# Patient Record
Sex: MALE | Race: WHITE | NOT HISPANIC OR LATINO | Employment: OTHER | URBAN - METROPOLITAN AREA
[De-identification: names, ages, dates, MRNs, and addresses within clinical notes are randomized per-mention and may not be internally consistent; named-entity substitution may affect disease eponyms.]

---

## 2019-07-10 ENCOUNTER — HOSPITAL ENCOUNTER (OUTPATIENT)
Dept: PREADMISSION TESTING | Facility: OTHER | Age: 69
Discharge: HOME | End: 2019-07-10
Attending: ORTHOPAEDIC SURGERY
Payer: MEDICARE

## 2019-07-10 ENCOUNTER — ANESTHESIA EVENT (OUTPATIENT)
Dept: SURGERY | Facility: OTHER | Age: 69
End: 2019-07-10
Payer: MEDICARE

## 2019-07-10 VITALS
BODY MASS INDEX: 28.63 KG/M2 | HEIGHT: 70 IN | TEMPERATURE: 98 F | WEIGHT: 200 LBS | HEART RATE: 54 BPM | DIASTOLIC BLOOD PRESSURE: 66 MMHG | OXYGEN SATURATION: 96 % | SYSTOLIC BLOOD PRESSURE: 142 MMHG

## 2019-07-10 DIAGNOSIS — Z01.818 PREOP TESTING: Primary | ICD-10-CM

## 2019-07-10 LAB
ABO + RH BLD: NORMAL
BILIRUB UR QL STRIP: NEGATIVE
BLD GP AB SCN CELLS X3 SERPL QL: NORMAL
CLARITY UR: CLEAR
COLOR UR: YELLOW
GLUCOSE UR QL STRIP: NEGATIVE
HGB UR QL STRIP: NEGATIVE
KETONES UR QL STRIP: NEGATIVE
LEUKOCYTE ESTERASE UR QL STRIP: NEGATIVE
NITRITE UR QL STRIP: NEGATIVE
PH UR STRIP: 6 [PH] (ref 5–8)
PROT UR QL STRIP: NEGATIVE
SP GR UR STRIP: 1.01 (ref 1–1.03)
URN SPEC COLLECT METH UR: NORMAL
UROBILINOGEN UR STRIP-ACNC: NEGATIVE EU/DL

## 2019-07-10 PROCEDURE — 36415 COLL VENOUS BLD VENIPUNCTURE: CPT

## 2019-07-10 PROCEDURE — 86901 BLOOD TYPING SEROLOGIC RH(D): CPT

## 2019-07-10 PROCEDURE — 81003 URINALYSIS AUTO W/O SCOPE: CPT

## 2019-07-10 RX ORDER — METOPROLOL TARTRATE 50 MG/1
25 TABLET ORAL DAILY
COMMUNITY
End: 2020-09-23

## 2019-07-10 RX ORDER — IRBESARTAN 150 MG/1
150 TABLET ORAL NIGHTLY
COMMUNITY
End: 2022-01-12 | Stop reason: SDUPTHER

## 2019-07-10 RX ORDER — SODIUM CHLORIDE, SODIUM LACTATE, POTASSIUM CHLORIDE, CALCIUM CHLORIDE 600; 310; 30; 20 MG/100ML; MG/100ML; MG/100ML; MG/100ML
INJECTION, SOLUTION INTRAVENOUS CONTINUOUS
Status: CANCELLED | OUTPATIENT
Start: 2019-07-10

## 2019-07-10 RX ORDER — LIDOCAINE HYDROCHLORIDE 10 MG/ML
0.5 INJECTION, SOLUTION EPIDURAL; INFILTRATION; INTRACAUDAL; PERINEURAL ONCE
Status: CANCELLED | OUTPATIENT
Start: 2019-07-10 | End: 2019-07-10

## 2019-07-10 RX ORDER — ASCORBIC ACID 500 MG
500 TABLET ORAL DAILY
COMMUNITY
End: 2020-08-19

## 2019-07-10 RX ORDER — ASPIRIN 81 MG/1
81 TABLET ORAL DAILY
Status: ON HOLD | COMMUNITY
End: 2019-07-23 | Stop reason: HOSPADM

## 2019-07-10 RX ORDER — ROSUVASTATIN CALCIUM 20 MG/1
20 TABLET, COATED ORAL NIGHTLY
COMMUNITY
End: 2022-01-12 | Stop reason: SDUPTHER

## 2019-07-10 RX ORDER — LATANOPROST 50 UG/ML
1 SOLUTION/ DROPS OPHTHALMIC NIGHTLY
COMMUNITY
End: 2021-09-22

## 2019-07-10 RX ORDER — ACETAMINOPHEN 500 MG
1000 TABLET ORAL
Status: CANCELLED | OUTPATIENT
Start: 2019-07-10 | End: 2019-07-10

## 2019-07-10 NOTE — ANESTHESIA PREPROCEDURE EVALUATION
07/10/2019  Bandar French is a 68 y.o., male.    Anesthesia Evaluation    I have reviewed the Patient Summary Reports.    I have reviewed the Nursing Notes.   I have reviewed the Medications.     Review of Systems  Anesthesia Hx:  Denies Family Hx of Anesthesia complications.   Denies Personal Hx of Anesthesia complications.   Social:  Former Smoker    Hematology/Oncology:  Hematology Normal   Oncology Normal     EENT/Dental:  EENT/Dental Normal glaucoma   Cardiovascular:   Hypertension CAD asymptomatic CABG/stent (stent 2014 & 2017)  hyperlipidemia    Pulmonary:  Pulmonary Normal    Renal/:  Renal/ Normal     Hepatic/GI:  Hepatic/GI Normal    Musculoskeletal:  Musculoskeletal Normal    Neurological:  Neurology Normal    Endocrine:  Endocrine Normal    Dermatological:  Skin Normal    Psych:  Psychiatric Normal           Physical Exam  General:  Well nourished      Dental:  Dental Findings: In tact, Upper Dentures        Mental Status:  Mental Status Findings:  Cooperative, Alert and Oriented         Anesthesia Plan  Type of Anesthesia, risks & benefits discussed:  Anesthesia Type:  general  Patient's Preference:   Intra-op Monitoring Plan: standard ASA monitors  Intra-op Monitoring Plan Comments:   Post Op Pain Control Plan: multimodal analgesia  Post Op Pain Control Plan Comments:   Induction:   IV  Beta Blocker:         Informed Consent: Patient understands risks and agrees with Anesthesia plan.  Questions answered. Anesthesia consent signed with patient.  ASA Score: 3     Day of Surgery Review of History & Physical:    H&P update referred to the surgeon.     Anesthesia Plan Notes: Booked as General, discussed both SAB and General, pt accepts either    Dr. King, [facility] cardiology, for clearance/EKG. Labs today in [facility]        Ready For Surgery From Anesthesia Perspective.

## 2019-07-10 NOTE — DISCHARGE INSTRUCTIONS
PRE-ADMIT TESTING -  [phone]    [address]  [place] [facility]          Your surgery has been scheduled at [facility]. We are pleased to have the opportunity to serve you. For Further Information please call [phone].    On the day of surgery please report to the Information Desk on the 1st floor.    · CONTACT YOUR PHYSICIAN'S OFFICE THE DAY PRIOR TO YOUR SURGERY TO OBTAIN YOUR ARRIVAL TIME.     · The evening before surgery do not eat anything after 9 p.m. ( this includes hard candy, chewing gum and mints).  You may only have GATORADE, POWERADE AND WATER  from 9 p.m. until you leave your home.   DO NOT DRINK ANY LIQUIDS ON THE WAY TO THE HOSPITAL.      SPECIAL MEDICATION INSTRUCTIONS: TAKE medications checked off by the Anesthesiologist on your Medication List.    Angiogram Patients: Take medications as instructed by your physician, including aspirin.     Surgery Patients:    If you take ASPIRIN - Your PHYSICIAN/SURGEON will need to inform you IF/OR when you need to stop taking aspirin prior to your surgery.     Do Not take any medications containing IBUPROFEN.  Do Not Wear any make-up or dark nail polish   (especially eye make-up) to surgery. If you come to surgery with makeup on you will be required to remove the makeup or nail polish.    Do not shave your surgical area at least 5 days prior to your surgery. The surgical prep will be performed at the hospital according to Infection Control regulations.    Leave all valuables at home.   Do Not wear any jewelry or watches, including any metal in body piercings. Jewelry must be removed prior to coming to the hospital.  There is a possibility that rings that are unable to be removed may be cut off if they are on the surgical extremity.    Contact Lens must be removed before surgery. Either do not wear the contact lens or bring a case and solution for storage.  Please bring a container for eyeglasses or dentures as required.  Bring  any paperwork your physician has provided, such as consent forms,  history and physicals, doctor's orders, etc.   Bring comfortable clothes that are loose fitting to wear upon discharge. Take into consideration the type of surgery being performed.  Maintain your diet as advised per your physician the day prior to surgery.      Adequate rest the night before surgery is advised.   Park in the Parking lot behind the hospital or in the [facility] across the street from the parking lot. Parking is complimentary.  If you will be discharged the same day as your procedure, please arrange for a responsible adult to drive you home or to accompany you if traveling by taxi.   YOU WILL NOT BE PERMITTED TO DRIVE OR TO LEAVE THE HOSPITAL ALONE AFTER SURGERY.   It is strongly recommended that you arrange for someone to remain with you for the first 24 hrs following your surgery.    The Surgeon will speak to your family/visitor after your surgery regarding the outcome of your surgery and post op care.  The Surgeon may speak to you after your surgery, but there is a possibility you may not remember the details.  Please check with your family members regarding the conversation with the Surgeon.      Thank you for your cooperation.  The Staff of [facility].                Bathing Instructions with Hibiclens     Shower the evening before and morning of your procedure with Hibiclens:   Wash your face with water and your regular face wash/soap   Apply Hibiclens directly on your skin or on a wet washcloth and wash gently. When showering: Move away from the shower stream when applying Hibiclens to avoid rinsing off too soon.   Rinse thoroughly with warm water   Do not dilute Hibiclens         Dry off as usual, do not use any deodorant, powder, body lotions, perfume, after shave or cologne.

## 2019-07-22 ENCOUNTER — HOSPITAL ENCOUNTER (OUTPATIENT)
Facility: OTHER | Age: 69
Discharge: HOME HEALTH | End: 2019-07-23
Attending: ORTHOPAEDIC SURGERY | Admitting: ORTHOPAEDIC SURGERY
Payer: MEDICARE

## 2019-07-22 ENCOUNTER — ANESTHESIA (OUTPATIENT)
Dept: SURGERY | Facility: OTHER | Age: 69
End: 2019-07-22
Payer: MEDICARE

## 2019-07-22 DIAGNOSIS — M17.11 OSTEOARTHRITIS OF RIGHT KNEE: ICD-10-CM

## 2019-07-22 DIAGNOSIS — M17.11 PRIMARY OSTEOARTHRITIS OF RIGHT KNEE: Primary | ICD-10-CM

## 2019-07-22 LAB
POCT GLUCOSE: 186 MG/DL (ref 70–110)
POCT GLUCOSE: 206 MG/DL (ref 70–110)

## 2019-07-22 PROCEDURE — 25000003 PHARM REV CODE 250: Performed by: ANESTHESIOLOGY

## 2019-07-22 PROCEDURE — 97116 GAIT TRAINING THERAPY: CPT

## 2019-07-22 PROCEDURE — 37000009 HC ANESTHESIA EA ADD 15 MINS: Performed by: ORTHOPAEDIC SURGERY

## 2019-07-22 PROCEDURE — 25000003 PHARM REV CODE 250: Performed by: ORTHOPAEDIC SURGERY

## 2019-07-22 PROCEDURE — 97110 THERAPEUTIC EXERCISES: CPT

## 2019-07-22 PROCEDURE — C1776 JOINT DEVICE (IMPLANTABLE): HCPCS | Performed by: ORTHOPAEDIC SURGERY

## 2019-07-22 PROCEDURE — 37000008 HC ANESTHESIA 1ST 15 MINUTES: Performed by: ORTHOPAEDIC SURGERY

## 2019-07-22 PROCEDURE — 25000003 PHARM REV CODE 250: Performed by: NURSE PRACTITIONER

## 2019-07-22 PROCEDURE — 63600175 PHARM REV CODE 636 W HCPCS: Performed by: ORTHOPAEDIC SURGERY

## 2019-07-22 PROCEDURE — 94761 N-INVAS EAR/PLS OXIMETRY MLT: CPT

## 2019-07-22 PROCEDURE — 36000711: Performed by: ORTHOPAEDIC SURGERY

## 2019-07-22 PROCEDURE — 36000710: Performed by: ORTHOPAEDIC SURGERY

## 2019-07-22 PROCEDURE — 25000003 PHARM REV CODE 250: Performed by: NURSE ANESTHETIST, CERTIFIED REGISTERED

## 2019-07-22 PROCEDURE — C9290 INJ, BUPIVACAINE LIPOSOME: HCPCS | Performed by: ORTHOPAEDIC SURGERY

## 2019-07-22 PROCEDURE — 63600175 PHARM REV CODE 636 W HCPCS: Performed by: NURSE ANESTHETIST, CERTIFIED REGISTERED

## 2019-07-22 PROCEDURE — 97161 PT EVAL LOW COMPLEX 20 MIN: CPT

## 2019-07-22 PROCEDURE — 71000039 HC RECOVERY, EACH ADD'L HOUR: Performed by: ORTHOPAEDIC SURGERY

## 2019-07-22 PROCEDURE — 27201423 OPTIME MED/SURG SUP & DEVICES STERILE SUPPLY: Performed by: ORTHOPAEDIC SURGERY

## 2019-07-22 PROCEDURE — 94799 UNLISTED PULMONARY SVC/PX: CPT

## 2019-07-22 PROCEDURE — C1713 ANCHOR/SCREW BN/BN,TIS/BN: HCPCS | Performed by: ORTHOPAEDIC SURGERY

## 2019-07-22 PROCEDURE — 63600175 PHARM REV CODE 636 W HCPCS: Performed by: SPECIALIST

## 2019-07-22 PROCEDURE — 71000033 HC RECOVERY, INTIAL HOUR: Performed by: ORTHOPAEDIC SURGERY

## 2019-07-22 DEVICE — IMPLANTABLE DEVICE: Type: IMPLANTABLE DEVICE | Site: KNEE | Status: FUNCTIONAL

## 2019-07-22 DEVICE — CEMENT REFOBACIN BCR 1X40: Type: IMPLANTABLE DEVICE | Site: KNEE | Status: FUNCTIONAL

## 2019-07-22 DEVICE — TIBIA STM PSN 5 DEG SZ F R: Type: IMPLANTABLE DEVICE | Site: KNEE | Status: FUNCTIONAL

## 2019-07-22 DEVICE — COMP FEM CR PSN CMT SZ 8 R: Type: IMPLANTABLE DEVICE | Site: KNEE | Status: FUNCTIONAL

## 2019-07-22 RX ORDER — ROSUVASTATIN CALCIUM 10 MG/1
20 TABLET, COATED ORAL NIGHTLY
Status: DISCONTINUED | OUTPATIENT
Start: 2019-07-22 | End: 2019-07-23 | Stop reason: HOSPADM

## 2019-07-22 RX ORDER — DIPHENHYDRAMINE HYDROCHLORIDE 50 MG/ML
25 INJECTION INTRAMUSCULAR; INTRAVENOUS EVERY 6 HOURS PRN
Status: DISCONTINUED | OUTPATIENT
Start: 2019-07-22 | End: 2019-07-22 | Stop reason: HOSPADM

## 2019-07-22 RX ORDER — GLUCAGON 1 MG
1 KIT INJECTION
Status: DISCONTINUED | OUTPATIENT
Start: 2019-07-22 | End: 2019-07-23 | Stop reason: HOSPADM

## 2019-07-22 RX ORDER — DIPHENHYDRAMINE HYDROCHLORIDE 50 MG/ML
25 INJECTION INTRAMUSCULAR; INTRAVENOUS EVERY 6 HOURS PRN
Status: DISCONTINUED | OUTPATIENT
Start: 2019-07-22 | End: 2019-07-23 | Stop reason: HOSPADM

## 2019-07-22 RX ORDER — IBUPROFEN 200 MG
24 TABLET ORAL
Status: DISCONTINUED | OUTPATIENT
Start: 2019-07-22 | End: 2019-07-23 | Stop reason: HOSPADM

## 2019-07-22 RX ORDER — ACETAMINOPHEN 500 MG
1000 TABLET ORAL
Status: DISCONTINUED | OUTPATIENT
Start: 2019-07-22 | End: 2019-07-22 | Stop reason: SDUPTHER

## 2019-07-22 RX ORDER — SODIUM CHLORIDE 0.9 % (FLUSH) 0.9 %
10 SYRINGE (ML) INJECTION
Status: DISCONTINUED | OUTPATIENT
Start: 2019-07-22 | End: 2019-07-23 | Stop reason: HOSPADM

## 2019-07-22 RX ORDER — KETOROLAC TROMETHAMINE 30 MG/ML
INJECTION, SOLUTION INTRAMUSCULAR; INTRAVENOUS
Status: DISCONTINUED | OUTPATIENT
Start: 2019-07-22 | End: 2019-07-22 | Stop reason: HOSPADM

## 2019-07-22 RX ORDER — OXYCODONE HYDROCHLORIDE 5 MG/1
10 TABLET ORAL EVERY 4 HOURS PRN
Status: DISCONTINUED | OUTPATIENT
Start: 2019-07-22 | End: 2019-07-23 | Stop reason: HOSPADM

## 2019-07-22 RX ORDER — ASPIRIN 325 MG
325 TABLET ORAL EVERY 12 HOURS
Status: DISCONTINUED | OUTPATIENT
Start: 2019-07-23 | End: 2019-07-23 | Stop reason: HOSPADM

## 2019-07-22 RX ORDER — HYDROMORPHONE HYDROCHLORIDE 1 MG/ML
0.5 INJECTION, SOLUTION INTRAMUSCULAR; INTRAVENOUS; SUBCUTANEOUS EVERY 4 HOURS PRN
Status: DISCONTINUED | OUTPATIENT
Start: 2019-07-22 | End: 2019-07-23 | Stop reason: HOSPADM

## 2019-07-22 RX ORDER — MEPERIDINE HYDROCHLORIDE 25 MG/ML
12.5 INJECTION INTRAMUSCULAR; INTRAVENOUS; SUBCUTANEOUS ONCE AS NEEDED
Status: DISCONTINUED | OUTPATIENT
Start: 2019-07-22 | End: 2019-07-22 | Stop reason: HOSPADM

## 2019-07-22 RX ORDER — CEFAZOLIN SODIUM 2 G/50ML
2 SOLUTION INTRAVENOUS
Status: COMPLETED | OUTPATIENT
Start: 2019-07-22 | End: 2019-07-23

## 2019-07-22 RX ORDER — MIDAZOLAM HYDROCHLORIDE 1 MG/ML
INJECTION INTRAMUSCULAR; INTRAVENOUS
Status: DISCONTINUED | OUTPATIENT
Start: 2019-07-22 | End: 2019-07-22

## 2019-07-22 RX ORDER — ONDANSETRON 2 MG/ML
8 INJECTION INTRAMUSCULAR; INTRAVENOUS EVERY 8 HOURS PRN
Status: DISCONTINUED | OUTPATIENT
Start: 2019-07-22 | End: 2019-07-23 | Stop reason: HOSPADM

## 2019-07-22 RX ORDER — IRBESARTAN 150 MG/1
150 TABLET ORAL NIGHTLY
Status: DISCONTINUED | OUTPATIENT
Start: 2019-07-22 | End: 2019-07-23 | Stop reason: HOSPADM

## 2019-07-22 RX ORDER — ACETAMINOPHEN 500 MG
1000 TABLET ORAL
Status: COMPLETED | OUTPATIENT
Start: 2019-07-22 | End: 2019-07-22

## 2019-07-22 RX ORDER — HYDROMORPHONE HYDROCHLORIDE 2 MG/ML
0.4 INJECTION, SOLUTION INTRAMUSCULAR; INTRAVENOUS; SUBCUTANEOUS EVERY 5 MIN PRN
Status: DISCONTINUED | OUTPATIENT
Start: 2019-07-22 | End: 2019-07-22 | Stop reason: HOSPADM

## 2019-07-22 RX ORDER — OXYCODONE HYDROCHLORIDE 5 MG/1
5 TABLET ORAL
Status: DISCONTINUED | OUTPATIENT
Start: 2019-07-22 | End: 2019-07-22 | Stop reason: HOSPADM

## 2019-07-22 RX ORDER — DOCUSATE SODIUM 100 MG/1
100 CAPSULE, LIQUID FILLED ORAL EVERY 12 HOURS
Status: DISCONTINUED | OUTPATIENT
Start: 2019-07-22 | End: 2019-07-23 | Stop reason: HOSPADM

## 2019-07-22 RX ORDER — LIDOCAINE HYDROCHLORIDE 10 MG/ML
0.5 INJECTION, SOLUTION EPIDURAL; INFILTRATION; INTRACAUDAL; PERINEURAL ONCE
Status: DISCONTINUED | OUTPATIENT
Start: 2019-07-22 | End: 2019-07-22 | Stop reason: HOSPADM

## 2019-07-22 RX ORDER — SODIUM CHLORIDE 0.9 % (FLUSH) 0.9 %
3 SYRINGE (ML) INJECTION
Status: DISCONTINUED | OUTPATIENT
Start: 2019-07-22 | End: 2019-07-23 | Stop reason: HOSPADM

## 2019-07-22 RX ORDER — OXYCODONE HYDROCHLORIDE 5 MG/1
5 TABLET ORAL EVERY 4 HOURS PRN
Status: DISCONTINUED | OUTPATIENT
Start: 2019-07-22 | End: 2019-07-23 | Stop reason: HOSPADM

## 2019-07-22 RX ORDER — BUPIVACAINE HCL/EPINEPHRINE 0.25-.0005
VIAL (ML) INJECTION
Status: DISCONTINUED | OUTPATIENT
Start: 2019-07-22 | End: 2019-07-22 | Stop reason: HOSPADM

## 2019-07-22 RX ORDER — SODIUM CHLORIDE, SODIUM LACTATE, POTASSIUM CHLORIDE, CALCIUM CHLORIDE 600; 310; 30; 20 MG/100ML; MG/100ML; MG/100ML; MG/100ML
INJECTION, SOLUTION INTRAVENOUS CONTINUOUS
Status: DISCONTINUED | OUTPATIENT
Start: 2019-07-22 | End: 2019-07-23 | Stop reason: HOSPADM

## 2019-07-22 RX ORDER — ONDANSETRON 2 MG/ML
4 INJECTION INTRAMUSCULAR; INTRAVENOUS DAILY PRN
Status: DISCONTINUED | OUTPATIENT
Start: 2019-07-22 | End: 2019-07-22 | Stop reason: HOSPADM

## 2019-07-22 RX ORDER — CEFAZOLIN SODIUM 1 G/3ML
2 INJECTION, POWDER, FOR SOLUTION INTRAMUSCULAR; INTRAVENOUS
Status: COMPLETED | OUTPATIENT
Start: 2019-07-22 | End: 2019-07-22

## 2019-07-22 RX ORDER — ACETAMINOPHEN 500 MG
1000 TABLET ORAL EVERY 8 HOURS
Status: COMPLETED | OUTPATIENT
Start: 2019-07-22 | End: 2019-07-23

## 2019-07-22 RX ORDER — IBUPROFEN 200 MG
16 TABLET ORAL
Status: DISCONTINUED | OUTPATIENT
Start: 2019-07-22 | End: 2019-07-23 | Stop reason: HOSPADM

## 2019-07-22 RX ORDER — INSULIN ASPART 100 [IU]/ML
0-5 INJECTION, SOLUTION INTRAVENOUS; SUBCUTANEOUS
Status: DISCONTINUED | OUTPATIENT
Start: 2019-07-22 | End: 2019-07-23 | Stop reason: HOSPADM

## 2019-07-22 RX ORDER — ACETAMINOPHEN 325 MG/1
650 TABLET ORAL EVERY 6 HOURS PRN
Status: DISCONTINUED | OUTPATIENT
Start: 2019-07-23 | End: 2019-07-23 | Stop reason: HOSPADM

## 2019-07-22 RX ORDER — METOPROLOL TARTRATE 50 MG/1
50 TABLET ORAL 2 TIMES DAILY
Status: DISCONTINUED | OUTPATIENT
Start: 2019-07-22 | End: 2019-07-23 | Stop reason: HOSPADM

## 2019-07-22 RX ORDER — MUPIROCIN 20 MG/G
1 OINTMENT TOPICAL 2 TIMES DAILY
Status: DISCONTINUED | OUTPATIENT
Start: 2019-07-22 | End: 2019-07-23 | Stop reason: HOSPADM

## 2019-07-22 RX ORDER — EPHEDRINE SULFATE 50 MG/ML
INJECTION, SOLUTION INTRAVENOUS
Status: DISCONTINUED | OUTPATIENT
Start: 2019-07-22 | End: 2019-07-22

## 2019-07-22 RX ORDER — PROPOFOL 10 MG/ML
VIAL (ML) INTRAVENOUS
Status: DISCONTINUED | OUTPATIENT
Start: 2019-07-22 | End: 2019-07-22

## 2019-07-22 RX ORDER — MIDAZOLAM HYDROCHLORIDE 5 MG/ML
INJECTION INTRAMUSCULAR; INTRAVENOUS
Status: DISCONTINUED | OUTPATIENT
Start: 2019-07-22 | End: 2019-07-22

## 2019-07-22 RX ORDER — PROPOFOL 10 MG/ML
VIAL (ML) INTRAVENOUS CONTINUOUS PRN
Status: DISCONTINUED | OUTPATIENT
Start: 2019-07-22 | End: 2019-07-22

## 2019-07-22 RX ORDER — ROPIVACAINE HYDROCHLORIDE 5 MG/ML
INJECTION, SOLUTION EPIDURAL; INFILTRATION; PERINEURAL
Status: COMPLETED | OUTPATIENT
Start: 2019-07-22 | End: 2019-07-22

## 2019-07-22 RX ORDER — DEXTROSE MONOHYDRATE AND SODIUM CHLORIDE 5; .9 G/100ML; G/100ML
INJECTION, SOLUTION INTRAVENOUS CONTINUOUS
Status: DISCONTINUED | OUTPATIENT
Start: 2019-07-22 | End: 2019-07-23

## 2019-07-22 RX ORDER — LIDOCAINE HYDROCHLORIDE 10 MG/ML
INJECTION, SOLUTION INTRAVENOUS
Status: DISCONTINUED | OUTPATIENT
Start: 2019-07-22 | End: 2019-07-22

## 2019-07-22 RX ORDER — FENTANYL CITRATE 50 UG/ML
INJECTION, SOLUTION INTRAMUSCULAR; INTRAVENOUS
Status: DISCONTINUED | OUTPATIENT
Start: 2019-07-22 | End: 2019-07-22

## 2019-07-22 RX ORDER — ONDANSETRON 2 MG/ML
INJECTION INTRAMUSCULAR; INTRAVENOUS
Status: DISCONTINUED | OUTPATIENT
Start: 2019-07-22 | End: 2019-07-22

## 2019-07-22 RX ORDER — CELECOXIB 200 MG/1
200 CAPSULE ORAL 2 TIMES DAILY
Status: DISCONTINUED | OUTPATIENT
Start: 2019-07-22 | End: 2019-07-23 | Stop reason: HOSPADM

## 2019-07-22 RX ORDER — TRANEXAMIC ACID 100 MG/ML
INJECTION, SOLUTION INTRAVENOUS
Status: DISCONTINUED | OUTPATIENT
Start: 2019-07-22 | End: 2019-07-22 | Stop reason: HOSPADM

## 2019-07-22 RX ORDER — FAMOTIDINE 20 MG/1
20 TABLET, FILM COATED ORAL 2 TIMES DAILY
Status: DISCONTINUED | OUTPATIENT
Start: 2019-07-22 | End: 2019-07-23 | Stop reason: HOSPADM

## 2019-07-22 RX ORDER — POLYETHYLENE GLYCOL 3350 17 G/17G
17 POWDER, FOR SOLUTION ORAL DAILY
Status: DISCONTINUED | OUTPATIENT
Start: 2019-07-23 | End: 2019-07-23 | Stop reason: HOSPADM

## 2019-07-22 RX ADMIN — ROSUVASTATIN CALCIUM 20 MG: 10 TABLET, COATED ORAL at 08:07

## 2019-07-22 RX ADMIN — ONDANSETRON 4 MG: 2 INJECTION INTRAMUSCULAR; INTRAVENOUS at 08:07

## 2019-07-22 RX ADMIN — EPHEDRINE SULFATE 10 MG: 50 INJECTION INTRAMUSCULAR; INTRAVENOUS; SUBCUTANEOUS at 09:07

## 2019-07-22 RX ADMIN — PROPOFOL 40 MG: 10 INJECTION, EMULSION INTRAVENOUS at 08:07

## 2019-07-22 RX ADMIN — CELECOXIB 200 MG: 200 CAPSULE ORAL at 08:07

## 2019-07-22 RX ADMIN — VANCOMYCIN HYDROCHLORIDE 1250 MG: 100 INJECTION, POWDER, LYOPHILIZED, FOR SOLUTION INTRAVENOUS at 08:07

## 2019-07-22 RX ADMIN — ROPIVACAINE HYDROCHLORIDE 3 ML: 5 INJECTION, SOLUTION EPIDURAL; INFILTRATION; PERINEURAL at 08:07

## 2019-07-22 RX ADMIN — SODIUM CHLORIDE, SODIUM LACTATE, POTASSIUM CHLORIDE, AND CALCIUM CHLORIDE: 600; 310; 30; 20 INJECTION, SOLUTION INTRAVENOUS at 09:07

## 2019-07-22 RX ADMIN — EPHEDRINE SULFATE 5 MG: 50 INJECTION INTRAMUSCULAR; INTRAVENOUS; SUBCUTANEOUS at 09:07

## 2019-07-22 RX ADMIN — CELECOXIB 200 MG: 200 CAPSULE ORAL at 01:07

## 2019-07-22 RX ADMIN — OXYCODONE HYDROCHLORIDE 10 MG: 5 TABLET ORAL at 05:07

## 2019-07-22 RX ADMIN — ACETAMINOPHEN 1000 MG: 500 TABLET, FILM COATED ORAL at 07:07

## 2019-07-22 RX ADMIN — DOCUSATE SODIUM 100 MG: 100 CAPSULE, LIQUID FILLED ORAL at 01:07

## 2019-07-22 RX ADMIN — EPHEDRINE SULFATE 5 MG: 50 INJECTION INTRAMUSCULAR; INTRAVENOUS; SUBCUTANEOUS at 08:07

## 2019-07-22 RX ADMIN — ACETAMINOPHEN 1000 MG: 500 TABLET ORAL at 03:07

## 2019-07-22 RX ADMIN — FAMOTIDINE 20 MG: 20 TABLET, FILM COATED ORAL at 01:07

## 2019-07-22 RX ADMIN — CEFAZOLIN 2 G: 330 INJECTION, POWDER, FOR SOLUTION INTRAMUSCULAR; INTRAVENOUS at 08:07

## 2019-07-22 RX ADMIN — SODIUM CHLORIDE, SODIUM LACTATE, POTASSIUM CHLORIDE, AND CALCIUM CHLORIDE: 600; 310; 30; 20 INJECTION, SOLUTION INTRAVENOUS at 08:07

## 2019-07-22 RX ADMIN — SODIUM CHLORIDE, SODIUM LACTATE, POTASSIUM CHLORIDE, AND CALCIUM CHLORIDE: 600; 310; 30; 20 INJECTION, SOLUTION INTRAVENOUS at 07:07

## 2019-07-22 RX ADMIN — MUPIROCIN 1 G: 20 OINTMENT TOPICAL at 08:07

## 2019-07-22 RX ADMIN — CEFAZOLIN SODIUM 2 G: 2 SOLUTION INTRAVENOUS at 05:07

## 2019-07-22 RX ADMIN — DEXTROSE AND SODIUM CHLORIDE: 5; .9 INJECTION, SOLUTION INTRAVENOUS at 12:07

## 2019-07-22 RX ADMIN — LIDOCAINE HYDROCHLORIDE 50 MG: 10 INJECTION, SOLUTION INTRAVENOUS at 08:07

## 2019-07-22 RX ADMIN — PROPOFOL 75 MCG/KG/MIN: 10 INJECTION, EMULSION INTRAVENOUS at 08:07

## 2019-07-22 RX ADMIN — OXYCODONE HYDROCHLORIDE 10 MG: 5 TABLET ORAL at 01:07

## 2019-07-22 RX ADMIN — MIDAZOLAM HYDROCHLORIDE 2 MG: 1 INJECTION, SOLUTION INTRAMUSCULAR; INTRAVENOUS at 08:07

## 2019-07-22 RX ADMIN — MUPIROCIN 1 G: 20 OINTMENT TOPICAL at 01:07

## 2019-07-22 RX ADMIN — DOCUSATE SODIUM 100 MG: 100 CAPSULE, LIQUID FILLED ORAL at 08:07

## 2019-07-22 RX ADMIN — VANCOMYCIN HYDROCHLORIDE 1250 MG: 100 INJECTION, POWDER, LYOPHILIZED, FOR SOLUTION INTRAVENOUS at 07:07

## 2019-07-22 RX ADMIN — ACETAMINOPHEN 1000 MG: 500 TABLET ORAL at 09:07

## 2019-07-22 RX ADMIN — FAMOTIDINE 20 MG: 20 TABLET, FILM COATED ORAL at 08:07

## 2019-07-22 NOTE — PT/OT/SLP PROGRESS
Physical Therapy Treatment    Patient Name:  Bandar French   MRN:  [id number]    Recommendations:     Discharge Recommendations:  home health PT, home   Discharge Equipment Recommendations: shower chair, commode   Barriers to discharge: None    Assessment:     Bandar French is a 68 y.o. male admitted with a medical diagnosis of Osteoarthritis of right knee.  He presents with the following impairments/functional limitations:  weakness, impaired functional mobilty, gait instability, orthopedic precautions, impaired balance, decreased lower extremity function. These impairments inhibit the patient from independently and safely participating in desired functional tasks such as transfers, ambulating within home, cooking, cleaning, and community ambulation.     Patient tolerated second session very well as evidenced by increased gait distance and improving independence with therapeutic exercises. Patient required moderate cues for gait deviations, see below. Patient would benefit from  PT upon discharge in order to optimize functional mobility outcomes and maximize patient safety.     Rehab Prognosis: Good; patient would benefit from acute skilled PT services to address these deficits and reach maximum level of function.    Recent Surgery: Procedure(s) (LRB):  ARTHROPLASTY, KNEE, TOTAL (Right) Day of Surgery    Plan:     During this hospitalization, patient to be seen BID to address the identified rehab impairments via gait training, therapeutic exercises, therapeutic activities, neuromuscular re-education and progress toward the following goals:    · Plan of Care Expires:  07/29/19    Subjective     Chief Complaint: Minimal pain  Patient/Family Comments/goals: Agreeable to ambulation and therapeutic exercises   Pain/Comfort:  · Pain Rating 1: 2/10  · Location - Side 1: Right  · Location - Orientation 1: generalized  · Location 1: knee  · Pain Addressed 1: Reposition, Distraction  · Pain Rating Post-Intervention 1:  2/10      Objective:     Communicated with RN prior to session.  Patient found up in chair with   upon PT entry to room.     General Precautions: Standard, fall   Orthopedic Precautions:RLE weight bearing as tolerated   Braces: N/A     Functional Mobility:  · Bed Mobility:     · Sit to Supine: stand by assistance  · No verbal cues necessary   · No use of HB features   · Transfers:     · Sit <> Stand:  stand by assistance with rolling walker  · Verbal cues for hand placement for optimal safety   · Gait:   · Ambulated 150' with RW, SBA/CGA  · PT demonstrated the following prior to patient trial: heel contact upon initial contact, toe off upon initial swing, and adequate knee flexion during swing phase of surgical limb. PT also demonstrated step-through pattern  · Pt required moderate verbal cues for heel contact upon initial contact  · Patient with moderately poor swing phase of surgical limb; required demonstration 2/2 poor knee flexion  · Balance:   Static sitting: SBA  Static standing: CGA/SBA with RW        AM-PAC 6 CLICK MOBILITY          Therapeutic Activities and Exercises:  · Pt performed supine therapeutic exercises for strengthening and to promote circulation, pressure relief, and functional ROM with verbal, visual and tactile cues for correct performance including:   · Bilateral ankle pumps x 10 reps  · Bilateral quad sets x 10 reps  · Bilateral glute sets x 10 reps   · RLE heel slides x 10 reps   · RLE hip ABD x 10 reps  · RLE SLR x 10 reps    Patient left supine with all lines intact, call button in reach and RN notified..    GOALS:   Multidisciplinary Problems     Physical Therapy Goals        Problem: Physical Therapy Goal    Goal Priority Disciplines Outcome Goal Variances Interventions   Physical Therapy Goal     PT, PT/OT      Description:  PT goals to be met by 7/29/19:  1. Pt will perform transfers (supine <-> sit, sit <-> stand, bed <-> chair) with mod I and LRAD.  2. Pt will ambulate 300 ft or  "greater with mod I and LRAD.  3. Pt will ascend/descend 1 6" curb style step with LRAD and supervision to simulate home environment.                      Time Tracking:     PT Received On: 07/22/19  PT Start Time: 1618     PT Stop Time: 1647  PT Total Time (min): 29 min     Billable Minutes: Gait Training 20 and Therapeutic Exercise 9    Treatment Type: Treatment  PT/PTA: PT     PTA Visit Number: 0     Renetta Brown, PT  07/22/2019  "

## 2019-07-22 NOTE — PT/OT/SLP EVAL
"Physical Therapy Evaluation    Patient Name:  Bandar French   MRN:  [id number]    Recommendations:     Discharge Recommendations:  home health PT, home   Discharge Equipment Recommendations: shower chair, commode   Barriers to discharge: None    Assessment:     Bandar French is a 68 y.o. male admitted with a medical diagnosis of Osteoarthritis of right knee.  He presents with the following impairments/functional limitations:  weakness, impaired functional mobilty, decreased safety awareness, impaired endurance, gait instability, pain, decreased ROM, edema, decreased lower extremity function, orthopedic precautions.    PT evaluation completed and goals established. Pt tolerated PT eval well with no adverse reactions.Pt able to perform transfers with CGA. Ambulated 200 ft with CGA and RW. Pt limited by pain and decreased ROM at R knee.    Rehab Prognosis: Good; patient would benefit from acute skilled PT services to address these deficits and reach maximum level of function.    Recent Surgery: Procedure(s) (LRB):  ARTHROPLASTY, KNEE, TOTAL (Right) Day of Surgery    Plan:     During this hospitalization, patient to be seen BID to address the identified rehab impairments via gait training, therapeutic activities, therapeutic exercises and progress toward the following goals:    · Plan of Care Expires:  07/29/19    Subjective     Chief Complaint: "I want to be get out bed."  Patient/Family Comments/goals: "to go home"  Pain/Comfort:  · Pain Rating 1: 1/10  · Location - Side 1: Right  · Location 1: knee  · Pain Rating Post-Intervention 1: 6/10  · Location - Side 2: Right  · Location 2: knee  · Pain Addressed 2: Reposition, Distraction, Other (see comments)(cryotherapy applied after session)    Patients cultural, spiritual, Muslim conflicts given the current situation: no    Living Environment:  Pt lives with his wife in a single-story home. Pt has 6 curb-style steps up to enter through the front door. Pt has 20 ft " incline to enter through back door. Pt has walk-in shower in bathroom.  Prior to admission, patients level of function was independent for mobility and ADLs.  Equipment used at home: none.  DME owned (not currently used): rolling walker.  Upon discharge, patient will have assistance from his wife.    Objective:     Communicated with ortho nurse navigator, Maricruz, prior to session.  Patient found supine with bashir catheter, peripheral IV  upon PT entry to room.    General Precautions: Standard, fall   Orthopedic Precautions:RLE weight bearing as tolerated   Braces: N/A     Exams:  · Cognitive Exam:  Patient is oriented to Person, Place, Time and Situation  · Gross Motor Coordination:  WFL  · Sensation: Intact  · RLE ROM: WFL except at R knee. R knee flexion 0-75 deg. R knee extension lacking 5 deg.  · RLE Strength: WFL except at R knee. 5/5 MMT at hip and ankle. 4-/5 MMT at knee.  · LLE ROM: WNL  · LLE Strength: WNL    Functional Mobility:  · Bed Mobility:     · Supine to Sit: contact guard assistance  · Transfers:     · Sit to Stand:  contact guard assistance with rolling walker  · Bed to Chair: contact guard assistance with  rolling walker  using  Step Transfer  · Gait: 200 ft with RW and CGA.      Therapeutic Activities and Exercises:  · Pt supine in bed upon entering room and agreeable to PT eval/treatment.  · Performed supine -> sit with CGA. Sat EOB x 2 min with SBA. Demonstrated normal static sitting balance.  · Performed sit <-> stand with CGA and RW. Required verbal cues for hand placement.  · Amb 200 ft with RW and CGA. Demonstrated step-to gait pattern with decreased L step length. Improved with verbal cues. No LOB noted. Demonstrated good balance during ambulation. Required VC for management of RW during turns.  · Perf therex: glut sets, quad sets, AP, and SLR x 10 reps on BLE.    Pt tolerated PT eval and treatment well with no adverse reactions. Pt limited by pain and decreased ROM at R knee.    AM-PAC 6  "CLICK MOBILITY  Total Score:17     Patient left up in chair with all lines intact and call button in reach.    GOALS:   Multidisciplinary Problems     Physical Therapy Goals        Problem: Physical Therapy Goal    Goal Priority Disciplines Outcome Goal Variances Interventions   Physical Therapy Goal     PT, PT/OT      Description:  PT goals to be met by 7/29/19:  1. Pt will perform transfers (supine <-> sit, sit <-> stand, bed <-> chair) with mod I and LRAD.  2. Pt will ambulate 300 ft or greater with mod I and LRAD.  3. Pt will ascend/descend 1 6" curb style step with LRAD and supervision to simulate home environment.                      History:     Past Medical History:   Diagnosis Date    Arthritis     Coronary artery disease 2014    stent    Glaucoma     Hyperlipidemia     Hypertension     Impaired fasting glucose        Past Surgical History:   Procedure Laterality Date    CORONARY STENT PLACEMENT  2014    EYE SURGERY      tendon repair on right; bleph bilateral    HAND SURGERY Right     KNEE ARTHROSCOPY Left 1967    KNEE ARTHROSCOPY Right 2012    TONSILLECTOMY         Time Tracking:     PT Received On: 07/22/19  PT Start Time: 1408     PT Stop Time: 1440  PT Total Time (min): 32 min     Billable Minutes: Evaluation 15 and Therapeutic Exercise 17      Ashley Wooten, PT  07/22/2019  "

## 2019-07-22 NOTE — DISCHARGE INSTRUCTIONS
Knee Athroscopy Discharge Instructions    1) Pain: After surgery your knee will be sore. The knee will likely have been injected with a numbing medicine (Exparel) prior to completion of surgery for pain control. This is indicated on a green bracelet that you will continue to wear for 4 days after surgery. You will   also get a prescription for pain control before you leave the hospital. Ice and elevation will assist with pain control.    a) Apply ice as much as possible for the first 72 hours. After 72 hours, apply ice for 20minutes 3-4 times a day, after therapy,after exercising or whenever experiencing pain. Avoid direct skin contact with ice to prevent frostbit.          b) Elevate the affected leg with the pillow the length of the leg  to assist with swelling and pain.  2) Incision Care:  a) Some drainage from the incision in the first 72 hours is normal. If drainage is excessive,remove bandage,  pat dry, cover with sterile gauze and secure with tape. Notify physician about excessive drainage. Staples will be removed 14 days after surgery   3) Activity:  a) Perform exercises 2-3 x day.  b) You may shower 48 hours after surgery providing the dressing is waterproof. No tub or hot tub usage.DR SIMON PATIENTS CANNOT SHOWER UNTIL STAPLES ARE REMOVED. Support help is mandatory during showering. If the dressing becomes wet, replace with a new dressing.   c) Wear thigh high dg hose stockings for 3 weeks after surgery .You may remove stockings for 1- 2 hours during the day only. Send patient home with an extra pair dg hose.If your physician orders the CPM machine you are to use it for 2 hours in the am and 2 hours in the pm.Increase the flexion 5 degrees each session if tolerated. This is not to replace your exercise program.  4) For lifetime after your replacement surgery, you may need antibiotic coverage before dental or minor surgical procedures.  5) Possible Complications: Call Surgeon  a) Infection: Report these  signs and symptoms to your surgeon.  i) Unexpected redness around incision   ii) Persistent drainage from wound after 72 hours.  iii) Temperature ,can be treated with Tylenol. Do not go to the emergency room or urgent care center, call your surgeon.   iv) Additional swelling  v) Pain not controlled with current pain medication  b) Blood Clot: Report theses signs and symptoms to your surgeon  i) Unusual pain  ii) Red or discolored skin  iii) Swelling in the leg  iv) Unusual warm skin

## 2019-07-22 NOTE — PLAN OF CARE
ARLETTE met with pt at bedside to complete discharge assessment, verified PCP and uses [facility] on [address].  Pt has RW, needs BSC and will purchase SC later.  ARLETTE gave pt a list of  agencies and pt would like to be placed with Dr. Westbrook's preferred provider and wife signed choice form.  Wife will provide transportation home.     07/22/19 5722   Discharge Assessment   Assessment Type Discharge Planning Assessment   Confirmed/corrected address and phone number on facesheet? Yes   Assessment information obtained from? Patient   Communicated expected length of stay with patient/caregiver no   Prior to hospitilization cognitive status: Alert/Oriented   Prior to hospitalization functional status: Independent   Current cognitive status: Alert/Oriented   Current Functional Status: Assistive Equipment;Needs Assistance   Lives With spouse   Able to Return to Prior Arrangements yes   Is patient able to care for self after discharge? Unable to determine at this time (comments)   Readmission Within the Last 30 Days no previous admission in last 30 days   Patient currently being followed by outpatient case management? No   Patient currently receives any other outside agency services? No   Equipment Currently Used at Home walker, rolling   Do you have any problems affording any of your prescribed medications? No   Is the patient taking medications as prescribed? yes   Does the patient have transportation home? Yes   Transportation Anticipated family or friend will provide   Does the patient receive services at the [facility]? No   Discharge Plan A Home Health   DME Needed Upon Discharge  bedside commode   Patient/Family in Agreement with Plan yes

## 2019-07-22 NOTE — CONSULTS
Consult Note  IM    Consult Requested By: Quinton Westbrook MD  Reason for Consult: CAD/HTN/Lipids/IFG    SUBJECTIVE:     History of Present Illness:  Patient is a 68 y.o. male presents with scheduled right knee repair.  Seen post op in PACU.  Still under effects of spinal anesthesia.  VSS.  Pre-op/EPIC reviewed.  No CP/SOB/N/V/D/F/C.      Past Medical History:   Diagnosis Date    Arthritis     Coronary artery disease 2014    stent    Glaucoma     Hyperlipidemia     Hypertension     Impaired fasting glucose      Past Surgical History:   Procedure Laterality Date    CORONARY STENT PLACEMENT      EYE SURGERY      tendon repair on right; bleph bilateral    HAND SURGERY Right     KNEE ARTHROSCOPY Left 1967    KNEE ARTHROSCOPY Right     TONSILLECTOMY       History reviewed. No pertinent family history.  Social History     Tobacco Use    Smoking status: Former Smoker     Last attempt to quit:      Years since quittin.5    Smokeless tobacco: Never Used   Substance Use Topics    Alcohol use: Not Currently     Frequency: Never     Comment:     Drug use: Not on file       Review of patient's allergies indicates:  No Known Allergies     Review of Systems:  Constitutional: No fever or chills  Respiratory: No cough or shortness of breath  Cardiovascular: No chest pain or palpitations  Gastrointestinal: No nausea or vomiting  Neurological: No confusion or weakness    OBJECTIVE:     Vital Signs (Most Recent)  Temp: (unableto get po temp. placed under mechelle hugger) (19 1012)  Pulse: (!) 50 (19 1045)  Resp: 20 (19 1100)  BP: 133/73 (19 1045)  SpO2: 98 % (19 1045)    Vital Signs Range (Last 24H):  Temp:  [97.7 °F (36.5 °C)]   Pulse:  [50-65]   Resp:  [16-20]   BP: (131-135)/(66-74)   SpO2:  [95 %-98 %]       Intake/Output Summary (Last 24 hours) at 2019 1117  Last data filed at 2019 1100  Gross per 24 hour   Intake 1400 ml   Output 1000 ml   Net 400 ml        Physical Exam:  General appearance: Well developed, well nourished  Eyes:  Conjunctivae/corneas clear. PERRL.  Lungs: Normal respiratory effort,   clear to auscultation bilaterally   Heart: Nabeel/Regular rate and rhythm, S1, S2 normal, no murmur, rub or akila.  Abdomen: Soft, non-tender non-distended; bowel sounds normal; no masses,  no organomegaly  Extremities: No cyanosis or clubbing. No edema.    Skin: Skin color, texture, turgor normal. No rashes or lesions  Neurologic: Normal strength and tone. No focal numbness or weakness  Right knee:  CDI  [place]      Laboratory:      Diagnostic Results:  X-Ray Knee 1 or 2 View Right   Final Result      Postsurgical change.         Electronically signed by: Deacon Borrero MD   Date:    07/22/2019   Time:    10:45          ASSESSMENT/PLAN:     1. S/P Right Knee (M17.11):  Per ortho/therapy teams.  2. CAD (I25.10):  Followed by Dr. King.  BB/ARB/Statin/ASA.  Monitor on tele.   3. HTN (I10):  meds with hold parameters.  4. IFG (R73.01):  ADA diet.  SSI.   5. Lipids (E78.5):  Statin.  6. DVT prophy:  ASA BID    Thanks for consult  See above  Will follow along.

## 2019-07-22 NOTE — INTERVAL H&P NOTE
The patient has been examined and the H&P has been reviewed:    I concur with the findings and no changes have occurred since H&P was written.    Anesthesia/Surgery risks, benefits and alternative options discussed and understood by patient/family.          Active Hospital Problems    Diagnosis  POA    Osteoarthritis of right knee [M17.11]  Yes      Resolved Hospital Problems   No resolved problems to display.

## 2019-07-22 NOTE — PLAN OF CARE
Problem: Adult Inpatient Plan of Care  Goal: Plan of Care Review  Outcome: Ongoing (interventions implemented as appropriate)  Pt on RA. Sats 100%. No distress noted. IS done. Will continue to monitor.

## 2019-07-22 NOTE — PLAN OF CARE
[facility]  [address]  [place] [address]  [phone]               [facility] Orthopedic Discharge Orders    Home [place]án           Expected Discharge Date: 07/23/2019    Diagnoses:  Post-op  knee replacement    Patient is homebound due to:   Pt requires home health services due to taxing effort to leave the home as a result of immobility from Post-op knee replacement    Weight Bearing Status:   full weight bearing: right leg  FWB unless otherwise indicated.  Progress to cane as able.  Set up for outpatient PT as soon as able after staple removal once patient is MOD I with cane.    Physical Therapy:   3 times a week for 2 weeks (Call for further orders beyond 2 weeks)  - Ambulate with a rolling walker  - Progress to cane  - Instruct on ROM and strengthening of knee    Aide to provide assistance with personal care and  ADLs  2 times a week for 2 weeks    Wound Care:   Surgical dressing change:Starting on  post op day 2 then 3 times per week and prn saturation.Change dressing while knee in flexed position utilizing island dressing or apply gauze and cover with tegaderm.  Teach patient to change daily if draining.  Pt may shower if surgical dressing is waterproof. Protect surgical dressing from getting wet by using press and seal saranwrap or garbage bag. Removal and replacement of dressing after shower only needed if incision is suspected  to have gotten wet during shower.  Otherwise change as previously described depending on dressing/drainage. No soaking in the tub or hot tub use for 6 weeks.    PT/SN to remove staples 14 days Post-op and apply skin prep and steri-strips.    · Cold therapy/Ice: Encouraged at least 20 minutes 2-3 times daily or more if desired.  Incision must be kept waterproof while icing.  · Wear TEDS Bilateral Thigh High Stockings for 3 weeks. Dg hose x 3 weeks. Ok to remove dg hose 1-2 hours/day max if desired.       Contact:  Please contact the nurse  practitioner, Ashley at [phone] at Ext [phone]. with concerns.  She is in surgery M,W,F so if urgent and needs to be addressed prior to the end of the day call the  and they with contact her in the OR or Clinic.         BLOOD THINNER:    If sent home on Xarelto         -14 days post-op for TKR       -30 days post-op for THR     If sent home home on ASA    325mg   BID x 4 weeks     Once finished with prescribed blood thinner, patients can return to pre-surgical ASA dosage if they took ASA before surgery.       Outpatient Therapy: [facility]    [address] [address]   or  [address]  [place] [address]    Call [phone] to schedule appointment  Fax [phone]    If need orders: Call Shadia at Ext [phone]    DME:  - rolling Walker  - 3 in 1 commode  - tub bench / shower chair  - Per PT/OT recommendation          Quinton Westbrook

## 2019-07-22 NOTE — ANESTHESIA PROCEDURE NOTES
Spinal    Diagnosis: OA R KNEE  Patient location during procedure: holding area  Start time: 7/22/2019 8:41 AM  Timeout: 7/22/2019 8:41 AM  End time: 7/22/2019 8:44 AM    Staffing  Authorizing Provider: Giovanni Lin MD  Performing Provider: Giovanni Lin MD    Preanesthetic Checklist  Completed: patient identified, site marked, surgical consent, pre-op evaluation, timeout performed, IV checked, risks and benefits discussed and monitors and equipment checked  Spinal Block  Patient position: sitting  Prep: ChloraPrep  Patient monitoring: heart rate, cardiac monitor, continuous pulse ox and frequent blood pressure checks  Approach: right paramedian  Location: L3-4  Injection technique: single shot  CSF Fluid: clear free-flowing CSF  Needle  Needle type: pencil-tip   Needle gauge: 25 G  Needle length: 3.5 in  Additional Documentation: incremental injection, negative aspiration for heme and no paresthesia on injection  Needle localization: anatomical landmarks  Assessment  Sensory level: T6   Dermatomal levels determined by alcohol wipe and pinch or prick  Ease of block: easy  Patient's tolerance of the procedure: comfortable throughout block and no complaints

## 2019-07-22 NOTE — TRANSFER OF CARE
"Anesthesia Transfer of Care Note    Patient: Bandar French    Procedure(s) Performed: Procedure(s) (LRB):  ARTHROPLASTY, KNEE, TOTAL (Right)    Patient location: PACU    Anesthesia Type: spinal    Transport from OR: Transported from OR on 2-3 L/min O2 by NC with adequate spontaneous ventilation    Post pain: adequate analgesia    Post assessment: no apparent anesthetic complications    Post vital signs: stable    Level of consciousness: awake    Nausea/Vomiting: no nausea/vomiting    Complications: none    Transfer of care protocol was followed      Last vitals:   Visit Vitals  /74 (BP Location: Left arm, Patient Position: Sitting)   Pulse (!) 53   Temp 36.5 °C (97.7 °F)   Resp 16   Ht 5' 10" (1.778 m)   Wt 90.3 kg (199 lb)   SpO2 98%   BMI 28.55 kg/m²     "

## 2019-07-22 NOTE — PLAN OF CARE
Jacob  and received BSC.     07/22/19 5266   Final Note   Assessment Type Final Discharge Note   Anticipated Discharge Disposition Home-Health   What phone number can be called within the next 1-3 days to see how you are doing after discharge?   ([phone])   Hospital Follow Up  Appt(s) scheduled? No   Discharge plans and expectations educations in teach back method with documentation complete? No

## 2019-07-22 NOTE — OP NOTE
[facility]  Operative Report    SUMMARY     Surgery Date: 7/22/2019     Surgeon(s) and Role:     * Quinton Westbrook MD - Primary    Assistant: LUIS Paul FA    Pre-op Diagnosis:  Primary osteoarthritis of right knee [M17.11]    Post-op Diagnosis:  Post-Op Diagnosis Codes:     * Primary osteoarthritis of right knee [M17.11]    Procedure(s) (LRB):  ARTHROPLASTY, KNEE, TOTAL (Right) (Loida Persona)    Anesthesia: Spinal    Description of Procedure: The appropriate consent was signed. The patient understood and except all risks and complications. The patient was brought to the Operating Room after undergoing spinal anesthetic. Tourniquet was applied to the proximal operative leg. The lower extremity was then prepped and draped in a sterile manner. After exsanguination of Esmarch bandage, tourniquet was inflated to 300 mmHg. An anterior incision with a medial parapatellar arthrotomy was performed. The menisci, anterior cruciate ligament and patellar fat pad were excised. The patella was resurfaced and sized to a 32 mm patella.   Three holes were then drilled for the lugs and this was trialed. A hole was then  drilled in the distal femur, marquita was placed down the femoral canal and the   distal femur cut in 6 degrees of valgus. The tibia was then cut utilizing the   Loida navigation system in 0 degree varus valgus with 5 degrees in posterior   slope. Extension block was placed and full extension was noted. The femur was   then sized to a #8 and #8 cutting block was placed and the anterior and   posterior cuts as well as chamfer cuts were performed. The tibia was sized to a  size F and a trial was performed.Trials were performed and a 14 mm spacer was felt to be appropriate.The tibia was then prepared with the drill and the punch, and trials were   removed and the knee washed out. Aquamantys was used to paint the posterior   capsule and corners. Palacos cement with gentamicin was then mixed and    pressurized sequentially in tibia, femur and patella. Components were impacted   and excess cement was removed. A trial 14 mm spacer was placed and knee   brought out to full extension. Once the cement was allowed to harden, the 14 mm  spacer was felt to be appropriate and this was locked into the tibial   baseplate. Tourniquet was deflated and hemostasis was obtained. Good patellar   tracking was noted. Exparel cocktail was injected into the fascia and   subcutaneous tissue. The fascial closure was obtained with a running #2 Quill suture. Subcutaneous closure was obtained with #1 and 2-0 Vicryl. Skin was then closed with the staples and a sterile compressive dressing was applied. The patient was then brought to the Recovery Room in a good condition.        Estimated Blood Loss: 100cc         Specimens:   Specimen (12h ago, onward)    None

## 2019-07-22 NOTE — PLAN OF CARE
"Problem: Physical Therapy Goal  Goal: Physical Therapy Goal  PT goals to be met by 7/29/19:  1. Pt will perform transfers (supine <-> sit, sit <-> stand, bed <-> chair) with mod I and LRAD.  2. Pt will ambulate 300 ft or greater with mod I and LRAD.  3. Pt will ascend/descend 1 6" curb style step with LRAD and supervision to simulate home environment.     PT evaluation completed and goals established. Pt tolerated PT eval well with no adverse reactions.Pt able to perform transfers with CGA. Ambulated 200 ft with CGA and RW. Pt will benefit from continued skilled PT services to address impairments and functional limitations.      "

## 2019-07-23 VITALS
OXYGEN SATURATION: 98 % | TEMPERATURE: 98 F | BODY MASS INDEX: 28.47 KG/M2 | WEIGHT: 198.88 LBS | DIASTOLIC BLOOD PRESSURE: 76 MMHG | HEIGHT: 70 IN | RESPIRATION RATE: 18 BRPM | SYSTOLIC BLOOD PRESSURE: 121 MMHG | HEART RATE: 76 BPM

## 2019-07-23 LAB
ERYTHROCYTE [DISTWIDTH] IN BLOOD BY AUTOMATED COUNT: 12.9 % (ref 11.5–14.5)
HCT VFR BLD AUTO: 37.6 % (ref 40–54)
HGB BLD-MCNC: 12.6 G/DL (ref 14–18)
MCH RBC QN AUTO: 29.2 PG (ref 27–31)
MCHC RBC AUTO-ENTMCNC: 33.5 G/DL (ref 32–36)
MCV RBC AUTO: 87 FL (ref 82–98)
PLATELET # BLD AUTO: 161 K/UL (ref 150–350)
PMV BLD AUTO: 10.3 FL (ref 9.2–12.9)
POCT GLUCOSE: 128 MG/DL (ref 70–110)
RBC # BLD AUTO: 4.31 M/UL (ref 4.6–6.2)
WBC # BLD AUTO: 8.73 K/UL (ref 3.9–12.7)

## 2019-07-23 PROCEDURE — 97116 GAIT TRAINING THERAPY: CPT

## 2019-07-23 PROCEDURE — 36415 COLL VENOUS BLD VENIPUNCTURE: CPT

## 2019-07-23 PROCEDURE — 25000003 PHARM REV CODE 250: Performed by: NURSE PRACTITIONER

## 2019-07-23 PROCEDURE — 63600175 PHARM REV CODE 636 W HCPCS: Performed by: ORTHOPAEDIC SURGERY

## 2019-07-23 PROCEDURE — 25000003 PHARM REV CODE 250: Performed by: ORTHOPAEDIC SURGERY

## 2019-07-23 PROCEDURE — 97110 THERAPEUTIC EXERCISES: CPT

## 2019-07-23 PROCEDURE — 85027 COMPLETE CBC AUTOMATED: CPT

## 2019-07-23 PROCEDURE — 97165 OT EVAL LOW COMPLEX 30 MIN: CPT

## 2019-07-23 RX ORDER — ONDANSETRON 8 MG/1
8 TABLET, ORALLY DISINTEGRATING ORAL EVERY 8 HOURS PRN
Qty: 20 TABLET | Refills: 1 | Status: SHIPPED | OUTPATIENT
Start: 2019-07-23 | End: 2020-08-19

## 2019-07-23 RX ORDER — ASPIRIN 325 MG
325 TABLET ORAL 2 TIMES DAILY
Qty: 60 TABLET | Refills: 0 | Status: SHIPPED | OUTPATIENT
Start: 2019-07-23 | End: 2020-08-19

## 2019-07-23 RX ORDER — OXYCODONE AND ACETAMINOPHEN 5; 325 MG/1; MG/1
TABLET ORAL
Qty: 50 TABLET | Refills: 0 | Status: SHIPPED | OUTPATIENT
Start: 2019-07-23 | End: 2020-08-19

## 2019-07-23 RX ADMIN — DOCUSATE SODIUM 100 MG: 100 CAPSULE, LIQUID FILLED ORAL at 09:07

## 2019-07-23 RX ADMIN — FAMOTIDINE 20 MG: 20 TABLET, FILM COATED ORAL at 09:07

## 2019-07-23 RX ADMIN — MUPIROCIN 1 G: 20 OINTMENT TOPICAL at 09:07

## 2019-07-23 RX ADMIN — CELECOXIB 200 MG: 200 CAPSULE ORAL at 09:07

## 2019-07-23 RX ADMIN — OXYCODONE HYDROCHLORIDE 5 MG: 5 TABLET ORAL at 07:07

## 2019-07-23 RX ADMIN — ACETAMINOPHEN 1000 MG: 500 TABLET ORAL at 05:07

## 2019-07-23 RX ADMIN — POLYETHYLENE GLYCOL 3350 17 G: 17 POWDER, FOR SOLUTION ORAL at 09:07

## 2019-07-23 RX ADMIN — ASPIRIN 325 MG ORAL TABLET 325 MG: 325 PILL ORAL at 09:07

## 2019-07-23 RX ADMIN — CEFAZOLIN SODIUM 2 G: 2 SOLUTION INTRAVENOUS at 01:07

## 2019-07-23 RX ADMIN — METOPROLOL TARTRATE 50 MG: 50 TABLET ORAL at 09:07

## 2019-07-23 NOTE — NURSING
Discharge instructions reviewed with pt at length and verbalized 100% understanding.Saline lock left hand dc with cath intact and site without redness or swelling

## 2019-07-23 NOTE — PT/OT/SLP EVAL
"Occupational Therapy   Evaluation and Discharge Note    Name: Bandar French  MRN: [id number]  Admitting Diagnosis:  Osteoarthritis of right knee 1 Day Post-Op    Recommendations:     Discharge Recommendations: home health PT, home health OT, home with home health  Discharge Equipment Recommendations:  shower chair, commode  Barriers to discharge:  None    Assessment:     Bandar French is a 68 y.o. male with a medical diagnosis of Osteoarthritis of right knee. Pt already discharged at time of documentation, therefore no acute OT goals established. Pt tolerated OT evaluation well. Pain was well controlled. Pt required SBA for functional transfers and mobility and just incidental assist with LB dressing. He has strong support at home, therefore feel that he will be safe to D/C home today with his wife's support. Recommending HH OT/PT upon D/C. DME needs include shower chair and BSC.     Plan:     During this hospitalization, patient does not require further acute OT services.  Please re-consult if situation changes.    · Plan of Care Reviewed with: patient, spouse    Subjective     Chief Complaint: "I didn't sleep much."  Patient/Family Comments/goals: return home at [facility]     Occupational Profile:  Living Environment: Pt lives with his wife in 1-story house with x6 curb-like steps to enter. 20 ft incline to enter through the back of the house. Walk-in shower.   Previous level of function: independent - mod (I) with mobility and ADLs  Equipment Used at home:  walker, rolling  Assistance upon Discharge: wife available to assist     Pain/Comfort:  · Pain Rating 1: 2/10  · Location - Side 1: Right  · Location - Orientation 1: generalized  · Location 1: knee  · Pain Addressed 1: Reposition, Distraction, Pre-medicate for activity  · Pain Rating Post-Intervention 1: 2/10    Patients cultural, spiritual, Adventist conflicts given the current situation: no    Objective:     Communicated with: RN prior to session.  Patient " found HOB elevated with peripheral IV, telemetry, cryotherapy, SCD, FCD upon OT entry to room.    General Precautions: Standard, fall   Orthopedic Precautions:RLE weight bearing as tolerated   Braces: N/A     Occupational Performance:    Bed Mobility:    · Patient completed Scooting/Bridging with modified independence  · Patient completed Supine to Sit with modified independence    Functional Mobility/Transfers:  · Patient completed Sit <> Stand Transfer with stand by assistance  with  rolling walker   · Patient completed Bed <> Chair Transfer using Step Transfer technique with stand by assistance with rolling walker  · Functional Mobility: short, house-hold level using RW with SBA    Activities of Daily Living:  · Upper Body Dressing: independence to don overhead shirt   · Lower Body Dressing: minimum assistance incidental (A) to lace (R) LE, otherwise completed remainder of components with SBA    Cognitive/Visual Perceptual:  Cognitive/Psychosocial Skills:     -       Oriented to: Person, Place, Time and Situation   -       Follows Commands/attention:Follows multistep  commands  -       Communication: clear/fluent  -       Safety awareness/insight to disability: intact   -       Mood/Affect/Coping skills/emotional control: Appropriate to situation  Visual/Perceptual:      -Intact visual skills    Physical Exam:  Balance:    -       independent sitting balance; SBA for standing balance   Postural examination/scapula alignment:    -       No postural abnormalities identified  Skin integrity: Visible skin intact and incisional site covered- clean margins  Edema:  Moderate (R) LE  Sensation:    -       Intact  Upper Extremity Range of Motion:     -       Right Upper Extremity: WNL  -       Left Upper Extremity: WNL  Upper Extremity Strength:    -       Right Upper Extremity: WNL  -       Left Upper Extremity: WNL   Strength:    -       Right Upper Extremity: WNL  -       Left Upper Extremity: WNL  Fine Motor  Coordination:    -       Intact  Gross motor coordination:   WFL    AMPAC 6 Click ADL:  AMPAC Total Score: 21    Treatment & Education:  OT role and POC, LB dressing techniques, orthopedic precautions, transfer set up and technique, shower stall transfer, DME set up at home, DC planning   Education:    Patient left up in chair with all lines intact, call button in reach, RN notified and wife present    GOALS:   Multidisciplinary Problems     Occupational Therapy Goals        Problem: Occupational Therapy Goal    Goal Priority Disciplines Outcome Interventions   Occupational Therapy Goal     OT, PT/OT                     History:     Past Medical History:   Diagnosis Date    Arthritis     Coronary artery disease 2014    stent    Glaucoma     Hyperlipidemia     Hypertension     Impaired fasting glucose        Past Surgical History:   Procedure Laterality Date    ARTHROPLASTY, KNEE, TOTAL Right 7/22/2019    [facility] OR    CORONARY STENT PLACEMENT  2014    EYE SURGERY      tendon repair on right; bleph bilateral    HAND SURGERY Right     KNEE ARTHROSCOPY Left 1967    KNEE ARTHROSCOPY Right 2012    TONSILLECTOMY         Time Tracking:     OT Date of Treatment: 07/23/19  OT Start Time: 0843  OT Stop Time: 0904  OT Total Time (min): 21 min    Billable Minutes:Evaluation 21    AMANDA Caraballo/ROLF  7/23/2019

## 2019-07-23 NOTE — PLAN OF CARE
Problem: Adult Inpatient Plan of Care  Goal: Plan of Care Review  Outcome: Ongoing (interventions implemented as appropriate)  Patient on RA. Sats 99%. IS done. Pt. in no distress, will continue to monitor.

## 2019-07-23 NOTE — PLAN OF CARE
"Problem: Physical Therapy Goal  Goal: Physical Therapy Goal  PT goals to be met by 7/29/19:  1. Pt will perform transfers (supine <-> sit, sit <-> stand, bed <-> chair) with mod I and LRAD.  2. Pt will ambulate 300 ft or greater with mod I and LRAD.  3. Pt will ascend/descend 1 6" curb style step with LRAD and supervision to simulate home environment.     Outcome: Ongoing (interventions implemented as appropriate)  Pt sit<> stand w/ RW and SBA. PT amb'd 300 ft w/ RW and SBA .  Pt ascended/ descended 6 inch steep and ramp w/ RW SBA      "

## 2019-07-23 NOTE — PT/OT/SLP PROGRESS
Physical Therapy Treatment    Patient Name:  Bandar French   MRN:  [id number]    Recommendations:     Discharge Recommendations:  home, home health PT   Discharge Equipment Recommendations: shower chair, commode   Barriers to discharge: None    Assessment:     Bandar French is a 68 y.o. male admitted with a medical diagnosis of Osteoarthritis of right knee.  He presents with the following impairments/functional limitations:  weakness, impaired functional mobilty, gait instability, orthopedic precautions, impaired balance, decreased lower extremity function , pt tolerated PT. PT amb'd 300 ft<, pt practice 6 inch curb, and also able to walk the ramp. .    Rehab Prognosis: Good; patient would benefit from acute skilled PT services to address these deficits and reach maximum level of function.    Recent Surgery: Procedure(s) (LRB):  ARTHROPLASTY, KNEE, TOTAL (Right) 1 Day Post-Op    Plan:     During this hospitalization, patient to be seen BID to address the identified rehab impairments via gait training, therapeutic activities, therapeutic exercises, neuromuscular re-education and progress toward the following goals:    · Plan of Care Expires:  07/29/19    Subjective     Chief Complaint: none stated  Patient/Family Comments/goals:  Go home  Pain/Comfort:  · Pain Rating 1: 2/10  · Location - Side 1: Right  · Location - Orientation 1: generalized  · Location 1: knee  · Pain Addressed 2: Reposition, Distraction      Objective:     Communicated with ns prior to session.  Patient found up in chair with peripheral IV, telemetry upon PT entry to room.     General Precautions: Standard, fall   Orthopedic Precautions:RLE weight bearing as tolerated   Braces: N/A     Functional Mobility:  · Transfers:     · Sit to Stand:  stand by assistance with rolling walker  · Gait: 300ft < w/ RW SBA      AM-PAC 6 CLICK MOBILITY  Turning over in bed (including adjusting bedclothes, sheets and blankets)?: 3  Sitting down on and standing up  "from a chair with arms (e.g., wheelchair, bedside commode, etc.): 3  Moving from lying on back to sitting on the side of the bed?: 3  Moving to and from a bed to a chair (including a wheelchair)?: 3  Need to walk in hospital room?: 3  Climbing 3-5 steps with a railing?: 3  Basic Mobility Total Score: 18       Therapeutic Activities and Exercises:   Pt performed supine therapeutic exercises including ankle pumps, quad sets, glute sets,the patient performed seated LAQ, HS, abd/add x 10 reps with verbal and tactile cues.       Patient left up in chair with all lines intact, call button in reach, ns notified and wife present..    GOALS:   Multidisciplinary Problems     Physical Therapy Goals        Problem: Physical Therapy Goal    Goal Priority Disciplines Outcome Goal Variances Interventions   Physical Therapy Goal     PT, PT/OT Ongoing (interventions implemented as appropriate)     Description:  PT goals to be met by 7/29/19:  1. Pt will perform transfers (supine <-> sit, sit <-> stand, bed <-> chair) with mod I and LRAD.  2. Pt will ambulate 300 ft or greater with mod I and LRAD.  3. Pt will ascend/descend 1 6" curb style step with LRAD and supervision to simulate home environment.                      Time Tracking:     PT Received On: 07/23/19  PT Start Time: 0910     PT Stop Time: 0945  PT Total Time (min): 35 min     Billable Minutes: Gait Training 20 and Therapeutic Exercise 15    Treatment Type: Treatment  PT/PTA: PTA     PTA Visit Number: 1     Jimmy Diaz, PTA  07/23/2019  "

## 2019-07-23 NOTE — PROGRESS NOTES
"Progress Note  Orthopedics    Admit Date: 7/22/2019   Patient ID: Bandar French is a 68 y.o. male.  Postop day 1.  Right total knee replacement. Patient doing very well.  Ambulated 200 ft yesterday.  Dressing dry, neurovascularly intact.  Plan for discharge today with home health.            Quinton Westbrook      Vital Sign (recent):  /76 (BP Location: Right arm, Patient Position: Lying)   Pulse (!) 56   Temp 98.3 °F (36.8 °C) (Oral)   Resp 18   Ht 5' 10" (1.778 m)   Wt 90.2 kg (198 lb 13.7 oz)   SpO2 97%   BMI 28.53 kg/m²       Laboratory:    CBC:   Recent Labs   Lab 07/23/19  0602   WBC 8.73   RBC 4.31*   HGB 12.6*   HCT 37.6*      MCV 87   MCH 29.2   MCHC 33.5       CMP: No results for input(s): GLU, CALCIUM, ALBUMIN, PROT, NA, K, CO2, CL, BUN, CREATININE, ALKPHOS, ALT, AST, BILITOT in the last 168 hours.        Intake/Output Summary (Last 24 hours) at 7/23/2019 0821  Last data filed at 7/23/2019 0600  Gross per 24 hour   Intake 2788.75 ml   Output 4250 ml   Net -1461.25 ml         Current Medications:   aspirin  325 mg Oral Q12H    celecoxib  200 mg Oral BID    docusate sodium  100 mg Oral Q12H    famotidine  20 mg Oral BID    irbesartan  150 mg Oral QHS    metoprolol tartrate  50 mg Oral BID    mupirocin  1 g Nasal BID    polyethylene glycol  17 g Oral Daily    rosuvastatin  20 mg Oral QHS       Continuous Infusions:   lactated ringers 10 mL/hr at 07/22/19 0724     PRN Meds:.acetaminophen, dextrose 50%, dextrose 50%, diphenhydrAMINE, glucagon (human recombinant), glucose, glucose, HYDROmorphone, insulin aspart U-100, ondansetron, oxyCODONE, oxyCODONE, promethazine (PHENERGAN) IVPB, sodium chloride 0.9%, sodium chloride 0.9%   Progress Note  Orthopedics    Admit Date: 7/22/2019   Patient ID: Bandar French is a 68 y.o. male.            Quinton Westbrook      Vital Sign (recent):  /76 (BP Location: Right arm, Patient Position: Lying)   Pulse (!) 56   Temp 98.3 °F (36.8 °C) (Oral) " "  Resp 18   Ht 5' 10" (1.778 m)   Wt 90.2 kg (198 lb 13.7 oz)   SpO2 97%   BMI 28.53 kg/m²       Laboratory:    CBC:   Recent Labs   Lab 07/23/19  0602   WBC 8.73   RBC 4.31*   HGB 12.6*   HCT 37.6*      MCV 87   MCH 29.2   MCHC 33.5       CMP: No results for input(s): GLU, CALCIUM, ALBUMIN, PROT, NA, K, CO2, CL, BUN, CREATININE, ALKPHOS, ALT, AST, BILITOT in the last 168 hours.        Intake/Output Summary (Last 24 hours) at 7/23/2019 0821  Last data filed at 7/23/2019 0600  Gross per 24 hour   Intake 2788.75 ml   Output 4250 ml   Net -1461.25 ml         Current Medications:   aspirin  325 mg Oral Q12H    celecoxib  200 mg Oral BID    docusate sodium  100 mg Oral Q12H    famotidine  20 mg Oral BID    irbesartan  150 mg Oral QHS    metoprolol tartrate  50 mg Oral BID    mupirocin  1 g Nasal BID    polyethylene glycol  17 g Oral Daily    rosuvastatin  20 mg Oral QHS       Continuous Infusions:   lactated ringers 10 mL/hr at 07/22/19 0724     PRN Meds:.acetaminophen, dextrose 50%, dextrose 50%, diphenhydrAMINE, glucagon (human recombinant), glucose, glucose, HYDROmorphone, insulin aspart U-100, ondansetron, oxyCODONE, oxyCODONE, promethazine (PHENERGAN) IVPB, sodium chloride 0.9%, sodium chloride 0.9%  "

## 2019-07-23 NOTE — PLAN OF CARE
Problem: Adult Inpatient Plan of Care  Goal: Plan of Care Review  Outcome: Ongoing (interventions implemented as appropriate)     07/23/19 9593   Plan of Care Review   Plan of Care Reviewed With patient   Progress improving       Comments: Patient alert and oriented x 4. Vital signs WNL. Purposeful rounding done. Pain well tolerated with scheduled meds. Tolerating PO, no nausea or vomiting. Fall precautions maintained, call light within reach. Right knee dressing dry and intact. Polar care with ace wrap. SCDs on. Will continue to monitor and care.     0558: Mcmanus discontinued per order. Urinal at bedside.

## 2019-07-23 NOTE — PROGRESS NOTES
"IM  Progress Note    Admit Date: 7/22/2019   LOS: 0 days     SUBJECTIVE:     Follow-up For:  Osteoarthritis of right knee    Interval History:     Doing well.  Discussed with orthopedic team and wife at bedside.  Denies chest pain, shortness of breath, calf tenderness. Eager to go home.    Review of Systems:  Constitutional: No fever or chills  Respiratory: No cough or shortness of breath  Cardiovascular: No chest pain or palpitations  Gastrointestinal: No nausea or vomiting  Neurological: No confusion or weakness    OBJECTIVE:     Vital Signs Range (Last 24H):  /76 (BP Location: Right arm, Patient Position: Lying)   Pulse (!) 58   Temp 98.3 °F (36.8 °C) (Oral)   Resp 18   Ht 5' 10" (1.778 m)   Wt 90.2 kg (198 lb 13.7 oz)   SpO2 97%   BMI 28.53 kg/m²     Temp:  [97.5 °F (36.4 °C)-98.3 °F (36.8 °C)]   Pulse:  [50-65]   Resp:  [16-20]   BP: (121-155)/(64-78)   SpO2:  [93 %-100 %]     I & O (Last 24H):    Intake/Output Summary (Last 24 hours) at 7/23/2019 0911  Last data filed at 7/23/2019 0600  Gross per 24 hour   Intake 1988.75 ml   Output 4250 ml   Net -2261.25 ml       Physical Exam:  General appearance: Well developed, well nourished  Eyes:  Conjunctivae/corneas clear. PERRL.  Lungs: Normal respiratory effort,   clear to auscultation bilaterally   Heart:  Nabeel/Regular rate and rhythm, S1, S2 normal, no murmur, rub or akila.  Abdomen: Soft, non-tender non-distended; bowel sounds normal; no masses,  no organomegaly  Extremities: No cyanosis or clubbing. No edema.    Skin: Skin color, texture, turgor normal. No rashes or lesions  Neurologic: Normal strength and tone. No focal numbness or weakness   Right knee clear dry and intact    Laboratory Data:  Recent Labs   Lab 07/23/19  0602   WBC 8.73   RBC 4.31*   HGB 12.6*   HCT 37.6*      MCV 87   MCH 29.2   MCHC 33.5     POCT Glucose   Date Value Ref Range Status   07/23/2019 128 (H) 70 - 110 mg/dL Final   07/22/2019 206 (H) 70 - 110 mg/dL Final "   07/22/2019 186 (H) 70 - 110 mg/dL Final       Medications:  Medication list was reviewed and changes noted under Assessment/Plan.    Diagnostic Results:        ASSESSMENT/PLAN:          1. S/P Right Knee (M17.11):  Per ortho/therapy teams.  2. CAD (I25.10):  Followed by Dr. King.  BB/ARB/Statin/ASA.  Monitored on tele.   3. HTN (I10):  meds with hold parameters.  4. IFG (R73.01):  ADA diet.  SSI.  Will likely need some type of oral agent soon.  Defer to primary care.  5. Lipids (E78.5):  Statin.  6. DVT prophy:  ASA BID    Okay to discharge

## 2019-07-23 NOTE — PLAN OF CARE
Problem: Occupational Therapy Goal  Goal: Occupational Therapy Goal  Pt already discharged at time of evaluation, therefore no acute OT goals established.     Desirae Moody, OTR/L  7/23/2019

## 2019-07-24 NOTE — DISCHARGE SUMMARY
[facility]  Short Stay  Discharge Summary  TOTAL KNEE REPLACEMENT    Admit Date: 7/22/2019    Discharge Date and Time: 7/23/2019 11:05 AM      Discharge Attending Physician: Quinton Westbrook MD    Hospital Course:  Bandar French,is a 68 y.o. male with severe osteoarthritis R knee, unrelieved with the conservative measures. The patient was admitted on  7/22/2019 and underwent R total knee replacement with computer-assisted navigation. Postoperatively, weightbearing as tolerated with a walker and CPM machine was initiated on postop day #1. Bandar French did quite well and was discharged on 7/23/2019  with home health physical therapy and nursing. The patient will be on Percocet for pain and aspirin 325 mg p.o. b.i.d. with meals x4 weeks.  A CPM machine will will be sent home with the patient. Postoperative follow up will be in the office in 4 weeks.       Final Diagnoses:    Principal Problem: Osteoarthritis of right knee   Secondary Diagnoses:   Active Hospital Problems    Diagnosis  POA    *Osteoarthritis of right knee [M17.11]  Yes      Resolved Hospital Problems   No resolved problems to display.       Discharged Condition: good    Disposition: Home-Health Care [facility]    Follow up/Patient Instructions:    Medications:  Reconciled Home Medications:      Medication List      START taking these medications    aspirin 325 MG tablet  Take 1 tablet (325 mg total) by mouth 2 (two) times daily. Take for four weeks post op  Replaces:  aspirin 81 MG EC tablet     ondansetron 8 MG Tbdl  Commonly known as:  ZOFRAN-ODT  Take 1 tablet (8 mg total) by mouth every 8 (eight) hours as needed.     oxyCODONE-acetaminophen 5-325 mg per tablet  Commonly known as:  PERCOCET  Take 1 tablet by mouth every 4 hours as needed OR 2 tablets every 6 hours as needed        CONTINUE taking these medications    CENTRUM SILVER ORAL  Take by mouth once daily.     irbesartan 150 MG tablet  Commonly known as:  AVAPRO  Take 150 mg by mouth  "every evening.     latanoprost 0.005 % ophthalmic solution  Place 1 drop into both eyes every evening.     metoprolol tartrate 50 MG tablet  Commonly known as:  LOPRESSOR  Take 50 mg by mouth 2 (two) times daily.     rosuvastatin 20 MG tablet  Commonly known as:  CRESTOR  Take 20 mg by mouth every evening.     VITAMIN C 500 MG tablet  Generic drug:  ascorbic acid (vitamin C)  Take 500 mg by mouth once daily.        STOP taking these medications    aspirin 81 MG EC tablet  Commonly known as:  ECOTRIN  Replaced by:  aspirin 325 MG tablet          Discharge Procedure Orders   COMMODE FOR HOME USE     Order Specific Question Answer Comments   Type: Standard    Height: 5' 10" (1.778 m)    Weight: 90.3 kg (199 lb)    Length of need (1-99 months): 99      Other restrictions (specify):   Order Comments: Follow post op instructions given in office     Notify your health care provider if you experience any of the following:  temperature >100.4     Notify your health care provider if you experience any of the following:  severe uncontrolled pain     Notify your health care provider if you experience any of the following:  redness, tenderness, or signs of infection (pain, swelling, redness, odor or green/yellow discharge around incision site)     Change dressing (specify)   Order Comments: Dressing change to be performed by Home Health nurse.     Follow-up Information     [facility].    Specialties:  DME Provider, Home Health Services  Why:  Home Health  Contact information:  [address] [place] [id number]  [phone]             Quinton Westbrook MD In 4 weeks.    Specialty:  Orthopedic Surgery  Contact information:  [address]  [facility]  [facility] [address]  [phone]             [facility]    Specialty:  DME Provider  Why:  With questions regarding medical equipment - [facility]  Contact information:  [address] [facility] [address]  [facility] [id number] [phone]             "

## 2019-09-10 ENCOUNTER — TELEPHONE (OUTPATIENT)
Dept: FAMILY MEDICINE | Facility: CLINIC | Age: 69
End: 2019-09-10

## 2019-09-10 NOTE — TELEPHONE ENCOUNTER
----- Message from Florence Greer sent at 9/10/2019 10:03 AM CDT -----  Contact: Self  Pt is calling to speak with Staff regarding the Shingles vaccine.  He wants to know if the office has it?    He can be reached at [phone].    Thank you.

## 2019-09-25 ENCOUNTER — OFFICE VISIT (OUTPATIENT)
Dept: FAMILY MEDICINE | Facility: CLINIC | Age: 69
End: 2019-09-25
Payer: MEDICARE

## 2019-09-25 VITALS
SYSTOLIC BLOOD PRESSURE: 124 MMHG | HEIGHT: 70 IN | OXYGEN SATURATION: 96 % | TEMPERATURE: 98 F | HEART RATE: 61 BPM | BODY MASS INDEX: 27.92 KG/M2 | WEIGHT: 195 LBS | DIASTOLIC BLOOD PRESSURE: 68 MMHG

## 2019-09-25 DIAGNOSIS — E78.2 HYPERLIPIDEMIA, MIXED: ICD-10-CM

## 2019-09-25 DIAGNOSIS — Z12.5 SCREENING FOR MALIGNANT NEOPLASM OF PROSTATE: ICD-10-CM

## 2019-09-25 DIAGNOSIS — I11.9 HYPERTENSIVE HEART DISEASE WITHOUT HEART FAILURE: ICD-10-CM

## 2019-09-25 DIAGNOSIS — M19.90 OSTEOARTHRITIS, UNSPECIFIED OSTEOARTHRITIS TYPE, UNSPECIFIED SITE: ICD-10-CM

## 2019-09-25 DIAGNOSIS — R73.01 IMPAIRED FASTING GLUCOSE: ICD-10-CM

## 2019-09-25 DIAGNOSIS — Z00.00 ANNUAL PHYSICAL EXAM: ICD-10-CM

## 2019-09-25 DIAGNOSIS — Z23 NEED FOR PROPHYLACTIC VACCINATION AND INOCULATION AGAINST INFLUENZA: ICD-10-CM

## 2019-09-25 DIAGNOSIS — I25.10 CORONARY ARTERY DISEASE INVOLVING NATIVE CORONARY ARTERY OF NATIVE HEART WITHOUT ANGINA PECTORIS: Primary | ICD-10-CM

## 2019-09-25 PROCEDURE — G0009 ADMIN PNEUMOCOCCAL VACCINE: HCPCS | Mod: HCNC,S$GLB,, | Performed by: INTERNAL MEDICINE

## 2019-09-25 PROCEDURE — G0008 FLU VACCINE - HIGH DOSE (65+) PRESERVATIVE FREE IM: ICD-10-PCS | Mod: HCNC,S$GLB,, | Performed by: INTERNAL MEDICINE

## 2019-09-25 PROCEDURE — 90732 PNEUMOCOCCAL POLYSACCHARIDE VACCINE 23-VALENT =>2YO SQ IM: ICD-10-PCS | Mod: HCNC,S$GLB,, | Performed by: INTERNAL MEDICINE

## 2019-09-25 PROCEDURE — G0009 PNEUMOCOCCAL POLYSACCHARIDE VACCINE 23-VALENT =>2YO SQ IM: ICD-10-PCS | Mod: HCNC,S$GLB,, | Performed by: INTERNAL MEDICINE

## 2019-09-25 PROCEDURE — 90732 PPSV23 VACC 2 YRS+ SUBQ/IM: CPT | Mod: HCNC,S$GLB,, | Performed by: INTERNAL MEDICINE

## 2019-09-25 PROCEDURE — 90662 IIV NO PRSV INCREASED AG IM: CPT | Mod: HCNC,S$GLB,, | Performed by: INTERNAL MEDICINE

## 2019-09-25 PROCEDURE — 99397 PER PM REEVAL EST PAT 65+ YR: CPT | Mod: HCNC,25,S$GLB, | Performed by: INTERNAL MEDICINE

## 2019-09-25 PROCEDURE — 99999 PR PBB SHADOW E&M-EST. PATIENT-LVL III: ICD-10-PCS | Mod: PBBFAC,HCNC,, | Performed by: INTERNAL MEDICINE

## 2019-09-25 PROCEDURE — 99499 UNLISTED E&M SERVICE: CPT | Mod: S$GLB,,, | Performed by: INTERNAL MEDICINE

## 2019-09-25 PROCEDURE — 99999 PR PBB SHADOW E&M-EST. PATIENT-LVL III: CPT | Mod: PBBFAC,HCNC,, | Performed by: INTERNAL MEDICINE

## 2019-09-25 PROCEDURE — 90662 FLU VACCINE - HIGH DOSE (65+) PRESERVATIVE FREE IM: ICD-10-PCS | Mod: HCNC,S$GLB,, | Performed by: INTERNAL MEDICINE

## 2019-09-25 PROCEDURE — G0008 ADMIN INFLUENZA VIRUS VAC: HCPCS | Mod: HCNC,S$GLB,, | Performed by: INTERNAL MEDICINE

## 2019-09-25 PROCEDURE — 99499 RISK ADDL DX/OHS AUDIT: ICD-10-PCS | Mod: S$GLB,,, | Performed by: INTERNAL MEDICINE

## 2019-09-25 PROCEDURE — 99397 PR PREVENTIVE VISIT,EST,65 & OVER: ICD-10-PCS | Mod: HCNC,25,S$GLB, | Performed by: INTERNAL MEDICINE

## 2019-09-25 RX ORDER — MELOXICAM 15 MG/1
TABLET ORAL
Refills: 4 | COMMUNITY
Start: 2019-08-27 | End: 2020-08-19

## 2019-09-25 RX ORDER — HYDROCODONE BITARTRATE AND ACETAMINOPHEN 5; 325 MG/1; MG/1
TABLET ORAL
Refills: 0 | COMMUNITY
Start: 2019-08-02 | End: 2020-08-19

## 2019-09-25 NOTE — PROGRESS NOTES
[facility] Note    Chief Complaint      Chief Complaint   Patient presents with    Annual Exam       History of Present Illness      Bandar French is a 69 y.o. male with chronic conditions of CAD, HTN, HLD, IFG, osteoarthritis who presents today for: re-establish care from  and annual preventative visit.  HTN: BP at [facility] on irbesartan, metoprolol  CAD: Sees Dr. King.  On ASA, irbesartan, metoprolol, crestor.  Denies CP, SOB.  HLD: Controlled on crestor.  LDL per DR. King, 72.    Osteoarthritis: S/P Right TKA with Dr. Westbrook.  Leilani rubio post op and with PT.    Diet: Prepares own food mostly.  LImiting fatty foods and carbs.  Drinks plenty water  Exercise: Undergoing PT.    Denies drinking and driving, drinking more than 4 drinks on occasion, drug use.       Past Medical History:  Past Medical History:   Diagnosis Date    Arthritis     Coronary artery disease 2014    stent    Glaucoma     Hyperlipidemia     Hypertension     Impaired fasting glucose        Past Surgical History:   has a past surgical history that includes Knee arthroscopy (Left, ); Knee arthroscopy (Right, ); Hand surgery (Right); Coronary stent placement (); Eye surgery; Tonsillectomy; and Total knee arthroplasty (Right, 2019).    Family History:  family history is not on file.     Social History:  Social History     Tobacco Use    Smoking status: Former Smoker     Last attempt to quit: 1986     Years since quittin.7    Smokeless tobacco: Never Used   Substance Use Topics    Alcohol use: Not Currently     Frequency: Never     Drinks per session: Patient refused     Binge frequency: Never     Comment:     Drug use: Not on file       Review of Systems   Constitutional: Negative for chills, fever and malaise/fatigue.   HENT: Negative for hearing loss.    Eyes: Negative for discharge.   Respiratory: Negative for shortness of breath and wheezing.    Cardiovascular: Negative for chest pain and  palpitations.   Gastrointestinal: Negative for blood in stool, constipation, diarrhea, nausea and vomiting.   Genitourinary: Negative for hematuria and urgency.   Musculoskeletal: Negative for neck pain.   Skin: Negative for rash.   Neurological: Negative for weakness and headaches.   Endo/Heme/Allergies: Negative for polydipsia.        Medications:  Outpatient Encounter Medications as of 9/25/2019   Medication Sig Dispense Refill    aspirin-calcium carbonate 81 mg-300 mg calcium(777 mg) Tab Take 81 mg by mouth.      folic acid/multivit-min/lutein (CENTRUM SILVER ORAL) Take by mouth once daily.      HYDROcodone-acetaminophen (NORCO) 5-325 mg per tablet TK 1 TO 2 TS PO QID PRN P  0    irbesartan (AVAPRO) 150 MG tablet Take 150 mg by mouth every evening.      latanoprost 0.005 % ophthalmic solution Place 1 drop into both eyes every evening.      meloxicam (MOBIC) 15 MG tablet TK 1 T PO D PC  4    metoprolol tartrate (LOPRESSOR) 50 MG tablet Take 50 mg by mouth 2 (two) times daily.      rosuvastatin (CRESTOR) 20 MG tablet Take 20 mg by mouth every evening.      ascorbic acid, vitamin C, (VITAMIN C) 500 MG tablet Take 500 mg by mouth once daily.      aspirin 325 MG tablet Take 1 tablet (325 mg total) by mouth 2 (two) times daily. Take for four weeks post op 60 tablet 0    ondansetron (ZOFRAN-ODT) 8 MG TbDL Take 1 tablet (8 mg total) by mouth every 8 (eight) hours as needed. 20 tablet 1    oxyCODONE-acetaminophen (PERCOCET) 5-325 mg per tablet Take 1 tablet by mouth every 4 hours as needed OR 2 tablets every 6 hours as needed 50 tablet 0     No facility-administered encounter medications on file as of 9/25/2019.        Allergies:  Review of patient's allergies indicates:  No Known Allergies    Health Maintenance:    There is no immunization history on file for this patient.   Health Maintenance   Topic Date Due    Hepatitis C Screening  1950    Lipid Panel  1950    TETANUS VACCINE  08/26/1968  "   Colonoscopy  08/26/2000    Abdominal Aortic Aneurysm Screening  08/26/2015    Pneumococcal Vaccine (65+ Low/Medium Risk) (2 of 2 - PPSV23) 11/23/2016      Flu shot due.  Prevnar 2015.  Pneumovax due today.  Zostavax 2013.  Shingrix discussed.    Cscope Dr. Copeland, 2016 approx, no polyps, 5 yr interval for previous hx of polyps.    Physical Exam      Vital Signs  Temp: 98 °F (36.7 °C)  Temp src: Oral  Pulse: 61  SpO2: 96 %  BP: 124/68  BP Location: Right arm  Patient Position: Sitting  Height and Weight  Height: 5' 10" (177.8 cm)  Weight: 88.5 kg (195 lb)  BSA (Calculated - sq m): 2.09 sq meters  BMI (Calculated): 28  Weight in (lb) to have BMI = 25: 173.9]    Physical Exam   Constitutional: He appears well-developed and well-nourished.   HENT:   Head: Normocephalic and atraumatic.   Right Ear: External ear normal.   Left Ear: External ear normal.   Mouth/Throat: Oropharynx is clear and moist.   Eyes: Pupils are equal, round, and reactive to light. Conjunctivae and EOM are normal.   Cardiovascular: Normal rate, regular rhythm, normal heart sounds and intact distal pulses.   No murmur heard.  Pulmonary/Chest: Effort normal and breath sounds normal. He has no wheezes. He has no rales.   Abdominal: Soft. Bowel sounds are normal. He exhibits no distension and no abdominal bruit. There is no hepatosplenomegaly. There is no tenderness.   Vitals reviewed.       Laboratory:  CBC:  Recent Labs   Lab 07/23/19  0602   WBC 8.73   RBC 4.31 L   Hemoglobin 12.6 L   Hematocrit 37.6 L   Platelets 161   Mean Corpuscular Volume 87   Mean Corpuscular Hemoglobin 29.2   Mean Corpuscular Hemoglobin Conc 33.5     CMP:        Invalid input(s): CREATININ  URINALYSIS:  Recent Labs   Lab 07/10/19  0844   Color, UA Yellow   Specific Gravity, UA 1.010   pH, UA 6.0   Protein, UA Negative   Nitrite, UA Negative   Leukocytes, UA Negative   Urobilinogen, UA Negative      LIPIDS:      TSH:      A1C:        Assessment/Plan     Bandar French is " a 69 y.o.male with:    1. Annual physical exam  Discussed diet and exercise, vaccines and cancer screening, risk factors.  Screening labs ordered.     2. Coronary artery disease involving native coronary artery of native heart without angina pectoris  Continue current meds.    3. Hypertensive heart disease without heart failure  Continue current meds.    4. Hyperlipidemia, mixed  Continue current meds.    5. Impaired fasting glucose  - Hemoglobin A1c; Future  - Hemoglobin A1c  Continue current meds.    6. Osteoarthritis, unspecified osteoarthritis type, unspecified site    7. Screening for malignant neoplasm of prostate  - PSA, Screening; Future  - PSA, Screening       Chronic conditions status updated as per HPI.  Other than changes above, cont current medications and maintain follow up with specialists.  Return to clinic in 6 months.    Gunnar Rangel MD  [facility]                Answers for HPI/ROS submitted by the patient on 9/24/2019   activity change: No  unexpected weight change: No  rhinorrhea: No  trouble swallowing: No  visual disturbance: No  chest tightness: No  polyuria: No  difficulty urinating: No  joint swelling: No  arthralgias: No  confusion: No  dysphoric mood: No    Flu consent signed by patient. Flu vaccine administered.

## 2019-11-25 ENCOUNTER — TELEPHONE (OUTPATIENT)
Dept: FAMILY MEDICINE | Facility: CLINIC | Age: 69
End: 2019-11-25

## 2019-11-25 DIAGNOSIS — Z12.5 SCREENING FOR MALIGNANT NEOPLASM OF PROSTATE: ICD-10-CM

## 2019-11-25 DIAGNOSIS — R73.01 IMPAIRED FASTING GLUCOSE: Primary | ICD-10-CM

## 2019-11-25 NOTE — TELEPHONE ENCOUNTER
----- Message from Enriqueta Raphael sent at 11/25/2019  1:26 PM CST -----  Contact: self  Patient is requesting a call back concerning question about his lab orders. Please call

## 2019-11-25 NOTE — TELEPHONE ENCOUNTER
office would not use the orders you gave, pt is going to [facility] tomorrow to have labs done

## 2019-11-26 ENCOUNTER — LAB VISIT (OUTPATIENT)
Dept: LAB | Facility: HOSPITAL | Age: 69
End: 2019-11-26
Attending: INTERNAL MEDICINE
Payer: MEDICARE

## 2019-11-26 DIAGNOSIS — Z12.5 SCREENING FOR MALIGNANT NEOPLASM OF PROSTATE: ICD-10-CM

## 2019-11-26 DIAGNOSIS — R73.01 IMPAIRED FASTING GLUCOSE: ICD-10-CM

## 2019-11-26 LAB
COMPLEXED PSA SERPL-MCNC: 0.53 NG/ML (ref 0–4)
ESTIMATED AVG GLUCOSE: 148 MG/DL (ref 68–131)
HBA1C MFR BLD HPLC: 6.8 % (ref 4–5.6)

## 2019-11-26 PROCEDURE — 84153 ASSAY OF PSA TOTAL: CPT | Mod: HCNC

## 2019-11-26 PROCEDURE — 36415 COLL VENOUS BLD VENIPUNCTURE: CPT | Mod: HCNC,PO

## 2019-11-26 PROCEDURE — 83036 HEMOGLOBIN GLYCOSYLATED A1C: CPT | Mod: HCNC

## 2019-11-28 PROBLEM — E11.9 TYPE 2 DIABETES MELLITUS WITHOUT COMPLICATION, WITHOUT LONG-TERM CURRENT USE OF INSULIN: Status: ACTIVE | Noted: 2019-11-28

## 2019-11-28 NOTE — PROGRESS NOTES
Labs show diagnosis of diabetes with an A1C of 6.8 (diabetes diagnosis A1C >6.5).  Medications to control diabetes are not needed at this time.  Needs to focus harder on low carb, low sugar diet.  Please offer diabetic education if desired.  Will recheck in 6 months.  Otherwise labs show normal PSA

## 2019-11-29 ENCOUNTER — TELEPHONE (OUTPATIENT)
Dept: FAMILY MEDICINE | Facility: CLINIC | Age: 69
End: 2019-11-29

## 2019-11-29 DIAGNOSIS — E11.9 TYPE 2 DIABETES MELLITUS WITHOUT COMPLICATION, WITHOUT LONG-TERM CURRENT USE OF INSULIN: Primary | ICD-10-CM

## 2019-11-29 NOTE — TELEPHONE ENCOUNTER
----- Message from Gunnar Rangel MD sent at 11/28/2019  9:18 AM CST -----  Labs show diagnosis of diabetes with an A1C of 6.8 (diabetes diagnosis A1C >6.5).  Medications to control diabetes are not needed at this time.  Needs to focus harder on low carb, low sugar diet.  Please offer diabetic education if desired.  Will recheck in 6 months.  Otherwise labs show normal PSA

## 2019-12-05 ENCOUNTER — CLINICAL SUPPORT (OUTPATIENT)
Dept: DIABETES | Facility: CLINIC | Age: 69
End: 2019-12-05
Payer: MEDICARE

## 2019-12-05 DIAGNOSIS — E11.9 TYPE 2 DIABETES MELLITUS WITHOUT COMPLICATION, WITHOUT LONG-TERM CURRENT USE OF INSULIN: ICD-10-CM

## 2019-12-05 DIAGNOSIS — E11.65 UNCONTROLLED TYPE 2 DIABETES MELLITUS WITH HYPERGLYCEMIA: Primary | ICD-10-CM

## 2019-12-05 PROCEDURE — 99999 PR PBB SHADOW E&M-EST. PATIENT-LVL I: CPT | Mod: PBBFAC,HCNC,, | Performed by: DIETITIAN, REGISTERED

## 2019-12-05 PROCEDURE — 99999 PR PBB SHADOW E&M-EST. PATIENT-LVL I: ICD-10-PCS | Mod: PBBFAC,HCNC,, | Performed by: DIETITIAN, REGISTERED

## 2019-12-05 PROCEDURE — G0108 DIAB MANAGE TRN  PER INDIV: HCPCS | Mod: HCNC,S$GLB,, | Performed by: DIETITIAN, REGISTERED

## 2019-12-05 PROCEDURE — G0108 PR DIAB MANAGE TRN  PER INDIV: ICD-10-PCS | Mod: HCNC,S$GLB,, | Performed by: DIETITIAN, REGISTERED

## 2019-12-05 RX ORDER — INSULIN PUMP SYRINGE, 3 ML
EACH MISCELLANEOUS
Qty: 1 EACH | Refills: 0 | Status: SHIPPED | OUTPATIENT
Start: 2019-12-05 | End: 2021-11-17

## 2019-12-05 RX ORDER — LANCETS
1 EACH MISCELLANEOUS DAILY
Qty: 30 EACH | Refills: 11 | Status: SHIPPED | OUTPATIENT
Start: 2019-12-05 | End: 2020-09-16 | Stop reason: SDUPTHER

## 2019-12-05 NOTE — PROGRESS NOTES
Diabetes Education  Author: Johnie Juarez RD  Date: 12/5/2019    Diabetes Care Management Summary  Diabetes Education Record Assessment/Progress: Initial  Current Diabetes Risk Level: Low     Patient is a new diagnosis with overall good diet and exercise. Retired, lives at home with wife who prepares 3 meals per day for patient.     Diabetes Type  Diabetes Type : Type II(New Dx)    Diabetes History  Diabetes Diagnosis: 0-1 year  Current Treatment: Diet, Exercise    Health Maintenance was reviewed today with patient. Discussed with patient importance of routine eye exams, foot exams/foot care, blood work (i.e.: A1c, microalbumin, and lipid), dental visits, yearly flu vaccine, and pneumonia vaccine as indicated by PCP. Patient verbalized understanding.     Health Maintenance Topics with due status: Not Due       Topic Last Completion Date    High Dose Statin 09/25/2019    Aspirin/Antiplatelet Therapy 09/25/2019     Health Maintenance Due   Topic Date Due    Hepatitis C Screening  1950    Lipid Panel  1950    TETANUS VACCINE  08/26/1968    Colonoscopy  08/26/2000    Abdominal Aortic Aneurysm Screening  08/26/2015       Nutrition  Meal Planning: 3 meals per day, snacks between meal  What type of beverages do you drink?: water, juice, milk(Coffee)  Meal Plan 24 Hour Recall - Breakfast: Toast w/ PB, honey & Cinnamon, Oatmeal w/ Coconut, Omelette w/ Potato - oj, broderick  Meal Plan 24 Hour Recall - Lunch: 1/2 [place] w/ Ham & Cheese w/ Carrots, Cauliflower  Meal Plan 24 Hour Recall - Dinner: Meat, Vegetables, Salad w/ Lite Dressing   Meal Plan 24 Hour Recall - Snack: carrots, celery, ritz crackers w/ pb and a glass milk     Monitoring   Self Monitoring : No Monitor    Exercise   Exercise Type: use exercise equipment(stationary bike)  Intensity: Low  Frequency: 3-5 Times per week  Duration: 30 min    Current Diabetes Treatment   Current Treatment: Diet, Exercise    Social History  Preferred Learning Method:  Face to Face  Primary Support: Self  Occupation: Retired x1yr                                Barriers to Change  Barriers to Change: None  Learning Challenges : None    Readiness to Learn   Readiness to Learn : Acceptance    Cultural Influences  Cultural Influences: None    Diabetes Education Assessment/Progress  Diabetes Disease Process (diabetes disease process and treatment options): Demonstrates Understanding/Competency(verbalizes/demonstrates), Discussion, Individual Session, Instructed, Written Materials Provided, Comprehends Key Points  Nutrition (Incorporating nutritional management into one's lifestyle): Demonstrates Understanding/Competency (verbalizes/demonstrates), Discussion, Individual Session, Instructed, Written Materials Provided, Comprehends Key Points  Physical Activity (incorporating physical activity into one's lifestyle): Demonstrates Understanding/Competency (verbalizes/demonstrates), Discussion, Individual Session, Instructed, Written Materials Provided, Comprehends Key Points  Medications (states correct name, dose, onset, peak, duration, side effects & timing of meds): Demonstrates Understanding/Competency(verbalizes/demonstrates), Discussion, Individual Session, Instructed, Written Materials Provided, Comprehends Key Points  Monitoring (monitoring blood glucose/other parameters & using results): Demonstrates Understanding/Competency (verbalizes/demonstrates), Discussion, Individual Session, Instructed, Written Materials Provided, Comprehends Key Points  Acute Complications (preventing, detecting, and treating acute complications): Demonstrates Understanding/Competency (verbalizes/demonstrates), Discussion, Individual Session, Instructed, Written Materials Provided, Comprehends Key Points  Chronic Complications (preventing, detecting, and treating chronic complications): Demonstrates Understanding/Competency (verbalizes/demonstrates), Discussion, Individual Session, Instructed, Written  Materials Provided, Comprehends Key Points  Clinical (diabetes, other pertinent medical history, and relevant comorbidities reviewed during visit): Discussion, Comprehends Key Points  Cognitive (knowledge of self-management skills, functional health literacy): Discussion, Comprehends Key Points  Psychosocial (emotional response to diabetes): Discussion, Comprehends Key Points  Diabetes Distress and Support Systems: Not Covered/Deferred(Time)  Behavioral (readiness for change, lifestyle practices, self-care behaviors): Discussion, Comprehends Key Points    Goals  Patient has selected/evaluated goals during today's session: Yes, selected  Monitoring: Set(Patient will monitor Blood Sugars at least once daily to have bg wnl )  Start Date: 12/05/19         Diabetes Care Plan/Intervention  Education Plan/Intervention: Individual Follow-Up DSMT    Diabetes Meal Plan  Carbohydrate Per Meal: 45-60g  Carbohydrate Per Snack : 15-20g    Today's Self-Management Care Plan was developed with the patient's input and is based on barriers identified during today's assessment.    The long and short-term goals in the care plan were written with the patient/caregiver's input. The patient has agreed to work toward these goals to improve his overall diabetes control.      The patient received a copy of today's self-management plan and verbalized understanding of the care plan, goals, and all of today's instructions.      The patient was encouraged to communicate with his physician and care team regarding his condition(s) and treatment.  I provided the patient with my contact information today and encouraged him to contact me via phone or patient portal as needed.     Education Units of Time   Time Spent: 60 min

## 2020-05-08 DIAGNOSIS — Z12.11 COLON CANCER SCREENING: ICD-10-CM

## 2020-08-14 LAB
LEFT EYE DM RETINOPATHY: NEGATIVE
RIGHT EYE DM RETINOPATHY: NEGATIVE

## 2020-08-18 ENCOUNTER — PATIENT OUTREACH (OUTPATIENT)
Dept: OTHER | Facility: OTHER | Age: 70
End: 2020-08-18

## 2020-08-18 DIAGNOSIS — E11.9 TYPE 2 DIABETES MELLITUS: ICD-10-CM

## 2020-08-18 NOTE — LETTER
August 18, 2020     Bandar French  [address]  [place] [address]       Dear Bandar,    Welcome to [facility] Our goal is to make care effective, proactive and convenient by using data you send us from home to better treat your chronic conditions.              My name is Chuck Covington, and I am your dedicated Digital Medicine clinician. As an expert in medication management, I will help ensure that the medications you are taking continue to provide the intended benefits and help you reach your goals. You can reach me directly at [phone] or by sending me a message directly through your [facility] account.      I am Magdalena Wesley and I will be your health . My job is to help you identify lifestyle changes to improve your disease control. We will talk about nutrition, exercise, and other ways you may be able to adjust your current habits to better your health. Additionally, we will help ensure you are completing the tests and screenings that are necessary to help manage your conditions. You can reach me directly at [phone] or by sending me a message directly through your [facility] account.    Most importantly, YOU are at the center of this team. Together, we will work to improve your overall health and encourage you to meet your goals for a healthier lifestyle.     What we expect from YOU:  · Please take frequent home blood pressure measurements. We ask that you take at least 1 blood pressure reading per week, but more information will better help us get you know you. Be sure you rest for a few minutes before taking the reading in a quiet, comfortable place.     Be available to receive phone calls or [employer] messages, when appropriate, from your care team. Please let us know if there are any specific days or times that work best for us to reach you via phone.     Complete routine tests and screenings. Dont worry, we will help keep you on track!           What you should  expect from your Digital Medicine Care Team:   We will work with you to create a personalized plan of care and provide you with encouragement and education, including regarding lifestyle changes, that could help you manage your disease states.     We will adjust your current medications, if needed, and continue to monitor your long-term progress.     We will provide you and your physician with monthly progress reports after you have been in the program for more than 30 days.     We will send you reminders through [employer] and text messages to help ensure you do not miss any testing deadlines to help manage your disease states.    You will be able to reach us by phone or through your "[employer] account by clicking our names under Care Team on the right side of the home screen.    I look forward to working with you to achieve your blood pressure goals!    We look forward to working with you to help manage your health,    Sincerely,    Your Digital Medicine Team    Please visit our websites to learn more:   · Hypertension: [email or web address]      Remember, we are not available for emergencies. If you have an emergency, please contact your doctors office directly or call [facility] ([phone] or [phone]) or [phone].

## 2020-08-18 NOTE — PROGRESS NOTES
Digital Medicine: Health  Introduction    Introduced Bandar French to Digital Medicine. Discussed health  role and recommended lifestyle modifications.    The history is provided by the patient.           Additional Enrollment Details: While going through demographic, patient prefers to use [facility]. I updated his preferred pharmacies.     Currently, he it taking metoprolol dose in the morning (around 9am).     He takes second metorprolol dose within 30 minutes of his irbesartan dose before bed.     HYPERTENSION  Explained hypertension digital medicine goals including BP goal less than or equal to 130/80mmHg, improved convenience of BP management and reduced risk of heart attack, kidney failure, stroke, eye disease, dementia, and death.      Explained non-pharmacologic therapies like low salt diet and physical activity can reduce blood pressure. .      Explained that we expect patient to submit several blood pressure readings per week at random times of the day, but at least 30 minutes after taking blood pressure medications. Instructed patient not to allow anyone else to use their blood pressure monitor and phone as data submitted is directly entered into medical record. Reviewed and confirmed appropriate blood pressure monitoring technique.             Lifestyle  Plan      Topic    Lipid (Cholesterol) Test        Last 5 Patient Entered Readings                                      Current 30 Day Average: 108/67     Recent Readings 8/18/2020    SBP (mmHg) 108    DBP (mmHg) 67    Pulse 51

## 2020-08-19 ENCOUNTER — PATIENT OUTREACH (OUTPATIENT)
Dept: OTHER | Facility: OTHER | Age: 70
End: 2020-08-19

## 2020-08-19 LAB
CHOL/HDLC RATIO: 4
CHOLEST SERPL-MSCNC: 113 MG/DL (ref 0–200)
HDLC SERPL-MCNC: 31 MG/DL
LDLC SERPL CALC-MCNC: 61 MG/DL
NON-HDL CHOLESTEROL: 82
TRIGLYCERIDE (LIPID PAN): 129

## 2020-08-19 PROCEDURE — 99453 REM MNTR PHYSIOL PARAM SETUP: CPT | Mod: S$GLB,,, | Performed by: INTERNAL MEDICINE

## 2020-08-19 PROCEDURE — 99453 PR REMOTE MONITR, PHYSIOL PARAM, INITIAL: ICD-10-PCS | Mod: S$GLB,,, | Performed by: INTERNAL MEDICINE

## 2020-08-19 NOTE — PROGRESS NOTES
Digital Medicine: Clinician Introduction    Bandar French is a 69 y.o. male who is newly enrolled in the Digital Medicine Clinic.    Called patient regarding HDMP (Hypertension Digital Medicine Program)    HPI  Patients BP average is currently 137/74 mmHg.    Patient denies s/s of hypotension (lightheadedness, dizziness, nausea, fatigue) associated with low readings. Instructed patient to inform me if this occurs, patient confirms understanding.    Patient denies s/s of hypertension (SOB, CP, severe headaches, changes in vision) associated with high readings. Instructed patient to go to the ED if BP >180/110 and accompanied by hypertensive s/s, patient confirms understanding.    Patient takes his Irbesartan 150 mg every evening. He went to his cardiologist today (at ) and he decreased his Lopressor to 25 mg BID because his pulse was low.    Patient checked his BP this morning as soon as he woke up, likely why it was elevated. It was before his morning dose of Lopressor.    The history is provided by the patient.      Review of patient's allergies indicates:  No Known Allergies  Completed Medication Reconciliation  Verified pharmacy information.    HYPERTENSION  Explained hypertension digital medicine goals including BP goal less than or equal to 130/80mmHg, improved convenience of BP management and reduced risk of heart attack, kidney failure, stroke, eye disease, dementia, and death.     Explained non-pharmacologic therapies like low salt diet and physical activity can reduce blood pressure.       Explained that we expect patient to submit several blood pressure readings per week at random times of the day, but at least 30 minutes after taking blood pressure medications. Instructed patient not to allow anyone else to use their blood pressure monitor and phone as data submitted is directly entered into medical record. Reviewed and confirmed appropriate blood pressure monitoring technique.         Reviewed  signs/symptoms of hypertension (headache, changes in vision, chest pain, shortness of breath)   Reviewed signs/symptoms of hypotension (lightheaded, dizziness, weakness)     Patient's BP goal is less than or equal to 130/80. Patients BP average is 137/74 mmHg, which is above goal, per 2017 ACC/AHA Hypertension Guidelines. He walked me through how he's checking his blood pressure. He sits at his home office and checks on his left arm. His technique appears to be accurate.       Last 5 Patient Entered Readings                                      Current 30 Day Average: 137/74     Recent Readings 8/19/2020 8/18/2020 8/18/2020 8/18/2020    SBP (mmHg) 140 134 145 108    DBP (mmHg) 78 75 75 67    Pulse 50 49 50 51              Depression Screening  Bandar French screened negative on the depression screening.     Sleep Apnea Screening  Patient not previously diagnosed with ENEDINA       Medication Affordability Screening  Patient did not answer the medication affordability questionnaires. Patient is currently not having problems affording medications    Medication Adherence-Medication adherence was assessed.  Patient continue taking medication as prescribed.          He reports it is a routine for him      ASSESSMENT(S)  Patients BP average is 137/74 mmHg, which is above goal. Patient's BP goal is less than or equal to 130/80 per 2017 ACC/AHA Hypertension Guidelines.       Hypertension Plan  Additional monitoring needed.  Continue current therapy.  Continue current diet/physical activity routine.  Will call patient in a few weeks, sooner if needed. If patient's BP is elevated, will consider increasing irbesartan to 300 mg. Patient knows to reach out at any time for any questions or concerns.        Addressed any questions or concerns and patient has my contact information if needed prior to next outreach. Patient verbalizes understanding.      Explained the importance of self-monitoring and medication adherence.  Encouraged the patient to communicate with their health  for lifestyle modifications to help improve or maintain a healthy lifestyle.        Sent link to [facility] webpages and my contact information via [employer] for future questions.        Explained to the patient that the Digital Medicine team is not available for emergencies. Advised patient call [facility] ([phone] or [phone]) or [phone] if needed.             Topic    Lipid (Cholesterol) Test        Current Medication Regimen:  Hypertension Medications             irbesartan (AVAPRO) 150 MG tablet Take 150 mg by mouth every evening.    metoprolol tartrate (LOPRESSOR) 50 MG tablet Take 25 mg by mouth 2 (two) times daily.        Chuck Covington, PharmD  Digital Medicine Clinical Pharmacist  [phone]

## 2020-08-24 ENCOUNTER — PATIENT OUTREACH (OUTPATIENT)
Dept: OTHER | Facility: OTHER | Age: 70
End: 2020-08-24

## 2020-08-24 NOTE — PROGRESS NOTES
Digital Medicine: Health  Introduction    Introduced Bandar French to Digital Medicine. Discussed health  role and recommended lifestyle modifications.    The history is provided by the patient.         Health  received low BG alert.        Additional Enrollment Details:   I called Mr. French to complete HC enrollment and to follow up on low BG alert received on . Patient denies hypoglycemic symptoms and believes it was an error. I will delete from chart.     Patient is noticing at least a 30 point difference in glucometer readings between his own. His personal glucometer is less than a year old and his test strips do not  for another year. He did try the test solution with the digital glucometer and it was in normal range indicated by test strip vial.      I encouraged patient to switch up the order of his testing to see if that makes a difference.     Mr. French had a question about whether sun exposure could increase his blood sugar. I will let his PharmD know so she can answer if applicable. 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 He is not sure if he told her or not.                 DIABETES  Explained the goal of the diabetes digital medicine program is to decrease A1c within patient-specific target levels. Reviewed benefits of A1c reduction including reducing risk for kidney, eye, and nerve disease.        Patient's A1C goal is less than or equal to 8.  Patient's most recent A1C result is at goal.  @RESUFAST(LABA1C,HGBA).          HYPERTENSION    Patient's BP goal is less than or equal to 130/80.Patient's BP average is 134/75 mmHg, which is above goal, per 2017 ACC/AHA Hypertension Guidelines.            Diet-Assessed    Patient reports eating or drinking the following: Mr. French changed his diet last November. Since then, he has lost about 25 pounds. His wife has been more attentive to carb count, and he  tries to stay in the 45g of carbs per meal and 15-20g of carb per snack. He is focusing on more foods that have zero carbs, limiting sweets, eating more vegetables, and drinking primarily water.     He also drinks coffee, tea, and low sugar cherry limeade (Min[employer]id). He tries to limit to 4oz.     They eat primarily at home.           Physical Activity-Assessed      He exercises for 60 minutes per day 5 day(s) a week.     Additional physical activity details: He started walking in February, and two months ago, he and his wife joined [employer]. They walk 30 minutes on the treadmill, and then do 30 minutes of weight lifting.         Medication Adherence-Medication adherence was assessed.  Patient continue taking medication as prescribed.          Substance, Sleep, Stress-Assessed  stress-not assessed  Details:  Intervention(s):    Sleep-assessed  Details:  Intervention(s):    Alcohol -assessed  Details:None  Intervention(s):    Tobacco-Assessed  Details:None  Intervention(s):          Provided patient education.       Addressed any questions or concerns and patient has my contact information if needed prior to next outreach. Patient verbalizes understanding.      Explained the importance of self-monitoring and medication adherence. Encouraged the patient to communicate with their health  for lifestyle modifications to help improve or maintain a healthy lifestyle.                Topic    Lipid (Cholesterol) Test        Last 5 Patient Entered Readings                                      Current 30 Day Average: 134/75     Recent Readings 8/24/2020 8/23/2020 8/23/2020 8/22/2020 8/21/2020    SBP (mmHg) 130 138 139 118 146    DBP (mmHg) 72 72 81 71 76    Pulse 53 55 51 51 57        Last 6 Patient Entered Readings                                          Most Recent A1c: 6.8% on 11/26/2019  (Goal: 8%)     Recent Readings 8/21/2020 8/21/2020 8/21/2020 8/21/2020 8/21/2020    Blood Glucose (mg/dL) 184 30 118 168  000

## 2020-08-25 ENCOUNTER — PATIENT OUTREACH (OUTPATIENT)
Dept: OTHER | Facility: OTHER | Age: 70
End: 2020-08-25

## 2020-08-25 DIAGNOSIS — I11.9 HYPERTENSIVE HEART DISEASE WITHOUT HEART FAILURE: Primary | ICD-10-CM

## 2020-08-25 DIAGNOSIS — E11.9 TYPE 2 DIABETES MELLITUS WITHOUT COMPLICATION, WITHOUT LONG-TERM CURRENT USE OF INSULIN: ICD-10-CM

## 2020-08-25 NOTE — PROGRESS NOTES
Digital Medicine: Clinician Introduction    Bandar French is a 69 y.o. male who is newly enrolled in the Digital Medicine Clinic.    HPI:  Called patient regarding the DDMP (Diabetes Digital Medicine Program).    Patient denies hypoglycemic s/sx (dizziness, extreme hunger, headaches, confusion, trouble concentrating, sweating, shaking, blurred vision, personality changes); patient denies hyperglycemic s/sx (increased thirst, increased urination, headaches, trouble concentrating, blurred vision, fatigue).    Patient had 4-5 corn chips for lunch and the rest of the meal was carb-free. He states he believes the digital glucometer is higher than his True Metrix. This morning, his numbers were 204 vs 147.     Patient inquired about the sun's effect on blood glucose because he likes to tan in his backyard.    The history is provided by the patient.      Review of patient's allergies indicates:  No Known Allergies  Completed Medication Reconciliation  Verified pharmacy information.  Patient is on ACEI/ARB.   Patient is on statin     DIABETES  Explained the goal of the diabetes digital medicine program is to decrease A1c within patient-specific target levels. Reviewed benefits of A1c reduction including reducing risk for kidney, eye, and nerve disease.      Explained that we expect patient to submit blood sugar readings as prescribed. Instructed patient not to allow anyone else to use their glucometer and phone as data submitted is directly entered into their medical record. Reviewed and confirmed appropriate blood sugar testing technique.      Patient reported SMBG schedule: Daily.   Reviewed general Self-Monitoring of Blood Glucose (SMBG) goals:  · FP-130 mg/dL  · 2h PPG: <180 mg/dL  · Bedtime: <150 mg/dL    Reviewed signs or symptoms of hyperglycemia (headache, increased thirst, increased urination, fatigue, blurred vision, etc.).      Reviewed signs and symptoms of hypoglycemia (weakness, dizziness, hunger,  shakiness, nausea, headache, heart palpitations, sweating, fatigue, anxiety, etc.).  Reviewed treatment of hypoglycemia (15/15 rule).      Patient does not have history of hypoglycemia.    Patient's A1C goal is less than or equal to 8 per 2020 ADA guidelines. Patient's most recent A1C result is at goal. Lab Results     Component                Value               Date                     HGBA1C                   6.8 (H)             11/26/2019          .         Last 6 Patient Entered Readings                                          Most Recent A1c: 6.8% on 11/26/2019  (Goal: 8%)     Recent Readings 8/23/2020 8/23/2020 8/22/2020 8/21/2020 8/21/2020    Blood Glucose (mg/dL) 122 150 152 184 (No Data)             Depression Screening  Did not address depression screening.    Sleep Apnea Screening    Did not address sleep apnea screening.     Medication Affordability Screening  Did not address medication affordability screening.     Medication Adherence-Medication adherence was assessed.  Patient continue taking medication as prescribed.            ASSESSMENT(S)  Patient's A1C goal is less than or equal to 8 per 2020 ADA guidelines. Patient's most recent A1C result is at goal. Lab Results    Component                Value               Date                     HGBA1C                   6.8 (H)             11/26/2019          .         Diabetes Plan  Labs ordered. Scheduled BMP, A1C, microalbumin/creatinine ratio Expected Lab Date: 8/27/2020  Additional monitoring needed.  Continue current diet/physical activity routine.  Provided patient education. Counseled patient that the sun can decrease blood glucose so to avoid excessive exposure to it and stay well hydrated  Will call patient in a few weeks, sooner if needed. Patient knows to reach out at any time for any questions or concerns.        Addressed any questions or concerns and patient has my contact information if needed prior to next outreach. Patient verbalizes  understanding.      Explained the importance of self-monitoring and medication adherence. Encouraged the patient to communicate with their health  for lifestyle modifications to help improve or maintain a healthy lifestyle.        Sent link to [facility] webpages and my contact information via [employer] [facility] for future questions.        Explained to the patient that the Digital Medicine team is not available for emergencies. Advised patient call [facility] ([phone] or [phone]) or [phone] if needed.             Topic    Lipid (Cholesterol) Test        Current Medication Regimen:  Hypertension Medications             irbesartan (AVAPRO) 150 MG tablet Take 150 mg by mouth every evening.    metoprolol tartrate (LOPRESSOR) 50 MG tablet Take 25 mg by mouth 2 (two) times daily.          Chuck Covington, PharmD  Digital Medicine Clinical Pharmacist  [phone]

## 2020-08-26 ENCOUNTER — LAB VISIT (OUTPATIENT)
Dept: LAB | Facility: HOSPITAL | Age: 70
End: 2020-08-26
Attending: INTERNAL MEDICINE
Payer: MEDICARE

## 2020-08-26 ENCOUNTER — TELEPHONE (OUTPATIENT)
Dept: FAMILY MEDICINE | Facility: CLINIC | Age: 70
End: 2020-08-26

## 2020-08-26 DIAGNOSIS — E11.9 TYPE 2 DIABETES MELLITUS WITHOUT COMPLICATION, WITHOUT LONG-TERM CURRENT USE OF INSULIN: ICD-10-CM

## 2020-08-26 DIAGNOSIS — I10 ESSENTIAL HYPERTENSION: Primary | ICD-10-CM

## 2020-08-26 DIAGNOSIS — I11.9 HYPERTENSIVE HEART DISEASE WITHOUT HEART FAILURE: ICD-10-CM

## 2020-08-26 LAB
ANION GAP SERPL CALC-SCNC: 6 MMOL/L (ref 8–16)
BUN SERPL-MCNC: 19 MG/DL (ref 8–23)
CALCIUM SERPL-MCNC: 9.1 MG/DL (ref 8.7–10.5)
CHLORIDE SERPL-SCNC: 106 MMOL/L (ref 95–110)
CO2 SERPL-SCNC: 29 MMOL/L (ref 23–29)
CREAT SERPL-MCNC: 1 MG/DL (ref 0.5–1.4)
EST. GFR  (AFRICAN AMERICAN): >60 ML/MIN/1.73 M^2
EST. GFR  (NON AFRICAN AMERICAN): >60 ML/MIN/1.73 M^2
ESTIMATED AVG GLUCOSE: 128 MG/DL (ref 68–131)
GLUCOSE SERPL-MCNC: 111 MG/DL (ref 70–110)
HBA1C MFR BLD HPLC: 6.1 % (ref 4–5.6)
POTASSIUM SERPL-SCNC: 4.6 MMOL/L (ref 3.5–5.1)
SODIUM SERPL-SCNC: 141 MMOL/L (ref 136–145)

## 2020-08-26 PROCEDURE — 36415 COLL VENOUS BLD VENIPUNCTURE: CPT | Mod: HCNC,PO

## 2020-08-26 PROCEDURE — 80048 BASIC METABOLIC PNL TOTAL CA: CPT | Mod: HCNC

## 2020-08-26 PROCEDURE — 83036 HEMOGLOBIN GLYCOSYLATED A1C: CPT | Mod: HCNC

## 2020-08-26 NOTE — TELEPHONE ENCOUNTER
"----- Message from Chuck Covington PharmD sent at 8/26/2020  9:35 AM CDT -----  Regarding: Hypertension Digital Medicine  Good morning Dr. Rangel,    Can you please add a diagnoses of "hypertension" to this patient's problem list in order for me to link the orders to it for the hypertension digital medicine program?    Please let me know if you have any questions.    Thanks so much,  Chuck Covington, PharmD  Digital Medicine Clinical Pharmacist  [phone]      "

## 2020-08-26 NOTE — TELEPHONE ENCOUNTER
Pt has a diagnosis of hypertensive heart disease without heart failure on his problem list already.     ICD-10-CM: I11.9   ICD-9-CM: 402.90

## 2020-08-27 ENCOUNTER — NUTRITION (OUTPATIENT)
Dept: DIABETES | Facility: CLINIC | Age: 70
End: 2020-08-27
Payer: MEDICARE

## 2020-08-27 DIAGNOSIS — E11.9 TYPE 2 DIABETES MELLITUS WITHOUT COMPLICATION, WITHOUT LONG-TERM CURRENT USE OF INSULIN: ICD-10-CM

## 2020-08-27 PROCEDURE — G0108 DIAB MANAGE TRN  PER INDIV: HCPCS | Mod: HCNC,S$GLB,, | Performed by: DIETITIAN, REGISTERED

## 2020-08-27 PROCEDURE — G0108 PR DIAB MANAGE TRN  PER INDIV: ICD-10-PCS | Mod: HCNC,S$GLB,, | Performed by: DIETITIAN, REGISTERED

## 2020-08-27 NOTE — PROGRESS NOTES
Diabetes Education  Author: Johnie Juarez RD  Date: 9/4/2020    Diabetes Care Management Summary  Diabetes Education Record Assessment/Progress: Post Program/Follow-up  Current Diabetes Risk Level: Low         Diabetes Type  Diabetes Type : Type II    Patient in office for follow up:  24hr Recall:  B - 2 eggs, 1/2c grits, 1/2c fruit  L - lettuce w/ tomatoe and tuna  D - spinach and artichoke dip w/ fried bowtie pasta, shrimp and tasso pasta, cheesecake (Birthday Dinner)   Drinking water and unsweet tea throughout the day, gatorade zero, coffee black    Testing 1-2x/day  113 7 day average   Exercising daily x1hr     Reviewed DM diet. Patient encouraged to continue current plan as bg wnl and A1c improved.    Health Maintenance was reviewed today with patient. Discussed with patient importance of routine eye exams, foot exams/foot care, blood work (i.e.: A1c, microalbumin, and lipid), dental visits, yearly flu vaccine, and pneumonia vaccine as indicated by PCP. Patient verbalized understanding.     Health Maintenance Topics with due status: Not Due       Topic Last Completion Date    High Dose Statin 08/19/2020     Health Maintenance Due   Topic Date Due    Hepatitis C Screening  1950    Lipid Panel  1950    Foot Exam  08/26/1960    TETANUS VACCINE  08/26/1968    Aspirin/Antiplatelet Therapy  08/26/1968    Colorectal Cancer Screening  08/26/2000    Shingles Vaccine (2 of 3) 02/27/2013    Abdominal Aortic Aneurysm Screening  08/26/2015    Influenza Vaccine (1) 08/01/2020         Today's Self-Management Care Plan was developed with the patient's input and is based on barriers identified during today's assessment.    The long and short-term goals in the care plan were written with the patient/caregiver's input. The patient has agreed to work toward these goals to improve his overall diabetes control.      The patient received a copy of today's self-management plan and verbalized understanding of the  care plan, goals, and all of today's instructions.      The patient was encouraged to communicate with his physician and care team regarding his condition(s) and treatment.  I provided the patient with my contact information today and encouraged him to contact me via phone or patient portal as needed.

## 2020-08-28 ENCOUNTER — TELEPHONE (OUTPATIENT)
Dept: FAMILY MEDICINE | Facility: CLINIC | Age: 70
End: 2020-08-28

## 2020-08-28 NOTE — TELEPHONE ENCOUNTER
Called pt and spoke with pt in regards of his lab results. Pt verbalized understanding of message.

## 2020-08-28 NOTE — TELEPHONE ENCOUNTER
----- Message from Gunnar Rangel MD sent at 8/28/2020  7:59 AM CDT -----  Diabetes control doing very well.  Kidneys appear very healthy

## 2020-08-31 PROCEDURE — 99458 PR REMOTE PHYSIOL MONIT, EA ADDTL 20 MINS: ICD-10-PCS | Mod: S$GLB,,, | Performed by: INTERNAL MEDICINE

## 2020-08-31 PROCEDURE — 99457 RPM TX MGMT 1ST 20 MIN: CPT | Mod: S$GLB,,, | Performed by: INTERNAL MEDICINE

## 2020-08-31 PROCEDURE — 99457 PR MONITORING, PHYSIOL PARAM, REMOTE, 1ST 20 MINS, PER MONTH: ICD-10-PCS | Mod: S$GLB,,, | Performed by: INTERNAL MEDICINE

## 2020-08-31 PROCEDURE — 99458 RPM TX MGMT EA ADDL 20 MIN: CPT | Mod: S$GLB,,, | Performed by: INTERNAL MEDICINE

## 2020-09-16 ENCOUNTER — OFFICE VISIT (OUTPATIENT)
Dept: FAMILY MEDICINE | Facility: CLINIC | Age: 70
End: 2020-09-16
Payer: MEDICARE

## 2020-09-16 VITALS
WEIGHT: 187.63 LBS | HEART RATE: 50 BPM | SYSTOLIC BLOOD PRESSURE: 120 MMHG | DIASTOLIC BLOOD PRESSURE: 64 MMHG | HEIGHT: 70 IN | OXYGEN SATURATION: 96 % | TEMPERATURE: 98 F | BODY MASS INDEX: 26.86 KG/M2

## 2020-09-16 DIAGNOSIS — E11.9 TYPE 2 DIABETES MELLITUS WITHOUT COMPLICATION, WITHOUT LONG-TERM CURRENT USE OF INSULIN: ICD-10-CM

## 2020-09-16 DIAGNOSIS — M17.11 PRIMARY OSTEOARTHRITIS OF RIGHT KNEE: ICD-10-CM

## 2020-09-16 DIAGNOSIS — Z23 NEED FOR PROPHYLACTIC VACCINATION AND INOCULATION AGAINST INFLUENZA: Primary | ICD-10-CM

## 2020-09-16 DIAGNOSIS — I25.10 CORONARY ARTERY DISEASE INVOLVING NATIVE CORONARY ARTERY OF NATIVE HEART WITHOUT ANGINA PECTORIS: ICD-10-CM

## 2020-09-16 DIAGNOSIS — I11.9 HYPERTENSIVE HEART DISEASE WITHOUT HEART FAILURE: ICD-10-CM

## 2020-09-16 PROCEDURE — 99397 PER PM REEVAL EST PAT 65+ YR: CPT | Mod: 25,HCNC,S$GLB, | Performed by: INTERNAL MEDICINE

## 2020-09-16 PROCEDURE — 99499 UNLISTED E&M SERVICE: CPT | Mod: S$GLB,,, | Performed by: INTERNAL MEDICINE

## 2020-09-16 PROCEDURE — 3044F PR MOST RECENT HEMOGLOBIN A1C LEVEL <7.0%: ICD-10-PCS | Mod: HCNC,CPTII,S$GLB, | Performed by: INTERNAL MEDICINE

## 2020-09-16 PROCEDURE — G0008 ADMIN INFLUENZA VIRUS VAC: HCPCS | Mod: HCNC,S$GLB,, | Performed by: INTERNAL MEDICINE

## 2020-09-16 PROCEDURE — 99999 PR PBB SHADOW E&M-EST. PATIENT-LVL IV: ICD-10-PCS | Mod: PBBFAC,HCNC,, | Performed by: INTERNAL MEDICINE

## 2020-09-16 PROCEDURE — 99397 PR PREVENTIVE VISIT,EST,65 & OVER: ICD-10-PCS | Mod: 25,HCNC,S$GLB, | Performed by: INTERNAL MEDICINE

## 2020-09-16 PROCEDURE — G0008 PR ADMIN INFLUENZA VIRUS VAC: ICD-10-PCS | Mod: HCNC,S$GLB,, | Performed by: INTERNAL MEDICINE

## 2020-09-16 PROCEDURE — 90694 FLU VACCINE - QUADRIVALENT - ADJUVANTED: ICD-10-PCS | Mod: HCNC,S$GLB,, | Performed by: INTERNAL MEDICINE

## 2020-09-16 PROCEDURE — 90694 VACC AIIV4 NO PRSRV 0.5ML IM: CPT | Mod: HCNC,S$GLB,, | Performed by: INTERNAL MEDICINE

## 2020-09-16 PROCEDURE — 99499 RISK ADDL DX/OHS AUDIT: ICD-10-PCS | Mod: S$GLB,,, | Performed by: INTERNAL MEDICINE

## 2020-09-16 PROCEDURE — 3044F HG A1C LEVEL LT 7.0%: CPT | Mod: HCNC,CPTII,S$GLB, | Performed by: INTERNAL MEDICINE

## 2020-09-16 PROCEDURE — 99999 PR PBB SHADOW E&M-EST. PATIENT-LVL IV: CPT | Mod: PBBFAC,HCNC,, | Performed by: INTERNAL MEDICINE

## 2020-09-16 RX ORDER — METOPROLOL SUCCINATE 25 MG/1
TABLET, EXTENDED RELEASE ORAL
COMMUNITY
Start: 2020-09-15 | End: 2020-11-03

## 2020-09-16 RX ORDER — CALCIUM CARB/VITAMIN D3/VIT K1 500-500-40
TABLET,CHEWABLE ORAL
COMMUNITY
Start: 2020-07-13

## 2020-09-16 RX ORDER — ASPIRIN 81 MG/1
81 TABLET ORAL DAILY
Status: ON HOLD | COMMUNITY
Start: 2020-02-20 | End: 2022-01-21 | Stop reason: HOSPADM

## 2020-09-16 NOTE — PROGRESS NOTES
[facility] Note    Chief Complaint      Chief Complaint   Patient presents with    Annual Exam       History of Present Illness      Bandar French is a 70 y.o. male with chronic conditions of CAD, HTN, HLD, DM2, osteoarthritis who presents today for: annual preventative visit.\  DM2: A1C 6.1.  Diet controlled.  Eye exam UTD with .  HTN: BP at goal on irbesartan, metoprolol.  HR has b een low but not having persistent lightheadedness or dyspnea.    CAD: Sees Dr. Cueva.  On ASA, irbesartan, metoprolol, crestor.  Denies CP, SOB.  HLD: Controlled on crestor.  LDL per DR. King, 72.    Osteoarthritis: S/P Right TKA with Dr. Westbrook.  Completed PT.    Diet: Prepares own food mostly.  LImiting fatty foods and carbs.  Drinks plenty water  Exercise: [employer] 6-7 x/week.   Denies drinking and driving, drinking more than 4 drinks on occasion, drug use.   Flu shot due today. Prevnar .  Pneumovax UTD.  Zostavax 2013.  Shingrix discussed.    Cscope Dr. Copeland, 2016 approx, no polyps, 5 yr interval for previous hx of polyps.    Past Medical History:  Past Medical History:   Diagnosis Date    Arthritis     Coronary artery disease 2014    stent    Glaucoma     Hyperlipidemia     Hypertension     Impaired fasting glucose        Past Surgical History:   has a past surgical history that includes Knee arthroscopy (Left, ); Knee arthroscopy (Right, ); Hand surgery (Right); Coronary stent placement (); Eye surgery; Tonsillectomy; Total knee arthroplasty (Right, 2019); and Joint replacement (19).    Family History:  family history is not on file.     Social History:  Social History     Tobacco Use    Smoking status: Former Smoker     Packs/day: 2.00     Years: 20.00     Pack years: 40.00     Types: Cigarettes, Cigars     Start date: 1968     Quit date:      Years since quittin.7    Smokeless tobacco: Never Used   Substance Use Topics    Alcohol use: Not Currently      Frequency: Never     Drinks per session: Patient refused     Binge frequency: Never     Comment: 1984    Drug use: Never       I personally reviewed all past medical, surgical, social and family history.    Review of Systems   Constitutional: Negative for chills, fever and malaise/fatigue.   Respiratory: Negative for shortness of breath.    Cardiovascular: Negative for chest pain.   Gastrointestinal: Negative for constipation, diarrhea, nausea and vomiting.   Skin: Negative for rash.   Neurological: Negative for weakness.   All other systems reviewed and are negative.       Medications:  Outpatient Encounter Medications as of 9/16/2020   Medication Sig Dispense Refill    aspirin (ADULT ASPIRIN REGIMEN) 81 MG EC tablet       folic acid/multivit-min/lutein (CENTRUM SILVER ORAL) Take by mouth once daily.      irbesartan (AVAPRO) 150 MG tablet Take 150 mg by mouth every evening.      latanoprost 0.005 % ophthalmic solution Place 1 drop into both eyes every evening.      metoprolol succinate (TOPROL-XL) 25 MG 24 hr tablet       MICRO THIN LANCETS 33 gauge Misc       rosuvastatin (CRESTOR) 20 MG tablet Take 20 mg by mouth every evening.      [DISCONTINUED] lancets Misc 1 each by Misc.(Non-Drug; Combo Route) route once daily. 30 each 11    aspirin 325 MG tablet Take 1 tablet (325 mg total) by mouth 2 (two) times daily. Take for four weeks post op 60 tablet 0    blood sugar diagnostic (BLOOD GLUCOSE TEST) Strp 1 each by Misc.(Non-Drug; Combo Route) route once daily. 30 each 11    blood-glucose meter kit Use as instructed 1 each 0    metoprolol tartrate (LOPRESSOR) 50 MG tablet Take 25 mg by mouth 2 (two) times daily.       No facility-administered encounter medications on file as of 9/16/2020.        Allergies:  Review of patient's allergies indicates:  No Known Allergies    Health Maintenance:  Immunization History   Administered Date(s) Administered    Influenza - High Dose - PF (65 years and older)  "09/25/2019    Pneumococcal Polysaccharide - 23 Valent 09/25/2019      Health Maintenance   Topic Date Due    Hepatitis C Screening  1950    Lipid Panel  1950    Foot Exam  08/26/1960    TETANUS VACCINE  08/26/1968    Abdominal Aortic Aneurysm Screening  08/26/2015    Eye Exam  12/13/2020    Hemoglobin A1c  02/26/2021    High Dose Statin  09/16/2021    Aspirin/Antiplatelet Therapy  09/16/2021    Pneumococcal Vaccine (65+ Low/Medium Risk)  Completed        Physical Exam      Vital Signs  Temp: 98 °F (36.7 °C)  Temp src: Oral  Pulse: (!) 50  SpO2: 96 %  BP: 120/64  BP Location: Right arm  Patient Position: Sitting  Pain Score: 0-No pain  Height and Weight  Height: 5' 10" (177.8 cm)  Weight: 85.1 kg (187 lb 9.8 oz)  BSA (Calculated - sq m): 2.05 sq meters  BMI (Calculated): 26.9  Weight in (lb) to have BMI = 25: 173.9]    Physical Exam  Vitals signs reviewed.   Constitutional:       Appearance: He is well-developed.   HENT:      Head: Normocephalic and atraumatic.      Right Ear: External ear normal.      Left Ear: External ear normal.   Eyes:      Conjunctiva/sclera: Conjunctivae normal.      Pupils: Pupils are equal, round, and reactive to light.   Cardiovascular:      Rate and Rhythm: Normal rate and regular rhythm.      Pulses:           Posterior tibial pulses are 2+ on the right side and 2+ on the left side.      Heart sounds: Normal heart sounds. No murmur.   Pulmonary:      Effort: Pulmonary effort is normal.      Breath sounds: Normal breath sounds. No wheezing or rales.   Abdominal:      General: Bowel sounds are normal. There is no distension or abdominal bruit.      Palpations: Abdomen is soft.      Tenderness: There is no abdominal tenderness.   Feet:      Right foot:      Protective Sensation: 5 sites tested. 5 sites sensed.      Skin integrity: Skin integrity normal.      Left foot:      Protective Sensation: 5 sites tested. 5 sites sensed.      Skin integrity: Skin integrity " normal.          Laboratory:  CBC:  Recent Labs   Lab 07/23/19  0602   WBC 8.73   RBC 4.31 L   Hemoglobin 12.6 L   Hematocrit 37.6 L   Platelets 161   Mean Corpuscular Volume 87   Mean Corpuscular Hemoglobin 29.2   Mean Corpuscular Hemoglobin Conc 33.5     CMP:  Recent Labs   Lab 08/26/20  0921   Glucose 111 H   Calcium 9.1   Sodium 141   Potassium 4.6   CO2 29   Chloride 106   BUN, Bld 19     URINALYSIS:  Recent Labs   Lab 07/10/19  0844   Color, UA Yellow   Specific Gravity, UA 1.010   pH, UA 6.0   Protein, UA Negative   Nitrite, UA Negative   Leukocytes, UA Negative   Urobilinogen, UA Negative      LIPIDS:      TSH:      A1C:  Recent Labs   Lab 11/26/19  1057 08/26/20  0921   Hemoglobin A1C 6.8 H 6.1 H       Assessment/Plan     Bandar French is a 70 y.o.male with:    Annual preventative visit: Discussed diet and exercise, vaccines and cancer screening, risk factors.  Screening labs reviewed and lipid panel requested.    1. Type 2 diabetes mellitus without complication, without long-term current use of insulin  Cont current diet.  A1C reviewed.  Eye exam UTD.    2. Coronary artery disease involving native coronary artery of native heart without angina pectoris  Continue current meds.  F?U with Dr. Cueva.  3. Hypertensive heart disease without heart failure  Continue current meds.    4. Primary osteoarthritis of right knee  F/U with Dr. Westbrook as needed.  5. Need for prophylactic vaccination and inoculation against influenza  - Flu Vaccine - Quadrivalent (Adjuvanted) *Preferred* 65+       Chronic conditions status updated as per HPI.  Other than changes above, cont current medications and maintain follow up with specialists.  Return to clinic in 6 months.    Gunnar Rangel MD  [facility]                  Flu consent signed by patient. Flu vaccine administered.

## 2020-09-17 ENCOUNTER — TELEPHONE (OUTPATIENT)
Dept: ADMINISTRATIVE | Facility: HOSPITAL | Age: 70
End: 2020-09-17

## 2020-09-17 ENCOUNTER — PATIENT OUTREACH (OUTPATIENT)
Dept: ADMINISTRATIVE | Facility: HOSPITAL | Age: 70
End: 2020-09-17

## 2020-09-18 ENCOUNTER — PATIENT OUTREACH (OUTPATIENT)
Dept: ADMINISTRATIVE | Facility: HOSPITAL | Age: 70
End: 2020-09-18

## 2020-09-18 ENCOUNTER — TELEPHONE (OUTPATIENT)
Dept: ADMINISTRATIVE | Facility: HOSPITAL | Age: 70
End: 2020-09-18

## 2020-09-21 ENCOUNTER — PATIENT OUTREACH (OUTPATIENT)
Dept: OTHER | Facility: OTHER | Age: 70
End: 2020-09-21

## 2020-09-21 NOTE — PROGRESS NOTES
Digital Medicine: Health  Follow-Up    The history is provided by the patient.             Reason for review: Blood glucose not at goal and Blood pressure not at goal  Care Team received low BG alert.          Topics Covered on Call: physical activity, Diet and device use    Additional Follow-up details: I called Mr. French to follow up on low BG alert of 25. Patient reports this is an error and denies any hypoglycemic symptoms. He rechecked BG a few minutes later and it was 176.     Patient continues to report a 30 point difference on the glucometers. He is checking BG first with digital glucometer and then manually inputting readings from his personal glucometer. We talked about switching the order so that he will first check with his own and the digital glucometer second to see if readings are still 30 points different. I will check back in a week. .    Patient saw his cardiologist who decreased his metoprolol to 25 mg once per day. He changed it about a week or two ago. Tolerating well. Pulse is still low 50s. He plans on making another appointment to discuss.     Patient recently took his flu shot and reports feeling unwell for a few days post shot. He feels fine now.               Diet-no change to diet    No change to diet.  Patient reports eating or drinking the following: Patient continues to follow low carb and low sodium. He is reading nutrition labels. He is eating more fresh foods than processed. He drinks about 4-6 20oz cups of water per day.       Physical Activity-no change to routine  No change to exercise routine.       Additional physical activity details: He continues to go to [employer] 5-6 days/week.       Medication Adherence-Medication adherence was assessed.      Substance, Sleep, Stress-Not assessed      Continue current diet/physical activity routine.  Instructed to charge device.  Provided patient education.  Reviewed Device Techniques.     Addressed patient questions and patient  has my contact information if needed prior to next outreach. Patient verbalizes understanding.      Explained the importance of self-monitoring and medication adherence. Encouraged the patient to communicate with their health  for lifestyle modifications to help improve or maintain a healthy lifestyle.            There are no preventive care reminders to display for this patient.    Last 5 Patient Entered Readings                                      Current 30 Day Average: 140/77     Recent Readings 9/19/2020 9/18/2020 9/17/2020 9/15/2020 9/14/2020    SBP (mmHg) 145 152 136 139 129    DBP (mmHg) 82 81 75 81 70    Pulse 52 50 51 51 54        Last 6 Patient Entered Readings                                          Most Recent A1c: 6.1% on 8/26/2020  (Goal: 8%)     Recent Readings 9/19/2020 9/19/2020 9/19/2020 9/19/2020 9/19/2020    Blood Glucose (mg/dL) 105 157 113 176 25

## 2020-09-23 ENCOUNTER — PATIENT OUTREACH (OUTPATIENT)
Dept: OTHER | Facility: OTHER | Age: 70
End: 2020-09-23

## 2020-09-23 NOTE — PROGRESS NOTES
Digital Medicine: Clinician Follow-Up    Called patient for routine follow up regarding HDMP (Hypertension Digital Medicine Program) and DDMP (Diabetes Digital Medicine Program).    HPI:  Patient's BP average is currently 140/77 mmHg and patient's last A1C was 6.1%.     Patient's cardiologist decreased his metoprolol to 25 mg once daily on 9/16 due to bradycardia.    The history is provided by the patient.   Follow-up reason(s): routine follow up.     Hypertension    Readings are trending down due to medication adherence.       Diabetes    Readings are trending down due to medication adherence.       Patient is not experiencing signs/symptoms of hypotension.  Patient is not experiencing signs/symptoms of hypertension.  Patient is not experiencing signs/symptoms of hypoglycemia.  Patient is not experiencing signs/symptoms of hyperglycemia.        Last 5 Patient Entered Readings                                      Current 30 Day Average: 140/77     Recent Readings 9/23/2020 9/22/2020 9/21/2020 9/21/2020 9/19/2020    SBP (mmHg) 128 127 155 149 145    DBP (mmHg) 75 67 79 88 82    Pulse 49 56 51 52 52        Last 6 Patient Entered Readings                                          Most Recent A1c: 6.1% on 8/26/2020  (Goal: 8%)     Recent Readings 9/23/2020 9/23/2020 9/22/2020 9/22/2020 9/21/2020    Blood Glucose (mg/dL) 152 94 253 169 120               Depression Screening  Did not address depression screening.    Sleep Apnea Screening    Did not address sleep apnea screening.     Medication Affordability Screening  Did not address medication affordability screening.     Medication Adherence-Medication adherence was assessed.          ASSESSMENT(S)  Patient's A1C goal is less than or equal to 8 per 2020 ADA guidelines. Patient's most recent A1C result is at goal. Lab Results    Component                Value               Date                     HGBA1C                   6.1 (H)             08/26/2020          .      Patients BP average is 140/77 mmHg, which is above goal. Patient's BP goal is less than or equal to 130/80 per 2017 ACC/AHA Hypertension Guidelines.     Hypertension Plan  Continue current therapy.  Continue current diet/physical activity routine.  If patient's BP is elevated in 2 months, will consider increasing irbesartan to 300 mg.  Diabetes Plan  Continue current therapy.  Continue current diet/physical activity routine.  Will call patient in a few weeks, sooner if needed. Patient knows to reach out at any time for any questions or concerns.        Addressed patient questions and patient has my contact information if needed prior to next outreach. Patient verbalizes understanding.            There are no preventive care reminders to display for this patient.  There are no preventive care reminders to display for this patient.    Hypertension Medications             irbesartan (AVAPRO) 150 MG tablet Take 150 mg by mouth every evening.    metoprolol succinate (TOPROL-XL) 25 MG 24 hr tablet     metoprolol tartrate (LOPRESSOR) 50 MG tablet Take 25 mg by mouth once daily.        Chuck Covington, PharmD  [facility] Clinical Pharmacist  [phone]

## 2020-09-28 ENCOUNTER — PATIENT OUTREACH (OUTPATIENT)
Dept: OTHER | Facility: OTHER | Age: 70
End: 2020-09-28

## 2020-09-28 NOTE — PROGRESS NOTES
Digital Medicine: Health  Follow-Up    The history is provided by the patient.               Care Team received low BG alert.          Topics Covered on Call: physical activity    Additional Follow-up details: I called Mr. French to follow up on low BG alert of 33. He believes this was an error. He denies any hypoglycemic symptoms. I will delete from chart.     He plans on going to the [facility] today to get his glucometer checked. It is registering 30+ points higher than his personal glucometer.     He is going to [place] for a few days this week.     He feels much better since being off his metoprolol. He noticed he isn't having to take naps throughout the day. He has more energy/strength.               Diet-Not assessed          Physical Activity-no change to routine  No change to exercise routine.   6 day(s) a week.     Additional physical activity details: He continues to go to the gym almost everyday. He has started a beginner's yoga class. He has started to see definition in his muscles.       Medication Adherence-Medication adherence was assessed.      Substance, Sleep, Stress-Not assessed      Continue current diet/physical activity routine.  Tech support needed. [facility]  Provided patient education.       Addressed patient questions and patient has my contact information if needed prior to next outreach. Patient verbalizes understanding.      Explained the importance of self-monitoring and medication adherence. Encouraged the patient to communicate with their health  for lifestyle modifications to help improve or maintain a healthy lifestyle.            There are no preventive care reminders to display for this patient.    Last 5 Patient Entered Readings                                      Current 30 Day Average: 142/78     Recent Readings 9/27/2020 9/26/2020 9/25/2020 9/24/2020 9/24/2020    SBP (mmHg) 146 129 136 147 138    DBP (mmHg) 83 78 77 76 80    Pulse 59 86 59 59 64        Last 6 Patient Entered  Readings                                          Most Recent A1c: 6.1% on 8/26/2020  (Goal: 8%)     Recent Readings 9/27/2020 9/27/2020 9/26/2020 9/26/2020 9/25/2020    Blood Glucose (mg/dL) 184 93 178 143 192

## 2020-09-30 PROCEDURE — 99457 PR MONITORING, PHYSIOL PARAM, REMOTE, 1ST 20 MINS, PER MONTH: ICD-10-PCS | Mod: S$GLB,,, | Performed by: INTERNAL MEDICINE

## 2020-09-30 PROCEDURE — 99457 RPM TX MGMT 1ST 20 MIN: CPT | Mod: S$GLB,,, | Performed by: INTERNAL MEDICINE

## 2020-10-19 ENCOUNTER — PATIENT MESSAGE (OUTPATIENT)
Dept: OTHER | Facility: OTHER | Age: 70
End: 2020-10-19

## 2020-10-19 ENCOUNTER — PATIENT MESSAGE (OUTPATIENT)
Dept: FAMILY MEDICINE | Facility: CLINIC | Age: 70
End: 2020-10-19

## 2020-10-19 ENCOUNTER — PATIENT OUTREACH (OUTPATIENT)
Dept: OTHER | Facility: OTHER | Age: 70
End: 2020-10-19

## 2020-10-19 NOTE — PROGRESS NOTES
Digital Medicine: Health  Follow-Up    The history is provided by the patient.               Care Team received low BG alert.        Additional Follow-up details: Mr. French returned my phone call. Patient reports not enough blood on the test strip. He denies any hypoglycemic symptoms.     He is feeling well overall. He notes his pulse seems normal since getting off metoprolol. I encouraged him to charge his BP cuff.             Diet-Not assessed          Physical Activity-Not assessed    Medication Adherence-Medication adherence was assessed.      Substance, Sleep, Stress-Not assessed      Instructed to charge device.  Provided patient education.       Addressed patient questions and patient has my contact information if needed prior to next outreach. Patient verbalizes understanding.             There are no preventive care reminders to display for this patient.      Last 5 Patient Entered Readings                                      Current 30 Day Average: 147/80     Recent Readings 10/19/2020 10/19/2020 10/18/2020 10/17/2020 10/17/2020    SBP (mmHg) 158 143 152 134 137    DBP (mmHg) 86 81 82 67 79    Pulse 64 74 80 69 72        Last 6 Patient Entered Readings                                          Most Recent A1c: 6.1% on 8/26/2020  (Goal: 8%)     Recent Readings 10/19/2020 10/19/2020 10/18/2020 10/18/2020 10/17/2020    Blood Glucose (mg/dL) 147 202 140 40 189

## 2020-10-28 ENCOUNTER — PATIENT OUTREACH (OUTPATIENT)
Dept: OTHER | Facility: OTHER | Age: 70
End: 2020-10-28

## 2020-10-28 NOTE — PROGRESS NOTES
Digital Medicine: Health  Follow-Up    The history is provided by the patient.             Reason for review: Blood glucose not at goal and Blood pressure not at goal  Care Team received low BG alert.          Topics Covered on Call: physical activity and Diet    Additional Follow-up details: I called Mr. French to follow up on a low BG alert of 50 today. Patient reports going to the gym as usual. He came home and did a little bit of housework when he felt dizzy and got tunnel vision. He sat down and ate a banana. He eventually ate breakfast.     We talked about better fast acting carbs to eat after getting a low.     Patient notes he has a doctor's appointment to check his eye pressure since getting off the metoprolol.                 Diet-no change to diet    No change to diet.  Patient reports eating or drinking the following: Dinner last night was around 5pm, which is a usual dinner time. Patient reports eating thin slices of roast beef, a salad, and some apples with a sweet potato. Patient continues to drink 5-6 12oz tumblers of water. We talked about late night snacking. He likes pistachios and cashews, but we talked about lower carb nuts (pecans, macademia). Patient understands.     Intervention(s): carb reduction      Physical Activity-no change to routine  No change to exercise routine.     Medication Adherence-Medication adherence was assessed.      Substance, Sleep, Stress-Not assessed      Provided patient education.       Addressed patient questions and patient has my contact information if needed prior to next outreach. Patient verbalizes understanding.      Explained the importance of self-monitoring and medication adherence. Encouraged the patient to communicate with their health  for lifestyle modifications to help improve or maintain a healthy lifestyle.               There are no preventive care reminders to display for this patient.      Last 5 Patient Entered Readings                                       Current 30 Day Average: 149/79     Recent Readings 10/28/2020 10/28/2020 10/28/2020 10/28/2020 10/28/2020    SBP (mmHg) 126 109 108 98 136    DBP (mmHg) 77 65 65 59 81    Pulse 65 78 84 98 71        Last 6 Patient Entered Readings                                          Most Recent A1c: 6.1% on 8/26/2020  (Goal: 8%)     Recent Readings 10/28/2020 10/28/2020 10/28/2020 10/28/2020 10/27/2020    Blood Glucose (mg/dL) 151 356 50 184 166

## 2020-10-31 ENCOUNTER — NURSE TRIAGE (OUTPATIENT)
Dept: ADMINISTRATIVE | Facility: CLINIC | Age: 70
End: 2020-10-31

## 2020-11-01 NOTE — TELEPHONE ENCOUNTER
BS 50 about 15 minutes ago.     Reason for Disposition   [1] Evening (after bedtime snack) blood glucose < 100 mg/dL (5.6 mmol/L) AND [2] more than once in past week    Additional Information   Negative: Unconscious or difficult to awaken   Negative: Seizure occurs   Negative: Acting confused (e.g., disoriented, slurred speech)   Negative: Very weak (e.g., can't stand)   Negative: Sounds like a life-threatening emergency to the triager   Negative: [1] Vomiting AND [2] signs of dehydration (e.g., very dry mouth, lightheaded, dark urine)   Negative: [1] Low blood sugar symptoms persist > 30 minutes AND [2] using low blood sugar Care Advice   Negative: [1] Low blood glucose (< 70 mg/dL  or 3.9 mmol/L) persists > 30 minutes AND [2] using low blood sugar Care Advice   Negative: Patient sounds very sick or weak to the triager   Negative: [1] Low blood sugar symptoms with no other adult present AND [2] hasn't tried Care Advice   Negative: [1] Low blood glucose (< 70 mg/dL  or 3.9 mmol/L) with no other adult present AND [2] hasn't tried Care Advice   Negative: Diabetes drug error or overdose (e.g., insulin error or extra dose)   Negative: [1] Caller has URGENT medication or insulin pump question AND [2] triager unable to answer question   Negative: [1] Blood glucose < 70  mg/dL (3.9 mmol/L) or symptomatic, now improved with Care Advice AND [2] cause unknown   Negative: [1] Caller has NON-URGENT medication or insulin pump question AND [2] triager unable to answer question   Negative: [1] Morning (before breakfast) blood glucose < 80 mg/dL (4.4 mmol/L) AND [2] more than once in past week    Protocols used: DIABETES - LOW BLOOD SUGAR-A-    Called concerning low blood sugar reading of 41 but now 195. He is monitored by digital medicine and they call him when the readings are way out of range. He is controlling his blood sugar with diet and exercise and has a health . Asked the patient to journal  activities and food during the times his blood sugar changes rapidly and to call pcp when office is open--Monday.

## 2020-11-02 ENCOUNTER — PATIENT MESSAGE (OUTPATIENT)
Dept: OTHER | Facility: OTHER | Age: 70
End: 2020-11-02

## 2020-11-02 ENCOUNTER — PATIENT OUTREACH (OUTPATIENT)
Dept: OTHER | Facility: OTHER | Age: 70
End: 2020-11-02

## 2020-11-02 ENCOUNTER — PATIENT MESSAGE (OUTPATIENT)
Dept: FAMILY MEDICINE | Facility: CLINIC | Age: 70
End: 2020-11-02

## 2020-11-02 ENCOUNTER — TELEPHONE (OUTPATIENT)
Dept: PRIMARY CARE CLINIC | Facility: CLINIC | Age: 70
End: 2020-11-02

## 2020-11-02 DIAGNOSIS — I25.10 CORONARY ARTERY DISEASE INVOLVING NATIVE CORONARY ARTERY OF NATIVE HEART WITHOUT ANGINA PECTORIS: ICD-10-CM

## 2020-11-02 DIAGNOSIS — E11.649 TYPE 2 DIABETES MELLITUS WITH HYPOGLYCEMIA WITHOUT COMA, WITHOUT LONG-TERM CURRENT USE OF INSULIN: ICD-10-CM

## 2020-11-02 DIAGNOSIS — E11.9 TYPE 2 DIABETES MELLITUS WITHOUT COMPLICATION, WITHOUT LONG-TERM CURRENT USE OF INSULIN: Primary | ICD-10-CM

## 2020-11-02 DIAGNOSIS — E16.2 HYPOGLYCEMIA: ICD-10-CM

## 2020-11-02 RX ORDER — BLOOD-GLUCOSE,RECEIVER,CONT
EACH MISCELLANEOUS
Qty: 1 EACH | Refills: 11 | Status: SHIPPED | OUTPATIENT
Start: 2020-11-02 | End: 2023-10-20

## 2020-11-02 RX ORDER — BLOOD-GLUCOSE TRANSMITTER
EACH MISCELLANEOUS
Qty: 1 EACH | Refills: 3 | Status: SHIPPED | OUTPATIENT
Start: 2020-11-02 | End: 2023-10-20

## 2020-11-02 RX ORDER — BLOOD-GLUCOSE SENSOR
EACH MISCELLANEOUS
Qty: 10 EACH | Refills: 11 | Status: SHIPPED | OUTPATIENT
Start: 2020-11-02 | End: 2023-10-20

## 2020-11-02 NOTE — PROGRESS NOTES
Digital Medicine: Clinician Follow-Up    Called patient for routine follow up regarding HDMP (Hypertension Digital Medicine Program) and DDMP (Diabetes Digital Medicine Program). Patient has had multiple recent low BG alerts.    HPI:  Patient's BP average is currently 147/78 mmHg and patient's last A1C was 6.1%.     Patient states he has flu-like symptoms this morning.     The history is provided by the patient.   Follow-up reason(s): routine follow up and Alert received.   Care Team received low BG alert.  Patient states he felt fine when his BG was 61 on 10/31 in the evening at 10 pm. He showered and rechecked his BG and it was 41. He received a message to call [facility] on-call. He called [facility] on-call. Nurse Katie told him to eat a tablespoon of honey and his BG was back to the normal range.     Patient eats dinner at 5 pm and usually does not eat again until the following day.       Patient is not experiencing signs/symptoms of hypotension.  Patient is not experiencing signs/symptoms of hypertension.  Patient is experiencing signs/symptoms of hypoglycemia. He feels lightheaded and dizzy.   Patient is not experiencing signs/symptoms of hyperglycemia.          Last 5 Patient Entered Readings                                      Current 30 Day Average: 147/78     Recent Readings 11/2/2020 11/2/2020 11/1/2020 11/1/2020 11/1/2020    SBP (mmHg) 105 112 147 122 146    DBP (mmHg) 72 71 75 75 76    Pulse 93 87 70 82 69        Last 6 Patient Entered Readings                                          Most Recent A1c: 6.1% on 8/26/2020  (Goal: 8%)     Recent Readings 11/2/2020 11/2/2020 11/1/2020 11/1/2020 11/1/2020    Blood Glucose (mg/dL) 159 164 100 144 150               Depression Screening  Did not address depression screening.    Sleep Apnea Screening    Did not address sleep apnea screening.     Medication Affordability Screening  Did not address medication affordability screening.     Medication Adherence-Medication  adherence was assessed.          ASSESSMENT(S)  Patients BP average is 147/78 mmHg, which is at goal. Patient's BP goal is less than or equal to 130/80.  Patient's A1C goal is less than or equal to 8. Patient's most recent A1C result is at goal. Lab Results    Component                Value               Date                     HGBA1C                   6.1 (H)             08/26/2020          .       Hypertension Plan  Continue current therapy.  Continue current diet/physical activity routine.    Diabetes Plan  Additional monitoring needed.  Await md intervention. Patient requests a referral for cardiology since he sees an outside provider for cardiology. He would also like a referral for endocrinology. Messaged PCP.  Provided patient education. Informed patient to eat a snack in between dinner and bed.  Will call patient in a few weeks, sooner if needed. Patient knows to reach out at any time for any questions or concerns.        Addressed patient questions and patient has my contact information if needed prior to next outreach. Patient verbalizes understanding.             There are no preventive care reminders to display for this patient.  There are no preventive care reminders to display for this patient.      Hypertension Medications             irbesartan (AVAPRO) 150 MG tablet Take 150 mg by mouth every evening.    metoprolol succinate (TOPROL-XL) 25 MG 24 hr tablet           Chuck Covington, PharmD  Digital Medicine Clinical Pharmacist  [phone]

## 2020-11-02 NOTE — TELEPHONE ENCOUNTER
----- Message from Chuck Covington PharmD sent at 11/2/2020 11:28 AM CST -----  Good morning Dr. Rangel,    I hope all is well. I wanted to bring your attention to Mr. French's BG readings. He is not on any medications for his diabetes. He has been having frequent hypoglycemic episodes and his BG quickly goes from one extreme to the next.   A few examples:   10/28- BG was 50 at 9:27 am and 356 at 10:11 am.     10/31- BG was 41 at 10:40 pm and 195 at 10:53 pm.    11/1- BG was 67 at 12:12 am and 235 at 12:35 am.    I believe a continuous glucose monitor would be beneficial to get more information because he is often unable to recall when the readings are in relation to meals and what he was doing at the time. He inquired about a referral to endocrinology.     He also would like a referral to cardiology because he sees one at  and would like all his providers to be within the [facility] system.    Please advise regarding BG readings.    Best regards,  Chuck Covington, PharmD  Digital Medicine Clinical Pharmacist  [phone]

## 2020-11-02 NOTE — PROGRESS NOTES
"Messaged PCP:    Good morning Dr. Rangel,    I hope all is well. I wanted to bring your attention to Mr. French's BG readings. He is not on any medications for his diabetes. He has been having frequent hypoglycemic episodes and his BG quickly goes from one extreme to the next.   A few examples:   10/28- BG was 50 at 9:27 am and 356 at 10:11 am.     10/31- BG was 41 at 10:40 pm and 195 at 10:53 pm.    11/1- BG was 67 at 12:12 am and 235 at 12:35 am.    I believe a continuous glucose monitor would be beneficial to get more information because he is often unable to recall when the readings are in relation to meals and what he was doing at the time. He inquired about a referral to endocrinology.     He also would like a referral to cardiology because he sees one at  and would like all his providers to be within the [facility] system.    Please advise regarding BG readings.    Best regards,  Chuck Covington, PharmD  [facility] Medicine Clinical Pharmacist  [phone]      PCP noted: "Ordered cardiology and endocrinology referrals.  Sending in Dexcom prescription to [facility] to help with authorization".  "

## 2020-11-02 NOTE — PROGRESS NOTES
Digital Medicine: Health  Follow-Up    The history is provided by the patient.               Care Team received low BG alert.        Additional Follow-up details: I called Mr. Portilol to discuss string of low BG alerts. Patient has had multiple low BG alerts within the past few weeks. He charges glucometer weekly. Patient will likely reach out to his PCP to discuss referral to an endocrinologist.     When he had lows this Saturday, he called the [facility] number and talked to a nurse. She instructed him to drink juice or candy. He ate honey and rechecked. It came back up. Patient notes he doesn't necessarily feel bad, but he isn't feeling great either.     Today he woke up with a tender back and possible flu-like symptoms.     Transferred call to [facility].                 Diet-no change to diet    No change to diet.  Patient reports eating or drinking the following: Patient notes he isn't actively trying to cut out all carbs. No real change in his diet to justify string of low readings.       Physical Activity-no change to routine  No change to exercise routine.     Medication Adherence-Medication adherence was assessed.        Substance, Sleep, Stress-No change  stress-  Details:  Intervention(s):    Sleep-  Details:  Intervention(s):    Alcohol -  Details:  Intervention(s):    Tobacco-  Details:  Intervention(s):          Provided patient education.       Patient verbalizes understanding.             There are no preventive care reminders to display for this patient.      Last 5 Patient Entered Readings                                      Current 30 Day Average: 147/78     Recent Readings 11/2/2020 11/2/2020 11/1/2020 11/1/2020 11/1/2020    SBP (mmHg) 105 112 147 122 146    DBP (mmHg) 72 71 75 75 76    Pulse 93 87 70 82 69        Last 6 Patient Entered Readings                                          Most Recent A1c: 6.1% on 8/26/2020  (Goal: 8%)     Recent Readings 11/2/2020 11/2/2020 11/1/2020  11/1/2020 11/1/2020    Blood Glucose (mg/dL) 159 164 100 144 150

## 2020-11-02 NOTE — TELEPHONE ENCOUNTER
Ordered cardiology and endocrinology referrals.  Sending in Dexcom prescription to [facility] to help with authorization

## 2020-11-03 ENCOUNTER — OFFICE VISIT (OUTPATIENT)
Dept: ENDOCRINOLOGY | Facility: CLINIC | Age: 70
End: 2020-11-03
Payer: MEDICARE

## 2020-11-03 VITALS
SYSTOLIC BLOOD PRESSURE: 128 MMHG | DIASTOLIC BLOOD PRESSURE: 70 MMHG | BODY MASS INDEX: 26.26 KG/M2 | HEART RATE: 80 BPM | WEIGHT: 183.44 LBS | HEIGHT: 70 IN

## 2020-11-03 DIAGNOSIS — E11.9 TYPE 2 DIABETES MELLITUS WITHOUT COMPLICATION, WITHOUT LONG-TERM CURRENT USE OF INSULIN: ICD-10-CM

## 2020-11-03 DIAGNOSIS — E16.2 HYPOGLYCEMIA: Primary | ICD-10-CM

## 2020-11-03 PROCEDURE — 1101F PR PT FALLS ASSESS DOC 0-1 FALLS W/OUT INJ PAST YR: ICD-10-PCS | Mod: HCNC,CPTII,GC,S$GLB | Performed by: GENERAL ACUTE CARE HOSPITAL

## 2020-11-03 PROCEDURE — 1125F PR PAIN SEVERITY QUANTIFIED, PAIN PRESENT: ICD-10-PCS | Mod: HCNC,GC,S$GLB, | Performed by: GENERAL ACUTE CARE HOSPITAL

## 2020-11-03 PROCEDURE — 3008F BODY MASS INDEX DOCD: CPT | Mod: HCNC,CPTII,GC,S$GLB | Performed by: GENERAL ACUTE CARE HOSPITAL

## 2020-11-03 PROCEDURE — 99203 OFFICE O/P NEW LOW 30 MIN: CPT | Mod: HCNC,GC,S$GLB, | Performed by: GENERAL ACUTE CARE HOSPITAL

## 2020-11-03 PROCEDURE — 99203 PR OFFICE/OUTPT VISIT, NEW, LEVL III, 30-44 MIN: ICD-10-PCS | Mod: HCNC,GC,S$GLB, | Performed by: GENERAL ACUTE CARE HOSPITAL

## 2020-11-03 PROCEDURE — 3044F PR MOST RECENT HEMOGLOBIN A1C LEVEL <7.0%: ICD-10-PCS | Mod: HCNC,CPTII,GC,S$GLB | Performed by: GENERAL ACUTE CARE HOSPITAL

## 2020-11-03 PROCEDURE — 3044F HG A1C LEVEL LT 7.0%: CPT | Mod: HCNC,CPTII,GC,S$GLB | Performed by: GENERAL ACUTE CARE HOSPITAL

## 2020-11-03 PROCEDURE — 99999 PR PBB SHADOW E&M-EST. PATIENT-LVL V: CPT | Mod: PBBFAC,HCNC,GC, | Performed by: GENERAL ACUTE CARE HOSPITAL

## 2020-11-03 PROCEDURE — 99499 UNLISTED E&M SERVICE: CPT | Mod: S$GLB,,, | Performed by: INTERNAL MEDICINE

## 2020-11-03 PROCEDURE — 1125F AMNT PAIN NOTED PAIN PRSNT: CPT | Mod: HCNC,GC,S$GLB, | Performed by: GENERAL ACUTE CARE HOSPITAL

## 2020-11-03 PROCEDURE — 99499 RISK ADDL DX/OHS AUDIT: ICD-10-PCS | Mod: S$GLB,,, | Performed by: INTERNAL MEDICINE

## 2020-11-03 PROCEDURE — 1101F PT FALLS ASSESS-DOCD LE1/YR: CPT | Mod: HCNC,CPTII,GC,S$GLB | Performed by: GENERAL ACUTE CARE HOSPITAL

## 2020-11-03 PROCEDURE — 99999 PR PBB SHADOW E&M-EST. PATIENT-LVL V: ICD-10-PCS | Mod: PBBFAC,HCNC,GC, | Performed by: GENERAL ACUTE CARE HOSPITAL

## 2020-11-03 PROCEDURE — 1159F MED LIST DOCD IN RCRD: CPT | Mod: HCNC,GC,S$GLB, | Performed by: GENERAL ACUTE CARE HOSPITAL

## 2020-11-03 PROCEDURE — 1159F PR MEDICATION LIST DOCUMENTED IN MEDICAL RECORD: ICD-10-PCS | Mod: HCNC,GC,S$GLB, | Performed by: GENERAL ACUTE CARE HOSPITAL

## 2020-11-03 PROCEDURE — 3008F PR BODY MASS INDEX (BMI) DOCUMENTED: ICD-10-PCS | Mod: HCNC,CPTII,GC,S$GLB | Performed by: GENERAL ACUTE CARE HOSPITAL

## 2020-11-03 RX ORDER — NITROGLYCERIN 0.3 MG/1
TABLET SUBLINGUAL
COMMUNITY
Start: 2014-10-18 | End: 2021-03-16

## 2020-11-03 RX ORDER — PANTOPRAZOLE SODIUM 40 MG/1
TABLET, DELAYED RELEASE ORAL
COMMUNITY
Start: 2017-05-10 | End: 2020-11-03

## 2020-11-03 RX ORDER — ASCORBIC ACID 500 MG
500 TABLET,CHEWABLE ORAL
COMMUNITY
Start: 2019-05-21 | End: 2020-11-03

## 2020-11-03 RX ORDER — CHLORPHENIRAMINE MALEATE 4 MG
4 TABLET ORAL
COMMUNITY
Start: 2018-01-01

## 2020-11-03 RX ORDER — ISOSORBIDE MONONITRATE 30 MG/1
30 TABLET, EXTENDED RELEASE ORAL DAILY
COMMUNITY
Start: 2017-05-10 | End: 2020-11-03

## 2020-11-03 NOTE — ASSESSMENT & PLAN NOTE
-A1c  6.1 (AT GOAL, DIET CONTROLLED)   -FS log  (Mainly at goal)   -Diet, exercise, lifestyle modifications and A1c Goals discussed   -Hypoglycemia symptoms and plan of action discussed   -Ophthalmology seen within 1 year  (No laser Sx)   -KING 19.1  (WNL)   -ASCVD (LDL 61 At goal)  ON Statin     Plan:   -Due to A1C at goal off medications will recommend the following.  -Cw/ Diet and lifestyle modifications   -NO need for Oral hypoglycemics at this time   -Follow up with PCP

## 2020-11-03 NOTE — ASSESSMENT & PLAN NOTE
-Pt having ASYMPTOMATIC low glucose reading on glucometer   -Rapid correction from 40s to 200s after 15 minutes (Concerning for false readings on glucometer)     -Denies relation to food intake or fasting   -No prior episodes   -NO signs of AI   -No gastric bypass surgery     Plan:   -Likely Pseudo hypoglycemias from faulty glucometer or insufficient blood sample.     -Continue to check FS qAC/HS and if symptoms of hypoglycemia   -If patient has hypoglycemic symptoms with documented hypoglycemia will consider CGM for further evaluation at that time.      -Informed patient to call if having documented, symptomatic hypoglycemias.     -Follow up w/ PCP   -Follow up prn

## 2020-11-03 NOTE — PATIENT INSTRUCTIONS
Keep checking your sugar at least once a day and if you are having symptoms of low blood sugar.       Symptoms of Low blood sugar (Dizzyness, shaking, nausea, blurry vision, or palpitations)     If you have symptoms of low blood sugar and you are able to catch it on the meter, please send us a message and we will schedule a continuous glucose monitor for further evaluation.      I will message your doctor about your back pain and possible imaging to be done.     Thank you for your visit.     Feel free to contact me if any concerns.

## 2020-11-03 NOTE — PROGRESS NOTES
"Subjective:      Patient ID: Bandar French is a 70 y.o. male.    Chief Complaint:  Hypoglycemia Initial visit       History of Present Illness  71 YO Male w/ dx of DM2 diet controlled, HTN, CAD s/p stent 2014 and HLD that presents to the endocrine clinic referred by PCP to establish care due to episodes of hypoglycemias.  Pt today reports that in the past month he has been having "Low blood glucose" readings that are asymptomatic and happening at random times during the day.  NO relation to food intake or fasting.  Pt reports he does not feel hypoglycemic symptoms when episodes occur.  He does endorse one day he felt a little dizzy  When the glucose was 50 but symptoms did not really improved after taking glucose for correction.        With regards to DM and new episodes of Hypoglycemia:    -Duration:   Dx > 5 yrs ago   -Complications:  CAD   -DM Medications:   DIET CONTROLLED   -Previously tried medications:  NONE   -Hyperglycemia symptoms: DENIES   -Hypoglycemia symptoms:  Sporadic hypoglycemic readings,  not related to food or fasting.  Asymptomatic   -Know how to correct hypoglycemias:  YES   -Glucose monitoring:  AM ( 90 - 120s), L (100 - 180s)  D( 130 - 200)   -Activity:  Very active, works out 3-4 times a week.   -Denies prior Bariatric surgery   -Denies use of steroids   -No signs or symptoms of AI   -Denies prior episodes of hypoglycemias   -Pt reports low glucose readings are asymptomatic       Diabetes Management Status    Statin: Taking (Rosuvastatin)   ACE/ARB: Taking  (Ibersartan 150mg)     Screening or Prevention Patient's value Goal Complete/Controlled?   HgA1C Testing and Control   Lab Results   Component Value Date    HGBA1C 6.1 (H) 08/26/2020      Annually/Less than 8% Yes   Lipid profile : 08/19/2020 Annually Yes   LDL control Lab Results   Component Value Date    LDLCALC 61 08/19/2020    Annually/Less than 100 mg/dl  Yes   Nephropathy screening Lab Results   Component Value Date    LABMICR " "25.0 08/26/2020     Lab Results   Component Value Date    PROTEINUA Negative 07/10/2019    Annually Yes   Blood pressure BP Readings from Last 1 Encounters:   11/03/20 128/70    Less than 140/90 Yes   Dilated retinal exam : 08/14/2020 Annually Yes   Foot exam   : 09/16/2020 Annually Yes         Review of Systems   Constitutional: Negative for chills and fatigue.   HENT: Negative for rhinorrhea and sore throat.    Eyes: Negative for discharge and visual disturbance.   Respiratory: Negative for shortness of breath and wheezing.    Cardiovascular: Negative for chest pain and leg swelling.   Gastrointestinal: Negative for abdominal distention and abdominal pain.   Endocrine: Negative for cold intolerance and polydipsia.   Musculoskeletal: Negative for arthralgias and myalgias.   Skin: Negative for rash and wound.   Neurological: Negative for dizziness, weakness and light-headedness.   Psychiatric/Behavioral: Negative for agitation and sleep disturbance.       Objective:     /70 (BP Location: Left arm, Patient Position: Sitting, BP Method: Medium (Manual))   Pulse 80   Ht 5' 10" (1.778 m)   Wt 83.2 kg (183 lb 6.8 oz)   BMI 26.32 kg/m²   BP Readings from Last 3 Encounters:   11/03/20 128/70   09/16/20 120/64   09/25/19 124/68     Wt Readings from Last 1 Encounters:   11/03/20 1056 83.2 kg (183 lb 6.8 oz)     Body mass index is 26.32 kg/m².      Physical Exam  Vitals signs reviewed.   Constitutional:       Appearance: He is well-developed.   HENT:      Right Ear: External ear normal.      Left Ear: External ear normal.      Nose: Nose normal.   Neck:      Thyroid: No thyromegaly.      Trachea: No tracheal deviation.   Cardiovascular:      Rate and Rhythm: Normal rate.      Heart sounds: No murmur.   Pulmonary:      Effort: Pulmonary effort is normal.      Breath sounds: Normal breath sounds.   Abdominal:      Palpations: Abdomen is soft. There is no mass.      Hernia: No hernia is present.   Skin:     Findings: " No rash.   Neurological:      Mental Status: He is alert.      Cranial Nerves: No cranial nerve deficit.      Sensory: No sensory deficit.      Coordination: Coordination normal.   Psychiatric:         Judgment: Judgment normal.                Lab Review:   Lab Results   Component Value Date    HGBA1C 6.1 (H) 08/26/2020     Lab Results   Component Value Date    CHOL 113 08/19/2020    HDL 31 08/19/2020    LDLCALC 61 08/19/2020     Lab Results   Component Value Date     08/26/2020    K 4.6 08/26/2020     08/26/2020    CO2 29 08/26/2020     (H) 08/26/2020    BUN 19 08/26/2020    CREATININE 1.0 08/26/2020    CALCIUM 9.1 08/26/2020    ANIONGAP 6 (L) 08/26/2020    ESTGFRAFRICA >60.0 08/26/2020    EGFRNONAA >60.0 08/26/2020     No results found for: [id number]    Assessment and Plan     Type 2 diabetes mellitus without complication, without long-term current use of insulin  -A1c  6.1 (AT GOAL, DIET CONTROLLED)   -FS log  (Mainly at goal)   -Diet, exercise, lifestyle modifications and A1c Goals discussed   -Hypoglycemia symptoms and plan of action discussed   -Ophthalmology seen within 1 year  (No laser Sx)   -KING 19.1  (WNL)   -ASCVD (LDL 61 At goal)  ON Statin     Plan:   -Due to A1C at goal off medications will recommend the following.  -Cw/ Diet and lifestyle modifications   -NO need for Oral hypoglycemics at this time   -Follow up with PCP     Hypoglycemia  -Pt having ASYMPTOMATIC low glucose reading on glucometer   -Rapid correction from 40s to 200s after 15 minutes (Concerning for false readings on glucometer)     -Denies relation to food intake or fasting   -No prior episodes   -NO signs of AI   -No gastric bypass surgery     Plan:   -Likely Pseudo hypoglycemias from faulty glucometer or insufficient blood sample.     -Continue to check FS qAC/HS and if symptoms of hypoglycemia   -If patient has hypoglycemic symptoms with documented hypoglycemia will consider CGM for further evaluation at that  time.      -Informed patient to call if having documented, symptomatic hypoglycemias.     -Follow up w/ PCP   -Follow up prn

## 2020-11-04 NOTE — PROGRESS NOTES
I have reviewed and concur with Dr. Rodriguez's history, physical, assessment, and plan.  I have personally interviewed and examined the patient.    Diabetes controlled with diet alone. Recently has had a few episodes of documented hypoglycemia although without symptoms thus not fulfilling Whipple's triad and suspect at least some of these are false. Will continue to monitor and if has documented episodes with symptoms can consider further evaluation with either fast or mixed meal test depending on timing of occurences    Isabella Doe MD

## 2020-11-12 ENCOUNTER — TELEPHONE (OUTPATIENT)
Dept: FAMILY MEDICINE | Facility: CLINIC | Age: 70
End: 2020-11-12

## 2020-11-12 NOTE — TELEPHONE ENCOUNTER
----- Message from Enriqueta Raphael sent at 11/12/2020  9:14 AM CST -----  Contact: [phone]  Type:  Needs Medical Advice    Who Called: PT  Reason for Call: pt would like to speak with the nurse regarding having a Ear Infection, pt states he is having ear pain and feels like the ear is blocked and would like to know what he can Take over the Counter to help. Pt is also having some Dizziness as well  Would the patient rather a call back or a response via [facility]? Call back  Best Call Back Number: [phone]

## 2020-11-13 ENCOUNTER — OFFICE VISIT (OUTPATIENT)
Dept: PRIMARY CARE CLINIC | Facility: CLINIC | Age: 70
End: 2020-11-13
Payer: MEDICARE

## 2020-11-13 ENCOUNTER — OFFICE VISIT (OUTPATIENT)
Dept: URGENT CARE | Facility: CLINIC | Age: 70
End: 2020-11-13
Payer: MEDICARE

## 2020-11-13 VITALS
DIASTOLIC BLOOD PRESSURE: 72 MMHG | WEIGHT: 176 LBS | HEIGHT: 70 IN | SYSTOLIC BLOOD PRESSURE: 113 MMHG | HEART RATE: 76 BPM | TEMPERATURE: 100 F | BODY MASS INDEX: 25.2 KG/M2 | RESPIRATION RATE: 20 BRPM | OXYGEN SATURATION: 95 %

## 2020-11-13 VITALS
HEIGHT: 70 IN | WEIGHT: 176.81 LBS | HEART RATE: 73 BPM | OXYGEN SATURATION: 96 % | BODY MASS INDEX: 25.31 KG/M2 | SYSTOLIC BLOOD PRESSURE: 130 MMHG | DIASTOLIC BLOOD PRESSURE: 82 MMHG

## 2020-11-13 DIAGNOSIS — H92.01 RIGHT EAR PAIN: Primary | ICD-10-CM

## 2020-11-13 DIAGNOSIS — J02.9 SORE THROAT: ICD-10-CM

## 2020-11-13 DIAGNOSIS — R53.83 FATIGUE, UNSPECIFIED TYPE: ICD-10-CM

## 2020-11-13 DIAGNOSIS — U07.1 COVID-19 VIRUS INFECTION: Primary | ICD-10-CM

## 2020-11-13 DIAGNOSIS — H93.11 TINNITUS OF RIGHT EAR: ICD-10-CM

## 2020-11-13 DIAGNOSIS — U07.1 COVID-19 VIRUS DETECTED: ICD-10-CM

## 2020-11-13 DIAGNOSIS — R50.9 FEVER, UNSPECIFIED FEVER CAUSE: ICD-10-CM

## 2020-11-13 LAB
CTP QC/QA: YES
CTP QC/QA: YES
FLUAV AG NPH QL: NEGATIVE
FLUBV AG NPH QL: NEGATIVE
SARS-COV-2 RDRP RESP QL NAA+PROBE: POSITIVE

## 2020-11-13 PROCEDURE — 3288F PR FALLS RISK ASSESSMENT DOCUMENTED: ICD-10-PCS | Mod: HCNC,CPTII,S$GLB, | Performed by: INTERNAL MEDICINE

## 2020-11-13 PROCEDURE — 1101F PT FALLS ASSESS-DOCD LE1/YR: CPT | Mod: HCNC,CPTII,S$GLB, | Performed by: INTERNAL MEDICINE

## 2020-11-13 PROCEDURE — U0002: ICD-10-PCS | Mod: QW,S$GLB,, | Performed by: NURSE PRACTITIONER

## 2020-11-13 PROCEDURE — 87804 POCT INFLUENZA A/B: ICD-10-PCS | Mod: QW,S$GLB,, | Performed by: NURSE PRACTITIONER

## 2020-11-13 PROCEDURE — 3008F BODY MASS INDEX DOCD: CPT | Mod: CPTII,S$GLB,, | Performed by: NURSE PRACTITIONER

## 2020-11-13 PROCEDURE — 3008F BODY MASS INDEX DOCD: CPT | Mod: HCNC,CPTII,S$GLB, | Performed by: INTERNAL MEDICINE

## 2020-11-13 PROCEDURE — 1125F AMNT PAIN NOTED PAIN PRSNT: CPT | Mod: HCNC,S$GLB,, | Performed by: INTERNAL MEDICINE

## 2020-11-13 PROCEDURE — 1157F PR ADVANCE CARE PLAN OR EQUIV PRESENT IN MEDICAL RECORD: ICD-10-PCS | Mod: HCNC,S$GLB,, | Performed by: INTERNAL MEDICINE

## 2020-11-13 PROCEDURE — 1125F PR PAIN SEVERITY QUANTIFIED, PAIN PRESENT: ICD-10-PCS | Mod: HCNC,S$GLB,, | Performed by: INTERNAL MEDICINE

## 2020-11-13 PROCEDURE — 99214 PR OFFICE/OUTPT VISIT, EST, LEVL IV, 30-39 MIN: ICD-10-PCS | Mod: HCNC,S$GLB,, | Performed by: INTERNAL MEDICINE

## 2020-11-13 PROCEDURE — 3288F FALL RISK ASSESSMENT DOCD: CPT | Mod: HCNC,CPTII,S$GLB, | Performed by: INTERNAL MEDICINE

## 2020-11-13 PROCEDURE — 1159F MED LIST DOCD IN RCRD: CPT | Mod: HCNC,S$GLB,, | Performed by: INTERNAL MEDICINE

## 2020-11-13 PROCEDURE — 1157F ADVNC CARE PLAN IN RCRD: CPT | Mod: HCNC,S$GLB,, | Performed by: INTERNAL MEDICINE

## 2020-11-13 PROCEDURE — 99214 PR OFFICE/OUTPT VISIT, EST, LEVL IV, 30-39 MIN: ICD-10-PCS | Mod: S$GLB,,, | Performed by: NURSE PRACTITIONER

## 2020-11-13 PROCEDURE — U0002 COVID-19 LAB TEST NON-CDC: HCPCS | Mod: QW,S$GLB,, | Performed by: NURSE PRACTITIONER

## 2020-11-13 PROCEDURE — 1157F ADVNC CARE PLAN IN RCRD: CPT | Mod: S$GLB,,, | Performed by: NURSE PRACTITIONER

## 2020-11-13 PROCEDURE — 87804 INFLUENZA ASSAY W/OPTIC: CPT | Mod: QW,S$GLB,, | Performed by: NURSE PRACTITIONER

## 2020-11-13 PROCEDURE — 1159F PR MEDICATION LIST DOCUMENTED IN MEDICAL RECORD: ICD-10-PCS | Mod: HCNC,S$GLB,, | Performed by: INTERNAL MEDICINE

## 2020-11-13 PROCEDURE — 3008F PR BODY MASS INDEX (BMI) DOCUMENTED: ICD-10-PCS | Mod: CPTII,S$GLB,, | Performed by: NURSE PRACTITIONER

## 2020-11-13 PROCEDURE — 3008F PR BODY MASS INDEX (BMI) DOCUMENTED: ICD-10-PCS | Mod: HCNC,CPTII,S$GLB, | Performed by: INTERNAL MEDICINE

## 2020-11-13 PROCEDURE — 99999 PR PBB SHADOW E&M-EST. PATIENT-LVL IV: CPT | Mod: PBBFAC,HCNC,, | Performed by: INTERNAL MEDICINE

## 2020-11-13 PROCEDURE — 1157F PR ADVANCE CARE PLAN OR EQUIV PRESENT IN MEDICAL RECORD: ICD-10-PCS | Mod: S$GLB,,, | Performed by: NURSE PRACTITIONER

## 2020-11-13 PROCEDURE — 1101F PR PT FALLS ASSESS DOC 0-1 FALLS W/OUT INJ PAST YR: ICD-10-PCS | Mod: HCNC,CPTII,S$GLB, | Performed by: INTERNAL MEDICINE

## 2020-11-13 PROCEDURE — 99999 PR PBB SHADOW E&M-EST. PATIENT-LVL IV: ICD-10-PCS | Mod: PBBFAC,HCNC,, | Performed by: INTERNAL MEDICINE

## 2020-11-13 PROCEDURE — 99214 OFFICE O/P EST MOD 30 MIN: CPT | Mod: S$GLB,,, | Performed by: NURSE PRACTITIONER

## 2020-11-13 PROCEDURE — 99214 OFFICE O/P EST MOD 30 MIN: CPT | Mod: HCNC,S$GLB,, | Performed by: INTERNAL MEDICINE

## 2020-11-13 RX ORDER — FLUTICASONE PROPIONATE 50 MCG
1 SPRAY, SUSPENSION (ML) NASAL DAILY
Qty: 16 G | Refills: 2 | Status: SHIPPED | OUTPATIENT
Start: 2020-11-13 | End: 2021-03-16

## 2020-11-13 RX ORDER — AZITHROMYCIN 250 MG/1
TABLET, FILM COATED ORAL
Qty: 6 TABLET | Refills: 0 | Status: SHIPPED | OUTPATIENT
Start: 2020-11-13 | End: 2020-11-18

## 2020-11-13 NOTE — PROGRESS NOTES
[facility] Note    Chief Complaint      Chief Complaint   Patient presents with    Otalgia     right, congestion    Tinnitus     bilateral       History of Present Illness      Bandar French is a 70 y.o. male with chronic conditions of CAD, HTN, HLD, DM2, osteoarthritis who presents today for: Complaints of 4-5 days of low grade fever, fatigue, decreased appetites, chills, right ear pain and ringing in the ears.  Has tried tylenol for fever and chlortab for congestion.  No COVID contacts.  Flu shot UTD. Prevnar .  Pneumovax UTD.  Zostavax 2013.  Shingrix discussed.    Cscope Dr. Copeland, 2016 approx, no polyps, 5 yr interval for previous hx of polyps.    Past Medical History:  Past Medical History:   Diagnosis Date    Arthritis     Coronary artery disease     stent    Glaucoma     Hyperlipidemia     Hypertension     Impaired fasting glucose        Past Surgical History:   has a past surgical history that includes Knee arthroscopy (Left, ); Knee arthroscopy (Right, ); Hand surgery (Right); Coronary stent placement (); Eye surgery; Tonsillectomy; Total knee arthroplasty (Right, 2019); and Joint replacement (19).    Family History:  family history is not on file.     Social History:  Social History     Tobacco Use    Smoking status: Former Smoker     Packs/day: 2.00     Years: 20.00     Pack years: 40.00     Types: Cigarettes, Cigars     Start date: 1968     Quit date:      Years since quittin.8    Smokeless tobacco: Never Used   Substance Use Topics    Alcohol use: Not Currently     Frequency: Never     Drinks per session: Patient refused     Binge frequency: Never     Comment:     Drug use: Never       I personally reviewed all past medical, surgical, social and family history.    Review of Systems   Constitutional: Negative for chills, fever and malaise/fatigue.   HENT: Positive for ear pain. Negative for congestion and sore throat.     Respiratory: Negative for cough, shortness of breath and wheezing.    Cardiovascular: Negative for chest pain.   Gastrointestinal: Positive for nausea. Negative for abdominal pain, constipation, diarrhea and vomiting.   Genitourinary: Negative for dysuria.   Skin: Negative for rash.   Neurological: Positive for headaches. Negative for weakness.   All other systems reviewed and are negative.       Medications:  Outpatient Encounter Medications as of 11/13/2020   Medication Sig Dispense Refill    amino acids/mv,iron,min (OCUVITE EXTRA ORAL) Take 1 capsule by mouth once daily.       aspirin (ADULT ASPIRIN REGIMEN) 81 MG EC tablet Take 81 mg by mouth once daily.       blood sugar diagnostic (BLOOD GLUCOSE TEST) Strp 1 each by Misc.(Non-Drug; Combo Route) route once daily. 30 each 11    blood-glucose meter kit Use as instructed 1 each 0    blood-glucose meter,continuous (DEXCOM G6 ) Misc Use as directed 1 each 11    blood-glucose sensor (DEXCOM G6 SENSOR) Joanne Use as directed 10 each 11    blood-glucose transmitter (DEXCOM G6 TRANSMITTER) Joanne Use as directed 1 each 3    chlorpheniramine (CHLORTABS) 4 mg tablet Take 4 mg by mouth as needed.       folic acid/multivit-min/lutein (CENTRUM SILVER ORAL) Take by mouth once daily.      irbesartan (AVAPRO) 150 MG tablet Take 150 mg by mouth every evening.      latanoprost 0.005 % ophthalmic solution Place 1 drop into both eyes every evening.      MICRO THIN LANCETS 33 gauge Misc       nitroGLYCERIN (NITROSTAT) 0.3 MG SL tablet       rosuvastatin (CRESTOR) 20 MG tablet Take 20 mg by mouth every evening.       No facility-administered encounter medications on file as of 11/13/2020.        Allergies:  Review of patient's allergies indicates:  No Known Allergies    Health Maintenance:  Immunization History   Administered Date(s) Administered    Influenza (FLUAD) - Quadrivalent - Adjuvanted - PF *Preferred* (65+) 09/16/2020    Influenza - High Dose - PF (65  "years and older) 09/25/2019    Pneumococcal Polysaccharide - 23 Valent 09/25/2019    Zoster Recombinant 10/14/2020      Health Maintenance   Topic Date Due    TETANUS VACCINE  08/26/1968    Abdominal Aortic Aneurysm Screening  08/26/2015    Pneumococcal Vaccine (65+ Low/Medium Risk) (2 of 2 - PCV13) 09/25/2020    Hemoglobin A1c  02/26/2021    Lipid Panel  08/19/2021    Eye Exam  08/31/2021    Foot Exam  09/16/2021    High Dose Statin  11/13/2021    Aspirin/Antiplatelet Therapy  11/13/2021    Hepatitis C Screening  Completed        Physical Exam      Vital Signs  Pulse: 73  SpO2: 96 %  BP: 130/82  BP Location: Left arm  Pain Score:   2  Height and Weight  Height: 5' 10" (177.8 cm)  Weight: 80.2 kg (176 lb 12.9 oz)  BSA (Calculated - sq m): 1.99 sq meters  BMI (Calculated): 25.4  Weight in (lb) to have BMI = 25: 173.9]    Physical Exam  Vitals signs reviewed.   Constitutional:       Appearance: He is well-developed.   HENT:      Head: Normocephalic and atraumatic.      Right Ear: External ear normal.      Left Ear: External ear normal.   Eyes:      Conjunctiva/sclera: Conjunctivae normal.      Pupils: Pupils are equal, round, and reactive to light.   Cardiovascular:      Rate and Rhythm: Normal rate and regular rhythm.      Heart sounds: Normal heart sounds. No murmur.   Pulmonary:      Effort: Pulmonary effort is normal.      Breath sounds: Normal breath sounds. No wheezing or rales.   Abdominal:      General: Bowel sounds are normal. There is no distension or abdominal bruit.      Palpations: Abdomen is soft.      Tenderness: There is no abdominal tenderness.          Laboratory:  CBC:  Recent Labs   Lab 07/23/19  0602   WBC 8.73   RBC 4.31 L   Hemoglobin 12.6 L   Hematocrit 37.6 L   Platelets 161   MCV 87   MCH 29.2   MCHC 33.5     CMP:  Recent Labs   Lab 08/26/20  0921   Glucose 111 H   Calcium 9.1   Sodium 141   Potassium 4.6   CO2 29   Chloride 106   BUN 19     URINALYSIS:  Recent Labs   Lab " 07/10/19  0844   Color, UA Yellow   Specific Gravity, UA 1.010   pH, UA 6.0   Protein, UA Negative   Nitrite, UA Negative   Leukocytes, UA Negative   Urobilinogen, UA Negative      LIPIDS:  Recent Labs   Lab 08/19/20   HDL 31   Cholesterol 113   LDL Cholesterol 61     TSH:      A1C:  Recent Labs   Lab 11/26/19  1057 08/26/20  0921   Hemoglobin A1C 6.8 H 6.1 H       Assessment/Plan     Bandar French is a 70 y.o.male with:    1. Right ear pain    2. Tinnitus of right ear    3. Fatigue, unspecified type    4. Fever, unspecified fever cause    Pt electing to get rapid test at urgent care for COVID and flu.  Sending in zpak and nasal steroid.      Gunnar Rangel MD  [facility]      Answers for HPI/ROS submitted by the patient on 11/12/2020   Fever  Chronicity: new  Onset: yesterday  Frequency: 2 to 4 times per day  Progression since onset: gradually improving  Max temp prior to arrival: 101 to 101.9 F  Temperature source: a tympanic thermometer  muscle aches: No  sleepiness: Yes  Treatments tried: acetaminophen  Improvement on treatment: mild

## 2020-11-13 NOTE — PATIENT INSTRUCTIONS
Your test was POSITIVE for COVID-19 (coronavirus).       Please isolate yourself at home.  You may leave home and/or return to work once the following conditions are met:    If you were not hospitalized and are not severely immunocompromised*:   More than 10 days since symptoms first appeared AND   More than 24 hours fever free without medications AND   Symptoms have improved     If you were hospitalized OR are severely immunocompromised*:   More than 20 days since symptoms first appeared   More than 24 hours fever free without medications   Symptoms have improved    If you had no symptoms but tested positive:   More than 10 days since the date of the first positive test (20 days if severely immunocompromised).   If you develop symptoms, then use the guidelines above.     *Definition of severely immunocompromised:  - Current chemotherapy for cancer  - Untreated HIV with CD4 count less than 200  - Combined primary immunodeficiency disorder  - Prednisone more than 20 mg per day for more than 14 days  - Post-transplant patients    Additional instructions:   Separate yourself from other people and animals in your home.   Call ahead before visiting your doctor.   Wear a facemask when around others.   Cover your coughs and sneezes.   Wash your hands often with soap and water; hand  can be used, too.   Avoid sharing personal household items.   Wipe down surfaces used daily.   Monitor your symptoms. Seek prompt medical attention if your illness is worsening (e.g., difficulty breathing).    Before seeking care, call your healthcare provider.   If you have a medical emergency and need to call 911, notify the dispatch personnel that you have, or are being evaluated for COVID-19. If possible, put on a facemask before emergency medical services arrive.        Contact Tracing    As one of the next steps, you will receive a call or text from the [place] Department of Health ([facility]) COVID-19 Tracing Team.  See the contact information below so you know not to ignore the health departments call. It is important that you contact them back immediately so they can help.      Contact Tracer Number:  [phone]  Caller ID for most carriers: [place] Dept Health     What is contact tracing?  · Contact tracing is a process that helps identify everyone who has been in close contact with an infected person. Contact tracers let those people know they may have been exposed and guide them on next steps. Confidentiality is important for everyone; no one will be told who may have exposed them to the virus.  · Your involvement is important. The more we know about where and how this virus is spreading, the better chance we have at stopping it from spreading further.  What does exposure mean?  · Exposure means you have been within 6 feet for more than 15 minutes with a person who has or had COVID-19.  What kind of questions do the contact tracers ask?  · A contact tracer will confirm your basic contact information including name, address, phone number, and next of kin, as well as asking about any symptoms you may have had. Theyll also ask you how you think you may have gotten sick, such as places where you may have been exposed to the virus, and people you were with. Those names will never be shared with anyone outside of that call, and will only be used to help trace and stop the spread of the virus.   I have privacy concerns. How will the state use my information?  · Your privacy about your health is important. All calls are completed using call centers that use the appropriate health privacy protection measures (HIPAA compliance), meaning that your patient information is safe. No one will ever ask you any questions related to immigration status. Your health comes first.   Do I have to participate?  · You do not have to participate, but we strongly encourage you to. Contact tracing can help us catch and control new outbreaks as  theyre developing to keep your friends and family safe.   What if I dont hear from anyone?  · If you dont receive a call within 24 hours, you can call the number above right away to inquire about your status. That line is open from 8:00 am - 8:00 p.m., 7 days a week.  Contact tracing saves lives! Together, we have the power to beat this virus and keep our loved ones and neighbors safe.    For more information see CDC link below.      https://www.cdc.gov/coronavirus/2019-ncov/hcp/guidance-prevent-spread.html#precautions        Sources:  CDC, [facility] and [facility]           Sincerely,     Mega Biswas III, NP

## 2020-11-13 NOTE — PROGRESS NOTES
"Subjective:       Patient ID: Bandar French is a 70 y.o. male.    Vitals:  height is 5' 10" (1.778 m) and weight is 79.8 kg (176 lb). His temperature is 99.5 °F (37.5 °C). His blood pressure is 113/72 and his pulse is 76. His respiration is 20 and oxygen saturation is 95%.     Chief Complaint: COVID-19 Concerns    Pt has chills, sweating, fatigue, dizziness, fever of 101 yesterday. Pt took cloratab for symptoms with some relief for post nasal drip.    Provider note begins below:    Patient seen by his primary provider Dr. Rangel earlier today.  Placed on macrolide antibiotic and oral steroid for presumed sinus infection.  Sent to this facility today for rapid COVID 19 test and flu test.    Per Dr Rangel, no known sick contacts.      URI   This is a new problem. Episode onset: Last sunday. The problem has been unchanged. The maximum temperature recorded prior to his arrival was 101 - 101.9 F. The fever has been present for 1 to 2 days. Associated symptoms include headaches. Pertinent negatives include no congestion, coughing, ear pain, nausea, rash, sinus pain, sore throat, vomiting or wheezing. Treatments tried: Clorotab. The treatment provided mild relief.       Constitution: Positive for chills, sweating, fatigue and fever.   HENT: Negative for ear pain, congestion, sinus pain, sinus pressure, sore throat and voice change.    Neck: Negative for painful lymph nodes.   Eyes: Negative for eye redness.   Respiratory: Negative for chest tightness, cough, sputum production, bloody sputum, COPD, shortness of breath, stridor, wheezing and asthma.    Gastrointestinal: Negative for nausea and vomiting.   Musculoskeletal: Negative for muscle ache.   Skin: Negative for rash.   Allergic/Immunologic: Negative for seasonal allergies and asthma.   Neurological: Positive for headaches.   Hematologic/Lymphatic: Negative for swollen lymph nodes.       Objective:      Physical Exam   Constitutional: He is oriented to person, " place, and time. He is cooperative.  Non-toxic appearance. No distress.   HENT:   Head: Normocephalic.   Ears:   Right Ear: Hearing and external ear normal.   Left Ear: Hearing and external ear normal.   Neck: Trachea normal and phonation normal.   Cardiovascular: Normal rate.   Pulmonary/Chest: Effort normal and breath sounds normal. No respiratory distress. He has no decreased breath sounds. He has no wheezes.   Abdominal: Normal appearance.   Neurological: He is alert and oriented to person, place, and time. Gait and coordination normal. Psychiatric: His speech is normal and behavior is normal. Mood normal.   Nursing note and vitals reviewed.    Results for orders placed or performed in visit on 11/13/20   POCT COVID-19 Rapid Screening   Result Value Ref Range    POC Rapid COVID Positive (A) Negative     Acceptable Yes    POCT Influenza A/B   Result Value Ref Range    Rapid Influenza A Ag Negative Negative    Rapid Influenza B Ag Negative Negative     Acceptable Yes        Physical exam not performed due to potential for viral transmission during pandemic.  Patient seen predominantly telephonically.        Assessment:       1. COVID-19 virus infection    2. Sore throat        Plan:       Labs ordered at this visit reviewed.     Discussed quarantine procedure at length with patient and he acknowledged complete understanding and agrees to plan.  Invited patient to return call to his primary care provider, this clinic or the [facility] if he has any further questions regarding his diagnosis or new onset of symptoms.    COVID-19 virus infection    Sore throat  -     POCT COVID-19 Rapid Screening  -     POCT Influenza A/B      Patient Instructions   Your test was POSITIVE for COVID-19 (coronavirus).       Please isolate yourself at home.  You may leave home and/or return to work once the following conditions are met:    If you were not hospitalized and are not severely  immunocompromised*:   More than 10 days since symptoms first appeared AND   More than 24 hours fever free without medications AND   Symptoms have improved     If you were hospitalized OR are severely immunocompromised*:   More than 20 days since symptoms first appeared   More than 24 hours fever free without medications   Symptoms have improved    If you had no symptoms but tested positive:   More than 10 days since the date of the first positive test (20 days if severely immunocompromised).   If you develop symptoms, then use the guidelines above.     *Definition of severely immunocompromised:  - Current chemotherapy for cancer  - Untreated HIV with CD4 count less than 200  - Combined primary immunodeficiency disorder  - Prednisone more than 20 mg per day for more than 14 days  - Post-transplant patients    Additional instructions:   Separate yourself from other people and animals in your home.   Call ahead before visiting your doctor.   Wear a facemask when around others.   Cover your coughs and sneezes.   Wash your hands often with soap and water; hand  can be used, too.   Avoid sharing personal household items.   Wipe down surfaces used daily.   Monitor your symptoms. Seek prompt medical attention if your illness is worsening (e.g., difficulty breathing).    Before seeking care, call your healthcare provider.   If you have a medical emergency and need to call 911, notify the dispatch personnel that you have, or are being evaluated for COVID-19. If possible, put on a facemask before emergency medical services arrive.        Contact Tracing    As one of the next steps, you will receive a call or text from the [facility] ([facility]) COVID-19 Tracing Team. See the contact information below so you know not to ignore the health departments call. It is important that you contact them back immediately so they can help.      Contact Tracer Number:  [phone]  Caller ID for most  carriers: [facility]     What is contact tracing?  · Contact tracing is a process that helps identify everyone who has been in close contact with an infected person. Contact tracers let those people know they may have been exposed and guide them on next steps. Confidentiality is important for everyone; no one will be told who may have exposed them to the virus.  · Your involvement is important. The more we know about where and how this virus is spreading, the better chance we have at stopping it from spreading further.  What does exposure mean?  · Exposure means you have been within 6 feet for more than 15 minutes with a person who has or had COVID-19.  What kind of questions do the contact tracers ask?  · A contact tracer will confirm your basic contact information including name, address, phone number, and next of kin, as well as asking about any symptoms you may have had. Theyll also ask you how you think you may have gotten sick, such as places where you may have been exposed to the virus, and people you were with. Those names will never be shared with anyone outside of that call, and will only be used to help trace and stop the spread of the virus.   I have privacy concerns. How will the state use my information?  · Your privacy about your health is important. All calls are completed using call centers that use the appropriate health privacy protection measures (HIPAA compliance), meaning that your patient information is safe. No one will ever ask you any questions related to immigration status. Your health comes first.   Do I have to participate?  · You do not have to participate, but we strongly encourage you to. Contact tracing can help us catch and control new outbreaks as theyre developing to keep your friends and family safe.   What if I dont hear from anyone?  · If you dont receive a call within 24 hours, you can call the number above right away to inquire about your status. That line is open  from 8:00 am - 8:00 p.m., 7 days a week.  Contact tracing saves lives! Together, we have the power to beat this virus and keep our loved ones and neighbors safe.    For more information see CDC link below.      https://www.cdc.gov/coronavirus/2019-ncov/hcp/guidance-prevent-spread.html#precautions        Sources:  [facility], [place] [facility] and [facility]           Sincerely,     Mega Biswas III, NP

## 2020-11-17 ENCOUNTER — TELEPHONE (OUTPATIENT)
Dept: FAMILY MEDICINE | Facility: CLINIC | Age: 70
End: 2020-11-17

## 2020-11-17 NOTE — TELEPHONE ENCOUNTER
Patient test positive on 13th but patient sated that you told him his quarantine started on the 8th? So ten day from the 8th would be tomorrow and he wants to know if he can participate in his activites starting Thursday-Sunday this week???

## 2020-11-17 NOTE — TELEPHONE ENCOUNTER
The current guidance from the CDC states a patient is to quarantine for 10 days from the onset of symptoms.  So if his symptoms started on the 8th, then he can be released from quarantine as of the 18th.

## 2020-11-17 NOTE — TELEPHONE ENCOUNTER
----- Message from Emy Ambriz sent at 11/17/2020  2:02 PM CST -----  Regarding: call back  Type:  Patient Returning Call    Who Called:patient   Who Left Message for Patient:office  Does the patient know what this is regarding?:questions  Would the patient rather a call back or a response via [facility]? Call back   Best Call Back Number:[phone]  Additional Information: patient tested positive for covid last Saturday. Patient would like to be retested for Covid.

## 2020-11-20 PROCEDURE — 99454 PR REMOTE MNTR, PHYS PARAM, INITIAL, EA 30 DAYS: ICD-10-PCS | Mod: S$GLB,,, | Performed by: INTERNAL MEDICINE

## 2020-11-20 PROCEDURE — 99454 REM MNTR PHYSIOL PARAM 16-30: CPT | Mod: S$GLB,,, | Performed by: INTERNAL MEDICINE

## 2020-11-25 ENCOUNTER — PATIENT MESSAGE (OUTPATIENT)
Dept: PRIMARY CARE CLINIC | Facility: CLINIC | Age: 70
End: 2020-11-25

## 2020-11-25 NOTE — TELEPHONE ENCOUNTER
Ms. Alvarenga,  Can you help me figure out what he's talking about?  It looks like he is looking at his [employer] portal and it is not reconciling with what we have.    Thanks

## 2020-11-27 NOTE — TELEPHONE ENCOUNTER
We might need to forward this to [facility] personnel to help answer his question?  I don't know how to answer.  On my end in Health Maintenance tab, foot exam is marked as done on 9/16/2020

## 2020-11-30 ENCOUNTER — PATIENT OUTREACH (OUTPATIENT)
Dept: OTHER | Facility: OTHER | Age: 70
End: 2020-11-30

## 2020-11-30 ENCOUNTER — PATIENT MESSAGE (OUTPATIENT)
Dept: PRIMARY CARE CLINIC | Facility: CLINIC | Age: 70
End: 2020-11-30

## 2020-11-30 PROCEDURE — 99457 PR MONITORING, PHYSIOL PARAM, REMOTE, 1ST 20 MINS, PER MONTH: ICD-10-PCS | Mod: S$GLB,,, | Performed by: INTERNAL MEDICINE

## 2020-11-30 PROCEDURE — 99457 RPM TX MGMT 1ST 20 MIN: CPT | Mod: S$GLB,,, | Performed by: INTERNAL MEDICINE

## 2020-11-30 NOTE — PROGRESS NOTES
Digital Medicine: Health  Follow-Up    The history is provided by the patient.               Care Team received low BG alert.        Additional Follow-up details: I spoke to patient quickly as he was out shopping. Care team received a low BG alert of 51. Patient rechecked it and it was 160. He believes the 51 was a false reading. He denies any hypoglycemic symptoms. I'll follow up in more detail at the next encounter.             Diet-Not assessed          Physical Activity-Not assessed    Medication Adherence-Medication Adherence not addressed.      Substance, Sleep, Stress-Not assessed         Addressed patient questions and patient has my contact information if needed prior to next outreach.        There are no preventive care reminders to display for this patient.      Last 5 Patient Entered Readings                                      Current 30 Day Average: 119/69     Recent Readings 11/30/2020 11/29/2020 11/28/2020 11/27/2020 11/25/2020    SBP (mmHg) 125 131 143 120 131    DBP (mmHg) 75 76 80 72 75    Pulse 66 58 60 57 65        Last 6 Patient Entered Readings                                          Most Recent A1c: 6.1% on 8/26/2020  (Goal: 8%)     Recent Readings 11/30/2020 11/29/2020 11/29/2020 11/28/2020 11/27/2020    Blood Glucose (mg/dL) 150 160 51 169 171

## 2020-12-02 ENCOUNTER — PATIENT MESSAGE (OUTPATIENT)
Dept: PRIMARY CARE CLINIC | Facility: CLINIC | Age: 70
End: 2020-12-02

## 2020-12-02 ENCOUNTER — PATIENT OUTREACH (OUTPATIENT)
Dept: OTHER | Facility: OTHER | Age: 70
End: 2020-12-02

## 2020-12-02 NOTE — PROGRESS NOTES
Digital Medicine: Clinician Follow-Up    Called patient for routine follow up regarding HDMP (Hypertension Digital Medicine Program) and DDMP (Diabetes Digital Medicine Program).    HPI:  Patients BP average is currently 116/68 mmHg and patient's last A1C was 6.1%.       The history is provided by the patient.   Follow-up reason(s): routine follow up.     Hypertension    Patient's blood pressure is stable.   Diabetes    Patient's blood glucose is stable.   Patient is not experiencing signs/symptoms of hypotension.  Patient is not experiencing signs/symptoms of hypertension.  Patient is not experiencing signs/symptoms of hypoglycemia.  Patient is not experiencing signs/symptoms of hyperglycemia.          Last 5 Patient Entered Readings                                      Current 30 Day Average: 116/68     Recent Readings 12/2/2020 11/30/2020 11/29/2020 11/28/2020 11/27/2020    SBP (mmHg) 112 125 131 143 120    DBP (mmHg) 67 75 76 80 72    Pulse 62 66 58 60 57        Last 6 Patient Entered Readings                                          Most Recent A1c: 6.1% on 8/26/2020  (Goal: 8%)     Recent Readings 12/2/2020 12/1/2020 11/30/2020 11/29/2020 11/28/2020    Blood Glucose (mg/dL) 164 161 150 160 169               Depression Screening  Did not address depression screening.    Sleep Apnea Screening    Did not address sleep apnea screening.     Medication Affordability Screening  Did not address medication affordability screening.     Medication Adherence-Medication adherence was assessed.          ASSESSMENT(S)  Patients BP average is 116/68 mmHg, which is at goal. Patient's BP goal is less than or equal to 130/80.  Patient's A1C goal is less than or equal to 8. Patient's most recent A1C result is at goal. Lab Results    Component                Value               Date                     HGBA1C                   6.1 (H)             08/26/2020          .       Hypertension Plan  Continue current therapy.  Continue  current diet/physical activity routine.    Diabetes Plan  Continue current therapy.  Continue current diet/physical activity routine.  Provided patient education. Informed patient how to order more lancets.   Will call patient in a few weeks, sooner if needed. Patient knows to reach out at any time for any questions or concerns.        Addressed patient questions and patient has my contact information if needed prior to next outreach. Patient verbalizes understanding.             There are no preventive care reminders to display for this patient.  There are no preventive care reminders to display for this patient.      Hypertension Medications             irbesartan (AVAPRO) 150 MG tablet Take 150 mg by mouth every evening.    nitroGLYCERIN (NITROSTAT) 0.3 MG SL tablet         Chuck Covington, PharmD  Digital Medicine Clinician  [phone]

## 2020-12-08 ENCOUNTER — PATIENT OUTREACH (OUTPATIENT)
Dept: ADMINISTRATIVE | Facility: OTHER | Age: 70
End: 2020-12-08

## 2020-12-08 ENCOUNTER — PATIENT OUTREACH (OUTPATIENT)
Dept: ADMINISTRATIVE | Facility: HOSPITAL | Age: 70
End: 2020-12-08

## 2020-12-08 NOTE — PROGRESS NOTES
Patient came on the fobt workbook needing an order. Patient completed colonoscopy no order needed

## 2020-12-23 PROCEDURE — 99454 REM MNTR PHYSIOL PARAM 16-30: CPT | Mod: S$GLB,,, | Performed by: INTERNAL MEDICINE

## 2020-12-23 PROCEDURE — 99454 PR REMOTE MNTR, PHYS PARAM, INITIAL, EA 30 DAYS: ICD-10-PCS | Mod: S$GLB,,, | Performed by: INTERNAL MEDICINE

## 2020-12-28 ENCOUNTER — PATIENT OUTREACH (OUTPATIENT)
Dept: OTHER | Facility: OTHER | Age: 70
End: 2020-12-28

## 2021-01-27 PROCEDURE — 99454 PR REMOTE MNTR, PHYS PARAM, INITIAL, EA 30 DAYS: ICD-10-PCS | Mod: S$GLB,,, | Performed by: INTERNAL MEDICINE

## 2021-01-27 PROCEDURE — 99454 REM MNTR PHYSIOL PARAM 16-30: CPT | Mod: S$GLB,,, | Performed by: INTERNAL MEDICINE

## 2021-02-22 ENCOUNTER — PATIENT MESSAGE (OUTPATIENT)
Dept: ADMINISTRATIVE | Facility: OTHER | Age: 71
End: 2021-02-22

## 2021-02-26 ENCOUNTER — LAB VISIT (OUTPATIENT)
Dept: LAB | Facility: HOSPITAL | Age: 71
End: 2021-02-26
Attending: INTERNAL MEDICINE
Payer: MEDICARE

## 2021-02-26 DIAGNOSIS — E11.9 TYPE 2 DIABETES MELLITUS WITHOUT COMPLICATION, WITHOUT LONG-TERM CURRENT USE OF INSULIN: ICD-10-CM

## 2021-02-26 PROCEDURE — 83036 HEMOGLOBIN GLYCOSYLATED A1C: CPT

## 2021-02-26 PROCEDURE — 36415 COLL VENOUS BLD VENIPUNCTURE: CPT | Mod: PO

## 2021-02-27 LAB
ESTIMATED AVG GLUCOSE: 120 MG/DL (ref 68–131)
HBA1C MFR BLD: 5.8 % (ref 4–5.6)

## 2021-02-27 PROCEDURE — 99454 REM MNTR PHYSIOL PARAM 16-30: CPT | Mod: S$GLB,,, | Performed by: INTERNAL MEDICINE

## 2021-02-27 PROCEDURE — 99454 PR REMOTE MNTR, PHYS PARAM, INITIAL, EA 30 DAYS: ICD-10-PCS | Mod: S$GLB,,, | Performed by: INTERNAL MEDICINE

## 2021-03-15 LAB
LEFT EYE DM RETINOPATHY: NORMAL
RIGHT EYE DM RETINOPATHY: NORMAL

## 2021-03-16 ENCOUNTER — OFFICE VISIT (OUTPATIENT)
Dept: FAMILY MEDICINE | Facility: CLINIC | Age: 71
End: 2021-03-16
Payer: MEDICARE

## 2021-03-16 ENCOUNTER — TELEPHONE (OUTPATIENT)
Dept: ADMINISTRATIVE | Facility: HOSPITAL | Age: 71
End: 2021-03-16

## 2021-03-16 VITALS
BODY MASS INDEX: 25.54 KG/M2 | HEART RATE: 69 BPM | WEIGHT: 178.38 LBS | TEMPERATURE: 98 F | DIASTOLIC BLOOD PRESSURE: 58 MMHG | SYSTOLIC BLOOD PRESSURE: 100 MMHG | HEIGHT: 70 IN | OXYGEN SATURATION: 98 %

## 2021-03-16 DIAGNOSIS — E11.9 TYPE 2 DIABETES MELLITUS WITHOUT COMPLICATION, WITHOUT LONG-TERM CURRENT USE OF INSULIN: Primary | ICD-10-CM

## 2021-03-16 DIAGNOSIS — I11.9 HYPERTENSIVE HEART DISEASE WITHOUT HEART FAILURE: ICD-10-CM

## 2021-03-16 DIAGNOSIS — E78.2 HYPERLIPIDEMIA, MIXED: ICD-10-CM

## 2021-03-16 DIAGNOSIS — I25.10 CORONARY ARTERY DISEASE INVOLVING NATIVE CORONARY ARTERY OF NATIVE HEART WITHOUT ANGINA PECTORIS: ICD-10-CM

## 2021-03-16 PROCEDURE — 99214 OFFICE O/P EST MOD 30 MIN: CPT | Mod: S$GLB,,, | Performed by: INTERNAL MEDICINE

## 2021-03-16 PROCEDURE — 3008F BODY MASS INDEX DOCD: CPT | Mod: CPTII,S$GLB,, | Performed by: INTERNAL MEDICINE

## 2021-03-16 PROCEDURE — 3044F PR MOST RECENT HEMOGLOBIN A1C LEVEL <7.0%: ICD-10-PCS | Mod: CPTII,S$GLB,, | Performed by: INTERNAL MEDICINE

## 2021-03-16 PROCEDURE — 1157F ADVNC CARE PLAN IN RCRD: CPT | Mod: S$GLB,,, | Performed by: INTERNAL MEDICINE

## 2021-03-16 PROCEDURE — 3044F HG A1C LEVEL LT 7.0%: CPT | Mod: CPTII,S$GLB,, | Performed by: INTERNAL MEDICINE

## 2021-03-16 PROCEDURE — 1159F PR MEDICATION LIST DOCUMENTED IN MEDICAL RECORD: ICD-10-PCS | Mod: S$GLB,,, | Performed by: INTERNAL MEDICINE

## 2021-03-16 PROCEDURE — 1157F PR ADVANCE CARE PLAN OR EQUIV PRESENT IN MEDICAL RECORD: ICD-10-PCS | Mod: S$GLB,,, | Performed by: INTERNAL MEDICINE

## 2021-03-16 PROCEDURE — 3074F PR MOST RECENT SYSTOLIC BLOOD PRESSURE < 130 MM HG: ICD-10-PCS | Mod: CPTII,S$GLB,, | Performed by: INTERNAL MEDICINE

## 2021-03-16 PROCEDURE — 99214 PR OFFICE/OUTPT VISIT, EST, LEVL IV, 30-39 MIN: ICD-10-PCS | Mod: S$GLB,,, | Performed by: INTERNAL MEDICINE

## 2021-03-16 PROCEDURE — 3008F PR BODY MASS INDEX (BMI) DOCUMENTED: ICD-10-PCS | Mod: CPTII,S$GLB,, | Performed by: INTERNAL MEDICINE

## 2021-03-16 PROCEDURE — 3078F DIAST BP <80 MM HG: CPT | Mod: CPTII,S$GLB,, | Performed by: INTERNAL MEDICINE

## 2021-03-16 PROCEDURE — 1126F AMNT PAIN NOTED NONE PRSNT: CPT | Mod: S$GLB,,, | Performed by: INTERNAL MEDICINE

## 2021-03-16 PROCEDURE — 3074F SYST BP LT 130 MM HG: CPT | Mod: CPTII,S$GLB,, | Performed by: INTERNAL MEDICINE

## 2021-03-16 PROCEDURE — 99999 PR PBB SHADOW E&M-EST. PATIENT-LVL IV: CPT | Mod: PBBFAC,,, | Performed by: INTERNAL MEDICINE

## 2021-03-16 PROCEDURE — 99999 PR PBB SHADOW E&M-EST. PATIENT-LVL IV: ICD-10-PCS | Mod: PBBFAC,,, | Performed by: INTERNAL MEDICINE

## 2021-03-16 PROCEDURE — 1159F MED LIST DOCD IN RCRD: CPT | Mod: S$GLB,,, | Performed by: INTERNAL MEDICINE

## 2021-03-16 PROCEDURE — 1126F PR PAIN SEVERITY QUANTIFIED, NO PAIN PRESENT: ICD-10-PCS | Mod: S$GLB,,, | Performed by: INTERNAL MEDICINE

## 2021-03-16 PROCEDURE — 3078F PR MOST RECENT DIASTOLIC BLOOD PRESSURE < 80 MM HG: ICD-10-PCS | Mod: CPTII,S$GLB,, | Performed by: INTERNAL MEDICINE

## 2021-03-19 PROCEDURE — 99454 PR REMOTE MNTR, PHYS PARAM, INITIAL, EA 30 DAYS: ICD-10-PCS | Mod: S$GLB,,, | Performed by: INTERNAL MEDICINE

## 2021-03-19 PROCEDURE — 99454 REM MNTR PHYSIOL PARAM 16-30: CPT | Mod: S$GLB,,, | Performed by: INTERNAL MEDICINE

## 2021-03-30 ENCOUNTER — TELEPHONE (OUTPATIENT)
Dept: ADMINISTRATIVE | Facility: HOSPITAL | Age: 71
End: 2021-03-30

## 2021-04-19 PROCEDURE — 99454 REM MNTR PHYSIOL PARAM 16-30: CPT | Mod: S$GLB,,, | Performed by: INTERNAL MEDICINE

## 2021-04-19 PROCEDURE — 99454 PR REMOTE MNTR, PHYS PARAM, INITIAL, EA 30 DAYS: ICD-10-PCS | Mod: S$GLB,,, | Performed by: INTERNAL MEDICINE

## 2021-05-18 PROCEDURE — 99454 PR REMOTE MNTR, PHYS PARAM, INITIAL, EA 30 DAYS: ICD-10-PCS | Mod: S$GLB,,, | Performed by: INTERNAL MEDICINE

## 2021-05-18 PROCEDURE — 99454 REM MNTR PHYSIOL PARAM 16-30: CPT | Mod: S$GLB,,, | Performed by: INTERNAL MEDICINE

## 2021-06-22 PROCEDURE — 99454 REM MNTR PHYSIOL PARAM 16-30: CPT | Mod: S$GLB,,, | Performed by: INTERNAL MEDICINE

## 2021-06-22 PROCEDURE — 99454 PR REMOTE MNTR, PHYS PARAM, INITIAL, EA 30 DAYS: ICD-10-PCS | Mod: S$GLB,,, | Performed by: INTERNAL MEDICINE

## 2021-07-20 PROCEDURE — 99454 PR REMOTE MNTR, PHYS PARAM, INITIAL, EA 30 DAYS: ICD-10-PCS | Mod: S$GLB,,, | Performed by: INTERNAL MEDICINE

## 2021-07-20 PROCEDURE — 99454 REM MNTR PHYSIOL PARAM 16-30: CPT | Mod: S$GLB,,, | Performed by: INTERNAL MEDICINE

## 2021-08-19 PROCEDURE — 99454 REM MNTR PHYSIOL PARAM 16-30: CPT | Mod: S$GLB,,, | Performed by: INTERNAL MEDICINE

## 2021-08-19 PROCEDURE — 99454 PR REMOTE MNTR, PHYS PARAM, INITIAL, EA 30 DAYS: ICD-10-PCS | Mod: S$GLB,,, | Performed by: INTERNAL MEDICINE

## 2021-08-20 ENCOUNTER — PES CALL (OUTPATIENT)
Dept: ADMINISTRATIVE | Facility: CLINIC | Age: 71
End: 2021-08-20

## 2021-08-23 ENCOUNTER — TELEPHONE (OUTPATIENT)
Dept: FAMILY MEDICINE | Facility: CLINIC | Age: 71
End: 2021-08-23

## 2021-08-23 DIAGNOSIS — E11.9 TYPE 2 DIABETES MELLITUS WITHOUT COMPLICATION, WITHOUT LONG-TERM CURRENT USE OF INSULIN: Primary | ICD-10-CM

## 2021-08-23 DIAGNOSIS — Z12.5 SCREENING FOR MALIGNANT NEOPLASM OF PROSTATE: ICD-10-CM

## 2021-08-23 DIAGNOSIS — E78.2 HYPERLIPIDEMIA, MIXED: ICD-10-CM

## 2021-09-08 ENCOUNTER — PES CALL (OUTPATIENT)
Dept: ADMINISTRATIVE | Facility: CLINIC | Age: 71
End: 2021-09-08

## 2021-09-08 ENCOUNTER — PATIENT MESSAGE (OUTPATIENT)
Dept: OTHER | Facility: OTHER | Age: 71
End: 2021-09-08

## 2021-09-14 ENCOUNTER — LAB VISIT (OUTPATIENT)
Dept: LAB | Facility: HOSPITAL | Age: 71
End: 2021-09-14
Attending: INTERNAL MEDICINE
Payer: MEDICARE

## 2021-09-14 DIAGNOSIS — E78.2 HYPERLIPIDEMIA, MIXED: ICD-10-CM

## 2021-09-14 DIAGNOSIS — Z12.5 SCREENING FOR MALIGNANT NEOPLASM OF PROSTATE: ICD-10-CM

## 2021-09-14 DIAGNOSIS — E11.9 TYPE 2 DIABETES MELLITUS WITHOUT COMPLICATION, WITHOUT LONG-TERM CURRENT USE OF INSULIN: ICD-10-CM

## 2021-09-14 LAB
ALBUMIN SERPL BCP-MCNC: 4 G/DL (ref 3.5–5.2)
ALP SERPL-CCNC: 62 U/L (ref 55–135)
ALT SERPL W/O P-5'-P-CCNC: 34 U/L (ref 10–44)
ANION GAP SERPL CALC-SCNC: 9 MMOL/L (ref 8–16)
AST SERPL-CCNC: 19 U/L (ref 10–40)
BILIRUB SERPL-MCNC: 0.9 MG/DL (ref 0.1–1)
BUN SERPL-MCNC: 20 MG/DL (ref 8–23)
CALCIUM SERPL-MCNC: 9 MG/DL (ref 8.7–10.5)
CHLORIDE SERPL-SCNC: 108 MMOL/L (ref 95–110)
CHOLEST SERPL-MCNC: 99 MG/DL (ref 120–199)
CHOLEST/HDLC SERPL: 3.5 {RATIO} (ref 2–5)
CO2 SERPL-SCNC: 25 MMOL/L (ref 23–29)
COMPLEXED PSA SERPL-MCNC: 0.72 NG/ML (ref 0–4)
CREAT SERPL-MCNC: 0.8 MG/DL (ref 0.5–1.4)
EST. GFR  (AFRICAN AMERICAN): >60 ML/MIN/1.73 M^2
EST. GFR  (NON AFRICAN AMERICAN): >60 ML/MIN/1.73 M^2
ESTIMATED AVG GLUCOSE: 131 MG/DL (ref 68–131)
GLUCOSE SERPL-MCNC: 118 MG/DL (ref 70–110)
HBA1C MFR BLD: 6.2 % (ref 4–5.6)
HDLC SERPL-MCNC: 28 MG/DL (ref 40–75)
HDLC SERPL: 28.3 % (ref 20–50)
LDLC SERPL CALC-MCNC: 50.4 MG/DL (ref 63–159)
NONHDLC SERPL-MCNC: 71 MG/DL
POTASSIUM SERPL-SCNC: 4.3 MMOL/L (ref 3.5–5.1)
PROT SERPL-MCNC: 6.8 G/DL (ref 6–8.4)
SODIUM SERPL-SCNC: 142 MMOL/L (ref 136–145)
TRIGL SERPL-MCNC: 103 MG/DL (ref 30–150)

## 2021-09-14 PROCEDURE — 36415 COLL VENOUS BLD VENIPUNCTURE: CPT | Mod: HCNC,PO | Performed by: INTERNAL MEDICINE

## 2021-09-14 PROCEDURE — 80061 LIPID PANEL: CPT | Mod: HCNC | Performed by: INTERNAL MEDICINE

## 2021-09-14 PROCEDURE — 84153 ASSAY OF PSA TOTAL: CPT | Mod: HCNC | Performed by: INTERNAL MEDICINE

## 2021-09-14 PROCEDURE — 80053 COMPREHEN METABOLIC PANEL: CPT | Mod: HCNC | Performed by: INTERNAL MEDICINE

## 2021-09-14 PROCEDURE — 83036 HEMOGLOBIN GLYCOSYLATED A1C: CPT | Mod: HCNC | Performed by: INTERNAL MEDICINE

## 2021-09-21 PROCEDURE — 99454 REM MNTR PHYSIOL PARAM 16-30: CPT | Mod: S$GLB,,, | Performed by: INTERNAL MEDICINE

## 2021-09-21 PROCEDURE — 99454 PR REMOTE MNTR, PHYS PARAM, INITIAL, EA 30 DAYS: ICD-10-PCS | Mod: S$GLB,,, | Performed by: INTERNAL MEDICINE

## 2021-09-22 ENCOUNTER — OFFICE VISIT (OUTPATIENT)
Dept: FAMILY MEDICINE | Facility: CLINIC | Age: 71
End: 2021-09-22
Payer: MEDICARE

## 2021-09-22 VITALS — SYSTOLIC BLOOD PRESSURE: 118 MMHG | HEART RATE: 68 BPM | DIASTOLIC BLOOD PRESSURE: 69 MMHG | OXYGEN SATURATION: 98 %

## 2021-09-22 DIAGNOSIS — M17.11 PRIMARY OSTEOARTHRITIS OF RIGHT KNEE: ICD-10-CM

## 2021-09-22 DIAGNOSIS — I25.10 CORONARY ARTERY DISEASE INVOLVING NATIVE CORONARY ARTERY OF NATIVE HEART WITHOUT ANGINA PECTORIS: ICD-10-CM

## 2021-09-22 DIAGNOSIS — I11.9 HYPERTENSIVE HEART DISEASE WITHOUT HEART FAILURE: ICD-10-CM

## 2021-09-22 DIAGNOSIS — E78.2 HYPERLIPIDEMIA, MIXED: ICD-10-CM

## 2021-09-22 DIAGNOSIS — E11.9 TYPE 2 DIABETES MELLITUS WITHOUT COMPLICATION, WITHOUT LONG-TERM CURRENT USE OF INSULIN: Primary | ICD-10-CM

## 2021-09-22 DIAGNOSIS — H81.10 BENIGN PAROXYSMAL POSITIONAL VERTIGO, UNSPECIFIED LATERALITY: ICD-10-CM

## 2021-09-22 PROCEDURE — 3288F PR FALLS RISK ASSESSMENT DOCUMENTED: ICD-10-PCS | Mod: HCNC,CPTII,S$GLB, | Performed by: INTERNAL MEDICINE

## 2021-09-22 PROCEDURE — 99214 OFFICE O/P EST MOD 30 MIN: CPT | Mod: HCNC,S$GLB,, | Performed by: INTERNAL MEDICINE

## 2021-09-22 PROCEDURE — 99999 PR PBB SHADOW E&M-EST. PATIENT-LVL IV: CPT | Mod: PBBFAC,HCNC,, | Performed by: INTERNAL MEDICINE

## 2021-09-22 PROCEDURE — 1101F PT FALLS ASSESS-DOCD LE1/YR: CPT | Mod: HCNC,CPTII,S$GLB, | Performed by: INTERNAL MEDICINE

## 2021-09-22 PROCEDURE — 99999 PR PBB SHADOW E&M-EST. PATIENT-LVL IV: ICD-10-PCS | Mod: PBBFAC,HCNC,, | Performed by: INTERNAL MEDICINE

## 2021-09-22 PROCEDURE — 3288F FALL RISK ASSESSMENT DOCD: CPT | Mod: HCNC,CPTII,S$GLB, | Performed by: INTERNAL MEDICINE

## 2021-09-22 PROCEDURE — 99499 UNLISTED E&M SERVICE: CPT | Mod: HCNC,S$GLB,, | Performed by: INTERNAL MEDICINE

## 2021-09-22 PROCEDURE — 1157F ADVNC CARE PLAN IN RCRD: CPT | Mod: HCNC,CPTII,S$GLB, | Performed by: INTERNAL MEDICINE

## 2021-09-22 PROCEDURE — 99214 PR OFFICE/OUTPT VISIT, EST, LEVL IV, 30-39 MIN: ICD-10-PCS | Mod: HCNC,S$GLB,, | Performed by: INTERNAL MEDICINE

## 2021-09-22 PROCEDURE — 99499 RISK ADDL DX/OHS AUDIT: ICD-10-PCS | Mod: HCNC,S$GLB,, | Performed by: INTERNAL MEDICINE

## 2021-09-22 PROCEDURE — 1157F PR ADVANCE CARE PLAN OR EQUIV PRESENT IN MEDICAL RECORD: ICD-10-PCS | Mod: HCNC,CPTII,S$GLB, | Performed by: INTERNAL MEDICINE

## 2021-09-22 PROCEDURE — 1101F PR PT FALLS ASSESS DOC 0-1 FALLS W/OUT INJ PAST YR: ICD-10-PCS | Mod: HCNC,CPTII,S$GLB, | Performed by: INTERNAL MEDICINE

## 2021-09-22 PROCEDURE — 4010F PR ACE/ARB THEARPY RXD/TAKEN: ICD-10-PCS | Mod: HCNC,CPTII,S$GLB, | Performed by: INTERNAL MEDICINE

## 2021-09-22 PROCEDURE — 3078F DIAST BP <80 MM HG: CPT | Mod: HCNC,CPTII,S$GLB, | Performed by: INTERNAL MEDICINE

## 2021-09-22 PROCEDURE — 1160F PR REVIEW ALL MEDS BY PRESCRIBER/CLIN PHARMACIST DOCUMENTED: ICD-10-PCS | Mod: HCNC,CPTII,S$GLB, | Performed by: INTERNAL MEDICINE

## 2021-09-22 PROCEDURE — 3074F SYST BP LT 130 MM HG: CPT | Mod: HCNC,CPTII,S$GLB, | Performed by: INTERNAL MEDICINE

## 2021-09-22 PROCEDURE — 4010F ACE/ARB THERAPY RXD/TAKEN: CPT | Mod: HCNC,CPTII,S$GLB, | Performed by: INTERNAL MEDICINE

## 2021-09-22 PROCEDURE — 3078F PR MOST RECENT DIASTOLIC BLOOD PRESSURE < 80 MM HG: ICD-10-PCS | Mod: HCNC,CPTII,S$GLB, | Performed by: INTERNAL MEDICINE

## 2021-09-22 PROCEDURE — 1126F AMNT PAIN NOTED NONE PRSNT: CPT | Mod: HCNC,CPTII,S$GLB, | Performed by: INTERNAL MEDICINE

## 2021-09-22 PROCEDURE — 1159F MED LIST DOCD IN RCRD: CPT | Mod: HCNC,CPTII,S$GLB, | Performed by: INTERNAL MEDICINE

## 2021-09-22 PROCEDURE — 1160F RVW MEDS BY RX/DR IN RCRD: CPT | Mod: HCNC,CPTII,S$GLB, | Performed by: INTERNAL MEDICINE

## 2021-09-22 PROCEDURE — 3044F HG A1C LEVEL LT 7.0%: CPT | Mod: HCNC,CPTII,S$GLB, | Performed by: INTERNAL MEDICINE

## 2021-09-22 PROCEDURE — 3074F PR MOST RECENT SYSTOLIC BLOOD PRESSURE < 130 MM HG: ICD-10-PCS | Mod: HCNC,CPTII,S$GLB, | Performed by: INTERNAL MEDICINE

## 2021-09-22 PROCEDURE — 1126F PR PAIN SEVERITY QUANTIFIED, NO PAIN PRESENT: ICD-10-PCS | Mod: HCNC,CPTII,S$GLB, | Performed by: INTERNAL MEDICINE

## 2021-09-22 PROCEDURE — 3044F PR MOST RECENT HEMOGLOBIN A1C LEVEL <7.0%: ICD-10-PCS | Mod: HCNC,CPTII,S$GLB, | Performed by: INTERNAL MEDICINE

## 2021-09-22 PROCEDURE — 1159F PR MEDICATION LIST DOCUMENTED IN MEDICAL RECORD: ICD-10-PCS | Mod: HCNC,CPTII,S$GLB, | Performed by: INTERNAL MEDICINE

## 2021-09-22 RX ORDER — FLUTICASONE PROPIONATE 50 MCG
2 SPRAY, SUSPENSION (ML) NASAL DAILY
Qty: 16 G | Refills: 11 | Status: SHIPPED | OUTPATIENT
Start: 2021-09-22 | End: 2021-12-17 | Stop reason: SDUPTHER

## 2021-09-29 ENCOUNTER — PATIENT MESSAGE (OUTPATIENT)
Dept: FAMILY MEDICINE | Facility: CLINIC | Age: 71
End: 2021-09-29

## 2021-10-18 PROCEDURE — 99454 PR REMOTE MNTR, PHYS PARAM, INITIAL, EA 30 DAYS: ICD-10-PCS | Mod: S$GLB,,, | Performed by: INTERNAL MEDICINE

## 2021-10-18 PROCEDURE — 99454 REM MNTR PHYSIOL PARAM 16-30: CPT | Mod: S$GLB,,, | Performed by: INTERNAL MEDICINE

## 2021-10-20 ENCOUNTER — PES CALL (OUTPATIENT)
Dept: ADMINISTRATIVE | Facility: CLINIC | Age: 71
End: 2021-10-20

## 2021-10-21 ENCOUNTER — OFFICE VISIT (OUTPATIENT)
Dept: FAMILY MEDICINE | Facility: CLINIC | Age: 71
End: 2021-10-21
Payer: MEDICARE

## 2021-10-21 VITALS
WEIGHT: 185 LBS | HEART RATE: 60 BPM | DIASTOLIC BLOOD PRESSURE: 68 MMHG | OXYGEN SATURATION: 97 % | HEIGHT: 70 IN | BODY MASS INDEX: 26.48 KG/M2 | SYSTOLIC BLOOD PRESSURE: 124 MMHG

## 2021-10-21 DIAGNOSIS — E78.5 HYPERLIPIDEMIA ASSOCIATED WITH TYPE 2 DIABETES MELLITUS: ICD-10-CM

## 2021-10-21 DIAGNOSIS — E11.69 HYPERLIPIDEMIA ASSOCIATED WITH TYPE 2 DIABETES MELLITUS: ICD-10-CM

## 2021-10-21 DIAGNOSIS — Z13.6 ENCOUNTER FOR ABDOMINAL AORTIC ANEURYSM (AAA) SCREENING: ICD-10-CM

## 2021-10-21 DIAGNOSIS — I11.9 HYPERTENSIVE HEART DISEASE WITHOUT HEART FAILURE: ICD-10-CM

## 2021-10-21 DIAGNOSIS — Z00.00 ENCOUNTER FOR PREVENTIVE HEALTH EXAMINATION: Primary | ICD-10-CM

## 2021-10-21 DIAGNOSIS — I15.2 HYPERTENSION ASSOCIATED WITH DIABETES: ICD-10-CM

## 2021-10-21 DIAGNOSIS — S50.311A ABRASION OF RIGHT ELBOW, INITIAL ENCOUNTER: ICD-10-CM

## 2021-10-21 DIAGNOSIS — M19.90 OSTEOARTHRITIS, UNSPECIFIED OSTEOARTHRITIS TYPE, UNSPECIFIED SITE: ICD-10-CM

## 2021-10-21 DIAGNOSIS — H81.10 BENIGN PAROXYSMAL POSITIONAL VERTIGO, UNSPECIFIED LATERALITY: ICD-10-CM

## 2021-10-21 DIAGNOSIS — E11.9 TYPE 2 DIABETES MELLITUS WITHOUT COMPLICATION, WITHOUT LONG-TERM CURRENT USE OF INSULIN: ICD-10-CM

## 2021-10-21 DIAGNOSIS — T63.304A SPIDER BITE WOUND, UNDETERMINED INTENT, INITIAL ENCOUNTER: ICD-10-CM

## 2021-10-21 DIAGNOSIS — E11.59 HYPERTENSION ASSOCIATED WITH DIABETES: ICD-10-CM

## 2021-10-21 DIAGNOSIS — I25.10 CORONARY ARTERY DISEASE INVOLVING NATIVE CORONARY ARTERY OF NATIVE HEART WITHOUT ANGINA PECTORIS: ICD-10-CM

## 2021-10-21 PROCEDURE — 3078F PR MOST RECENT DIASTOLIC BLOOD PRESSURE < 80 MM HG: ICD-10-PCS | Mod: HCNC,CPTII,S$GLB, | Performed by: NURSE PRACTITIONER

## 2021-10-21 PROCEDURE — 99499 UNLISTED E&M SERVICE: CPT | Mod: HCNC,S$GLB,, | Performed by: NURSE PRACTITIONER

## 2021-10-21 PROCEDURE — 3288F FALL RISK ASSESSMENT DOCD: CPT | Mod: HCNC,CPTII,S$GLB, | Performed by: NURSE PRACTITIONER

## 2021-10-21 PROCEDURE — 3044F PR MOST RECENT HEMOGLOBIN A1C LEVEL <7.0%: ICD-10-PCS | Mod: HCNC,CPTII,S$GLB, | Performed by: NURSE PRACTITIONER

## 2021-10-21 PROCEDURE — 4010F PR ACE/ARB THEARPY RXD/TAKEN: ICD-10-PCS | Mod: HCNC,CPTII,S$GLB, | Performed by: NURSE PRACTITIONER

## 2021-10-21 PROCEDURE — 1160F RVW MEDS BY RX/DR IN RCRD: CPT | Mod: HCNC,CPTII,S$GLB, | Performed by: NURSE PRACTITIONER

## 2021-10-21 PROCEDURE — 1126F PR PAIN SEVERITY QUANTIFIED, NO PAIN PRESENT: ICD-10-PCS | Mod: HCNC,CPTII,S$GLB, | Performed by: NURSE PRACTITIONER

## 2021-10-21 PROCEDURE — 1170F PR FUNCTIONAL STATUS ASSESSED: ICD-10-PCS | Mod: HCNC,CPTII,S$GLB, | Performed by: NURSE PRACTITIONER

## 2021-10-21 PROCEDURE — 90471 IMMUNIZATION ADMIN: CPT | Mod: HCNC,S$GLB,, | Performed by: NURSE PRACTITIONER

## 2021-10-21 PROCEDURE — 3078F DIAST BP <80 MM HG: CPT | Mod: HCNC,CPTII,S$GLB, | Performed by: NURSE PRACTITIONER

## 2021-10-21 PROCEDURE — G0439 PR MEDICARE ANNUAL WELLNESS SUBSEQUENT VISIT: ICD-10-PCS | Mod: HCNC,S$GLB,, | Performed by: NURSE PRACTITIONER

## 2021-10-21 PROCEDURE — 99499 RISK ADDL DX/OHS AUDIT: ICD-10-PCS | Mod: HCNC,S$GLB,, | Performed by: NURSE PRACTITIONER

## 2021-10-21 PROCEDURE — 3044F HG A1C LEVEL LT 7.0%: CPT | Mod: HCNC,CPTII,S$GLB, | Performed by: NURSE PRACTITIONER

## 2021-10-21 PROCEDURE — 99999 PR PBB SHADOW E&M-EST. PATIENT-LVL V: ICD-10-PCS | Mod: PBBFAC,HCNC,, | Performed by: NURSE PRACTITIONER

## 2021-10-21 PROCEDURE — 1170F FXNL STATUS ASSESSED: CPT | Mod: HCNC,CPTII,S$GLB, | Performed by: NURSE PRACTITIONER

## 2021-10-21 PROCEDURE — 1157F PR ADVANCE CARE PLAN OR EQUIV PRESENT IN MEDICAL RECORD: ICD-10-PCS | Mod: HCNC,CPTII,S$GLB, | Performed by: NURSE PRACTITIONER

## 2021-10-21 PROCEDURE — 1159F MED LIST DOCD IN RCRD: CPT | Mod: HCNC,CPTII,S$GLB, | Performed by: NURSE PRACTITIONER

## 2021-10-21 PROCEDURE — 1101F PT FALLS ASSESS-DOCD LE1/YR: CPT | Mod: HCNC,CPTII,S$GLB, | Performed by: NURSE PRACTITIONER

## 2021-10-21 PROCEDURE — 4010F ACE/ARB THERAPY RXD/TAKEN: CPT | Mod: HCNC,CPTII,S$GLB, | Performed by: NURSE PRACTITIONER

## 2021-10-21 PROCEDURE — 3008F BODY MASS INDEX DOCD: CPT | Mod: HCNC,CPTII,S$GLB, | Performed by: NURSE PRACTITIONER

## 2021-10-21 PROCEDURE — 1159F PR MEDICATION LIST DOCUMENTED IN MEDICAL RECORD: ICD-10-PCS | Mod: HCNC,CPTII,S$GLB, | Performed by: NURSE PRACTITIONER

## 2021-10-21 PROCEDURE — 3288F PR FALLS RISK ASSESSMENT DOCUMENTED: ICD-10-PCS | Mod: HCNC,CPTII,S$GLB, | Performed by: NURSE PRACTITIONER

## 2021-10-21 PROCEDURE — 99999 PR PBB SHADOW E&M-EST. PATIENT-LVL V: CPT | Mod: PBBFAC,HCNC,, | Performed by: NURSE PRACTITIONER

## 2021-10-21 PROCEDURE — 90471 TDAP VACCINE GREATER THAN OR EQUAL TO 7YO IM: ICD-10-PCS | Mod: HCNC,S$GLB,, | Performed by: NURSE PRACTITIONER

## 2021-10-21 PROCEDURE — 90715 TDAP VACCINE GREATER THAN OR EQUAL TO 7YO IM: ICD-10-PCS | Mod: HCNC,S$GLB,, | Performed by: NURSE PRACTITIONER

## 2021-10-21 PROCEDURE — 1157F ADVNC CARE PLAN IN RCRD: CPT | Mod: HCNC,CPTII,S$GLB, | Performed by: NURSE PRACTITIONER

## 2021-10-21 PROCEDURE — 1101F PR PT FALLS ASSESS DOC 0-1 FALLS W/OUT INJ PAST YR: ICD-10-PCS | Mod: HCNC,CPTII,S$GLB, | Performed by: NURSE PRACTITIONER

## 2021-10-21 PROCEDURE — 1126F AMNT PAIN NOTED NONE PRSNT: CPT | Mod: HCNC,CPTII,S$GLB, | Performed by: NURSE PRACTITIONER

## 2021-10-21 PROCEDURE — 1160F PR REVIEW ALL MEDS BY PRESCRIBER/CLIN PHARMACIST DOCUMENTED: ICD-10-PCS | Mod: HCNC,CPTII,S$GLB, | Performed by: NURSE PRACTITIONER

## 2021-10-21 PROCEDURE — 90715 TDAP VACCINE 7 YRS/> IM: CPT | Mod: HCNC,S$GLB,, | Performed by: NURSE PRACTITIONER

## 2021-10-21 PROCEDURE — 3074F PR MOST RECENT SYSTOLIC BLOOD PRESSURE < 130 MM HG: ICD-10-PCS | Mod: HCNC,CPTII,S$GLB, | Performed by: NURSE PRACTITIONER

## 2021-10-21 PROCEDURE — 3074F SYST BP LT 130 MM HG: CPT | Mod: HCNC,CPTII,S$GLB, | Performed by: NURSE PRACTITIONER

## 2021-10-21 PROCEDURE — G0439 PPPS, SUBSEQ VISIT: HCPCS | Mod: HCNC,S$GLB,, | Performed by: NURSE PRACTITIONER

## 2021-10-21 PROCEDURE — 3008F PR BODY MASS INDEX (BMI) DOCUMENTED: ICD-10-PCS | Mod: HCNC,CPTII,S$GLB, | Performed by: NURSE PRACTITIONER

## 2021-10-21 RX ORDER — DORZOLAMIDE HYDROCHLORIDE AND TIMOLOL MALEATE 20; 5 MG/ML; MG/ML
SOLUTION/ DROPS OPHTHALMIC
COMMUNITY
Start: 2021-08-11 | End: 2021-11-17 | Stop reason: SDUPTHER

## 2021-10-21 RX ORDER — ACETAMINOPHEN 500 MG
5000 TABLET ORAL DAILY
COMMUNITY

## 2021-10-21 RX ORDER — LATANOPROST 50 UG/ML
1 SOLUTION/ DROPS OPHTHALMIC NIGHTLY
COMMUNITY
Start: 2021-10-01 | End: 2022-06-11 | Stop reason: SDUPTHER

## 2021-10-21 RX ORDER — IBUPROFEN 100 MG/5ML
1000 SUSPENSION, ORAL (FINAL DOSE FORM) ORAL DAILY
COMMUNITY
End: 2022-01-20 | Stop reason: DRUGHIGH

## 2021-10-21 RX ORDER — DORZOLAMIDE HYDROCHLORIDE AND TIMOLOL MALEATE 20; 5 MG/ML; MG/ML
1 SOLUTION/ DROPS OPHTHALMIC 2 TIMES DAILY
COMMUNITY
End: 2022-07-01 | Stop reason: SDUPTHER

## 2021-11-02 ENCOUNTER — HOSPITAL ENCOUNTER (OUTPATIENT)
Dept: RADIOLOGY | Facility: HOSPITAL | Age: 71
Discharge: HOME | End: 2021-11-02
Attending: NURSE PRACTITIONER
Payer: MEDICARE

## 2021-11-02 DIAGNOSIS — Z13.6 ENCOUNTER FOR ABDOMINAL AORTIC ANEURYSM (AAA) SCREENING: ICD-10-CM

## 2021-11-02 PROCEDURE — 76775 US EXAM ABDO BACK WALL LIM: CPT | Mod: 26,HCNC,, | Performed by: RADIOLOGY

## 2021-11-02 PROCEDURE — 76775 US EXAM ABDO BACK WALL LIM: CPT | Mod: TC,HCNC

## 2021-11-02 PROCEDURE — 76775 US ABDOMINAL AORTA: ICD-10-PCS | Mod: 26,HCNC,, | Performed by: RADIOLOGY

## 2021-11-04 ENCOUNTER — PATIENT MESSAGE (OUTPATIENT)
Dept: FAMILY MEDICINE | Facility: CLINIC | Age: 71
End: 2021-11-04
Payer: MEDICARE

## 2021-11-04 ENCOUNTER — PATIENT MESSAGE (OUTPATIENT)
Dept: UROLOGY | Facility: CLINIC | Age: 71
End: 2021-11-04
Payer: MEDICARE

## 2021-11-05 ENCOUNTER — PATIENT MESSAGE (OUTPATIENT)
Dept: FAMILY MEDICINE | Facility: CLINIC | Age: 71
End: 2021-11-05
Payer: MEDICARE

## 2021-11-17 ENCOUNTER — OFFICE VISIT (OUTPATIENT)
Dept: CARDIOLOGY | Facility: CLINIC | Age: 71
End: 2021-11-17
Payer: MEDICARE

## 2021-11-17 VITALS
SYSTOLIC BLOOD PRESSURE: 135 MMHG | HEART RATE: 56 BPM | BODY MASS INDEX: 26.33 KG/M2 | WEIGHT: 183.88 LBS | DIASTOLIC BLOOD PRESSURE: 63 MMHG | HEIGHT: 70 IN

## 2021-11-17 DIAGNOSIS — I25.10 CORONARY ARTERY DISEASE INVOLVING NATIVE CORONARY ARTERY OF NATIVE HEART WITHOUT ANGINA PECTORIS: Primary | ICD-10-CM

## 2021-11-17 DIAGNOSIS — E78.2 HYPERLIPIDEMIA, MIXED: ICD-10-CM

## 2021-11-17 DIAGNOSIS — I10 ESSENTIAL HYPERTENSION: ICD-10-CM

## 2021-11-17 DIAGNOSIS — Z95.5 PRESENCE OF STENT IN CORONARY ARTERY: ICD-10-CM

## 2021-11-17 DIAGNOSIS — E11.9 TYPE 2 DIABETES MELLITUS WITHOUT COMPLICATION, WITHOUT LONG-TERM CURRENT USE OF INSULIN: ICD-10-CM

## 2021-11-17 PROBLEM — E78.5 HYPERLIPIDEMIA ASSOCIATED WITH TYPE 2 DIABETES MELLITUS: Chronic | Status: ACTIVE | Noted: 2021-10-21

## 2021-11-17 PROBLEM — K76.0 STEATOSIS OF LIVER: Status: ACTIVE | Noted: 2021-11-17

## 2021-11-17 PROBLEM — Z82.49 FAMILY HISTORY OF CARDIOVASCULAR DISEASE: Status: ACTIVE | Noted: 2021-11-17

## 2021-11-17 PROBLEM — E11.69 HYPERLIPIDEMIA ASSOCIATED WITH TYPE 2 DIABETES MELLITUS: Chronic | Status: ACTIVE | Noted: 2021-10-21

## 2021-11-17 PROCEDURE — 1159F MED LIST DOCD IN RCRD: CPT | Mod: HCNC,CPTII,S$GLB, | Performed by: INTERNAL MEDICINE

## 2021-11-17 PROCEDURE — 3288F FALL RISK ASSESSMENT DOCD: CPT | Mod: HCNC,CPTII,S$GLB, | Performed by: INTERNAL MEDICINE

## 2021-11-17 PROCEDURE — 3044F HG A1C LEVEL LT 7.0%: CPT | Mod: HCNC,CPTII,S$GLB, | Performed by: INTERNAL MEDICINE

## 2021-11-17 PROCEDURE — 1157F ADVNC CARE PLAN IN RCRD: CPT | Mod: HCNC,CPTII,S$GLB, | Performed by: INTERNAL MEDICINE

## 2021-11-17 PROCEDURE — 1101F PT FALLS ASSESS-DOCD LE1/YR: CPT | Mod: HCNC,CPTII,S$GLB, | Performed by: INTERNAL MEDICINE

## 2021-11-17 PROCEDURE — 99999 PR PBB SHADOW E&M-EST. PATIENT-LVL IV: CPT | Mod: PBBFAC,HCNC,, | Performed by: INTERNAL MEDICINE

## 2021-11-17 PROCEDURE — 3078F PR MOST RECENT DIASTOLIC BLOOD PRESSURE < 80 MM HG: ICD-10-PCS | Mod: HCNC,CPTII,S$GLB, | Performed by: INTERNAL MEDICINE

## 2021-11-17 PROCEDURE — 4010F PR ACE/ARB THEARPY RXD/TAKEN: ICD-10-PCS | Mod: HCNC,CPTII,S$GLB, | Performed by: INTERNAL MEDICINE

## 2021-11-17 PROCEDURE — 1101F PR PT FALLS ASSESS DOC 0-1 FALLS W/OUT INJ PAST YR: ICD-10-PCS | Mod: HCNC,CPTII,S$GLB, | Performed by: INTERNAL MEDICINE

## 2021-11-17 PROCEDURE — 93000 ELECTROCARDIOGRAM COMPLETE: CPT | Mod: HCNC,S$GLB,, | Performed by: INTERNAL MEDICINE

## 2021-11-17 PROCEDURE — 1160F PR REVIEW ALL MEDS BY PRESCRIBER/CLIN PHARMACIST DOCUMENTED: ICD-10-PCS | Mod: HCNC,CPTII,S$GLB, | Performed by: INTERNAL MEDICINE

## 2021-11-17 PROCEDURE — 3288F PR FALLS RISK ASSESSMENT DOCUMENTED: ICD-10-PCS | Mod: HCNC,CPTII,S$GLB, | Performed by: INTERNAL MEDICINE

## 2021-11-17 PROCEDURE — 99999 PR PBB SHADOW E&M-EST. PATIENT-LVL IV: ICD-10-PCS | Mod: PBBFAC,HCNC,, | Performed by: INTERNAL MEDICINE

## 2021-11-17 PROCEDURE — 1126F PR PAIN SEVERITY QUANTIFIED, NO PAIN PRESENT: ICD-10-PCS | Mod: HCNC,CPTII,S$GLB, | Performed by: INTERNAL MEDICINE

## 2021-11-17 PROCEDURE — 3075F SYST BP GE 130 - 139MM HG: CPT | Mod: HCNC,CPTII,S$GLB, | Performed by: INTERNAL MEDICINE

## 2021-11-17 PROCEDURE — 3008F PR BODY MASS INDEX (BMI) DOCUMENTED: ICD-10-PCS | Mod: HCNC,CPTII,S$GLB, | Performed by: INTERNAL MEDICINE

## 2021-11-17 PROCEDURE — 93000 EKG 12-LEAD: ICD-10-PCS | Mod: HCNC,S$GLB,, | Performed by: INTERNAL MEDICINE

## 2021-11-17 PROCEDURE — 3078F DIAST BP <80 MM HG: CPT | Mod: HCNC,CPTII,S$GLB, | Performed by: INTERNAL MEDICINE

## 2021-11-17 PROCEDURE — 3044F PR MOST RECENT HEMOGLOBIN A1C LEVEL <7.0%: ICD-10-PCS | Mod: HCNC,CPTII,S$GLB, | Performed by: INTERNAL MEDICINE

## 2021-11-17 PROCEDURE — 99204 OFFICE O/P NEW MOD 45 MIN: CPT | Mod: HCNC,25,S$GLB, | Performed by: INTERNAL MEDICINE

## 2021-11-17 PROCEDURE — 1159F PR MEDICATION LIST DOCUMENTED IN MEDICAL RECORD: ICD-10-PCS | Mod: HCNC,CPTII,S$GLB, | Performed by: INTERNAL MEDICINE

## 2021-11-17 PROCEDURE — 4010F ACE/ARB THERAPY RXD/TAKEN: CPT | Mod: HCNC,CPTII,S$GLB, | Performed by: INTERNAL MEDICINE

## 2021-11-17 PROCEDURE — 1160F RVW MEDS BY RX/DR IN RCRD: CPT | Mod: HCNC,CPTII,S$GLB, | Performed by: INTERNAL MEDICINE

## 2021-11-17 PROCEDURE — 3008F BODY MASS INDEX DOCD: CPT | Mod: HCNC,CPTII,S$GLB, | Performed by: INTERNAL MEDICINE

## 2021-11-17 PROCEDURE — 1126F AMNT PAIN NOTED NONE PRSNT: CPT | Mod: HCNC,CPTII,S$GLB, | Performed by: INTERNAL MEDICINE

## 2021-11-17 PROCEDURE — 1157F PR ADVANCE CARE PLAN OR EQUIV PRESENT IN MEDICAL RECORD: ICD-10-PCS | Mod: HCNC,CPTII,S$GLB, | Performed by: INTERNAL MEDICINE

## 2021-11-17 PROCEDURE — 3075F PR MOST RECENT SYSTOLIC BLOOD PRESS GE 130-139MM HG: ICD-10-PCS | Mod: HCNC,CPTII,S$GLB, | Performed by: INTERNAL MEDICINE

## 2021-11-17 PROCEDURE — 99204 PR OFFICE/OUTPT VISIT, NEW, LEVL IV, 45-59 MIN: ICD-10-PCS | Mod: HCNC,25,S$GLB, | Performed by: INTERNAL MEDICINE

## 2021-11-18 PROCEDURE — 99454 REM MNTR PHYSIOL PARAM 16-30: CPT | Mod: S$GLB,,, | Performed by: INTERNAL MEDICINE

## 2021-11-18 PROCEDURE — 99454 PR REMOTE MNTR, PHYS PARAM, INITIAL, EA 30 DAYS: ICD-10-PCS | Mod: S$GLB,,, | Performed by: INTERNAL MEDICINE

## 2021-11-30 ENCOUNTER — PATIENT MESSAGE (OUTPATIENT)
Dept: OTHER | Facility: OTHER | Age: 71
End: 2021-11-30
Payer: MEDICARE

## 2021-11-30 ENCOUNTER — PATIENT MESSAGE (OUTPATIENT)
Dept: FAMILY MEDICINE | Facility: CLINIC | Age: 71
End: 2021-11-30
Payer: MEDICARE

## 2021-12-01 ENCOUNTER — OFFICE VISIT (OUTPATIENT)
Dept: OPHTHALMOLOGY | Facility: CLINIC | Age: 71
End: 2021-12-01
Payer: MEDICARE

## 2021-12-01 ENCOUNTER — CLINICAL SUPPORT (OUTPATIENT)
Dept: OPHTHALMOLOGY | Facility: CLINIC | Age: 71
End: 2021-12-01
Payer: MEDICARE

## 2021-12-01 DIAGNOSIS — E11.9 DIABETES MELLITUS TYPE 2 WITHOUT RETINOPATHY: ICD-10-CM

## 2021-12-01 DIAGNOSIS — H40.1132 PRIMARY OPEN ANGLE GLAUCOMA (POAG) OF BOTH EYES, MODERATE STAGE: Primary | ICD-10-CM

## 2021-12-01 DIAGNOSIS — H25.813 COMBINED FORMS OF AGE-RELATED CATARACT OF BOTH EYES: ICD-10-CM

## 2021-12-01 DIAGNOSIS — H40.1430 PSEUDOEXFOLIATION (PXF) GLAUCOMA OF BOTH EYES: ICD-10-CM

## 2021-12-01 DIAGNOSIS — H04.129 DRY EYE: ICD-10-CM

## 2021-12-01 DIAGNOSIS — Z97.3 WEARS CONTACT LENSES: ICD-10-CM

## 2021-12-01 PROCEDURE — 99204 OFFICE O/P NEW MOD 45 MIN: CPT | Mod: HCNC,S$GLB,, | Performed by: STUDENT IN AN ORGANIZED HEALTH CARE EDUCATION/TRAINING PROGRAM

## 2021-12-01 PROCEDURE — 92083 HUMPHREY VISUAL FIELD - OU - BOTH EYES: ICD-10-PCS | Mod: HCNC,S$GLB,, | Performed by: STUDENT IN AN ORGANIZED HEALTH CARE EDUCATION/TRAINING PROGRAM

## 2021-12-01 PROCEDURE — 92250 COLOR FUNDUS PHOTOGRAPHY - OU - BOTH EYES: ICD-10-PCS | Mod: HCNC,S$GLB,, | Performed by: STUDENT IN AN ORGANIZED HEALTH CARE EDUCATION/TRAINING PROGRAM

## 2021-12-01 PROCEDURE — 92020 GONIOSCOPY: CPT | Mod: HCNC,S$GLB,, | Performed by: STUDENT IN AN ORGANIZED HEALTH CARE EDUCATION/TRAINING PROGRAM

## 2021-12-01 PROCEDURE — 92020 PR SPECIAL EYE EVAL,GONIOSCOPY: ICD-10-PCS | Mod: HCNC,S$GLB,, | Performed by: STUDENT IN AN ORGANIZED HEALTH CARE EDUCATION/TRAINING PROGRAM

## 2021-12-01 PROCEDURE — 4010F ACE/ARB THERAPY RXD/TAKEN: CPT | Mod: HCNC,CPTII,S$GLB, | Performed by: STUDENT IN AN ORGANIZED HEALTH CARE EDUCATION/TRAINING PROGRAM

## 2021-12-01 PROCEDURE — 1157F ADVNC CARE PLAN IN RCRD: CPT | Mod: HCNC,CPTII,S$GLB, | Performed by: STUDENT IN AN ORGANIZED HEALTH CARE EDUCATION/TRAINING PROGRAM

## 2021-12-01 PROCEDURE — 92250 FUNDUS PHOTOGRAPHY W/I&R: CPT | Mod: HCNC,S$GLB,, | Performed by: STUDENT IN AN ORGANIZED HEALTH CARE EDUCATION/TRAINING PROGRAM

## 2021-12-01 PROCEDURE — 99204 PR OFFICE/OUTPT VISIT, NEW, LEVL IV, 45-59 MIN: ICD-10-PCS | Mod: HCNC,S$GLB,, | Performed by: STUDENT IN AN ORGANIZED HEALTH CARE EDUCATION/TRAINING PROGRAM

## 2021-12-01 PROCEDURE — 99999 PR PBB SHADOW E&M-EST. PATIENT-LVL III: ICD-10-PCS | Mod: PBBFAC,HCNC,, | Performed by: STUDENT IN AN ORGANIZED HEALTH CARE EDUCATION/TRAINING PROGRAM

## 2021-12-01 PROCEDURE — 99999 PR PBB SHADOW E&M-EST. PATIENT-LVL III: CPT | Mod: PBBFAC,HCNC,, | Performed by: STUDENT IN AN ORGANIZED HEALTH CARE EDUCATION/TRAINING PROGRAM

## 2021-12-01 PROCEDURE — 1157F PR ADVANCE CARE PLAN OR EQUIV PRESENT IN MEDICAL RECORD: ICD-10-PCS | Mod: HCNC,CPTII,S$GLB, | Performed by: STUDENT IN AN ORGANIZED HEALTH CARE EDUCATION/TRAINING PROGRAM

## 2021-12-01 PROCEDURE — 92083 EXTENDED VISUAL FIELD XM: CPT | Mod: HCNC,S$GLB,, | Performed by: STUDENT IN AN ORGANIZED HEALTH CARE EDUCATION/TRAINING PROGRAM

## 2021-12-01 PROCEDURE — 4010F PR ACE/ARB THEARPY RXD/TAKEN: ICD-10-PCS | Mod: HCNC,CPTII,S$GLB, | Performed by: STUDENT IN AN ORGANIZED HEALTH CARE EDUCATION/TRAINING PROGRAM

## 2021-12-06 ENCOUNTER — PATIENT MESSAGE (OUTPATIENT)
Dept: ADMINISTRATIVE | Facility: OTHER | Age: 71
End: 2021-12-06
Payer: MEDICARE

## 2021-12-17 DIAGNOSIS — H81.10 BENIGN PAROXYSMAL POSITIONAL VERTIGO, UNSPECIFIED LATERALITY: ICD-10-CM

## 2021-12-19 PROCEDURE — 99454 REM MNTR PHYSIOL PARAM 16-30: CPT | Mod: S$GLB,,, | Performed by: INTERNAL MEDICINE

## 2021-12-19 PROCEDURE — 99454 PR REMOTE MNTR, PHYS PARAM, INITIAL, EA 30 DAYS: ICD-10-PCS | Mod: S$GLB,,, | Performed by: INTERNAL MEDICINE

## 2021-12-20 RX ORDER — FLUTICASONE PROPIONATE 50 MCG
2 SPRAY, SUSPENSION (ML) NASAL DAILY
Qty: 16 G | Refills: 11 | Status: SHIPPED | OUTPATIENT
Start: 2021-12-20 | End: 2022-04-13

## 2021-12-30 ENCOUNTER — TELEPHONE (OUTPATIENT)
Dept: CARDIOLOGY | Facility: CLINIC | Age: 71
End: 2021-12-30

## 2022-01-18 PROCEDURE — 99454 REM MNTR PHYSIOL PARAM 16-30: CPT | Mod: S$GLB,,, | Performed by: INTERNAL MEDICINE

## 2022-01-18 PROCEDURE — 99454 PR REMOTE MNTR, PHYS PARAM, INITIAL, EA 30 DAYS: ICD-10-PCS | Mod: S$GLB,,, | Performed by: INTERNAL MEDICINE

## 2022-01-20 ENCOUNTER — HOSPITAL ENCOUNTER (INPATIENT)
Facility: HOSPITAL | Age: 72
LOS: 1 days | Discharge: HOME | DRG: 247 | End: 2022-01-21
Attending: EMERGENCY MEDICINE | Admitting: INTERNAL MEDICINE
Payer: MEDICARE

## 2022-01-20 ENCOUNTER — TELEPHONE (OUTPATIENT)
Dept: INTERNAL MEDICINE | Facility: CLINIC | Age: 72
End: 2022-01-20
Payer: MEDICARE

## 2022-01-20 DIAGNOSIS — Z95.820 STATUS POST ANGIOPLASTY WITH STENT: ICD-10-CM

## 2022-01-20 DIAGNOSIS — I25.10 CORONARY ARTERY DISEASE INVOLVING NATIVE CORONARY ARTERY OF NATIVE HEART WITHOUT ANGINA PECTORIS: ICD-10-CM

## 2022-01-20 DIAGNOSIS — I20.0 UNSTABLE ANGINA: ICD-10-CM

## 2022-01-20 DIAGNOSIS — R07.9 CHEST PAIN: ICD-10-CM

## 2022-01-20 DIAGNOSIS — I20.0 ACCELERATING ANGINA: Primary | ICD-10-CM

## 2022-01-20 LAB
ALBUMIN SERPL BCP-MCNC: 4.2 G/DL (ref 3.5–5.2)
ALP SERPL-CCNC: 61 U/L (ref 55–135)
ALT SERPL W/O P-5'-P-CCNC: 22 U/L (ref 10–44)
ANION GAP SERPL CALC-SCNC: 10 MMOL/L (ref 8–16)
AORTIC ROOT ANNULUS: 3.36 CM
APTT BLDCRRT: 25 SEC (ref 21–32)
ASCENDING AORTA: 2.67 CM
AST SERPL-CCNC: 15 U/L (ref 10–40)
AV INDEX (PROSTH): 0.9
AV MEAN GRADIENT: 5 MMHG
AV PEAK GRADIENT: 10 MMHG
AV VALVE AREA: 4.1 CM2
AV VELOCITY RATIO: 0.66
BASOPHILS # BLD AUTO: 0.07 K/UL (ref 0–0.2)
BASOPHILS NFR BLD: 0.8 % (ref 0–1.9)
BILIRUB SERPL-MCNC: 0.8 MG/DL (ref 0.1–1)
BNP SERPL-MCNC: 12 PG/ML (ref 0–99)
BSA FOR ECHO PROCEDURE: 2.02 M2
BUN SERPL-MCNC: 20 MG/DL (ref 8–23)
CALCIUM SERPL-MCNC: 9 MG/DL (ref 8.7–10.5)
CHLORIDE SERPL-SCNC: 108 MMOL/L (ref 95–110)
CO2 SERPL-SCNC: 21 MMOL/L (ref 23–29)
CREAT SERPL-MCNC: 0.8 MG/DL (ref 0.5–1.4)
CV ECHO LV RWT: 0.44 CM
DIFFERENTIAL METHOD: ABNORMAL
DOP CALC AO PEAK VEL: 1.61 M/S
DOP CALC AO VTI: 26.39 CM
DOP CALC LVOT AREA: 4.6 CM2
DOP CALC LVOT DIAMETER: 2.41 CM
DOP CALC LVOT PEAK VEL: 1.07 M/S
DOP CALC LVOT STROKE VOLUME: 108.33 CM3
DOP CALC MV VTI: 30.58 CM
DOP CALCLVOT PEAK VEL VTI: 23.76 CM
E WAVE DECELERATION TIME: 249.37 MSEC
E/A RATIO: 0.92
E/E' RATIO: 11.6 M/S
ECHO LV POSTERIOR WALL: 1.04 CM (ref 0.6–1.1)
EJECTION FRACTION: 60 %
EOSINOPHIL # BLD AUTO: 0.2 K/UL (ref 0–0.5)
EOSINOPHIL NFR BLD: 2.5 % (ref 0–8)
ERYTHROCYTE [DISTWIDTH] IN BLOOD BY AUTOMATED COUNT: 12.8 % (ref 11.5–14.5)
EST. GFR  (AFRICAN AMERICAN): >60 ML/MIN/1.73 M^2
EST. GFR  (NON AFRICAN AMERICAN): >60 ML/MIN/1.73 M^2
FRACTIONAL SHORTENING: 68 % (ref 28–44)
GLUCOSE SERPL-MCNC: 132 MG/DL (ref 70–110)
HCT VFR BLD AUTO: 40.7 % (ref 40–54)
HGB BLD-MCNC: 14.1 G/DL (ref 14–18)
IMM GRANULOCYTES # BLD AUTO: 0.07 K/UL (ref 0–0.04)
IMM GRANULOCYTES NFR BLD AUTO: 0.8 % (ref 0–0.5)
INR PPP: 1.3 (ref 0.8–1.2)
INTERVENTRICULAR SEPTUM: 1 CM (ref 0.6–1.1)
IVRT: 85.63 MSEC
LA MAJOR: 4.8 CM
LA MINOR: 4 CM
LA WIDTH: 3.8 CM
LEFT ATRIUM SIZE: 3.56 CM
LEFT ATRIUM VOLUME INDEX MOD: 26 ML/M2
LEFT ATRIUM VOLUME INDEX: 25 ML/M2
LEFT ATRIUM VOLUME MOD: 52.26 CM3
LEFT ATRIUM VOLUME: 50.18 CM3
LEFT INTERNAL DIMENSION IN SYSTOLE: 1.5 CM (ref 2.1–4)
LEFT VENTRICLE DIASTOLIC VOLUME INDEX: 51.24 ML/M2
LEFT VENTRICLE DIASTOLIC VOLUME: 102.99 ML
LEFT VENTRICLE MASS INDEX: 84 G/M2
LEFT VENTRICLE SYSTOLIC VOLUME INDEX: 17 ML/M2
LEFT VENTRICLE SYSTOLIC VOLUME: 34.2 ML
LEFT VENTRICULAR INTERNAL DIMENSION IN DIASTOLE: 4.71 CM (ref 3.5–6)
LEFT VENTRICULAR MASS: 169.55 G
LV LATERAL E/E' RATIO: 12.43 M/S
LV SEPTAL E/E' RATIO: 10.88 M/S
LYMPHOCYTES # BLD AUTO: 1.6 K/UL (ref 1–4.8)
LYMPHOCYTES NFR BLD: 18.7 % (ref 18–48)
MCH RBC QN AUTO: 29.6 PG (ref 27–31)
MCHC RBC AUTO-ENTMCNC: 34.6 G/DL (ref 32–36)
MCV RBC AUTO: 86 FL (ref 82–98)
MONOCYTES # BLD AUTO: 1 K/UL (ref 0.3–1)
MONOCYTES NFR BLD: 11.7 % (ref 4–15)
MV PEAK A VEL: 0.95 M/S
MV PEAK E VEL: 0.87 M/S
MV PEAK GRADIENT: 4 MMHG
MV STENOSIS PRESSURE HALF TIME: 72.32 MS
MV VALVE AREA BY CONTINUITY EQUATION: 3.54 CM2
MV VALVE AREA P 1/2 METHOD: 3.04 CM2
NEUTROPHILS # BLD AUTO: 5.4 K/UL (ref 1.8–7.7)
NEUTROPHILS NFR BLD: 65.5 % (ref 38–73)
NRBC BLD-RTO: 0 /100 WBC
PISA TR MAX VEL: 1.9 M/S
PLATELET # BLD AUTO: 190 K/UL (ref 150–450)
PMV BLD AUTO: 10.3 FL (ref 9.2–12.9)
POTASSIUM SERPL-SCNC: 4.1 MMOL/L (ref 3.5–5.1)
PROT SERPL-MCNC: 6.8 G/DL (ref 6–8.4)
PROTHROMBIN TIME: 12.8 SEC (ref 9–12.5)
PULM VEIN S/D RATIO: 1.11
PV PEAK D VEL: 0.28 M/S
PV PEAK S VEL: 0.31 M/S
PV PEAK VELOCITY: 0.87 CM/S
RA MAJOR: 4 CM
RA PRESSURE: 3 MMHG
RA WIDTH: 3.7 CM
RBC # BLD AUTO: 4.76 M/UL (ref 4.6–6.2)
RIGHT VENTRICULAR END-DIASTOLIC DIMENSION: 2.4 CM
RIGHT VENTRICULAR LENGTH IN DIASTOLE (APICAL 4-CHAMBER VIEW): 5.5 CM
RV TISSUE DOPPLER FREE WALL SYSTOLIC VELOCITY 1 (APICAL 4 CHAMBER VIEW): 16.78 CM/S
SARS-COV-2 RDRP RESP QL NAA+PROBE: NEGATIVE
SODIUM SERPL-SCNC: 139 MMOL/L (ref 136–145)
STJ: 2.65 CM
TDI LATERAL: 0.07 M/S
TDI SEPTAL: 0.08 M/S
TDI: 0.08 M/S
TR MAX PG: 14 MMHG
TRICUSPID ANNULAR PLANE SYSTOLIC EXCURSION: 1.9 CM
TROPONIN I SERPL DL<=0.01 NG/ML-MCNC: 0.02 NG/ML (ref 0–0.03)
TROPONIN I SERPL DL<=0.01 NG/ML-MCNC: 0.02 NG/ML (ref 0–0.03)
TV REST PULMONARY ARTERY PRESSURE: 17 MMHG
WBC # BLD AUTO: 8.27 K/UL (ref 3.9–12.7)

## 2022-01-20 PROCEDURE — 93005 ELECTROCARDIOGRAM TRACING: CPT | Mod: HCNC

## 2022-01-20 PROCEDURE — 11000001 HC ACUTE MED/SURG PRIVATE ROOM: Mod: HCNC

## 2022-01-20 PROCEDURE — 99152 MOD SED SAME PHYS/QHP 5/>YRS: CPT | Mod: HCNC,,, | Performed by: INTERNAL MEDICINE

## 2022-01-20 PROCEDURE — 92979 PR INTRAVASC CORONARY SO2,ADDN VESSEL: ICD-10-PCS | Mod: 26,RC,HCNC, | Performed by: INTERNAL MEDICINE

## 2022-01-20 PROCEDURE — 85730 THROMBOPLASTIN TIME PARTIAL: CPT | Mod: HCNC | Performed by: EMERGENCY MEDICINE

## 2022-01-20 PROCEDURE — 25000003 PHARM REV CODE 250: Mod: HCNC | Performed by: EMERGENCY MEDICINE

## 2022-01-20 PROCEDURE — 99153 MOD SED SAME PHYS/QHP EA: CPT | Mod: HCNC | Performed by: INTERNAL MEDICINE

## 2022-01-20 PROCEDURE — C1725 CATH, TRANSLUMIN NON-LASER: HCPCS | Mod: HCNC | Performed by: INTERNAL MEDICINE

## 2022-01-20 PROCEDURE — 99152 PR MOD CONSCIOUS SEDATION, SAME PHYS, 5+ YRS, FIRST 15 MIN: ICD-10-PCS | Mod: HCNC,,, | Performed by: INTERNAL MEDICINE

## 2022-01-20 PROCEDURE — U0002 COVID-19 LAB TEST NON-CDC: HCPCS | Mod: HCNC | Performed by: EMERGENCY MEDICINE

## 2022-01-20 PROCEDURE — 92978 PR IVUS, CORONARY, 1ST VESSEL: ICD-10-PCS | Mod: 26,LC,HCNC, | Performed by: INTERNAL MEDICINE

## 2022-01-20 PROCEDURE — 93010 ELECTROCARDIOGRAM REPORT: CPT | Mod: 76,HCNC,, | Performed by: INTERNAL MEDICINE

## 2022-01-20 PROCEDURE — 99223 1ST HOSP IP/OBS HIGH 75: CPT | Mod: HCNC,,, | Performed by: INTERNAL MEDICINE

## 2022-01-20 PROCEDURE — 92928 PRQ TCAT PLMT NTRAC ST 1 LES: CPT | Mod: LC,HCNC,, | Performed by: INTERNAL MEDICINE

## 2022-01-20 PROCEDURE — 85610 PROTHROMBIN TIME: CPT | Mod: HCNC | Performed by: EMERGENCY MEDICINE

## 2022-01-20 PROCEDURE — 25000242 PHARM REV CODE 250 ALT 637 W/ HCPCS: Mod: HCNC | Performed by: EMERGENCY MEDICINE

## 2022-01-20 PROCEDURE — 25000003 PHARM REV CODE 250: Mod: HCNC | Performed by: NURSE PRACTITIONER

## 2022-01-20 PROCEDURE — C1894 INTRO/SHEATH, NON-LASER: HCPCS | Mod: HCNC | Performed by: INTERNAL MEDICINE

## 2022-01-20 PROCEDURE — C1874 STENT, COATED/COV W/DEL SYS: HCPCS | Mod: HCNC | Performed by: INTERNAL MEDICINE

## 2022-01-20 PROCEDURE — 93458 PR CATH PLACE/CORON ANGIO, IMG SUPER/INTERP,W LEFT HEART VENTRICULOGRAPHY: ICD-10-PCS | Mod: 26,51,59,HCNC | Performed by: INTERNAL MEDICINE

## 2022-01-20 PROCEDURE — 99152 MOD SED SAME PHYS/QHP 5/>YRS: CPT | Mod: HCNC | Performed by: INTERNAL MEDICINE

## 2022-01-20 PROCEDURE — 63600175 PHARM REV CODE 636 W HCPCS: Mod: HCNC | Performed by: EMERGENCY MEDICINE

## 2022-01-20 PROCEDURE — 83880 ASSAY OF NATRIURETIC PEPTIDE: CPT | Mod: HCNC | Performed by: EMERGENCY MEDICINE

## 2022-01-20 PROCEDURE — C1753 CATH, INTRAVAS ULTRASOUND: HCPCS | Mod: HCNC | Performed by: INTERNAL MEDICINE

## 2022-01-20 PROCEDURE — 25000003 PHARM REV CODE 250: Mod: HCNC | Performed by: INTERNAL MEDICINE

## 2022-01-20 PROCEDURE — 92928 PR STENT: ICD-10-PCS | Mod: LC,HCNC,, | Performed by: INTERNAL MEDICINE

## 2022-01-20 PROCEDURE — 93458 L HRT ARTERY/VENTRICLE ANGIO: CPT | Mod: 26,51,59,HCNC | Performed by: INTERNAL MEDICINE

## 2022-01-20 PROCEDURE — 99285 EMERGENCY DEPT VISIT HI MDM: CPT | Mod: 25,HCNC

## 2022-01-20 PROCEDURE — 85347 COAGULATION TIME ACTIVATED: CPT | Mod: HCNC | Performed by: INTERNAL MEDICINE

## 2022-01-20 PROCEDURE — 92978 ENDOLUMINL IVUS OCT C 1ST: CPT | Mod: LC,HCNC | Performed by: INTERNAL MEDICINE

## 2022-01-20 PROCEDURE — 92978 ENDOLUMINL IVUS OCT C 1ST: CPT | Mod: 26,LC,HCNC, | Performed by: INTERNAL MEDICINE

## 2022-01-20 PROCEDURE — 93010 ELECTROCARDIOGRAM REPORT: CPT | Mod: HCNC,,, | Performed by: INTERNAL MEDICINE

## 2022-01-20 PROCEDURE — 92979 ENDOLUMINL IVUS OCT C EA: CPT | Mod: RC,HCNC | Performed by: INTERNAL MEDICINE

## 2022-01-20 PROCEDURE — 63600175 PHARM REV CODE 636 W HCPCS: Mod: HCNC | Performed by: INTERNAL MEDICINE

## 2022-01-20 PROCEDURE — 25500020 PHARM REV CODE 255: Mod: HCNC | Performed by: INTERNAL MEDICINE

## 2022-01-20 PROCEDURE — 93010 EKG 12-LEAD: ICD-10-PCS | Mod: 76,HCNC,, | Performed by: INTERNAL MEDICINE

## 2022-01-20 PROCEDURE — 99223 PR INITIAL HOSPITAL CARE,LEVL III: ICD-10-PCS | Mod: HCNC,,, | Performed by: INTERNAL MEDICINE

## 2022-01-20 PROCEDURE — 94761 N-INVAS EAR/PLS OXIMETRY MLT: CPT | Mod: HCNC

## 2022-01-20 PROCEDURE — 80053 COMPREHEN METABOLIC PANEL: CPT | Mod: HCNC | Performed by: EMERGENCY MEDICINE

## 2022-01-20 PROCEDURE — 92979 ENDOLUMINL IVUS OCT C EA: CPT | Mod: 26,RC,HCNC, | Performed by: INTERNAL MEDICINE

## 2022-01-20 PROCEDURE — 93458 L HRT ARTERY/VENTRICLE ANGIO: CPT | Mod: 59,HCNC | Performed by: INTERNAL MEDICINE

## 2022-01-20 PROCEDURE — 85025 COMPLETE CBC W/AUTO DIFF WBC: CPT | Mod: HCNC | Performed by: EMERGENCY MEDICINE

## 2022-01-20 PROCEDURE — C9600 PERC DRUG-EL COR STENT SING: HCPCS | Mod: RC,HCNC | Performed by: INTERNAL MEDICINE

## 2022-01-20 PROCEDURE — C1887 CATHETER, GUIDING: HCPCS | Mod: HCNC | Performed by: INTERNAL MEDICINE

## 2022-01-20 PROCEDURE — C1769 GUIDE WIRE: HCPCS | Mod: HCNC | Performed by: INTERNAL MEDICINE

## 2022-01-20 PROCEDURE — 84484 ASSAY OF TROPONIN QUANT: CPT | Mod: 91,HCNC | Performed by: EMERGENCY MEDICINE

## 2022-01-20 PROCEDURE — 27201423 OPTIME MED/SURG SUP & DEVICES STERILE SUPPLY: Mod: HCNC | Performed by: INTERNAL MEDICINE

## 2022-01-20 PROCEDURE — 96374 THER/PROPH/DIAG INJ IV PUSH: CPT | Mod: 59,HCNC

## 2022-01-20 DEVICE — STENT RONYX35022UX RESOLUTE ONYX 3.50X22
Type: IMPLANTABLE DEVICE | Site: HEART | Status: FUNCTIONAL
Brand: RESOLUTE ONYX™

## 2022-01-20 DEVICE — STENT RONYX30012UX RESOLUTE ONYX 3.00X12
Type: IMPLANTABLE DEVICE | Site: HEART | Status: FUNCTIONAL
Brand: RESOLUTE ONYX™

## 2022-01-20 RX ORDER — VITC/E/ZINC/COPPER/LUTEIN/ZEAX 250 MG-200
2 TABLET,CHEWABLE ORAL DAILY
COMMUNITY

## 2022-01-20 RX ORDER — HEPARIN SODIUM 1000 [USP'U]/ML
INJECTION, SOLUTION INTRAVENOUS; SUBCUTANEOUS
Status: DISCONTINUED | OUTPATIENT
Start: 2022-01-20 | End: 2022-01-20 | Stop reason: HOSPADM

## 2022-01-20 RX ORDER — DIPHENHYDRAMINE HYDROCHLORIDE 50 MG/ML
INJECTION INTRAMUSCULAR; INTRAVENOUS
Status: DISCONTINUED | OUTPATIENT
Start: 2022-01-20 | End: 2022-01-20 | Stop reason: HOSPADM

## 2022-01-20 RX ORDER — VERAPAMIL HYDROCHLORIDE 2.5 MG/ML
INJECTION, SOLUTION INTRAVENOUS
Status: DISCONTINUED | OUTPATIENT
Start: 2022-01-20 | End: 2022-01-20 | Stop reason: HOSPADM

## 2022-01-20 RX ORDER — ASPIRIN 81 MG/1
81 TABLET ORAL DAILY
Status: DISCONTINUED | OUTPATIENT
Start: 2022-01-21 | End: 2022-01-21 | Stop reason: HOSPADM

## 2022-01-20 RX ORDER — ACETAMINOPHEN 500 MG
500 TABLET ORAL EVERY 6 HOURS PRN
Status: ON HOLD | COMMUNITY
End: 2022-01-21 | Stop reason: HOSPADM

## 2022-01-20 RX ORDER — IBUPROFEN 200 MG
200 TABLET ORAL EVERY 6 HOURS PRN
Status: ON HOLD | COMMUNITY
End: 2022-01-21 | Stop reason: HOSPADM

## 2022-01-20 RX ORDER — ASPIRIN 325 MG
325 TABLET ORAL
Status: COMPLETED | OUTPATIENT
Start: 2022-01-20 | End: 2022-01-20

## 2022-01-20 RX ORDER — NITROGLYCERIN 0.4 MG/1
0.4 TABLET SUBLINGUAL
Status: COMPLETED | OUTPATIENT
Start: 2022-01-20 | End: 2022-01-20

## 2022-01-20 RX ORDER — VALSARTAN 40 MG/1
80 TABLET ORAL DAILY
Refills: 1 | Status: DISCONTINUED | OUTPATIENT
Start: 2022-01-21 | End: 2022-01-21 | Stop reason: HOSPADM

## 2022-01-20 RX ORDER — HEPARIN SODIUM 200 [USP'U]/100ML
INJECTION, SOLUTION INTRAVENOUS
Status: DISCONTINUED | OUTPATIENT
Start: 2022-01-20 | End: 2022-01-20

## 2022-01-20 RX ORDER — SODIUM CHLORIDE 9 MG/ML
INJECTION, SOLUTION INTRAVENOUS CONTINUOUS
Status: ACTIVE | OUTPATIENT
Start: 2022-01-20 | End: 2022-01-20

## 2022-01-20 RX ORDER — CLOPIDOGREL BISULFATE 75 MG/1
600 TABLET ORAL
Status: COMPLETED | OUTPATIENT
Start: 2022-01-20 | End: 2022-01-20

## 2022-01-20 RX ORDER — ATORVASTATIN CALCIUM 40 MG/1
80 TABLET, FILM COATED ORAL NIGHTLY
Status: DISCONTINUED | OUTPATIENT
Start: 2022-01-20 | End: 2022-01-21 | Stop reason: HOSPADM

## 2022-01-20 RX ORDER — NITROGLYCERIN 20 MG/100ML
0-400 INJECTION INTRAVENOUS CONTINUOUS
Status: DISCONTINUED | OUTPATIENT
Start: 2022-01-20 | End: 2022-01-20

## 2022-01-20 RX ORDER — IODIXANOL 320 MG/ML
INJECTION, SOLUTION INTRAVASCULAR
Status: DISCONTINUED | OUTPATIENT
Start: 2022-01-20 | End: 2022-01-20 | Stop reason: HOSPADM

## 2022-01-20 RX ORDER — CLOPIDOGREL BISULFATE 75 MG/1
75 TABLET ORAL DAILY
Status: DISCONTINUED | OUTPATIENT
Start: 2022-01-21 | End: 2022-01-21 | Stop reason: HOSPADM

## 2022-01-20 RX ORDER — FENTANYL CITRATE 50 UG/ML
INJECTION, SOLUTION INTRAMUSCULAR; INTRAVENOUS
Status: DISCONTINUED | OUTPATIENT
Start: 2022-01-20 | End: 2022-01-20 | Stop reason: HOSPADM

## 2022-01-20 RX ORDER — MIDAZOLAM HYDROCHLORIDE 1 MG/ML
INJECTION, SOLUTION INTRAMUSCULAR; INTRAVENOUS
Status: DISCONTINUED | OUTPATIENT
Start: 2022-01-20 | End: 2022-01-20 | Stop reason: HOSPADM

## 2022-01-20 RX ORDER — ASCORBIC ACID 500 MG
500 TABLET ORAL DAILY
COMMUNITY
End: 2022-03-12 | Stop reason: ALTCHOICE

## 2022-01-20 RX ORDER — DIPHENHYDRAMINE HCL 25 MG
50 CAPSULE ORAL ONCE
Status: DISCONTINUED | OUTPATIENT
Start: 2022-01-20 | End: 2022-01-20 | Stop reason: HOSPADM

## 2022-01-20 RX ORDER — NITROGLYCERIN 0.4 MG/1
0.4 TABLET SUBLINGUAL EVERY 5 MIN PRN
Status: DISCONTINUED | OUTPATIENT
Start: 2022-01-20 | End: 2022-01-21 | Stop reason: HOSPADM

## 2022-01-20 RX ORDER — HEPARIN SODIUM,PORCINE/D5W 25000/250
0-40 INTRAVENOUS SOLUTION INTRAVENOUS CONTINUOUS
Status: DISCONTINUED | OUTPATIENT
Start: 2022-01-20 | End: 2022-01-20

## 2022-01-20 RX ORDER — LIDOCAINE HYDROCHLORIDE 10 MG/ML
INJECTION, SOLUTION EPIDURAL; INFILTRATION; INTRACAUDAL; PERINEURAL
Status: DISCONTINUED | OUTPATIENT
Start: 2022-01-20 | End: 2022-01-20 | Stop reason: HOSPADM

## 2022-01-20 RX ORDER — ACETAMINOPHEN 325 MG/1
650 TABLET ORAL EVERY 6 HOURS PRN
Status: DISCONTINUED | OUTPATIENT
Start: 2022-01-20 | End: 2022-01-21 | Stop reason: HOSPADM

## 2022-01-20 RX ADMIN — CLOPIDOGREL 600 MG: 75 TABLET, FILM COATED ORAL at 10:01

## 2022-01-20 RX ADMIN — HEPARIN SODIUM 12 UNITS/KG/HR: 1000 INJECTION, SOLUTION INTRAVENOUS; SUBCUTANEOUS at 11:01

## 2022-01-20 RX ADMIN — ACETAMINOPHEN 650 MG: 325 TABLET ORAL at 08:01

## 2022-01-20 RX ADMIN — SODIUM CHLORIDE: 0.9 INJECTION, SOLUTION INTRAVENOUS at 02:01

## 2022-01-20 RX ADMIN — ATORVASTATIN CALCIUM 80 MG: 40 TABLET, FILM COATED ORAL at 08:01

## 2022-01-20 RX ADMIN — NITROGLYCERIN 0.4 MG: 0.4 TABLET, ORALLY DISINTEGRATING SUBLINGUAL at 09:01

## 2022-01-20 RX ADMIN — NITROGLYCERIN 5 MCG/MIN: 20 INJECTION INTRAVENOUS at 11:01

## 2022-01-20 RX ADMIN — ASPIRIN 325 MG ORAL TABLET 325 MG: 325 PILL ORAL at 09:01

## 2022-01-20 RX ADMIN — NITROGLYCERIN 0.4 MG: 0.4 TABLET, ORALLY DISINTEGRATING SUBLINGUAL at 11:01
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
## 2022-01-20 NOTE — Clinical Note
180 ml of contrast were injected throughout the case. 120 mL of contrast was the total wasted during the case. 300 mL was the total amount used during the case.

## 2022-01-20 NOTE — INTERVAL H&P NOTE
The patient has been examined and the H&P has been reviewed:    I concur with the findings and no changes have occurred since H&P was written.    Anesthesia/Surgery risks, benefits and alternative options discussed and understood by patient/family.          Active Hospital Problems    Diagnosis  POA    *Unstable angina [I20.0]  Yes    Essential hypertension [I10]  Yes     Chronic    Coronary artery disease involving native coronary artery of native heart without angina pectoris [I25.10]  Yes     Chronic     Review of old records showed the presence of coronary artery disease with a stent of the circumflex in October of 2014. Cardiac catheterization in 2017 showed a 20% lesion in the right coronary artery, but the left coronary artery was free of disease.  Echocardiography in January 2019 showed left ventricular systolic function be normal, ejection fraction 60-65%          Hyperlipidemia, mixed [E78.2]  Yes     Chronic      Resolved Hospital Problems   No resolved problems to display.

## 2022-01-20 NOTE — PLAN OF CARE
2 mL of air removed from radial vasc band.  No hematoma or bleeding noted.  +2 paty radial pulses palpated. Skin normal in color, warm to touch, < 3 sec cap refill.   Will continue to monitor pt.

## 2022-01-20 NOTE — Clinical Note
The catheter was inserted into the ostium   left main. An angiography was performed of the left coronary arteries. Multiple views were taken. The angiography was performed via hand injection with 30 mL of contrast.

## 2022-01-20 NOTE — HOSPITAL COURSE
01/20/2022   Cath Results:  Access: Rt Radial   LM:  TY III flow  LAD: TY- flow 50% prox   LCx:  TY- flow  99% ISR OM1 stent   RCA: TY- flow 70% Prox   LVgram: LVEDP 17 , LVEF %  Intervention:   S/p Successful PTCA and stent to OM1 99% ISR with 3.0 x 12 KAMI post dilated with 3.0 NC balloons  S/P Successful PTCA prox RCA with 3.5 x 22 KAMI post dilated with 3.5 NC balloon      Closure device:  Vasc band      Patient tolerated procedure well, no complications    01/21/2022  Patient recovered overnight without incident. Complaint of mild chest discomfort this AM. EKG SR without acute ischemic changes. Patient later ambulated around nursing station without chest pain and was found to be tolerating medications/ diet without difficulty. Medications were sent to pharmacy for bedside delivery. Compliance with medication was discussed with particular attention paid to importance of  DAPT for one year without interruption. The risk of abrupt stent occlusion and MI with early discontinuation was specifically stressed. Verbalized understanding and will follow up in the cardiology clinic in 2-3 weeks

## 2022-01-20 NOTE — ASSESSMENT & PLAN NOTE
History of LCx stent in 2014   Repeat LHC in 2017 noting mild disease to RCA 20% with patent LCx and LAD  Presents with UA today   NPO for LHC  Not on BB 2/2 bradycardia  ARB to be resumed post LHC  Asa, Plavix, statin

## 2022-01-20 NOTE — PHARMACY MED REC
"  Admission Medication History     The home medication history was taken by Nancy Forbes CPhT.    Medication history obtained from, Patient Verified    You may go to "Admission" then "Reconcile Home Medications" tabs to review and/or act upon these items.      The home medication list has been updated by the Pharmacy department.    Please read ALL comments highlighted in yellow.    Please address this information as you see fit.     Feel free to contact us if you have any questions or require assistance.      The medications listed below were removed from the home medication list.  Please reorder if appropriate:  Patient reports no longer taking the following medication(s):   Ocuvite Extra Oral        Nancy Forbes CPhT.  Ext [phone]              .          "
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
## 2022-01-20 NOTE — HPI
"Bandar French is a 71 y.o. male with Arthritis, Depression, Glaucoma, CAD, and Impaired fasting glucose. Patient reports that he was able to play a round of golf yesterday without any complaints. Patient was at home at rest yesterday evening and developed an episode of chest pain that radiated to his neck and bilateral jaws. Patient took an asa and his symptoms resolved within 30 minutes. Patient was able to sleep without recurrence of pain. This morning he went to the gym and started working out on the elliptical when his symptoms returned with increased intensity. Pain was similar to his MI in 2014 except worse with radiation down his left arm and "excruciating" jaw and chest pain described as a squeezing sensation. He rested with some relief but not complete relief. Patient is having jaw pain at the time of evaluation which improved with NTG x 2 but not completely. He reports generalized weakness but denies dizziness, syncope, diaphoresis, NV.  BP and HR are controlled. EKG SR without any GEORGE. Patient has been loaded with asa and Plavix. Heparin gtt initiated. He is NPO- last ate at 9pm.   Tridil gtt ordered.   NPO for [facility] today.   "
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
## 2022-01-20 NOTE — ED NOTES
Pt in room 23 with his wife. Pt aaox4 with no pain at present. Pt had an episode of jaw pain and chest pain last night that resolved and it returned this morning while at the gym on treadmill. Head to toe assessment completed, placed on cardiac monitor and plan of care reviewed. Call bell within reach.

## 2022-01-20 NOTE — PROGRESS NOTES
VN cued into room to complete admit assessment. VIP model introduced; VN working alongside bedside treatment team.  Plan of care reviewed with patient. Patient informed of fall risk, fall precautions, call light within reach, side rails x2 elevated. Patient notified to ask staff for assistance. Patient verbalized complete understanding. Time allowed for questions. Will continue to monitor and intervene as needed.          01/20/22 1630   Admission   Communication Issues? None   Shift   Virtual Nurse - Rounding Complete   Virtual Nurse - Patient Verbalized Approval Of Camera Use   Safety/Activity   Patient Rounds bed in low position;call light in patient/parent reach;visualized patient   Safety Promotion/Fall Prevention side rails raised x 2   Positioning   Body Position supine   Head of Bed (HOB) Positioning HOB at 20-30 degrees

## 2022-01-20 NOTE — ED NOTES
Faye from cath lab called for report, updated her on pt's current status #20 to R ac with heparin infusing and #18 to L ac with tidril infusing. Both sites free from pain, redness or swelling. Pt currently has pain to jaw but no chest pain or shortness of breath. ER tech at bedside shaving pt's bilateral groin and wrist. Wife remains at bedside, call bell within reach.

## 2022-01-20 NOTE — Clinical Note
An angiography was performed of the right coronary arteries. The angiography was performed via hand injection with 20 mL of contrast.

## 2022-01-20 NOTE — BRIEF OP NOTE
"POST CATH NOTE    HPI:   Patient referrred by :   s/p catheterization secondary to:     Cath Results:  Access: Rt Radial   LM:  TY III flow  LAD: TY- flow 50% prox   LCx:  TY- flow  99% ISR OM1 stent   RCA: TY- flow 70% Prox   LVgram: LVEDP 17 , LVEF %  Intervention:   S/p Successful PTCA and stent to OM1 99% ISR with 3.0 x 12 KAMI post dilated with 3.0 NC balloons  S/P Successful PTCA prox RCA with 3.5 x 22 KAMI post dilated with 3.5 NC balloon     Closure device:  Vasc band     Patient tolerated procedure well, no complications    Post Cath Exam:  /81   Pulse (!) 56   Temp 98.2 °F (36.8 °C)   Resp 17   Ht 5' 10" (1.778 m)   Wt 83 kg (183 lb)   SpO2 97%   BMI 26.26 kg/m²     Assessment:   - ASA/Plavix  - High intense statin   - Echo   - Likely will D/C home in AM     "

## 2022-01-20 NOTE — ASSESSMENT & PLAN NOTE
Symptoms concerning for UA  Initial Troponin negative  EKG without GEORGE  Tridil- pain is persistent despite SL NTG  Plavix and asa load  Heparin gtt  TTE  NPO for LHC today

## 2022-01-20 NOTE — Clinical Note
The catheter was repositioned into the left ventricle LV. Hemodynamics were performed.  and Pullback was recorded.

## 2022-01-20 NOTE — PLAN OF CARE
Patient transferred to recovery cath lab slot 3 via stretcher with side rails up x2 .  Pt AAO X3 and able to follow commands. Pt is stable when connecting to cardiac monitors.  VSS. Left radial vasc band in place with 9 ml of air in band c.d.i. no bleeding or hematoma noted. +2 paty radial pulses palpated. +2 paty pedal and post tib pulses.  Skin normal in color and warm to touch, <3 sec cap refill.  Fall risk precautions given and patient acknowledges. AIDET completed to pt.  Will continue to monitor patient.  Updated pt's spouse in waiting room.

## 2022-01-20 NOTE — TELEPHONE ENCOUNTER
----- Message from Suma Sharma sent at 1/20/2022  8:24 AM CST -----  Contact: Pt  Type:  Needs Medical Advice    Who Called: pt   Symptoms (please be specific): chest tighten , jaw locking up  How long has patient had these symptoms:  last night  Pharmacy name and phone #:    Would the patient rather a call back or a response via [facility]? Call  Best Call Back Number: [phone]  Additional Information:     Pt stated he wanted to let us office know he is heading to the EMD

## 2022-01-20 NOTE — Clinical Note
The catheter was inserted into the ostium   right coronary artery. An angiography was performed of the right coronary arteries. The angiography was performed via hand injection with 10 mL of contrast.

## 2022-01-20 NOTE — PLAN OF CARE
Remaining air removed from right radial vasc band. Gauze and tegaderm dressing applied c.d.i.   No drainage or shadowing noted. No bleeding or hematoma noted around site. +2 paty radial pulses palpated. Skin normal in color, warm to touch, < 3 sec cap refill.   Pt tolerated well.  Will continue to monitor pt.

## 2022-01-20 NOTE — PLAN OF CARE
Patient transferred to floor on CR monitor.  VSS. No c/o pain. Patient attached to tele monitor upon arrival in room 431.  Right radial with gauze/tegaderm CDI. No bleeding or hematoma noted.  2+ paty pedal, post tib and radial pulses.  Site reviewed with PORFIRIO Lees at bedside.  All questions answered. Updated pt's wife at bedside.

## 2022-01-20 NOTE — Clinical Note
The right groin and right radial was prepped. The site was prepped with ChloraPrep. The patient was draped. The patient was positioned supine.

## 2022-01-21 ENCOUNTER — TELEPHONE (OUTPATIENT)
Dept: INTERNAL MEDICINE | Facility: CLINIC | Age: 72
End: 2022-01-21
Payer: MEDICARE

## 2022-01-21 VITALS
OXYGEN SATURATION: 96 % | HEART RATE: 72 BPM | SYSTOLIC BLOOD PRESSURE: 145 MMHG | TEMPERATURE: 97 F | WEIGHT: 183 LBS | RESPIRATION RATE: 18 BRPM | BODY MASS INDEX: 26.2 KG/M2 | DIASTOLIC BLOOD PRESSURE: 73 MMHG | HEIGHT: 70 IN

## 2022-01-21 PROCEDURE — 99239 PR HOSPITAL DISCHARGE DAY,>30 MIN: ICD-10-PCS | Mod: HCNC,,, | Performed by: NURSE PRACTITIONER

## 2022-01-21 PROCEDURE — 93010 ELECTROCARDIOGRAM REPORT: CPT | Mod: HCNC,,, | Performed by: INTERNAL MEDICINE

## 2022-01-21 PROCEDURE — 25000003 PHARM REV CODE 250: Mod: HCNC | Performed by: INTERNAL MEDICINE

## 2022-01-21 PROCEDURE — 94761 N-INVAS EAR/PLS OXIMETRY MLT: CPT | Mod: HCNC

## 2022-01-21 PROCEDURE — 99239 HOSP IP/OBS DSCHRG MGMT >30: CPT | Mod: HCNC,,, | Performed by: NURSE PRACTITIONER

## 2022-01-21 PROCEDURE — 93010 EKG 12-LEAD: ICD-10-PCS | Mod: HCNC,,, | Performed by: INTERNAL MEDICINE

## 2022-01-21 PROCEDURE — 93005 ELECTROCARDIOGRAM TRACING: CPT | Mod: HCNC

## 2022-01-21 PROCEDURE — 93010 ELECTROCARDIOGRAM REPORT: CPT | Mod: 76,HCNC,, | Performed by: INTERNAL MEDICINE

## 2022-01-21 PROCEDURE — 25000003 PHARM REV CODE 250: Mod: HCNC | Performed by: NURSE PRACTITIONER

## 2022-01-21 RX ORDER — CLOPIDOGREL BISULFATE 75 MG/1
75 TABLET ORAL DAILY
Qty: 30 TABLET | Refills: 11 | Status: SHIPPED | OUTPATIENT
Start: 2022-01-22 | End: 2022-02-19

## 2022-01-21 RX ORDER — NITROGLYCERIN 0.4 MG/1
0.4 TABLET SUBLINGUAL EVERY 5 MIN PRN
Qty: 25 TABLET | Refills: 11 | Status: SHIPPED | OUTPATIENT
Start: 2022-01-21 | End: 2023-02-06 | Stop reason: SDUPTHER

## 2022-01-21 RX ORDER — KETOROLAC TROMETHAMINE 30 MG/ML
30 INJECTION, SOLUTION INTRAMUSCULAR; INTRAVENOUS ONCE
Status: DISCONTINUED | OUTPATIENT
Start: 2022-01-21 | End: 2022-01-21 | Stop reason: HOSPADM

## 2022-01-21 RX ORDER — NAPROXEN SODIUM 220 MG/1
81 TABLET, FILM COATED ORAL DAILY
Qty: 30 TABLET | Refills: 11 | Status: SHIPPED | OUTPATIENT
Start: 2022-01-21 | End: 2023-01-25

## 2022-01-21 RX ADMIN — ACETAMINOPHEN 650 MG: 325 TABLET ORAL at 10:01

## 2022-01-21 RX ADMIN — ASPIRIN 81 MG: 81 TABLET, COATED ORAL at 10:01

## 2022-01-21 RX ADMIN — CLOPIDOGREL 75 MG: 75 TABLET, FILM COATED ORAL at 10:01

## 2022-01-21 RX ADMIN — VALSARTAN 80 MG: 40 TABLET, FILM COATED ORAL at 11:01

## 2022-01-21 NOTE — DISCHARGE INSTRUCTIONS
Other instructions    Lifting restrictions   No lifting over 10 pounds with right arm for 1 week              Understanding Transradial Cardiac Catheterization    Cardiac catheterization (cardiac cath) is a common, non-surgical procedure. During the procedure, your doctor will insert a long, thin tube (catheter) into an artery and move it up into your heart. Transradial means the catheter is inserted into an artery in the wrist (the radial artery). This procedure can be used to diagnose and treat certain heart problems.  Why do I need a transradial cardiac cath?  You may need a cardiac cath if signs indicate a problem with your heart. These may include:  · Symptoms of chest pain, tightness, or heaviness (known as angina). This is a common symptom of blocked heart arteries, known as coronary artery disease.  · Symptoms of weakness, dizziness, trouble breathing, or swollen legs or feet. These may be symptoms of a problem with a heart valve or the heart muscle.  · Other test results show heart problems. Tests may include stress tests, heart scans, and echocardiography.  During a cardiac cath, your doctor can see the condition of the coronary arteries and heart valves. He or she can also check how well the heart pumps and the flow of blood through the heart. Your doctor can also measure pressures and take blood samples. And, if needed, he or she can open blocked arteries. This can help reduce symptoms of angina.  Cardiac cath is often done using a catheter inserted into an artery in the groin. During transradial cardiac cath, the catheter is inserted into an artery in the wrist. This can mean less bleeding and a faster recovery. Some people may have blockages in the groin arteries as well as in the heart arteries, making it difficult to reach the heart. The transradial approach can be used to get around this problem.   What happens during a transradial cardiac cath?  The procedure is done in the hospital or a surgery  [facility]. First, an IV line is put in your arm or hand to deliver fluids and medicines. You will likely be given medicine to relax you and make you drowsy. When the procedure begins:  · You lie on an X-ray table.  · The skin over the insertion site in your wrist is numbed.  · The doctor makes a tiny puncture or incision into the artery in the wrist. He or she then inserts a catheter and threads it through the blood vessel into your heart.    · The doctor may inject a contrast fluid through the catheter into the arteries. This fluid makes the arteries show up better on X-rays.  · Tests may be done to check the condition of your heart and arteries. If needed, the doctor can clear blockages in the arteries or do other repairs.  · When the doctor is finished, he or she will remove the catheter and put direct pressure on the site to prevent bleeding.  · You will stay for a time to recover, and then go home.  What are the risks of transradial cardiac cath?  These include:  · Bleeding, bruising, infection, or blood clots  · Damage to the radial artery that may cause injury to the hand  · Allergic reaction to the contrast fluid  · Abnormal heartbeat (arrhythmia)  · Damage to blood vessels or tissues  · Kidney damage or failure  · The need for emergency heart surgery  · Heart attack, stroke, or death  Date Last Reviewed: 5/1/2016  © 5779-9541 [employer]. [address] [place] [address]. All rights reserved. This information is not intended as a substitute for professional medical care. Always follow your healthcare professional's instructions.    Cardiac Catheterization (English) View Edit Remove  Aspirin, ADULT (English) View Edit Remove  Clopidogrel, ADULT (English) View Edit Remove  Nitroglycerin, ADULT (English) View Edit Remove  Angina Discharge Instructions (English) View Edit Remove

## 2022-01-21 NOTE — TELEPHONE ENCOUNTER
----- Message from Luiza Felder sent at 1/21/2022  1:13 PM CST -----  Regarding: Hosp f/u appt  Patient is preparing for discharge from [facility] and is requiring a hospital follow up appointment with  within 7 days.  If possible, can you please assist with scheduling this patient and message me back?    Dx Accelerating angina     Thank you,    Luiza Felder  High End Access Navigator/[facility] discharge project   [facility]

## 2022-01-21 NOTE — PLAN OF CARE
"TN working remotely. DCA done virtually with patient with [employer] on unit. Pt granted permission to virtually enter the room. Demographics/Ins/NextofKin/PCP verified with patient/family. Denies any DME needs at present from pt/family. Patient/family plans to return home with family. Educated on bedside RX. Patient consents for TN to discuss discharge plan with next of kin. Preferences appointment times and location obtained. Patient reports they will have transportation home upon discharge.    Pt lives with spouse in [place] and is independent with ADLS.     Future Appointments   Date Time Provider Department Center   1/31/2022 10:45 AM Gunnar Rangel MD [facility] PRICARE [place]   2/3/2022  8:00 AM Mega Tompkins MD [facility] CARDIO [place] [facility]   4/6/2022 10:00 AM Heather You MD [facility]     BP (!) 145/73 (Patient Position: Sitting)   Pulse 72   Temp 97.2 °F (36.2 °C) (Oral)   Resp 18   Ht 5' 10" (1.778 m)   Wt 83 kg (183 lb)   SpO2 96%   BMI 26.26 kg/m²      aspirin  81 mg Oral Daily    atorvastatin  80 mg Oral QHS    clopidogreL  75 mg Oral Daily    ketorolac  30 mg Intravenous Once    valsartan  80 mg Oral Daily        01/21/22 1413   Discharge Planning   Assessment Type Discharge Planning Brief Assessment   Resource/Environmental Concerns none   Support Systems Spouse/significant other   Equipment Currently Used at Home none   Current Living Arrangements home/apartment/condo   Patient/Family Anticipates Transition to home   Patient/Family Anticipated Services at Transition none   DME Needed Upon Discharge  none   Discharge Plan A Home;Home with family     "

## 2022-01-21 NOTE — PLAN OF CARE
"   01/21/22 1253   Post-Acute Status   Post-Acute Authorization Other   Other Status Awaiting f/u Appts     Gunnar Rangel MD  Go in 1 week  FOLLOW UP WITH PCP  [id number] [place] [address] RD  [place] [address] [phone]   Also requested 2wk F/U with [facility].    Future Appointments   Date Time Provider Department Center   4/6/2022 10:00 AM Heather You MD [facility]     BP (!) 145/73 (Patient Position: Sitting)   Pulse 72   Temp 97.2 °F (36.2 °C) (Oral)   Resp 18   Ht 5' 10" (1.778 m)   Wt 83 kg (183 lb)   SpO2 96%   BMI 26.26 kg/m²      aspirin  81 mg Oral Daily    atorvastatin  80 mg Oral QHS    clopidogreL  75 mg Oral Daily    ketorolac  30 mg Intravenous Once    valsartan  80 mg Oral Daily       "

## 2022-01-21 NOTE — DISCHARGE SUMMARY
"[facility]  Cardiology  Discharge Summary      Patient Name: Bandar French  MRN: [id number]  Admission Date: 1/20/2022  Hospital Length of Stay: 1 days  Discharge Date and Time:  01/21/2022 12:20 PM  Attending Physician: Pito Ha MD    Discharging Provider: Carlos Enrique Giang NP  Primary Care Physician: Gunnar Rangel MD    HPI:   Bandar French is a 71 y.o. male with Arthritis, Depression, Glaucoma, CAD, and Impaired fasting glucose. Patient reports that he was able to play a round of golf yesterday without any complaints. Patient was at home at rest yesterday evening and developed an episode of chest pain that radiated to his neck and bilateral jaws. Patient took an asa and his symptoms resolved within 30 minutes. Patient was able to sleep without recurrence of pain. This morning he went to the gym and started working out on the elliptical when his symptoms returned with increased intensity. Pain was similar to his MI in 2014 except worse with radiation down his left arm and "excruciating" jaw and chest pain described as a squeezing sensation. He rested with some relief but not complete relief. Patient is having jaw pain at the time of evaluation which improved with NTG x 2 but not completely. He reports generalized weakness but denies dizziness, syncope, diaphoresis, NV.  BP and HR are controlled. EKG SR without any GEORGE. Patient has been loaded with asa and Plavix. Heparin gtt initiated. He is NPO- last ate at 9pm.   Tridil gtt ordered.   NPO for [facility] today.       Procedure(s) (LRB):  Left heart cath (Left)  ANGIOGRAM, CORONARY ARTERY (N/A)  IVUS, Coronary  PTCA, Single Vessel  Stent, Drug Eluting, Multi Vessel, Coronary     Indwelling Lines/Drains at time of discharge:  Lines/Drains/Airways     None                 Hospital Course:  01/20/2022   Cath Results:  Access: Rt Radial   LM:  TY III flow  LAD: TY- flow 50% prox   LCx:  TY- flow  99% ISR OM1 stent   RCA: TY- flow 70% Prox "   LVgram: LVEDP 17 , LVEF %  Intervention:   S/p Successful PTCA and stent to OM1 99% ISR with 3.0 x 12 KAMI post dilated with 3.0 NC balloons  S/P Successful PTCA prox RCA with 3.5 x 22 KAMI post dilated with 3.5 NC balloon      Closure device:  Vasc band      Patient tolerated procedure well, no complications    01/21/2022  Patient recovered overnight without incident. Complaint of mild chest discomfort this AM. EKG SR without acute ischemic changes. Patient later ambulated around nursing station without chest pain and was found to be tolerating medications/ diet without difficulty. Medications were sent to pharmacy for bedside delivery. Compliance with medication was discussed with particular attention paid to importance of  DAPT for one year without interruption. The risk of abrupt stent occlusion and MI with early discontinuation was specifically stressed. Verbalized understanding and will follow up in the cardiology clinic in 2-3 weeks        Goals of Care Treatment Preferences:       Living Will: Yes              Consults:   Consults (From admission, onward)        Status Ordering Provider     Inpatient consult to [facility]  Once        Provider:  Mega Tompkins MD    Completed TAYLER STOCKTON          Significant Diagnostic Studies: Labs:   BMP:   Recent Labs   Lab 01/20/22  0904   *      K 4.1      CO2 21*   BUN 20   CREATININE 0.8   CALCIUM 9.0   , CMP   Recent Labs   Lab 01/20/22  0904      K 4.1      CO2 21*   *   BUN 20   CREATININE 0.8   CALCIUM 9.0   PROT 6.8   ALBUMIN 4.2   BILITOT 0.8   ALKPHOS 61   AST 15   ALT 22   ANIONGAP 10   ESTGFRAFRICA >60   EGFRNONAA >60   , CBC   Recent Labs   Lab 01/20/22  0904   WBC 8.27   HGB 14.1   HCT 40.7      , INR   Lab Results   Component Value Date    INR 1.3 (H) 01/20/2022   , Lipid Panel   Lab Results   Component Value Date    CHOL 99 (L) 09/14/2021    HDL 28 (L) 09/14/2021    LDLCALC 50.4 (L) 09/14/2021     TRIG 103 09/14/2021    CHOLHDL 28.3 09/14/2021   , Troponin   Recent Labs   Lab 01/20/22  0904 01/20/22  1103   TROPONINI 0.022 0.020   , A1C:   Recent Labs   Lab 09/14/21  0752   HGBA1C 6.2*    and All labs within the past 24 hours have been reviewed  Cardiac Graphics: Echocardiogram:   Transthoracic echo (TTE) complete (Cupid Only):   Results for orders placed or performed during the hospital encounter of 01/20/22   Echo   Result Value Ref Range    BSA 2.02 m2    TDI SEPTAL 0.08 m/s    LV LATERAL E/E' RATIO 12.43 m/s    LV SEPTAL E/E' RATIO 10.88 m/s    LA WIDTH 3.80 cm    TDI LATERAL 0.07 m/s    PV PEAK VELOCITY 0.87 cm/s    LVIDd 4.71 3.5 - 6.0 cm    IVS 1.00 (A) 0.6 - 1.1 cm    Posterior Wall 1.04 0.6 - 1.1 cm    Ao root annulus 3.36 cm    LVIDs 1.50 2.1 - 4.0 cm    FS 68 28 - 44 %    LA volume 50.18 cm3    STJ 2.65 cm    Ascending aorta 2.67 cm    LV mass 169.55 g    LA size 3.56 cm    RV S' 16.78 cm/s    Left Ventricle Relative Wall Thickness 0.44 cm    AV mean gradient 5 mmHg    AV valve area 4.10 cm2    AV Velocity Ratio 0.66     AV index (prosthetic) 0.90     MV valve area p 1/2 method 3.04 cm2    MV valve area by continuity eq 3.54 cm2    E/A ratio 0.92     Mean e' 0.08 m/s    E wave deceleration time 249.37 msec    IVRT 85.63 msec    Pulm vein S/D ratio 1.11     LVOT diameter 2.41 cm    LVOT area 4.6 cm2    LVOT peak darrel 1.07 m/s    LVOT peak VTI 23.76 cm    Ao peak darrel 1.61 m/s    Ao VTI 26.39 cm    LVOT stroke volume 108.33 cm3    AV peak gradient 10 mmHg    MV peak gradient 4 mmHg    E/E' ratio 11.60 m/s    MV Peak E Darrel 0.87 m/s    TR Max Darrel 1.90 m/s    MV VTI 30.58 cm    MV stenosis pressure 1/2 time 72.32 ms    MV Peak A Darrel 0.95 m/s    PV Peak S Darrel 0.31 m/s    PV Peak D Darrel 0.28 m/s    LV Systolic Volume 34.20 mL    LV Systolic Volume Index 17.0 mL/m2    LV Diastolic Volume 102.99 mL    LV Diastolic Volume Index 51.24 mL/m2    LA Volume Index 25.0 mL/m2    LV Mass Index 84 g/m2    RA Major Axis  4.00 cm    Left Atrium Minor Axis 4.00 cm    Left Atrium Major Axis 4.80 cm    Triscuspid Valve Regurgitation Peak Gradient 14 mmHg    LA Volume Index (Mod) 26.0 mL/m2    LA volume (mod) 52.26 cm3    RA Width 3.70 cm    Right Atrial Pressure (from IVC) 3 mmHg    EF 60 %    RVDD 2.40 cm    TAPSE 1.90 cm    Right ventricular length in diastole (apical 4-chamber view) 5.50 cm    TV rest pulmonary artery pressure 17 mmHg    Narrative    · The left ventricle is normal in size with concentric remodeling and   normal systolic function.  · The estimated ejection fraction is 60%.  · Grade I left ventricular diastolic dysfunction.  · Normal right ventricular size with normal right ventricular systolic   function.  · The estimated PA systolic pressure is 17 mmHg.  · Normal central venous pressure (3 mmHg).          Pending Diagnostic Studies:     Procedure Component Value Units Date/Time    EKG 12-lead [[id number]] Collected: 01/21/22 1037    Order Status: Sent Lab Status: In process Updated: 01/21/22 1121    Narrative:      Test Reason : R07.9,    Vent. Rate : 061 BPM     Atrial Rate : 061 BPM     P-R Int : 224 ms          QRS Dur : 090 ms      QT Int : 390 ms       P-R-T Axes : 054 041 037 degrees     QTc Int : 392 ms    Sinus rhythm with 1st degree A-V block  Otherwise normal ECG  When compared with ECG of 21-JAN-2022 00:02,  No significant change was found    Referred By: [email or web address]   SELF           Confirmed By:           Final Active Diagnoses:    Diagnosis Date Noted POA    PRINCIPAL PROBLEM:  Unstable angina [I20.0] 01/20/2022 Yes    Essential hypertension [I10] 08/26/2020 Yes     Chronic    Coronary artery disease involving native coronary artery of native heart without angina pectoris [I25.10] 09/25/2019 Yes     Chronic    Hyperlipidemia, mixed [E78.2] 09/25/2019 Yes     Chronic      Problems Resolved During this Admission:     No new Assessment & Plan notes have been filed under this hospital service since the  last note was generated.  Service: Cardiology      Discharged Condition: good    Disposition: Home or Self Care    Follow Up:    Patient Instructions:      Diet diabetic     Diet Cardiac     Lifting restrictions   Order Comments: No lifting over 10 pounds with right arm for 1 week     Notify your health care provider if you experience any of the following:  temperature >100.4     Notify your health care provider if you experience any of the following:  severe uncontrolled pain     Notify your health care provider if you experience any of the following:  redness, tenderness, or signs of infection (pain, swelling, redness, odor or green/yellow discharge around incision site)     Notify your health care provider if you experience any of the following:  difficulty breathing or increased cough     Notify your health care provider if you experience any of the following:  persistent dizziness, light-headedness, or visual disturbances     Medications:  Reconciled Home Medications:      Medication List      START taking these medications    aspirin 81 MG Chew  Take 1 tablet (81 mg total) by mouth once daily.  Replaces: aspirin 81 MG EC tablet     clopidogreL 75 mg tablet  Commonly known as: PLAVIX  Take 1 tablet (75 mg total) by mouth once daily.  Start taking on: January 22, 2022     nitroGLYCERIN 0.4 MG SL tablet  Commonly known as: NITROSTAT  Place 1 tablet (0.4 mg total) under the tongue every 5 (five) minutes as needed for Chest pain.        CONTINUE taking these medications    blood sugar diagnostic Strp  Commonly known as: BLOOD GLUCOSE TEST  1 each by Misc.(Non-Drug; Combo Route) route once daily.     CENTRUM SILVER ORAL  Take by mouth once daily.     chlorpheniramine 4 mg tablet  Commonly known as: CHLOR-TRIMETON  Take 4 mg by mouth as needed.     cholecalciferol (vitamin D3) 125 mcg (5,000 unit) Tab  Take 1,000 Units by mouth once daily.     DEXCOM G6  Misc  Generic drug: blood-glucose meter,continuous  Use  as directed     DEXCOM G6 SENSOR Joanne  Generic drug: blood-glucose sensor  Use as directed     DEXCOM G6 TRANSMITTER Joanne  Generic drug: blood-glucose transmitter  Use as directed     dorzolamide-timolol 2-0.5% 22.3-6.8 mg/mL ophthalmic solution  Commonly known as: COSOPT  Place 1 drop into both eyes 2 (two) times daily.     fluticasone propionate 50 mcg/actuation nasal spray  Commonly known as: FLONASE  2 sprays (100 mcg total) by Each Nostril route once daily.     ICAPS AREDS2 250 mg-200 unit -12.5 mg-1 mg Cap  Generic drug: vit C-E-zinc ox-dayron-lut-zeax  Take 1 capsule by mouth once daily. Take 2 capsules by mouth daily     irbesartan 150 MG tablet  Commonly known as: AVAPRO  Take 2 tablets (300 mg total) by mouth every evening.     latanoprost 0.005 % ophthalmic solution  Place 1 drop into both eyes every evening.     MICRO THIN LANCETS 33 gauge Misc  Generic drug: lancets     rosuvastatin 20 MG tablet  Commonly known as: CRESTOR  Take 1 tablet (20 mg total) by mouth every evening.     VITAMIN C WITH ZARINA HIPS 500 MG tablet  Generic drug: ascorbic acid (vitamin C)  Take 500 mg by mouth once daily.     zinc 50 mg Tab  Take 50 mg by mouth Daily.        STOP taking these medications    acetaminophen 500 MG tablet  Commonly known as: TYLENOL     aspirin 81 MG EC tablet  Commonly known as: ECOTRIN  Replaced by: aspirin 81 MG Chew     ibuprofen 200 MG tablet  Commonly known as: BISI GUNDERSON            Time spent on the discharge of patient: 45 minutes    Carlos Enrique Giang NP  Cardiology  [facility] - Telemetry

## 2022-01-21 NOTE — NURSING
Dressing to patient's R radial CDI, capillary refill <3 sec bilaterally. Patient reported head/ neck/ and under jaw aching rated 3/10. Dr Cantu paged and order for tylenol obtained. Patient reports that pain is now at a 1/10 after tylenol administration and continues to deny chest pain.

## 2022-01-21 NOTE — PLAN OF CARE
Problem: Adult Inpatient Plan of Care  Goal: Optimal Comfort and Wellbeing  Outcome: Ongoing, Progressing     Problem: Chest Pain  Goal: Resolution of Chest Pain Symptoms  Outcome: Ongoing, Progressing     Problem: Bleeding (Cardiac Catheterization)  Goal: Absence of Bleeding  Outcome: Ongoing, Progressing

## 2022-01-21 NOTE — PLAN OF CARE
"Pharmacist will go over home medications and reasons for medications. VN and bedside nurse to reiterate final discharge instructions.     Future Appointments   Date Time Provider Department Center   1/31/2022 10:45 AM Gunnar Rangel MD [facility] PR[facility] [place]   2/3/2022  8:00 AM Mega Tompkins MD [facility] CARDIO [facility]   4/6/2022 10:00 AM Heather You MD [facility]     BP (!) 145/73 (Patient Position: Sitting)   Pulse 72   Temp 97.2 °F (36.2 °C) (Oral)   Resp 18   Ht 5' 10" (1.778 m)   Wt 83 kg (183 lb)   SpO2 96%   BMI 26.26 kg/m²        01/21/22 1416   Final Note   Assessment Type Final Discharge Note   Anticipated Discharge Disposition Home   Hospital Resources/Appts/Education Provided Appointments scheduled and added to AVS   Post-Acute Status   Other Status No Post-Acute Service Needs   Discharge Delays None known at this time        Medication List        START taking these medications      aspirin 81 MG Chew  Take 1 tablet (81 mg total) by mouth once daily.  Replaces: aspirin 81 MG EC tablet     clopidogreL 75 mg tablet  Commonly known as: PLAVIX  Take 1 tablet (75 mg total) by mouth once daily.  Start taking on: January 22, 2022     nitroGLYCERIN 0.4 MG SL tablet  Commonly known as: NITROSTAT  Place 1 tablet (0.4 mg total) under the tongue every 5 (five) minutes as needed for Chest pain.            CONTINUE taking these medications      blood sugar diagnostic Strp  Commonly known as: BLOOD GLUCOSE TEST  1 each by Misc.(Non-Drug; Combo Route) route once daily.     CENTRUM SILVER ORAL     chlorpheniramine 4 mg tablet  Commonly known as: CHLOR-TRIMETON     cholecalciferol (vitamin D3) 125 mcg (5,000 unit) Tab     DEXCOM G6  Misc  Generic drug: blood-glucose meter,continuous  Use as directed     DEXCOM G6 SENSOR Joanne  Generic drug: blood-glucose sensor  Use as directed     DEXCOM G6 TRANSMITTER Joanne  Generic drug: blood-glucose transmitter  Use as directed   "   dorzolamide-timolol 2-0.5% 22.3-6.8 mg/mL ophthalmic solution  Commonly known as: COSOPT     fluticasone propionate 50 mcg/actuation nasal spray  Commonly known as: FLONASE  2 sprays (100 mcg total) by Each Nostril route once daily.     ICAPS AREDS2 250 mg-200 unit -12.5 mg-1 mg Cap  Generic drug: vit C-E-zinc ox-dayron-lut-zeax     irbesartan 150 MG tablet  Commonly known as: AVAPRO  Take 2 tablets (300 mg total) by mouth every evening.     latanoprost 0.005 % ophthalmic solution     MICRO THIN LANCETS 33 gauge Misc  Generic drug: lancets     rosuvastatin 20 MG tablet  Commonly known as: CRESTOR  Take 1 tablet (20 mg total) by mouth every evening.     VITAMIN C WITH ZARINA HIPS 500 MG tablet  Generic drug: ascorbic acid (vitamin C)     zinc 50 mg Tab            STOP taking these medications      acetaminophen 500 MG tablet  Commonly known as: TYLENOL     aspirin 81 MG EC tablet  Commonly known as: ECOTRIN  Replaced by: aspirin 81 MG Chew     ibuprofen 200 MG tablet  Commonly known as: ADVBISI SNEED               Where to Get Your Medications        These medications were sent to [facility] [address] [id number] [id number]      Hours: Mon-Fri, 8a-5:30p Phone: [phone]   aspirin 81 MG Chew  clopidogreL 75 mg tablet  nitroGLYCERIN 0.4 MG SL tablet

## 2022-01-21 NOTE — PLAN OF CARE
Discharge orders noted. Additional clinical references attached.    Patient's discharge instructions given by bedside RN and reviewed via this VN.  Education provided on new medication, diagnosis, and follow-up appointments.  Teach back method used. Patient verbalized understanding. All questions answered. Transport to [facility] requested. Floor nurse notified.     Problem: Diabetes Comorbidity  Goal: Blood Glucose Level Within Targeted Range  1/21/2022 1505 by Marilee Jasso RN  Outcome: Met  1/21/2022 1505 by Marilee Jasso RN  Outcome: Ongoing, Progressing     Problem: Adult Inpatient Plan of Care  Goal: Plan of Care Review  1/21/2022 1505 by Marilee Jasso RN  Outcome: Met  1/21/2022 1505 by Marilee Jasso RN  Outcome: Ongoing, Progressing  Goal: Patient-Specific Goal (Individualized)  1/21/2022 1505 by Marilee Jasso RN  Outcome: Met  1/21/2022 1505 by Marilee Jsaso RN  Outcome: Ongoing, Progressing  Goal: Absence of Hospital-Acquired Illness or Injury  1/21/2022 1505 by Marilee Jasso RN  Outcome: Met  1/21/2022 1505 by Marilee Jasso RN  Outcome: Ongoing, Progressing  Goal: Optimal Comfort and Wellbeing  1/21/2022 1505 by Marilee Jasso RN  Outcome: Met  1/21/2022 1505 by Marilee Jasso RN  Outcome: Ongoing, Progressing  Goal: Readiness for Transition of Care  1/21/2022 1505 by Marilee Jasso RN  Outcome: Met  1/21/2022 1505 by Marilee Jasso RN  Outcome: Ongoing, Progressing     Problem: Chest Pain  Goal: Resolution of Chest Pain Symptoms  1/21/2022 1505 by Marilee Jasso RN  Outcome: Met  1/21/2022 1505 by Marilee Jasso RN  Outcome: Ongoing, Progressing     Problem: Arrhythmia/Dysrhythmia (Cardiac Catheterization)  Goal: Stable Heart Rate and Rhythm  1/21/2022 1505 by Marilee Jasso RN  Outcome: Met  1/21/2022 1505 by Marilee Jasso RN  Outcome: Ongoing, Progressing     Problem: Bleeding (Cardiac Catheterization)  Goal: Absence of Bleeding  1/21/2022 1505 by Marilee Jasso RN  Outcome: Met  1/21/2022 1505 by Marilee Jasso,  RN  Outcome: Ongoing, Progressing     Problem: Contrast-Induced Injury Risk (Cardiac Catheterization)  Goal: Absence of Contrast-Induced Injury  1/21/2022 1505 by Marilee Jasso RN  Outcome: Met  1/21/2022 1505 by Marilee Jasso RN  Outcome: Ongoing, Progressing     Problem: Embolism (Cardiac Catheterization)  Goal: Absence of Embolism Signs and Symptoms  1/21/2022 1505 by Marilee Jasso RN  Outcome: Met  1/21/2022 1505 by Marilee Jasso RN  Outcome: Ongoing, Progressing     Problem: Ongoing Anesthesia/Sedation Effects (Cardiac Catheterization)  Goal: Anesthesia/Sedation Recovery  1/21/2022 1505 by Marilee Jasso RN  Outcome: Met  1/21/2022 1505 by Marilee Jasso RN  Outcome: Ongoing, Progressing     Problem: Pain (Cardiac Catheterization)  Goal: Acceptable Pain Control  1/21/2022 1505 by Marilee Jasso RN  Outcome: Met  1/21/2022 1505 by Marilee Jasso RN  Outcome: Ongoing, Progressing     Problem: Vascular Access Protection (Cardiac Catheterization)  Goal: Absence of Vascular Access Complication  1/21/2022 1505 by Marilee Jasso RN  Outcome: Met  1/21/2022 1505 by Marilee Jasso RN  Outcome: Ongoing, Progressing     Problem: Fall Injury Risk  Goal: Absence of Fall and Fall-Related Injury  1/21/2022 1505 by Marilee Jasso RN  Outcome: Met  1/21/2022 1505 by Marilee Jasso RN  Outcome: Ongoing, Progressing

## 2022-01-25 ENCOUNTER — OFFICE VISIT (OUTPATIENT)
Dept: CARDIOLOGY | Facility: CLINIC | Age: 72
End: 2022-01-25
Payer: MEDICARE

## 2022-01-25 VITALS
DIASTOLIC BLOOD PRESSURE: 69 MMHG | BODY MASS INDEX: 26.74 KG/M2 | SYSTOLIC BLOOD PRESSURE: 138 MMHG | WEIGHT: 186.75 LBS | HEART RATE: 59 BPM | HEIGHT: 70 IN

## 2022-01-25 DIAGNOSIS — I10 ESSENTIAL HYPERTENSION: Chronic | ICD-10-CM

## 2022-01-25 DIAGNOSIS — E78.2 HYPERLIPIDEMIA, MIXED: Chronic | ICD-10-CM

## 2022-01-25 DIAGNOSIS — I25.10 CORONARY ARTERY DISEASE INVOLVING NATIVE CORONARY ARTERY OF NATIVE HEART WITHOUT ANGINA PECTORIS: Primary | Chronic | ICD-10-CM

## 2022-01-25 DIAGNOSIS — E11.9 TYPE 2 DIABETES MELLITUS WITHOUT COMPLICATION, WITHOUT LONG-TERM CURRENT USE OF INSULIN: ICD-10-CM

## 2022-01-25 DIAGNOSIS — Z95.5 PRESENCE OF STENT IN CORONARY ARTERY: Chronic | ICD-10-CM

## 2022-01-25 DIAGNOSIS — E78.2 HYPERLIPIDEMIA, MIXED: ICD-10-CM

## 2022-01-25 PROBLEM — I20.0 UNSTABLE ANGINA: Status: RESOLVED | Noted: 2022-01-20 | Resolved: 2022-01-25

## 2022-01-25 PROCEDURE — 1126F PR PAIN SEVERITY QUANTIFIED, NO PAIN PRESENT: ICD-10-PCS | Mod: HCNC,CPTII,S$GLB, | Performed by: INTERNAL MEDICINE

## 2022-01-25 PROCEDURE — 1101F PR PT FALLS ASSESS DOC 0-1 FALLS W/OUT INJ PAST YR: ICD-10-PCS | Mod: HCNC,CPTII,S$GLB, | Performed by: INTERNAL MEDICINE

## 2022-01-25 PROCEDURE — 4010F ACE/ARB THERAPY RXD/TAKEN: CPT | Mod: HCNC,CPTII,S$GLB, | Performed by: INTERNAL MEDICINE

## 2022-01-25 PROCEDURE — 1111F PR DISCHARGE MEDS RECONCILED W/ CURRENT OUTPATIENT MED LIST: ICD-10-PCS | Mod: HCNC,CPTII,S$GLB, | Performed by: INTERNAL MEDICINE

## 2022-01-25 PROCEDURE — 3288F FALL RISK ASSESSMENT DOCD: CPT | Mod: HCNC,CPTII,S$GLB, | Performed by: INTERNAL MEDICINE

## 2022-01-25 PROCEDURE — 1159F MED LIST DOCD IN RCRD: CPT | Mod: HCNC,CPTII,S$GLB, | Performed by: INTERNAL MEDICINE

## 2022-01-25 PROCEDURE — 3288F PR FALLS RISK ASSESSMENT DOCUMENTED: ICD-10-PCS | Mod: HCNC,CPTII,S$GLB, | Performed by: INTERNAL MEDICINE

## 2022-01-25 PROCEDURE — 99999 PR PBB SHADOW E&M-EST. PATIENT-LVL IV: ICD-10-PCS | Mod: PBBFAC,HCNC,, | Performed by: INTERNAL MEDICINE

## 2022-01-25 PROCEDURE — 3008F BODY MASS INDEX DOCD: CPT | Mod: HCNC,CPTII,S$GLB, | Performed by: INTERNAL MEDICINE

## 2022-01-25 PROCEDURE — 1126F AMNT PAIN NOTED NONE PRSNT: CPT | Mod: HCNC,CPTII,S$GLB, | Performed by: INTERNAL MEDICINE

## 2022-01-25 PROCEDURE — 3072F PR LOW RISK FOR RETINOPATHY: ICD-10-PCS | Mod: HCNC,CPTII,S$GLB, | Performed by: INTERNAL MEDICINE

## 2022-01-25 PROCEDURE — 4010F PR ACE/ARB THEARPY RXD/TAKEN: ICD-10-PCS | Mod: HCNC,CPTII,S$GLB, | Performed by: INTERNAL MEDICINE

## 2022-01-25 PROCEDURE — 99999 PR PBB SHADOW E&M-EST. PATIENT-LVL IV: CPT | Mod: PBBFAC,HCNC,, | Performed by: INTERNAL MEDICINE

## 2022-01-25 PROCEDURE — 3075F PR MOST RECENT SYSTOLIC BLOOD PRESS GE 130-139MM HG: ICD-10-PCS | Mod: HCNC,CPTII,S$GLB, | Performed by: INTERNAL MEDICINE

## 2022-01-25 PROCEDURE — 3078F DIAST BP <80 MM HG: CPT | Mod: HCNC,CPTII,S$GLB, | Performed by: INTERNAL MEDICINE

## 2022-01-25 PROCEDURE — 1157F PR ADVANCE CARE PLAN OR EQUIV PRESENT IN MEDICAL RECORD: ICD-10-PCS | Mod: HCNC,CPTII,S$GLB, | Performed by: INTERNAL MEDICINE

## 2022-01-25 PROCEDURE — 1111F DSCHRG MED/CURRENT MED MERGE: CPT | Mod: HCNC,CPTII,S$GLB, | Performed by: INTERNAL MEDICINE

## 2022-01-25 PROCEDURE — 99214 PR OFFICE/OUTPT VISIT, EST, LEVL IV, 30-39 MIN: ICD-10-PCS | Mod: HCNC,S$GLB,, | Performed by: INTERNAL MEDICINE

## 2022-01-25 PROCEDURE — 1101F PT FALLS ASSESS-DOCD LE1/YR: CPT | Mod: HCNC,CPTII,S$GLB, | Performed by: INTERNAL MEDICINE

## 2022-01-25 PROCEDURE — 3008F PR BODY MASS INDEX (BMI) DOCUMENTED: ICD-10-PCS | Mod: HCNC,CPTII,S$GLB, | Performed by: INTERNAL MEDICINE

## 2022-01-25 PROCEDURE — 3078F PR MOST RECENT DIASTOLIC BLOOD PRESSURE < 80 MM HG: ICD-10-PCS | Mod: HCNC,CPTII,S$GLB, | Performed by: INTERNAL MEDICINE

## 2022-01-25 PROCEDURE — 1159F PR MEDICATION LIST DOCUMENTED IN MEDICAL RECORD: ICD-10-PCS | Mod: HCNC,CPTII,S$GLB, | Performed by: INTERNAL MEDICINE

## 2022-01-25 PROCEDURE — 3075F SYST BP GE 130 - 139MM HG: CPT | Mod: HCNC,CPTII,S$GLB, | Performed by: INTERNAL MEDICINE

## 2022-01-25 PROCEDURE — 3072F LOW RISK FOR RETINOPATHY: CPT | Mod: HCNC,CPTII,S$GLB, | Performed by: INTERNAL MEDICINE

## 2022-01-25 PROCEDURE — 1157F ADVNC CARE PLAN IN RCRD: CPT | Mod: HCNC,CPTII,S$GLB, | Performed by: INTERNAL MEDICINE

## 2022-01-25 PROCEDURE — 99214 OFFICE O/P EST MOD 30 MIN: CPT | Mod: HCNC,S$GLB,, | Performed by: INTERNAL MEDICINE

## 2022-01-25 RX ORDER — ROSUVASTATIN CALCIUM 40 MG/1
40 TABLET, COATED ORAL NIGHTLY
Qty: 90 TABLET | Refills: 3 | Status: SHIPPED | OUTPATIENT
Start: 2022-01-25 | End: 2022-10-10 | Stop reason: SDUPTHER

## 2022-01-25 NOTE — PROGRESS NOTES
Subjective:   01/25/2022     Patient ID:  Bandar French is a 71 y.o. male who presents for [facility] Follow Up and s/p Stent      Patient comes in for cardiac follow-up.  He does have coronary artery disease manifested by coronary stenting in the circumflex distribution in 2014. Cardiac catheterization in 2017 showed no significant stenoses.  Echocardiography showed left hypertrophy with normal ejection fraction.     He developed unstable symptoms and underwent cardiac catheterization on 01/20/2022:    · There was two vessel coronary artery disease.  · Procedure done for unstable angina  · The PCI was successful.  · The 1st Mrg lesion was 99% stenosed with 0% stenosis post-intervention.  · A STENT RESOLUTE VICKY [id number] stent was successfully placed. Post dilated with 3.0 NC balloon at high pressure. IVUS guided  · The Prox RCA lesion was 70% stenosed with 0% stenosis post-intervention.  · A STENT RESOLUTE VICKY 3.5X22MM stent was successfully placed . Post dilated with 3.5 NC balloon. IVUS guided.  · The Prox LAD to Mid LAD lesion was 50% stenosed. Medical treatment at this time.  · The estimated blood loss was none.     - ASA/Plavix  - High intense statin   - Avoid BB due to bradycardia  - Medical tx for residual LAD disease. If symptomatic as outpatient consider non invasive evaluation versus physiologic evaluation.   - F/U with Dr. Alvarado        Patient does have hypertension, his blood pressure is generally well controlled, 135/60.  His dose of irbesartan was increased to 300 mg daily though.    Hypercholesterolemia is treated with high-dose statin therapy.  His LDL cholesterol last was 50, excellent result.  Given his recent acute coronary syndrome, we will increase rosuvastatin to obtain an LDL cholesterol of less than 50.       Past Medical History:   Diagnosis Date    Depression     Glaucoma        Review of patient's allergies indicates:  No Known Allergies      Current Outpatient  Medications:     ascorbic acid, vitamin C, (VITAMIN C) 500 MG tablet, Take 500 mg by mouth once daily., Disp: , Rfl:     aspirin 81 MG Chew, Take 1 tablet (81 mg total) by mouth once daily., Disp: 30 tablet, Rfl: 11    blood sugar diagnostic (BLOOD GLUCOSE TEST) Strp, 1 each by Misc.(Non-Drug; Combo Route) route once daily., Disp: 30 each, Rfl: 11    blood-glucose meter,continuous (DEXCOM G6 ) Misc, Use as directed, Disp: 1 each, Rfl: 11    blood-glucose sensor (DEXCOM G6 SENSOR) Joanne, Use as directed, Disp: 10 each, Rfl: 11    blood-glucose transmitter (DEXCOM G6 TRANSMITTER) Joanne, Use as directed, Disp: 1 each, Rfl: 3    chlorpheniramine (CHLOR-TRIMETON) 4 mg tablet, Take 4 mg by mouth as needed. , Disp: , Rfl:     cholecalciferol, vitamin D3, 125 mcg (5,000 unit) Tab, Take 1,000 Units by mouth once daily., Disp: , Rfl:     clopidogreL (PLAVIX) 75 mg tablet, Take 1 tablet (75 mg total) by mouth once daily., Disp: 30 tablet, Rfl: 11    dorzolamide-timolol 2-0.5% (COSOPT) 22.3-6.8 mg/mL ophthalmic solution, Place 1 drop into both eyes 2 (two) times daily., Disp: , Rfl:     fluticasone propionate (FLONASE) 50 mcg/actuation nasal spray, 2 sprays (100 mcg total) by Each Nostril route once daily., Disp: 16 g, Rfl: 11    folic acid/multivit-min/lutein (CENTRUM SILVER ORAL), Take by mouth once daily., Disp: , Rfl:     irbesartan (AVAPRO) 150 MG tablet, Take 2 tablets (300 mg total) by mouth every evening., Disp: 180 tablet, Rfl: 1    latanoprost 0.005 % ophthalmic solution, Place 1 drop into both eyes every evening., Disp: , Rfl:     MICRO THIN LANCETS 33 gauge Misc, , Disp: , Rfl:     nitroGLYCERIN (NITROSTAT) 0.4 MG SL tablet, Place 1 tablet (0.4 mg total) under the tongue every 5 (five) minutes as needed for Chest pain., Disp: 25 tablet, Rfl: 11    vit C-E-zinc ox-dayron-lut-zeax (ICAPS AREDS2) 250 mg-200 unit -12.5 mg-1 mg Cap, Take 1 capsule by mouth once daily. Take 2 capsules by mouth daily,  Disp: , Rfl:     zinc 50 mg Tab, Take 50 mg by mouth Daily., Disp: , Rfl:     rosuvastatin (CRESTOR) 40 MG Tab, Take 1 tablet (40 mg total) by mouth every evening., Disp: 90 tablet, Rfl: 3     Objective:   Review of Systems   Cardiovascular: Negative for chest pain, claudication, cyanosis, dyspnea on exertion, irregular heartbeat, leg swelling, near-syncope, orthopnea, palpitations, paroxysmal nocturnal dyspnea and syncope.       Vitals:    01/25/22 1036   BP: 138/69   Pulse: (!) 59       Physical Exam  Vitals reviewed.   Constitutional:       General: He is not in acute distress.     Appearance: He is well-developed.   HENT:      Head: Normocephalic and atraumatic.      Nose: Nose normal.   Eyes:      Conjunctiva/sclera: Conjunctivae normal.      Pupils: Pupils are equal, round, and reactive to light.   Neck:      Vascular: No JVD.   Cardiovascular:      Rate and Rhythm: Normal rate and regular rhythm.      Pulses: Intact distal pulses.      Heart sounds: Normal heart sounds. No murmur heard.  No friction rub. No gallop.    Pulmonary:      Effort: Pulmonary effort is normal. No respiratory distress.      Breath sounds: Normal breath sounds. No wheezing or rales.   Chest:      Chest wall: No tenderness.   Abdominal:      General: Bowel sounds are normal. There is no distension.      Palpations: Abdomen is soft.      Tenderness: There is no abdominal tenderness.   Musculoskeletal:         General: No tenderness or deformity. Normal range of motion.      Cervical back: Normal range of motion and neck supple.   Skin:     General: Skin is warm and dry.      Findings: No erythema or rash.   Neurological:      Mental Status: He is alert and oriented to person, place, and time.      Cranial Nerves: No cranial nerve deficit.      Motor: No abnormal muscle tone.      Coordination: Coordination normal.   Psychiatric:         Behavior: Behavior normal.         Thought Content: Thought content normal.         Judgment:  Judgment normal.           Lab Results   Component Value Date    CHOL 99 (L) 09/14/2021    CHOL 113 08/19/2020     Lab Results   Component Value Date    HDL 28 (L) 09/14/2021    HDL 31 08/19/2020     Lab Results   Component Value Date    LDLCALC 50.4 (L) 09/14/2021    LDLCALC 61 08/19/2020     Lab Results   Component Value Date    ALT 22 01/20/2022    AST 15 01/20/2022    AST 19 09/14/2021     Lab Results   Component Value Date    CREATININE 0.8 01/20/2022    BUN 20 01/20/2022     01/20/2022    K 4.1 01/20/2022    CO2 21 (L) 01/20/2022    CO2 25 09/14/2021    CO2 29 08/26/2020     Lab Results   Component Value Date    HGB 14.1 01/20/2022    HCT 40.7 01/20/2022    HCT 37.6 (L) 07/23/2019                   Electrocardiogram shows sinus bradycardia, first-degree AV block, heart rate 57 beats per minute      Assessment and Plan:     Coronary artery disease involving native coronary artery of native heart without angina pectoris  Comments:  Status post recent coronary stent for ACS    Presence of stent in coronary artery  Comments:  Dual anti-platelet therapy until January of 2023    Essential hypertension  Comments:  Better controlled on 300 mg of irbesartan daily    Hyperlipidemia, mixed  Comments:  Increase rosuvastatin to obtain LDL of less than 50  Orders:  -     rosuvastatin (CRESTOR) 40 MG Tab; Take 1 tablet (40 mg total) by mouth every evening.  Dispense: 90 tablet; Refill: 3    Type 2 diabetes mellitus without complication, without long-term current use of insulin    Hyperlipidemia, mixed  -     rosuvastatin (CRESTOR) 40 MG Tab; Take 1 tablet (40 mg total) by mouth every evening.  Dispense: 90 tablet; Refill: 3         Follow up in about 6 months (around 7/25/2022).

## 2022-01-25 NOTE — PATIENT INSTRUCTIONS
"I recommend the book, "The Obesity Code" for weight loss; it recommends intermittent fasting and avoidance of sugar, artificial sweeteners and refined carbohydrates.    Also, here is information on a Mediterranean type diet including fish, the pesco-mediterranean diet from the American College of Cardiology:    1.  Humans are evolutionarily adapted to obtain calories and nutrients from both plant and animal food sources. Many people overconsume animal products, often-processed meats high in saturated fats and chemical additives. In contrast, while strict veganism has gained popularity for many reasons and has value in certain groups, it can cause nutritional deficiencies (vitamin B12, high-quality proteins, iron, zinc, omega-3 fatty acid, vitamin D, and calcium), and predispose to osteopenia, loss of muscle mass, and anemia. This is not true of a lacto-ovo vegetarian diet, which allows no animal-based food except for eggs and dairy. A 6-year study of [id number] [place] [employer] reported a decreased incidence of all-cause mortality when comparing vegetarians with nonvegetarians. However, when the vegetarians were stratified into vegans, lacto-ovo vegetarians, pesco-vegetarians, and semi-vegetarians, the pesco-vegetarians had lowest risks for all-cause mortality, cardiovascular disease (CVD) mortality, and mortality from other causes.     2.  The authors propose a plant-rich diet rich in nuts with fish and seafood as the principle source of animal food. Known as the Pesco-Mediterranean diet, it is supplemented with extra-virgin olive oil (EVOO), which is the principle fat source, along with moderate amounts of dairy (particularly yogurt and cheese) and eggs, as well as modest amounts of alcohol consumption (ideally red wine with the evening meal), but few red and processed meats.     3.  Both epidemiological studies and randomized clinical trials indicate that the traditional Mediterranean diet is associated with " lower risks for all-cause and CVD mortality, coronary heart disease, metabolic syndrome, diabetes, cognitive decline, neurodegenerative diseases (including Alzheimers), depression, overall cancer mortality, and breast and colorectal cancers.     4.  The traditional Mediterranean diet has been endorsed in the most recent Dietary Guidelines for Americans and the American College of Cardiology/American Heart Association guidelines. The 2020 [employer] News & World Report ranked it #1 for overall health based upon it being nutritious, safe, relatively easy to follow, protective against CVD and diabetes, and effective for weight loss.     5.  Fish and seafood are important sources of vitamins protein and omega-3 fatty acids, of which the higher blood and adipose tissue are associated with reduced fatal and nonfatal myocardial infarction. When not fried, fish consumption has been associated with reduced risk of heart failure, and the incidence of the metabolic syndrome, coronary heart disease, ischemic stroke, and sudden cardiac death, particularly when seafood replaces less healthy foods.     6.  Unrestricted use of olive oil in the kitchen, on salads (with vinegar), cooking vegetables, and at the table is the foundation of the traditional Mediterranean diet, although olive oil quality is crucial, which makes it expensive. EVOO retains hydrophilic components of olives including highly bioactive polyphenols, which are believed to underlie many of EVOOs cardiometabolic benefits, such as reduced low-density lipoprotein cholesterol (LDL-C) and increased high-density lipoprotein cholesterol (HDL-C), improved vascular reactivity, enhanced HDL particle functionality, and a lower diabetes risk.     7.  Tree nuts, an integral component of the traditional Mediterranean diet, are nutrient dense rich in unsaturated fats, fiber, protein, polyphenols, phytosterols, and tocopherols. Nut consumption is associated with decreased incidence  and mortality rates from both CVD and coronary artery disease (CAD), as well as atrial fibrillation and diabetes. Randomized controlled trials have shown that diets enriched with nuts produce cardiometabolic benefits including improvements in insulin sensitivity, LDL-C, inflammation, and vascular reactivity. A 1-daily serving of mixed nuts resulted in a 28% reduction in CVD risk. Generous intake of nuts does not promote weight gain because of increased satiety and incomplete digestion.     8.  Legumes are an excellent source of vegetable protein, folate, and magnesium and fiber, and like other seeds including peanuts, are rich in polyphenols. Consumption of legumes has been linked to a reduced risk of incident and fatal CVD and CAD, as well as improvements in blood glucose, cholesterol, blood pressure, and body weight. Legumes, like fish, are a satiating and healthy substitute for red meat and processed meats.     9.  Dairy products and eggs are important sources of protein, nonsodium minerals, probiotics, and vitamin D. Although there is no clear consensus among nutrition experts on the role of dairy products in CVD risk, they are allowed in this Pesco-Mediterranean diet. Fermented low-fat versions, such as yogurt and soft cheeses, are preferred; butter and hard cheese are high in saturated fats and salt.     10.  Eggs are composed of beneficial nutrients including all essential amino acids, in addition to minerals (selenium, phosphorus, iodine, zinc), vitamins (A, D, B2, B12, niacin), and carotenoids (lutein, zeaxanthin). Although each yolk contains about 184 mg of dietary cholesterol, large prospective cohorts suggest that egg consumption is unrelated to serum cholesterol and does not increase CVD risk. Eggs are allowed in the Pesco-Mediterranean diet; egg whites are unlimited and preferably no more than 5 yolks/week.     11.  Whole grains, such as barley, whole oats, rye, corn, buckwheat, brown rice, and quinoa,  are an integral part of the traditional Mediterranean diet. Pasta is an example of a starchy food that has a low glycemic index despite being a refined carbohydrate. In the context of a low glycemic index dietary pattern such as the Mediterranean diet, pasta does not adversely affect adiposity and may even help reduce body weight and there is no evidence that pasta promotes cardiometabolic risk factors. White rice is associated with type 2 diabetes mellitus in Asians but not in Western cohorts, possibly because it is cooked and served plain in [place] and in Western cultures cooked in mixed dishes with vegetables and vegetable oil including EVOO.     12.  The staple beverage of the Pesco-Mediterranean diet is water--which can be flavored but not sweetened. Unsweetened tea and coffee are rich in antioxidants and are associated with improved CVD outcomes. If alcohol is consumed at all, dry red wine is recommended, with the ideal amount being a single glass (6 oz) for women and 1 or 2 glasses/day for men (6-12 oz) consumed with meals.     13.  Time-restricted eating, a type of intermittent fasting, is the practice of limiting the daily intake of calories to a window of time usually between 6-12 hours each day. Intermittent fasting when done on a regular basis has been shown to decrease intra-abdominal adipose tissue and reduce free-radical production. This elicits powerful cellular responses that improve glucose metabolism and reduce systemic inflammation, and may also reduce risks of diabetes, CVD, cancer, and neurodegenerative diseases. After a 12-hour overnight fast, insulin levels are typically low, and glycogen stores have been depleted. In this fasted state, the body starts mobilizing fatty acids from adipose cells to burn as metabolic fuel instead of glucose. This improves insulin sensitivity. Time-restricted eating is not more effective for weight loss than standard calorie-restriction, but appears to enhance CV  health even in nonobese people. Fasting may also lower blood pressure and resting heart rate and improve autonomic balance with augmented heart rate variability.     14.  The evidence regarding time-restricted eating is mostly based on animal models and observational human studies. The most popular form of time-restricted eating involves eating two rather than three meals and compressing the calorie-consumption window. No head-to-head studies have been performed to assess the optimal time window.

## 2022-01-26 LAB
POC ACTIVATED CLOTTING TIME K: 160 SEC (ref 74–137)
POC ACTIVATED CLOTTING TIME K: 214 SEC (ref 74–137)
POC ACTIVATED CLOTTING TIME K: 232 SEC (ref 74–137)
SAMPLE: ABNORMAL

## 2022-01-31 ENCOUNTER — OFFICE VISIT (OUTPATIENT)
Dept: INTERNAL MEDICINE | Facility: CLINIC | Age: 72
End: 2022-01-31
Payer: MEDICARE

## 2022-01-31 ENCOUNTER — PATIENT MESSAGE (OUTPATIENT)
Dept: INTERNAL MEDICINE | Facility: CLINIC | Age: 72
End: 2022-01-31

## 2022-01-31 VITALS
SYSTOLIC BLOOD PRESSURE: 130 MMHG | HEIGHT: 70 IN | HEART RATE: 57 BPM | DIASTOLIC BLOOD PRESSURE: 80 MMHG | WEIGHT: 184.88 LBS | OXYGEN SATURATION: 96 % | BODY MASS INDEX: 26.47 KG/M2 | TEMPERATURE: 99 F

## 2022-01-31 DIAGNOSIS — E11.9 TYPE 2 DIABETES MELLITUS WITHOUT COMPLICATION, WITHOUT LONG-TERM CURRENT USE OF INSULIN: ICD-10-CM

## 2022-01-31 DIAGNOSIS — I25.10 CORONARY ARTERY DISEASE INVOLVING NATIVE CORONARY ARTERY OF NATIVE HEART WITHOUT ANGINA PECTORIS: Primary | ICD-10-CM

## 2022-01-31 DIAGNOSIS — Z11.59 SCREENING FOR VIRAL DISEASE: Primary | ICD-10-CM

## 2022-01-31 DIAGNOSIS — E78.2 HYPERLIPIDEMIA, MIXED: ICD-10-CM

## 2022-01-31 DIAGNOSIS — I11.9 HYPERTENSIVE HEART DISEASE WITHOUT HEART FAILURE: ICD-10-CM

## 2022-01-31 PROCEDURE — 1111F DSCHRG MED/CURRENT MED MERGE: CPT | Mod: HCNC,CPTII,S$GLB, | Performed by: INTERNAL MEDICINE

## 2022-01-31 PROCEDURE — 3072F PR LOW RISK FOR RETINOPATHY: ICD-10-PCS | Mod: HCNC,CPTII,S$GLB, | Performed by: INTERNAL MEDICINE

## 2022-01-31 PROCEDURE — 1159F MED LIST DOCD IN RCRD: CPT | Mod: HCNC,CPTII,S$GLB, | Performed by: INTERNAL MEDICINE

## 2022-01-31 PROCEDURE — 1160F PR REVIEW ALL MEDS BY PRESCRIBER/CLIN PHARMACIST DOCUMENTED: ICD-10-PCS | Mod: HCNC,CPTII,S$GLB, | Performed by: INTERNAL MEDICINE

## 2022-01-31 PROCEDURE — 4010F ACE/ARB THERAPY RXD/TAKEN: CPT | Mod: HCNC,CPTII,S$GLB, | Performed by: INTERNAL MEDICINE

## 2022-01-31 PROCEDURE — 99214 PR OFFICE/OUTPT VISIT, EST, LEVL IV, 30-39 MIN: ICD-10-PCS | Mod: HCNC,S$GLB,, | Performed by: INTERNAL MEDICINE

## 2022-01-31 PROCEDURE — 99999 PR PBB SHADOW E&M-EST. PATIENT-LVL III: CPT | Mod: PBBFAC,HCNC,, | Performed by: INTERNAL MEDICINE

## 2022-01-31 PROCEDURE — 99999 PR PBB SHADOW E&M-EST. PATIENT-LVL III: ICD-10-PCS | Mod: PBBFAC,HCNC,, | Performed by: INTERNAL MEDICINE

## 2022-01-31 PROCEDURE — 4010F PR ACE/ARB THEARPY RXD/TAKEN: ICD-10-PCS | Mod: HCNC,CPTII,S$GLB, | Performed by: INTERNAL MEDICINE

## 2022-01-31 PROCEDURE — 1159F PR MEDICATION LIST DOCUMENTED IN MEDICAL RECORD: ICD-10-PCS | Mod: HCNC,CPTII,S$GLB, | Performed by: INTERNAL MEDICINE

## 2022-01-31 PROCEDURE — 3008F BODY MASS INDEX DOCD: CPT | Mod: HCNC,CPTII,S$GLB, | Performed by: INTERNAL MEDICINE

## 2022-01-31 PROCEDURE — 3008F PR BODY MASS INDEX (BMI) DOCUMENTED: ICD-10-PCS | Mod: HCNC,CPTII,S$GLB, | Performed by: INTERNAL MEDICINE

## 2022-01-31 PROCEDURE — 99214 OFFICE O/P EST MOD 30 MIN: CPT | Mod: HCNC,S$GLB,, | Performed by: INTERNAL MEDICINE

## 2022-01-31 PROCEDURE — 3075F SYST BP GE 130 - 139MM HG: CPT | Mod: HCNC,CPTII,S$GLB, | Performed by: INTERNAL MEDICINE

## 2022-01-31 PROCEDURE — 3075F PR MOST RECENT SYSTOLIC BLOOD PRESS GE 130-139MM HG: ICD-10-PCS | Mod: HCNC,CPTII,S$GLB, | Performed by: INTERNAL MEDICINE

## 2022-01-31 PROCEDURE — 1111F PR DISCHARGE MEDS RECONCILED W/ CURRENT OUTPATIENT MED LIST: ICD-10-PCS | Mod: HCNC,CPTII,S$GLB, | Performed by: INTERNAL MEDICINE

## 2022-01-31 PROCEDURE — 1160F RVW MEDS BY RX/DR IN RCRD: CPT | Mod: HCNC,CPTII,S$GLB, | Performed by: INTERNAL MEDICINE

## 2022-01-31 PROCEDURE — 3072F LOW RISK FOR RETINOPATHY: CPT | Mod: HCNC,CPTII,S$GLB, | Performed by: INTERNAL MEDICINE

## 2022-01-31 PROCEDURE — 1157F PR ADVANCE CARE PLAN OR EQUIV PRESENT IN MEDICAL RECORD: ICD-10-PCS | Mod: HCNC,CPTII,S$GLB, | Performed by: INTERNAL MEDICINE

## 2022-01-31 PROCEDURE — 3079F PR MOST RECENT DIASTOLIC BLOOD PRESSURE 80-89 MM HG: ICD-10-PCS | Mod: HCNC,CPTII,S$GLB, | Performed by: INTERNAL MEDICINE

## 2022-01-31 PROCEDURE — 1157F ADVNC CARE PLAN IN RCRD: CPT | Mod: HCNC,CPTII,S$GLB, | Performed by: INTERNAL MEDICINE

## 2022-01-31 PROCEDURE — 3079F DIAST BP 80-89 MM HG: CPT | Mod: HCNC,CPTII,S$GLB, | Performed by: INTERNAL MEDICINE

## 2022-01-31 NOTE — TELEPHONE ENCOUNTER
Added COVID antibody test.  Please link to lab orders.    As for HDL, it is preferable to have it as high as possible.  Exercise is the most reliable tool to increase that number.

## 2022-01-31 NOTE — PROGRESS NOTES
[facility] Note    Chief Complaint      Chief Complaint   Patient presents with    Hospital Follow Up       History of Present Illness      Bandar French is a 71 y.o. male with chronic conditions of CAD, HTN, HLD, DM2, osteoarthritis who presents today for: hospital follow up.  Pt presented to [facility] on 1/20/2022.  Prior to presentation, started with complaints of chest pain radiating to neck and jaw after playing a round of golf.  Resolved with ASA then recurred following day after exercise.  Had angiogram with PCI x2 and resolution of symptoms.  Added plavix, continued aspirin (DAPT until 1/2023).  Increased crestor and increased irbesartan.  Has seen Dr. Alvarado since discharge and no changes made.    DM2: A1C 6.2.  Diet controlled.  Eye exam UTD with Dr. Shannon.  HTN: BP at goal on irbesartan.   CAD: Sees Dr. Cueva.  On ASA, irbesartan, crestor.  Denies CP, SOB.  HLD: Controlled on crestor.  LDL 50.     Flu shot 2020. Prevnar 2015.  Pneumovax UTD.  Zostavax 2013.  Shingrix 1/2 UTD.  COVID UTD.  Cscope Dr. Copeland, 2016, no polyps, 10 yr interval.    Past Medical History:  Past Medical History:   Diagnosis Date    Depression     Glaucoma        Past Surgical History:   has a past surgical history that includes Knee arthroscopy (Left, 1967); Knee arthroscopy (Right, 2012); Hand surgery (Right, 12/2004); Coronary stent placement (2014); Tonsillectomy; Total knee arthroplasty (Right, 7/22/2019); Adenoidectomy; Cosmetic surgery (Bilateral, 06/2019); Eye surgery (Left, 03/2019); Joint replacement (Right, 7/22/19); Left heart catheterization (Left, 1/20/2022); and Coronary angiography (N/A, 1/20/2022).    Family History:  family history includes Aneurysm in his father; Dementia in his mother; Diabetes in his mother; Heart disease in his brother; Migraines in his sister; No Known Problems in his daughter, son, and son; Other in his brother and son; Pneumonia in his brother.     Social  History:  Social History     Tobacco Use    Smoking status: Former Smoker     Packs/day: 2.00     Years: 20.00     Pack years: 40.00     Types: Cigarettes, Cigars     Start date: 1968     Quit date:      Years since quittin.1    Smokeless tobacco: Never Used   Substance Use Topics    Alcohol use: Not Currently     Alcohol/week: 0.0 standard drinks     Comment: Discontinued     Drug use: Never       I personally reviewed all past medical, surgical, social and family history.    Review of Systems   Constitutional: Negative for chills, fever and malaise/fatigue.   Respiratory: Negative for shortness of breath.    Cardiovascular: Negative for chest pain.   Gastrointestinal: Negative for constipation, diarrhea, nausea and vomiting.   Skin: Negative for rash.   Neurological: Negative for weakness.   All other systems reviewed and are negative.       Medications:  Outpatient Encounter Medications as of 2022   Medication Sig Dispense Refill    ascorbic acid, vitamin C, (VITAMIN C) 500 MG tablet Take 500 mg by mouth once daily.      aspirin 81 MG Chew Take 1 tablet (81 mg total) by mouth once daily. 30 tablet 11    chlorpheniramine (CHLOR-TRIMETON) 4 mg tablet Take 4 mg by mouth as needed.       cholecalciferol, vitamin D3, 125 mcg (5,000 unit) Tab Take 1,000 Units by mouth once daily.      clopidogreL (PLAVIX) 75 mg tablet Take 1 tablet (75 mg total) by mouth once daily. 30 tablet 11    dorzolamide-timolol 2-0.5% (COSOPT) 22.3-6.8 mg/mL ophthalmic solution Place 1 drop into both eyes 2 (two) times daily.      fluticasone propionate (FLONASE) 50 mcg/actuation nasal spray 2 sprays (100 mcg total) by Each Nostril route once daily. 16 g 11    folic acid/multivit-min/lutein (CENTRUM SILVER ORAL) Take by mouth once daily.      irbesartan (AVAPRO) 300 MG tablet Take 1 tablet (300 mg total) by mouth every evening. 90 tablet 1    latanoprost 0.005 % ophthalmic solution Place 1 drop into both eyes  every evening.      nitroGLYCERIN (NITROSTAT) 0.4 MG SL tablet Place 1 tablet (0.4 mg total) under the tongue every 5 (five) minutes as needed for Chest pain. 25 tablet 11    rosuvastatin (CRESTOR) 40 MG Tab Take 1 tablet (40 mg total) by mouth every evening. 90 tablet 3    vit C-E-zinc ox-dayron-lut-zeax (ICAPS AREDS2) 250 mg-200 unit -12.5 mg-1 mg Cap Take 1 capsule by mouth once daily. Take 2 capsules by mouth daily      zinc 50 mg Tab Take 50 mg by mouth Daily.      blood sugar diagnostic (BLOOD GLUCOSE TEST) Strp 1 each by Misc.(Non-Drug; Combo Route) route once daily. (Patient not taking: Reported on 1/31/2022) 30 each 11    blood-glucose meter,continuous (DEXCOM G6 ) Misc Use as directed (Patient not taking: Reported on 1/31/2022) 1 each 11    blood-glucose sensor (DEXCOM G6 SENSOR) Joanne Use as directed (Patient not taking: Reported on 1/31/2022) 10 each 11    blood-glucose transmitter (DEXCOM G6 TRANSMITTER) Joanne Use as directed (Patient not taking: Reported on 1/31/2022) 1 each 3    MICRO THIN LANCETS 33 gauge Misc        No facility-administered encounter medications on file as of 1/31/2022.       Allergies:  Review of patient's allergies indicates:  No Known Allergies    Health Maintenance:  Immunization History   Administered Date(s) Administered    COVID-19, MRNA, LN-S, PF (MODERNA FULL 0.5 ML DOSE) 03/10/2021, 03/10/2021, 04/12/2021    Influenza (FLUAD) - Quadrivalent - Adjuvanted - PF *Preferred* (65+) 09/16/2020    Influenza - High Dose - PF (65 years and older) 09/25/2019    Influenza - Quadrivalent - High Dose - PF (65 years and older) 10/21/2021    Pneumococcal Polysaccharide - 23 Valent 09/25/2019    Tdap 10/21/2021    Zoster 01/02/2013    Zoster Recombinant 10/14/2020, 05/10/2021      Health Maintenance   Topic Date Due    Foot Exam  09/16/2021    Hemoglobin A1c  03/14/2022    Lipid Panel  09/14/2022    Eye Exam  09/27/2022    High Dose Statin  01/25/2023    TETANUS  "VACCINE  10/21/2031    Hepatitis C Screening  Completed    Abdominal Aortic Aneurysm Screening  Completed        Physical Exam      Vital Signs  Temp: 98.5 °F (36.9 °C)  Temp src: Oral  Pulse: (!) 57  SpO2: 96 %  BP: 130/80  BP Location: Right arm  Patient Position: Sitting  Height and Weight  Height: 5' 10" (177.8 cm)  Weight: 83.8 kg (184 lb 13.7 oz)  BSA (Calculated - sq m): 2.03 sq meters  BMI (Calculated): 26.5  Weight in (lb) to have BMI = 25: 173.9]    Physical Exam  Vitals reviewed.   Constitutional:       Appearance: He is well-developed and well-nourished.   HENT:      Head: Normocephalic and atraumatic.      Right Ear: External ear normal.      Left Ear: External ear normal.      Mouth/Throat:      Mouth: Oropharynx is clear and moist.   Cardiovascular:      Rate and Rhythm: Normal rate and regular rhythm.      Heart sounds: Normal heart sounds. No murmur heard.      Pulmonary:      Effort: Pulmonary effort is normal.      Breath sounds: Normal breath sounds. No wheezing or rales.   Abdominal:      General: Bowel sounds are normal.      Palpations: Abdomen is soft.          Laboratory:  CBC:  Recent Labs   Lab 07/23/19  0602 07/23/19  0602 01/20/22  0904   WBC 8.73   < > 8.27   RBC 4.31 L   < > 4.76   Hemoglobin 12.6 L   < > 14.1   Hematocrit 37.6 L   < > 40.7   Platelets 161   < > 190   MCV 87   < > 86   MCH 29.2   < > 29.6   MCHC 33.5  --  34.6    < > = values in this interval not displayed.     CMP:  Recent Labs   Lab 09/14/21  0752 09/14/21  0752 01/20/22  0904   Glucose 118 H   < > 132 H   Calcium 9.0   < > 9.0   Albumin 4.0   < > 4.2   Total Protein 6.8   < > 6.8   Sodium 142   < > 139   Potassium 4.3   < > 4.1   CO2 25   < > 21 L   Chloride 108   < > 108   BUN 20   < > 20   Alkaline Phosphatase 62   < > 61   ALT 34   < > 22   AST 19   < > 15   Total Bilirubin 0.9  --  0.8    < > = values in this interval not displayed.     URINALYSIS:  Recent Labs   Lab 07/10/19  0844   Color, UA Yellow "   Specific Gravity, UA 1.010   pH, UA 6.0   Protein, UA Negative   Nitrite, UA Negative   Leukocytes, UA Negative   Urobilinogen, UA Negative      LIPIDS:  Recent Labs   Lab 08/19/20  0000 09/14/21  0752   HDL 31 28 L   Cholesterol 113 99 L   Triglycerides  --  103   LDL Cholesterol 61 50.4 L   HDL/Cholesterol Ratio  --  28.3   Non-HDL Cholesterol  --  71   Total Cholesterol/HDL Ratio  --  3.5     TSH:      A1C:  Recent Labs   Lab 11/26/19  1057 08/26/20  0921 02/26/21  0954 09/14/21  0752   Hemoglobin A1C 6.8 H 6.1 H 5.8 H 6.2 H       Assessment/Plan     Bandar French is a 71 y.o.male with:    1. Coronary artery disease involving native coronary artery of native heart without angina pectoris  Continue current meds.  F/U with Dr. Alvarado  2. Hypertensive heart disease without heart failure  Continue current meds.    3. Type 2 diabetes mellitus without complication, without long-term current use of insulin  - Microalbumin/Creatinine Ratio, Urine; Future  - Hemoglobin A1C; Future  Continue current meds.  Update labs   4. Hyperlipidemia, mixed  Continue current meds.      Chronic conditions status updated as per HPI.  Other than changes above, cont current medications and maintain follow up with specialists.  Follow up for next visit as already scheduled.    Future Appointments   Date Time Provider Department Center   4/6/2022  7:00 AM LAB, [facility] [place]   4/6/2022 10:00 AM Heather You MD [facility] Timothy Rangel MD  [facility]

## 2022-02-01 ENCOUNTER — PATIENT MESSAGE (OUTPATIENT)
Dept: INTERNAL MEDICINE | Facility: CLINIC | Age: 72
End: 2022-02-01
Payer: MEDICARE

## 2022-02-04 ENCOUNTER — PATIENT MESSAGE (OUTPATIENT)
Dept: CARDIOLOGY | Facility: CLINIC | Age: 72
End: 2022-02-04
Payer: MEDICARE

## 2022-02-07 NOTE — ANESTHESIA POSTPROCEDURE EVALUATION
Anesthesia Post Evaluation    Patient: Bandar French    Procedure(s) Performed: Procedure(s) (LRB):  ARTHROPLASTY, KNEE, TOTAL (Right)    Final Anesthesia Type: spinal  Patient location during evaluation: PACU  Patient participation: Yes- Able to Participate  Level of consciousness: awake and alert  Post-procedure vital signs: reviewed and stable  Pain management: adequate  Airway patency: patent  PONV status at discharge: No PONV  Anesthetic complications: no      Cardiovascular status: blood pressure returned to baseline and hemodynamically stable  Respiratory status: unassisted, spontaneous ventilation and nasal cannula  Hydration status: euvolemic  Follow-up not needed.          Vitals Value Taken Time   /65 7/22/2019 12:40 PM   Temp 36.4 °C (97.6 °F) 7/22/2019 12:40 PM   Pulse 52 7/22/2019 12:40 PM   Resp 16 7/22/2019 12:40 PM   SpO2 97 % 7/22/2019 12:40 PM         Event Time     Out of Recovery 12:20:00          Pain/Holden Score: Pain Rating Prior to Med Admin: 3 (7/22/2019  1:08 PM)  Holden Score: 9 (7/22/2019  1:00 PM)         4

## 2022-02-14 ENCOUNTER — PATIENT MESSAGE (OUTPATIENT)
Dept: ADMINISTRATIVE | Facility: OTHER | Age: 72
End: 2022-02-14
Payer: MEDICARE

## 2022-02-16 ENCOUNTER — PATIENT MESSAGE (OUTPATIENT)
Dept: CARDIOLOGY | Facility: CLINIC | Age: 72
End: 2022-02-16
Payer: MEDICARE

## 2022-02-16 ENCOUNTER — PATIENT MESSAGE (OUTPATIENT)
Dept: INTERNAL MEDICINE | Facility: CLINIC | Age: 72
End: 2022-02-16
Payer: MEDICARE

## 2022-02-18 PROCEDURE — 99454 REM MNTR PHYSIOL PARAM 16-30: CPT | Mod: S$GLB,,, | Performed by: INTERNAL MEDICINE

## 2022-02-18 PROCEDURE — 99454 PR REMOTE MNTR, PHYS PARAM, INITIAL, EA 30 DAYS: ICD-10-PCS | Mod: S$GLB,,, | Performed by: INTERNAL MEDICINE

## 2022-02-21 ENCOUNTER — PATIENT MESSAGE (OUTPATIENT)
Dept: INTERNAL MEDICINE | Facility: CLINIC | Age: 72
End: 2022-02-21
Payer: MEDICARE

## 2022-02-21 DIAGNOSIS — I10 ESSENTIAL HYPERTENSION: ICD-10-CM

## 2022-02-21 RX ORDER — IRBESARTAN 300 MG/1
300 TABLET ORAL NIGHTLY
Qty: 90 TABLET | Refills: 1 | Status: SHIPPED | OUTPATIENT
Start: 2022-02-21 | End: 2022-02-27 | Stop reason: SDUPTHER

## 2022-02-21 NOTE — TELEPHONE ENCOUNTER
No new care gaps identified.  Powered by [employer]. Reference number: [id number].   2/21/2022 9:23:14 AM CST

## 2022-02-22 ENCOUNTER — PATIENT MESSAGE (OUTPATIENT)
Dept: INTERNAL MEDICINE | Facility: CLINIC | Age: 72
End: 2022-02-22
Payer: MEDICARE

## 2022-02-22 NOTE — TELEPHONE ENCOUNTER
Spoke to Eloise at [facility] they have everything corrected and will be sent out today or tomorrow.

## 2022-02-27 DIAGNOSIS — I10 ESSENTIAL HYPERTENSION: ICD-10-CM

## 2022-02-27 NOTE — TELEPHONE ENCOUNTER
No new care gaps identified.  Powered by [employer]. Reference number: [id number].   2/27/2022 3:55:35 PM CST

## 2022-02-28 ENCOUNTER — PATIENT MESSAGE (OUTPATIENT)
Dept: INTERNAL MEDICINE | Facility: CLINIC | Age: 72
End: 2022-02-28
Payer: MEDICARE

## 2022-02-28 RX ORDER — IRBESARTAN 300 MG/1
300 TABLET ORAL NIGHTLY
Qty: 90 TABLET | Refills: 1 | Status: SHIPPED | OUTPATIENT
Start: 2022-02-28 | End: 2022-09-18

## 2022-02-28 NOTE — TELEPHONE ENCOUNTER
----- Message from Lindsay Jean sent at 2/28/2022  8:12 AM CST -----  Type:  RX Refill Request    Who Called: pt  Refill or New Rx:refill  RX Name and Strength:irbesartan (AVAPRO) 300 MG tablet  How is the patient currently taking it? (ex. 1XDay):Take 1 tablet (300 mg total) by mouth every evening  Is this a 30 day or 90 day RX:90  Preferred Pharmacy with phone number:[id number] - [address]  [address] AT [facility] [address]   Phone: [phone]  Fax:  [phone]        Local or Mail Order:local  Ordering Provider:Dr Rangel  Would the patient rather a call back or a response via [facility]? Please call  Best Call Back Number:[phone]   Additional Information: pt needs some to hold him to Thursday that is when mail order will be in

## 2022-03-06 ENCOUNTER — PATIENT MESSAGE (OUTPATIENT)
Dept: OTHER | Facility: OTHER | Age: 72
End: 2022-03-06
Payer: MEDICARE

## 2022-03-09 ENCOUNTER — TELEPHONE (OUTPATIENT)
Dept: CARDIOLOGY | Facility: CLINIC | Age: 72
End: 2022-03-09
Payer: MEDICARE

## 2022-03-09 NOTE — TELEPHONE ENCOUNTER
----- Message from Pepe Reyna sent at 3/9/2022  1:08 PM CST -----  Regarding: Call back  Pt called and stated that the bleeding had stopped and they decided not to go to Urgent care.  Pt would like a visit sooner than April 1 to discuss Plavix.  PT can be reached @ [phone].      Thanks

## 2022-03-11 ENCOUNTER — OFFICE VISIT (OUTPATIENT)
Dept: CARDIOLOGY | Facility: CLINIC | Age: 72
End: 2022-03-11
Payer: MEDICARE

## 2022-03-11 VITALS
SYSTOLIC BLOOD PRESSURE: 142 MMHG | DIASTOLIC BLOOD PRESSURE: 73 MMHG | BODY MASS INDEX: 26.86 KG/M2 | HEART RATE: 50 BPM | HEIGHT: 70 IN | WEIGHT: 187.63 LBS

## 2022-03-11 DIAGNOSIS — I10 ESSENTIAL HYPERTENSION: Chronic | ICD-10-CM

## 2022-03-11 DIAGNOSIS — R04.0 EPISTAXIS: Primary | ICD-10-CM

## 2022-03-11 DIAGNOSIS — Z95.5 PRESENCE OF STENT IN CORONARY ARTERY: Chronic | ICD-10-CM

## 2022-03-11 DIAGNOSIS — E78.2 HYPERLIPIDEMIA, MIXED: Chronic | ICD-10-CM

## 2022-03-11 DIAGNOSIS — I25.10 CORONARY ARTERY DISEASE INVOLVING NATIVE CORONARY ARTERY OF NATIVE HEART WITHOUT ANGINA PECTORIS: Chronic | ICD-10-CM

## 2022-03-11 PROCEDURE — 3072F LOW RISK FOR RETINOPATHY: CPT | Mod: CPTII,S$GLB,, | Performed by: INTERNAL MEDICINE

## 2022-03-11 PROCEDURE — 1159F MED LIST DOCD IN RCRD: CPT | Mod: CPTII,S$GLB,, | Performed by: INTERNAL MEDICINE

## 2022-03-11 PROCEDURE — 99999 PR PBB SHADOW E&M-EST. PATIENT-LVL IV: ICD-10-PCS | Mod: PBBFAC,,, | Performed by: INTERNAL MEDICINE

## 2022-03-11 PROCEDURE — 99999 PR PBB SHADOW E&M-EST. PATIENT-LVL IV: CPT | Mod: PBBFAC,,, | Performed by: INTERNAL MEDICINE

## 2022-03-11 PROCEDURE — 3078F PR MOST RECENT DIASTOLIC BLOOD PRESSURE < 80 MM HG: ICD-10-PCS | Mod: CPTII,S$GLB,, | Performed by: INTERNAL MEDICINE

## 2022-03-11 PROCEDURE — 3077F SYST BP >= 140 MM HG: CPT | Mod: CPTII,S$GLB,, | Performed by: INTERNAL MEDICINE

## 2022-03-11 PROCEDURE — 4010F ACE/ARB THERAPY RXD/TAKEN: CPT | Mod: CPTII,S$GLB,, | Performed by: INTERNAL MEDICINE

## 2022-03-11 PROCEDURE — 1126F PR PAIN SEVERITY QUANTIFIED, NO PAIN PRESENT: ICD-10-PCS | Mod: CPTII,S$GLB,, | Performed by: INTERNAL MEDICINE

## 2022-03-11 PROCEDURE — 3008F PR BODY MASS INDEX (BMI) DOCUMENTED: ICD-10-PCS | Mod: CPTII,S$GLB,, | Performed by: INTERNAL MEDICINE

## 2022-03-11 PROCEDURE — 1160F RVW MEDS BY RX/DR IN RCRD: CPT | Mod: CPTII,S$GLB,, | Performed by: INTERNAL MEDICINE

## 2022-03-11 PROCEDURE — 1160F PR REVIEW ALL MEDS BY PRESCRIBER/CLIN PHARMACIST DOCUMENTED: ICD-10-PCS | Mod: CPTII,S$GLB,, | Performed by: INTERNAL MEDICINE

## 2022-03-11 PROCEDURE — 3077F PR MOST RECENT SYSTOLIC BLOOD PRESSURE >= 140 MM HG: ICD-10-PCS | Mod: CPTII,S$GLB,, | Performed by: INTERNAL MEDICINE

## 2022-03-11 PROCEDURE — 1159F PR MEDICATION LIST DOCUMENTED IN MEDICAL RECORD: ICD-10-PCS | Mod: CPTII,S$GLB,, | Performed by: INTERNAL MEDICINE

## 2022-03-11 PROCEDURE — 1101F PT FALLS ASSESS-DOCD LE1/YR: CPT | Mod: CPTII,S$GLB,, | Performed by: INTERNAL MEDICINE

## 2022-03-11 PROCEDURE — 3072F PR LOW RISK FOR RETINOPATHY: ICD-10-PCS | Mod: CPTII,S$GLB,, | Performed by: INTERNAL MEDICINE

## 2022-03-11 PROCEDURE — 1157F ADVNC CARE PLAN IN RCRD: CPT | Mod: CPTII,S$GLB,, | Performed by: INTERNAL MEDICINE

## 2022-03-11 PROCEDURE — 3288F FALL RISK ASSESSMENT DOCD: CPT | Mod: CPTII,S$GLB,, | Performed by: INTERNAL MEDICINE

## 2022-03-11 PROCEDURE — 1126F AMNT PAIN NOTED NONE PRSNT: CPT | Mod: CPTII,S$GLB,, | Performed by: INTERNAL MEDICINE

## 2022-03-11 PROCEDURE — 4010F PR ACE/ARB THEARPY RXD/TAKEN: ICD-10-PCS | Mod: CPTII,S$GLB,, | Performed by: INTERNAL MEDICINE

## 2022-03-11 PROCEDURE — 99499 RISK ADDL DX/OHS AUDIT: ICD-10-PCS | Mod: HCNC,S$GLB,, | Performed by: INTERNAL MEDICINE

## 2022-03-11 PROCEDURE — 3008F BODY MASS INDEX DOCD: CPT | Mod: CPTII,S$GLB,, | Performed by: INTERNAL MEDICINE

## 2022-03-11 PROCEDURE — 3078F DIAST BP <80 MM HG: CPT | Mod: CPTII,S$GLB,, | Performed by: INTERNAL MEDICINE

## 2022-03-11 PROCEDURE — 99499 UNLISTED E&M SERVICE: CPT | Mod: HCNC,S$GLB,, | Performed by: INTERNAL MEDICINE

## 2022-03-11 PROCEDURE — 1157F PR ADVANCE CARE PLAN OR EQUIV PRESENT IN MEDICAL RECORD: ICD-10-PCS | Mod: CPTII,S$GLB,, | Performed by: INTERNAL MEDICINE

## 2022-03-11 PROCEDURE — 99214 PR OFFICE/OUTPT VISIT, EST, LEVL IV, 30-39 MIN: ICD-10-PCS | Mod: S$GLB,,, | Performed by: INTERNAL MEDICINE

## 2022-03-11 PROCEDURE — 1101F PR PT FALLS ASSESS DOC 0-1 FALLS W/OUT INJ PAST YR: ICD-10-PCS | Mod: CPTII,S$GLB,, | Performed by: INTERNAL MEDICINE

## 2022-03-11 PROCEDURE — 99214 OFFICE O/P EST MOD 30 MIN: CPT | Mod: S$GLB,,, | Performed by: INTERNAL MEDICINE

## 2022-03-11 PROCEDURE — 3288F PR FALLS RISK ASSESSMENT DOCUMENTED: ICD-10-PCS | Mod: CPTII,S$GLB,, | Performed by: INTERNAL MEDICINE

## 2022-03-11 RX ORDER — AMLODIPINE BESYLATE 2.5 MG/1
2.5 TABLET ORAL NIGHTLY
Qty: 30 TABLET | Refills: 11 | Status: SHIPPED | OUTPATIENT
Start: 2022-03-11 | End: 2022-03-11

## 2022-03-11 RX ORDER — AMLODIPINE BESYLATE 2.5 MG/1
2.5 TABLET ORAL NIGHTLY
Qty: 30 TABLET | Refills: 11 | Status: ON HOLD | OUTPATIENT
Start: 2022-03-11 | End: 2022-03-14 | Stop reason: SDUPTHER

## 2022-03-11 NOTE — PATIENT INSTRUCTIONS
"To try to reduce likelihood of recurrent nose bleeds, optimization of blood pressures will be pursued with the addition of amlodipine 2.5 mg every night.    Unfortunately, continuation of aspirin and Plavix for up to year would be optimal after acute coronary syndrome and coronary stent placement.    Avoid things that would dry your nose out like decongestants or antihistamines.  Use ocean nasal spray at least twice a day.  See an ENT for evaluation and recommendations.    If nose bleeds continue be a problem, we may have no choice but to discontinue clopidogrel in the future.        I recommend the book, "The Obesity Code" for weight loss; it recommends intermittent fasting and avoidance of sugar, artificial sweeteners and refined carbohydrates.    Also, here is information on a Mediterranean type diet including fish, the pesco-mediterranean diet from the American College of Cardiology:    1.  Humans are evolutionarily adapted to obtain calories and nutrients from both plant and animal food sources. Many people overconsume animal products, often-processed meats high in saturated fats and chemical additives. In contrast, while strict veganism has gained popularity for many reasons and has value in certain groups, it can cause nutritional deficiencies (vitamin B12, high-quality proteins, iron, zinc, omega-3 fatty acid, vitamin D, and calcium), and predispose to osteopenia, loss of muscle mass, and anemia. This is not true of a lacto-ovo vegetarian diet, which allows no animal-based food except for eggs and dairy. A 6-year study of [id number] [place] [employer] reported a decreased incidence of all-cause mortality when comparing vegetarians with nonvegetarians. However, when the vegetarians were stratified into vegans, lacto-ovo vegetarians, pesco-vegetarians, and semi-vegetarians, the pesco-vegetarians had lowest risks for all-cause mortality, cardiovascular disease (CVD) mortality, and mortality from other " causes.     2.  The authors propose a plant-rich diet rich in nuts with fish and seafood as the principle source of animal food. Known as the Pesco-Mediterranean diet, it is supplemented with extra-virgin olive oil (EVOO), which is the principle fat source, along with moderate amounts of dairy (particularly yogurt and cheese) and eggs, as well as modest amounts of alcohol consumption (ideally red wine with the evening meal), but few red and processed meats.     3.  Both epidemiological studies and randomized clinical trials indicate that the traditional Mediterranean diet is associated with lower risks for all-cause and CVD mortality, coronary heart disease, metabolic syndrome, diabetes, cognitive decline, neurodegenerative diseases (including Alzheimers), depression, overall cancer mortality, and breast and colorectal cancers.     4.  The traditional Mediterranean diet has been endorsed in the most recent Dietary Guidelines for Americans and the American College of Cardiology/American Heart Association guidelines. The 2020 [employer] ranked it #1 for overall health based upon it being nutritious, safe, relatively easy to follow, protective against CVD and diabetes, and effective for weight loss.     5.  Fish and seafood are important sources of vitamins protein and omega-3 fatty acids, of which the higher blood and adipose tissue are associated with reduced fatal and nonfatal myocardial infarction. When not fried, fish consumption has been associated with reduced risk of heart failure, and the incidence of the metabolic syndrome, coronary heart disease, ischemic stroke, and sudden cardiac death, particularly when seafood replaces less healthy foods.     6.  Unrestricted use of olive oil in the kitchen, on salads (with vinegar), cooking vegetables, and at the table is the foundation of the traditional Mediterranean diet, although olive oil quality is crucial, which makes it expensive. EVOO retains  hydrophilic components of olives including highly bioactive polyphenols, which are believed to underlie many of EVOOs cardiometabolic benefits, such as reduced low-density lipoprotein cholesterol (LDL-C) and increased high-density lipoprotein cholesterol (HDL-C), improved vascular reactivity, enhanced HDL particle functionality, and a lower diabetes risk.     7.  Tree nuts, an integral component of the traditional Mediterranean diet, are nutrient dense rich in unsaturated fats, fiber, protein, polyphenols, phytosterols, and tocopherols. Nut consumption is associated with decreased incidence and mortality rates from both CVD and coronary artery disease (CAD), as well as atrial fibrillation and diabetes. Randomized controlled trials have shown that diets enriched with nuts produce cardiometabolic benefits including improvements in insulin sensitivity, LDL-C, inflammation, and vascular reactivity. A 1-daily serving of mixed nuts resulted in a 28% reduction in CVD risk. Generous intake of nuts does not promote weight gain because of increased satiety and incomplete digestion.     8.  Legumes are an excellent source of vegetable protein, folate, and magnesium and fiber, and like other seeds including peanuts, are rich in polyphenols. Consumption of legumes has been linked to a reduced risk of incident and fatal CVD and CAD, as well as improvements in blood glucose, cholesterol, blood pressure, and body weight. Legumes, like fish, are a satiating and healthy substitute for red meat and processed meats.     9.  Dairy products and eggs are important sources of protein, nonsodium minerals, probiotics, and vitamin D. Although there is no clear consensus among nutrition experts on the role of dairy products in CVD risk, they are allowed in this Pesco-Mediterranean diet. Fermented low-fat versions, such as yogurt and soft cheeses, are preferred; butter and hard cheese are high in saturated fats and salt.     10.  Eggs are  composed of beneficial nutrients including all essential amino acids, in addition to minerals (selenium, phosphorus, iodine, zinc), vitamins (A, D, B2, B12, niacin), and carotenoids (lutein, zeaxanthin). Although each yolk contains about 184 mg of dietary cholesterol, large prospective cohorts suggest that egg consumption is unrelated to serum cholesterol and does not increase CVD risk. Eggs are allowed in the Pesco-Mediterranean diet; egg whites are unlimited and preferably no more than 5 yolks/week.     11.  Whole grains, such as barley, whole oats, rye, corn, buckwheat, brown rice, and quinoa, are an integral part of the traditional Mediterranean diet. Pasta is an example of a starchy food that has a low glycemic index despite being a refined carbohydrate. In the context of a low glycemic index dietary pattern such as the Mediterranean diet, pasta does not adversely affect adiposity and may even help reduce body weight and there is no evidence that pasta promotes cardiometabolic risk factors. White rice is associated with type 2 diabetes mellitus in Asians but not in Western cohorts, possibly because it is cooked and served plain in [place] and in Western cultures cooked in mixed dishes with vegetables and vegetable oil including EVOO.     12.  The staple beverage of the Pesco-Mediterranean diet is water--which can be flavored but not sweetened. Unsweetened tea and coffee are rich in antioxidants and are associated with improved CVD outcomes. If alcohol is consumed at all, dry red wine is recommended, with the ideal amount being a single glass (6 oz) for women and 1 or 2 glasses/day for men (6-12 oz) consumed with meals.     13.  Time-restricted eating, a type of intermittent fasting, is the practice of limiting the daily intake of calories to a window of time usually between 6-12 hours each day. Intermittent fasting when done on a regular basis has been shown to decrease intra-abdominal adipose tissue and reduce  free-radical production. This elicits powerful cellular responses that improve glucose metabolism and reduce systemic inflammation, and may also reduce risks of diabetes, CVD, cancer, and neurodegenerative diseases. After a 12-hour overnight fast, insulin levels are typically low, and glycogen stores have been depleted. In this fasted state, the body starts mobilizing fatty acids from adipose cells to burn as metabolic fuel instead of glucose. This improves insulin sensitivity. Time-restricted eating is not more effective for weight loss than standard calorie-restriction, but appears to enhance CV health even in nonobese people. Fasting may also lower blood pressure and resting heart rate and improve autonomic balance with augmented heart rate variability.     14.  The evidence regarding time-restricted eating is mostly based on animal models and observational human studies. The most popular form of time-restricted eating involves eating two rather than three meals and compressing the calorie-consumption window. No head-to-head studies have been performed to assess the optimal time window.

## 2022-03-11 NOTE — PROGRESS NOTES
Subjective:   03/11/2022     Patient ID:  Bandar French is a 71 y.o. male who presents for evaulation of nose bleeds      Patient comes in for recommendations, he has had 2 episodes of epistaxis, see fairly severe with clots.  He wonders whether he could get off of clopidogrel.  We discussed the fact that he had had a acute coronary syndrome and at January of 2022. Recommendations would be to continue aspirin and clopidogrel for 1 year.  He has been using a decongestant, I have asked him to stop that.  Blood pressure will be optimized with the addition of amlodipine.  I would add a beta-blocker, but he tends to have bradycardia already.    He does have coronary artery disease manifested by coronary stenting in the circumflex distribution in 2014. Cardiac catheterization in 2017 showed no significant stenoses.  Echocardiography showed left hypertrophy with normal ejection fraction.       Prior note:  He developed unstable symptoms and underwent cardiac catheterization on 01/20/2022:    · There was two vessel coronary artery disease.  · Procedure done for unstable angina  · The PCI was successful.  · The 1st Mrg lesion was 99% stenosed with 0% stenosis post-intervention.  · A STENT RESOLUTE VICKY [id number] stent was successfully placed. Post dilated with 3.0 NC balloon at high pressure. IVUS guided  · The Prox RCA lesion was 70% stenosed with 0% stenosis post-intervention.  · A STENT RESOLUTE VICKY 3.5X22MM stent was successfully placed . Post dilated with 3.5 NC balloon. IVUS guided.  · The Prox LAD to Mid LAD lesion was 50% stenosed. Medical treatment at this time.  · The estimated blood loss was none.     - ASA/Plavix  - High intense statin   - Avoid BB due to bradycardia  - Medical tx for residual LAD disease. If symptomatic as outpatient consider non invasive evaluation versus physiologic evaluation.   - F/U with Dr. Alvarado              Past Medical History:   Diagnosis Date    Depression     Glaucoma         Review of patient's allergies indicates:  No Known Allergies      Current Outpatient Medications:     ascorbic acid, vitamin C, (VITAMIN C) 500 MG tablet, Take 500 mg by mouth once daily., Disp: , Rfl:     aspirin 81 MG Chew, Take 1 tablet (81 mg total) by mouth once daily., Disp: 30 tablet, Rfl: 11    blood sugar diagnostic (BLOOD GLUCOSE TEST) Strp, 1 each by Misc.(Non-Drug; Combo Route) route once daily., Disp: 30 each, Rfl: 11    blood-glucose meter,continuous (DEXCOM G6 ) Misc, Use as directed, Disp: 1 each, Rfl: 11    blood-glucose sensor (DEXCOM G6 SENSOR) Joanne, Use as directed, Disp: 10 each, Rfl: 11    blood-glucose transmitter (DEXCOM G6 TRANSMITTER) Joanne, Use as directed, Disp: 1 each, Rfl: 3    chlorpheniramine (CHLOR-TRIMETON) 4 mg tablet, Take 4 mg by mouth as needed. , Disp: , Rfl:     cholecalciferol, vitamin D3, 125 mcg (5,000 unit) Tab, Take 1,000 Units by mouth once daily., Disp: , Rfl:     clopidogreL (PLAVIX) 75 mg tablet, Take 1 tablet (75 mg total) by mouth once daily., Disp: 90 tablet, Rfl: 3    dorzolamide-timolol 2-0.5% (COSOPT) 22.3-6.8 mg/mL ophthalmic solution, Place 1 drop into both eyes 2 (two) times daily., Disp: , Rfl:     fluticasone propionate (FLONASE) 50 mcg/actuation nasal spray, 2 sprays (100 mcg total) by Each Nostril route once daily., Disp: 16 g, Rfl: 11    folic acid/multivit-min/lutein (CENTRUM SILVER ORAL), Take by mouth once daily., Disp: , Rfl:     irbesartan (AVAPRO) 300 MG tablet, Take 1 tablet (300 mg total) by mouth every evening., Disp: 90 tablet, Rfl: 1    latanoprost 0.005 % ophthalmic solution, Place 1 drop into both eyes every evening., Disp: , Rfl:     MICRO THIN LANCETS 33 gauge Misc, , Disp: , Rfl:     nitroGLYCERIN (NITROSTAT) 0.4 MG SL tablet, Place 1 tablet (0.4 mg total) under the tongue every 5 (five) minutes as needed for Chest pain., Disp: 25 tablet, Rfl: 11    rosuvastatin (CRESTOR) 40 MG Tab, Take 1 tablet (40 mg  total) by mouth every evening., Disp: 90 tablet, Rfl: 3    TUMERIC-GING-OLIVE-OREG-CAPRYL ORAL, Take by mouth Daily., Disp: , Rfl:     vit C-E-zinc ox-dayron-lut-zeax (ICAPS AREDS2) 250 mg-200 unit -12.5 mg-1 mg Cap, Take 1 capsule by mouth once daily. Take 2 capsules by mouth daily, Disp: , Rfl:     zinc 50 mg Tab, Take 50 mg by mouth Daily., Disp: , Rfl:     amLODIPine (NORVASC) 2.5 MG tablet, Take 1 tablet (2.5 mg total) by mouth nightly., Disp: 30 tablet, Rfl: 11     Objective:   Review of Systems   HENT: Positive for nosebleeds.    Cardiovascular: Negative for chest pain, claudication, cyanosis, dyspnea on exertion, irregular heartbeat, leg swelling, near-syncope, orthopnea, palpitations, paroxysmal nocturnal dyspnea and syncope.       Vitals:    03/11/22 1040   BP: (!) 142/73   Pulse: (!) 50       Physical Exam  Vitals reviewed.   Constitutional:       General: He is not in acute distress.     Appearance: He is well-developed.   HENT:      Head: Normocephalic and atraumatic.      Nose: Nose normal.   Eyes:      Conjunctiva/sclera: Conjunctivae normal.      Pupils: Pupils are equal, round, and reactive to light.   Neck:      Vascular: No JVD.   Cardiovascular:      Rate and Rhythm: Normal rate and regular rhythm.      Pulses: Intact distal pulses.      Heart sounds: Normal heart sounds. No murmur heard.    No friction rub. No gallop.   Pulmonary:      Effort: Pulmonary effort is normal. No respiratory distress.      Breath sounds: Normal breath sounds. No wheezing or rales.   Chest:      Chest wall: No tenderness.   Abdominal:      General: Bowel sounds are normal. There is no distension.      Palpations: Abdomen is soft.      Tenderness: There is no abdominal tenderness.   Musculoskeletal:         General: No tenderness or deformity. Normal range of motion.      Cervical back: Normal range of motion and neck supple.   Skin:     General: Skin is warm and dry.      Findings: No erythema or rash.    Neurological:      Mental Status: He is alert and oriented to person, place, and time.      Cranial Nerves: No cranial nerve deficit.      Motor: No abnormal muscle tone.      Coordination: Coordination normal.   Psychiatric:         Behavior: Behavior normal.         Thought Content: Thought content normal.         Judgment: Judgment normal.           Lab Results   Component Value Date    CHOL 99 (L) 09/14/2021    CHOL 113 08/19/2020     Lab Results   Component Value Date    HDL 28 (L) 09/14/2021    HDL 31 08/19/2020     Lab Results   Component Value Date    LDLCALC 50.4 (L) 09/14/2021    LDLCALC 61 08/19/2020     Lab Results   Component Value Date    ALT 22 01/20/2022    AST 15 01/20/2022    AST 19 09/14/2021     Lab Results   Component Value Date    CREATININE 0.8 01/20/2022    BUN 20 01/20/2022     01/20/2022    K 4.1 01/20/2022    CO2 21 (L) 01/20/2022    CO2 25 09/14/2021    CO2 29 08/26/2020     Lab Results   Component Value Date    HGB 14.1 01/20/2022    HCT 40.7 01/20/2022    HCT 37.6 (L) 07/23/2019       Assessment and Plan:     Epistaxis  Comments:  See recommendations on AVS    Coronary artery disease involving native coronary artery of native heart without angina pectoris  Comments:  Continue aspirin and Plavix optimally for 1 year after drug-eluting stent placement for acute coronary syndrome in January 2022    Hyperlipidemia, mixed  Comments:  Lipid profile with PCP  LDL goal less than 50 mg dL    Essential hypertension  Comments:  Optimize blood pressure, less than 120/less than 80  Orders:  -     Discontinue: amLODIPine (NORVASC) 2.5 MG tablet; Take 1 tablet (2.5 mg total) by mouth nightly.  Dispense: 30 tablet; Refill: 11  -     amLODIPine (NORVASC) 2.5 MG tablet; Take 1 tablet (2.5 mg total) by mouth nightly.  Dispense: 30 tablet; Refill: 11    Presence of stent in coronary artery         Follow up in about 3 months (around 6/11/2022).

## 2022-03-12 ENCOUNTER — NURSE TRIAGE (OUTPATIENT)
Dept: ADMINISTRATIVE | Facility: CLINIC | Age: 72
End: 2022-03-12
Payer: MEDICARE

## 2022-03-12 ENCOUNTER — PATIENT MESSAGE (OUTPATIENT)
Dept: INTERNAL MEDICINE | Facility: CLINIC | Age: 72
End: 2022-03-12
Payer: MEDICARE

## 2022-03-12 ENCOUNTER — PATIENT MESSAGE (OUTPATIENT)
Dept: CARDIOLOGY | Facility: CLINIC | Age: 72
End: 2022-03-12
Payer: MEDICARE

## 2022-03-12 ENCOUNTER — HOSPITAL ENCOUNTER (OUTPATIENT)
Facility: HOSPITAL | Age: 72
Discharge: HOME | End: 2022-03-14
Attending: EMERGENCY MEDICINE | Admitting: INTERNAL MEDICINE
Payer: MEDICARE

## 2022-03-12 DIAGNOSIS — I10 HYPERTENSION, UNSPECIFIED TYPE: ICD-10-CM

## 2022-03-12 DIAGNOSIS — I10 ESSENTIAL HYPERTENSION: Chronic | ICD-10-CM

## 2022-03-12 DIAGNOSIS — R07.9 CHEST PAIN: Primary | ICD-10-CM

## 2022-03-12 PROBLEM — R51.9 EPISODIC HEADACHE: Status: ACTIVE | Noted: 2022-03-12

## 2022-03-12 PROBLEM — I25.119 CORONARY ARTERY DISEASE INVOLVING NATIVE CORONARY ARTERY OF NATIVE HEART WITH ANGINA PECTORIS: Status: ACTIVE | Noted: 2019-09-25

## 2022-03-12 PROBLEM — R07.89 OTHER CHEST PAIN: Status: ACTIVE | Noted: 2022-03-12

## 2022-03-12 LAB
ALBUMIN SERPL BCP-MCNC: 4.4 G/DL (ref 3.5–5.2)
ALP SERPL-CCNC: 67 U/L (ref 55–135)
ALT SERPL W/O P-5'-P-CCNC: 24 U/L (ref 10–44)
ANION GAP SERPL CALC-SCNC: 8 MMOL/L (ref 8–16)
AST SERPL-CCNC: 18 U/L (ref 10–40)
BASOPHILS # BLD AUTO: 0.07 K/UL (ref 0–0.2)
BASOPHILS NFR BLD: 0.8 % (ref 0–1.9)
BILIRUB SERPL-MCNC: 0.8 MG/DL (ref 0.1–1)
BNP SERPL-MCNC: 41 PG/ML (ref 0–99)
BUN SERPL-MCNC: 11 MG/DL (ref 8–23)
CALCIUM SERPL-MCNC: 9.5 MG/DL (ref 8.7–10.5)
CHLORIDE SERPL-SCNC: 106 MMOL/L (ref 95–110)
CO2 SERPL-SCNC: 26 MMOL/L (ref 23–29)
CREAT SERPL-MCNC: 0.9 MG/DL (ref 0.5–1.4)
CTP QC/QA: YES
DIFFERENTIAL METHOD: ABNORMAL
EOSINOPHIL # BLD AUTO: 0.2 K/UL (ref 0–0.5)
EOSINOPHIL NFR BLD: 1.8 % (ref 0–8)
ERYTHROCYTE [DISTWIDTH] IN BLOOD BY AUTOMATED COUNT: 12.9 % (ref 11.5–14.5)
EST. GFR  (AFRICAN AMERICAN): >60 ML/MIN/1.73 M^2
EST. GFR  (NON AFRICAN AMERICAN): >60 ML/MIN/1.73 M^2
GLUCOSE SERPL-MCNC: 105 MG/DL (ref 70–110)
HCT VFR BLD AUTO: 41.1 % (ref 40–54)
HGB BLD-MCNC: 13.9 G/DL (ref 14–18)
IMM GRANULOCYTES # BLD AUTO: 0.05 K/UL (ref 0–0.04)
IMM GRANULOCYTES NFR BLD AUTO: 0.6 % (ref 0–0.5)
LYMPHOCYTES # BLD AUTO: 1.3 K/UL (ref 1–4.8)
LYMPHOCYTES NFR BLD: 15.3 % (ref 18–48)
MCH RBC QN AUTO: 29.7 PG (ref 27–31)
MCHC RBC AUTO-ENTMCNC: 33.8 G/DL (ref 32–36)
MCV RBC AUTO: 88 FL (ref 82–98)
MONOCYTES # BLD AUTO: 0.8 K/UL (ref 0.3–1)
MONOCYTES NFR BLD: 9.2 % (ref 4–15)
NEUTROPHILS # BLD AUTO: 6.3 K/UL (ref 1.8–7.7)
NEUTROPHILS NFR BLD: 72.3 % (ref 38–73)
NRBC BLD-RTO: 0 /100 WBC
PLATELET # BLD AUTO: 201 K/UL (ref 150–450)
PMV BLD AUTO: 10.5 FL (ref 9.2–12.9)
POTASSIUM SERPL-SCNC: 4.4 MMOL/L (ref 3.5–5.1)
PROT SERPL-MCNC: 7.4 G/DL (ref 6–8.4)
RBC # BLD AUTO: 4.68 M/UL (ref 4.6–6.2)
SARS-COV-2 RDRP RESP QL NAA+PROBE: NEGATIVE
SODIUM SERPL-SCNC: 140 MMOL/L (ref 136–145)
TROPONIN I SERPL DL<=0.01 NG/ML-MCNC: 0.01 NG/ML (ref 0–0.03)
TROPONIN I SERPL DL<=0.01 NG/ML-MCNC: <0.006 NG/ML (ref 0–0.03)
TROPONIN I SERPL DL<=0.01 NG/ML-MCNC: <0.006 NG/ML (ref 0–0.03)
WBC # BLD AUTO: 8.73 K/UL (ref 3.9–12.7)

## 2022-03-12 PROCEDURE — 93005 ELECTROCARDIOGRAM TRACING: CPT

## 2022-03-12 PROCEDURE — 25000003 PHARM REV CODE 250

## 2022-03-12 PROCEDURE — 93010 EKG 12-LEAD: ICD-10-PCS | Mod: ,,, | Performed by: INTERNAL MEDICINE

## 2022-03-12 PROCEDURE — 84484 ASSAY OF TROPONIN QUANT: CPT | Performed by: EMERGENCY MEDICINE

## 2022-03-12 PROCEDURE — 36415 COLL VENOUS BLD VENIPUNCTURE: CPT

## 2022-03-12 PROCEDURE — 83880 ASSAY OF NATRIURETIC PEPTIDE: CPT | Performed by: EMERGENCY MEDICINE

## 2022-03-12 PROCEDURE — 80053 COMPREHEN METABOLIC PANEL: CPT | Performed by: EMERGENCY MEDICINE

## 2022-03-12 PROCEDURE — 99205 PR OFFICE/OUTPT VISIT, NEW, LEVL V, 60-74 MIN: ICD-10-PCS | Mod: ,,, | Performed by: INTERNAL MEDICINE

## 2022-03-12 PROCEDURE — 99285 EMERGENCY DEPT VISIT HI MDM: CPT | Mod: 25,CS

## 2022-03-12 PROCEDURE — U0002 COVID-19 LAB TEST NON-CDC: HCPCS

## 2022-03-12 PROCEDURE — G0378 HOSPITAL OBSERVATION PER HR: HCPCS

## 2022-03-12 PROCEDURE — 25000003 PHARM REV CODE 250: Performed by: EMERGENCY MEDICINE

## 2022-03-12 PROCEDURE — 84484 ASSAY OF TROPONIN QUANT: CPT | Mod: 91

## 2022-03-12 PROCEDURE — 99205 OFFICE O/P NEW HI 60 MIN: CPT | Mod: ,,, | Performed by: INTERNAL MEDICINE

## 2022-03-12 PROCEDURE — 93010 ELECTROCARDIOGRAM REPORT: CPT | Mod: ,,, | Performed by: INTERNAL MEDICINE

## 2022-03-12 PROCEDURE — 85025 COMPLETE CBC W/AUTO DIFF WBC: CPT | Performed by: EMERGENCY MEDICINE

## 2022-03-12 RX ORDER — IBUPROFEN 200 MG
16 TABLET ORAL
Status: DISCONTINUED | OUTPATIENT
Start: 2022-03-12 | End: 2022-03-14 | Stop reason: HOSPADM

## 2022-03-12 RX ORDER — ASCORBIC ACID 500 MG
500 TABLET ORAL DAILY
COMMUNITY

## 2022-03-12 RX ORDER — ONDANSETRON 2 MG/ML
4 INJECTION INTRAMUSCULAR; INTRAVENOUS EVERY 8 HOURS PRN
Status: DISCONTINUED | OUTPATIENT
Start: 2022-03-12 | End: 2022-03-14 | Stop reason: HOSPADM

## 2022-03-12 RX ORDER — AMOXICILLIN 500 MG/1
1000 TABLET, FILM COATED ORAL 2 TIMES DAILY
COMMUNITY
Start: 2022-03-03 | End: 2022-03-12 | Stop reason: ALTCHOICE

## 2022-03-12 RX ORDER — IPRATROPIUM BROMIDE AND ALBUTEROL SULFATE 2.5; .5 MG/3ML; MG/3ML
3 SOLUTION RESPIRATORY (INHALATION) EVERY 6 HOURS PRN
Status: DISCONTINUED | OUTPATIENT
Start: 2022-03-12 | End: 2022-03-14 | Stop reason: HOSPADM

## 2022-03-12 RX ORDER — ACETAMINOPHEN 325 MG/1
650 TABLET ORAL
Status: COMPLETED | OUTPATIENT
Start: 2022-03-12 | End: 2022-03-12

## 2022-03-12 RX ORDER — MAG HYDROX/ALUMINUM HYD/SIMETH 200-200-20
30 SUSPENSION, ORAL (FINAL DOSE FORM) ORAL 4 TIMES DAILY PRN
Status: DISCONTINUED | OUTPATIENT
Start: 2022-03-12 | End: 2022-03-14 | Stop reason: HOSPADM

## 2022-03-12 RX ORDER — TALC
6 POWDER (GRAM) TOPICAL NIGHTLY PRN
Status: DISCONTINUED | OUTPATIENT
Start: 2022-03-12 | End: 2022-03-14 | Stop reason: HOSPADM

## 2022-03-12 RX ORDER — CLOPIDOGREL BISULFATE 75 MG/1
75 TABLET ORAL DAILY
Status: DISCONTINUED | OUTPATIENT
Start: 2022-03-13 | End: 2022-03-14 | Stop reason: HOSPADM

## 2022-03-12 RX ORDER — HYDRALAZINE HYDROCHLORIDE 20 MG/ML
10 INJECTION INTRAMUSCULAR; INTRAVENOUS EVERY 6 HOURS PRN
Status: DISCONTINUED | OUTPATIENT
Start: 2022-03-12 | End: 2022-03-14 | Stop reason: HOSPADM

## 2022-03-12 RX ORDER — GLUCAGON 1 MG
1 KIT INJECTION
Status: DISCONTINUED | OUTPATIENT
Start: 2022-03-12 | End: 2022-03-14 | Stop reason: HOSPADM

## 2022-03-12 RX ORDER — SODIUM CHLORIDE 0.9 % (FLUSH) 0.9 %
10 SYRINGE (ML) INJECTION EVERY 8 HOURS PRN
Status: DISCONTINUED | OUTPATIENT
Start: 2022-03-12 | End: 2022-03-14 | Stop reason: HOSPADM

## 2022-03-12 RX ORDER — LATANOPROST 50 UG/ML
1 SOLUTION/ DROPS OPHTHALMIC NIGHTLY
Status: DISCONTINUED | OUTPATIENT
Start: 2022-03-12 | End: 2022-03-14 | Stop reason: HOSPADM

## 2022-03-12 RX ORDER — AMLODIPINE BESYLATE 2.5 MG/1
2.5 TABLET ORAL NIGHTLY
Status: DISCONTINUED | OUTPATIENT
Start: 2022-03-12 | End: 2022-03-14

## 2022-03-12 RX ORDER — ASPIRIN 325 MG
325 TABLET ORAL
Status: COMPLETED | OUTPATIENT
Start: 2022-03-12 | End: 2022-03-12

## 2022-03-12 RX ORDER — LOSARTAN POTASSIUM 50 MG/1
100 TABLET ORAL DAILY
Refills: 1 | Status: DISCONTINUED | OUTPATIENT
Start: 2022-03-13 | End: 2022-03-14 | Stop reason: HOSPADM

## 2022-03-12 RX ORDER — ACETAMINOPHEN 325 MG/1
650 TABLET ORAL EVERY 4 HOURS PRN
Status: DISCONTINUED | OUTPATIENT
Start: 2022-03-12 | End: 2022-03-14 | Stop reason: HOSPADM

## 2022-03-12 RX ORDER — DORZOLAMIDE HYDROCHLORIDE AND TIMOLOL MALEATE 20; 5 MG/ML; MG/ML
1 SOLUTION/ DROPS OPHTHALMIC 2 TIMES DAILY
Status: DISCONTINUED | OUTPATIENT
Start: 2022-03-12 | End: 2022-03-14 | Stop reason: HOSPADM

## 2022-03-12 RX ORDER — IBUPROFEN 200 MG
24 TABLET ORAL
Status: DISCONTINUED | OUTPATIENT
Start: 2022-03-12 | End: 2022-03-14 | Stop reason: HOSPADM

## 2022-03-12 RX ORDER — NAPROXEN SODIUM 220 MG/1
81 TABLET, FILM COATED ORAL DAILY
Status: DISCONTINUED | OUTPATIENT
Start: 2022-03-13 | End: 2022-03-14 | Stop reason: HOSPADM

## 2022-03-12 RX ORDER — ATORVASTATIN CALCIUM 40 MG/1
80 TABLET, FILM COATED ORAL NIGHTLY
Refills: 3 | Status: DISCONTINUED | OUTPATIENT
Start: 2022-03-12 | End: 2022-03-14 | Stop reason: HOSPADM

## 2022-03-12 RX ORDER — SIMETHICONE 80 MG
1 TABLET,CHEWABLE ORAL 4 TIMES DAILY PRN
Status: DISCONTINUED | OUTPATIENT
Start: 2022-03-12 | End: 2022-03-14 | Stop reason: HOSPADM

## 2022-03-12 RX ORDER — NITROGLYCERIN 0.4 MG/1
0.4 TABLET SUBLINGUAL EVERY 5 MIN PRN
Status: DISCONTINUED | OUTPATIENT
Start: 2022-03-12 | End: 2022-03-14 | Stop reason: HOSPADM

## 2022-03-12 RX ORDER — CHOLECALCIFEROL (VITAMIN D3) 25 MCG
5000 TABLET ORAL DAILY
Status: DISCONTINUED | OUTPATIENT
Start: 2022-03-13 | End: 2022-03-14 | Stop reason: HOSPADM

## 2022-03-12 RX ADMIN — ACETAMINOPHEN 650 MG: 325 TABLET ORAL at 04:03

## 2022-03-12 RX ADMIN — AMLODIPINE BESYLATE 2.5 MG: 2.5 TABLET ORAL at 10:03

## 2022-03-12 RX ADMIN — ASPIRIN 325 MG ORAL TABLET 325 MG: 325 PILL ORAL at 12:03

## 2022-03-12 RX ADMIN — LATANOPROST 1 DROP: 50 SOLUTION/ DROPS OPHTHALMIC at 09:03

## 2022-03-12 RX ADMIN — DORZOLAMIDE HYDROCHLORIDE AND TIMOLOL MALEATE 1 DROP: 20; 5 SOLUTION/ DROPS OPHTHALMIC at 09:03

## 2022-03-12 RX ADMIN — ATORVASTATIN CALCIUM 80 MG: 40 TABLET, FILM COATED ORAL at 10:03

## 2022-03-12 NOTE — PHARMACY MED REC
"Admission Medication History     The home medication history was taken by Meghan Salmon CPhT.    Medication history obtained from,Patient list    You may go to "Admission" then "Reconcile Home Medications" tabs to review and/or act upon these items.      The home medication list has been updated by the Pharmacy department.    Please read ALL comments highlighted in yellow.    Please address this information as you see fit.     Feel free to contact us if you have any questions or require assistance.              Meghan Salmon CPhT.  Ext [phone]                  .          "

## 2022-03-12 NOTE — ED NOTES
"Patient reports only brief intermittent "dull knife" to the left chest, but denies currently. Warm blankets given and urinal emptied. Instructed to call if chest pain persists.   "

## 2022-03-12 NOTE — ED NOTES
"Chief Complaint   Patient presents with    Hypertension     Pt complains of HA x 3 days with htn ( 185/88)  with left anterior CP      Patient identified using two identifiers. Placed on cardiac monitor, continuous pulse ox, and auto bp.     LOC: The patient is awake, alert and aware of environment with an appropriate affect, the patient is oriented x 3 and speaking appropriately.    APPEARANCE: Patient resting comfortably and in no acute distress, patient is clean and well groomed, patient's clothing is properly fastened.    SKIN: The skin is warm and dry, color consistent with ethnicity, patient has normal skin turgor and moist mucus membranes, skin intact, no breakdown or bruising noted.    MUSCULOSKELETAL: Patient moving all extremities spontaneously. Denies any weakness.     RESPIRATORY: Airway is open and patent, respirations are spontaneous. Denies SOB. Bilateral breath sounds clear, no cough reported.     CARDIAC: Patient has a normal rate, no periphreal edema noted, capillary refill < 3 seconds. Patient complains of anterior chest pain intermittent x few days with increased bp. Reports just seen by his cardiologist yesterday and placed on a calcium channel blocker, but that the pain started radiating into left neck and jaw. Also, complaints of intermittent generalized headache. Patient has periods of bradycardia on monitor. Takes plavix and baby aspirin after having two stents placed previously. States "this felt similar to the last time." Currently denies any pain at this time.     ABDOMEN: Soft and non tender to palpation. Denies N/VD.    Neuro: GCS 15. Neuro intact. Speech clear.     "

## 2022-03-12 NOTE — TELEPHONE ENCOUNTER
"Pt states HTN since Wednesday, along with Lt sided headache. Tylenol has been effective in treating the pain, but it does recur when BP high, or tylenol wears off. Pain worse when BP high. Been running 164/84. Current /87, HR 61. Current headache on Lt side 7/10, no tylenol taken today.   Pt also reports episode of chest pain, described as "tightness or irritation, right around where my heart would be", lasted approx 5-10 mins., approx 1 hr ago. Pt seems to be downplaying his symptoms.  Care advice provided per protocol, with recommendation to hang up and call 911. Pt verbalizes understanding, but asks if his wife can take him to the hospital. Explained that per protocol, that is his recommendation, however, he is free to discount advice if he so chooses, but the recommendation is for him to hang up and call 911.  Reason for Disposition   [1] Chest pain lasts > 5 minutes AND [2] history of heart disease  (i.e., heart attack, bypass surgery, angina, angioplasty, CHF)    Additional Information   Negative: Difficult to awaken or acting confused (e.g., disoriented, slurred speech)   Negative: Severe difficulty breathing (e.g., struggling for each breath, speaks in single words)   Negative: [1] Weakness of the face, arm or leg on one side of the body AND [2] new onset   Negative: [1] Numbness (i.e., loss of sensation) of the face, arm or leg on one side of the body AND [2] new onset    Protocols used: HIGH BLOOD PRESSURE-A-AH      "
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
## 2022-03-13 LAB
ANION GAP SERPL CALC-SCNC: 7 MMOL/L (ref 8–16)
BASOPHILS # BLD AUTO: 0.06 K/UL (ref 0–0.2)
BASOPHILS NFR BLD: 0.7 % (ref 0–1.9)
BUN SERPL-MCNC: 15 MG/DL (ref 8–23)
CALCIUM SERPL-MCNC: 9 MG/DL (ref 8.7–10.5)
CHLORIDE SERPL-SCNC: 108 MMOL/L (ref 95–110)
CO2 SERPL-SCNC: 26 MMOL/L (ref 23–29)
CREAT SERPL-MCNC: 0.8 MG/DL (ref 0.5–1.4)
DIFFERENTIAL METHOD: ABNORMAL
EOSINOPHIL # BLD AUTO: 0.2 K/UL (ref 0–0.5)
EOSINOPHIL NFR BLD: 2.9 % (ref 0–8)
ERYTHROCYTE [DISTWIDTH] IN BLOOD BY AUTOMATED COUNT: 12.9 % (ref 11.5–14.5)
EST. GFR  (AFRICAN AMERICAN): >60 ML/MIN/1.73 M^2
EST. GFR  (NON AFRICAN AMERICAN): >60 ML/MIN/1.73 M^2
GLUCOSE SERPL-MCNC: 99 MG/DL (ref 70–110)
HCT VFR BLD AUTO: 39.5 % (ref 40–54)
HGB BLD-MCNC: 13.6 G/DL (ref 14–18)
IMM GRANULOCYTES # BLD AUTO: 0.04 K/UL (ref 0–0.04)
IMM GRANULOCYTES NFR BLD AUTO: 0.5 % (ref 0–0.5)
LYMPHOCYTES # BLD AUTO: 1.6 K/UL (ref 1–4.8)
LYMPHOCYTES NFR BLD: 18.6 % (ref 18–48)
MAGNESIUM SERPL-MCNC: 2.2 MG/DL (ref 1.6–2.6)
MCH RBC QN AUTO: 30 PG (ref 27–31)
MCHC RBC AUTO-ENTMCNC: 34.4 G/DL (ref 32–36)
MCV RBC AUTO: 87 FL (ref 82–98)
MONOCYTES # BLD AUTO: 1 K/UL (ref 0.3–1)
MONOCYTES NFR BLD: 11.9 % (ref 4–15)
NEUTROPHILS # BLD AUTO: 5.5 K/UL (ref 1.8–7.7)
NEUTROPHILS NFR BLD: 65.4 % (ref 38–73)
NRBC BLD-RTO: 0 /100 WBC
PLATELET # BLD AUTO: 184 K/UL (ref 150–450)
PMV BLD AUTO: 10.5 FL (ref 9.2–12.9)
POCT GLUCOSE: 104 MG/DL (ref 70–110)
POCT GLUCOSE: 171 MG/DL (ref 70–110)
POCT GLUCOSE: 97 MG/DL (ref 70–110)
POTASSIUM SERPL-SCNC: 4.1 MMOL/L (ref 3.5–5.1)
RBC # BLD AUTO: 4.54 M/UL (ref 4.6–6.2)
SODIUM SERPL-SCNC: 141 MMOL/L (ref 136–145)
TROPONIN I SERPL DL<=0.01 NG/ML-MCNC: 0.01 NG/ML (ref 0–0.03)
TROPONIN I SERPL DL<=0.01 NG/ML-MCNC: 0.01 NG/ML (ref 0–0.03)
WBC # BLD AUTO: 8.37 K/UL (ref 3.9–12.7)

## 2022-03-13 PROCEDURE — 36415 COLL VENOUS BLD VENIPUNCTURE: CPT

## 2022-03-13 PROCEDURE — 84484 ASSAY OF TROPONIN QUANT: CPT | Mod: 91

## 2022-03-13 PROCEDURE — 85025 COMPLETE CBC W/AUTO DIFF WBC: CPT

## 2022-03-13 PROCEDURE — G0378 HOSPITAL OBSERVATION PER HR: HCPCS

## 2022-03-13 PROCEDURE — 25000003 PHARM REV CODE 250

## 2022-03-13 PROCEDURE — 83735 ASSAY OF MAGNESIUM: CPT

## 2022-03-13 PROCEDURE — 80048 BASIC METABOLIC PNL TOTAL CA: CPT

## 2022-03-13 PROCEDURE — 94760 N-INVAS EAR/PLS OXIMETRY 1: CPT

## 2022-03-13 RX ADMIN — ACETAMINOPHEN 650 MG: 325 TABLET ORAL at 09:03

## 2022-03-13 RX ADMIN — DORZOLAMIDE HYDROCHLORIDE AND TIMOLOL MALEATE 1 DROP: 20; 5 SOLUTION/ DROPS OPHTHALMIC at 08:03

## 2022-03-13 RX ADMIN — LOSARTAN POTASSIUM 100 MG: 50 TABLET, FILM COATED ORAL at 08:03

## 2022-03-13 RX ADMIN — Medication 5000 UNITS: at 08:03

## 2022-03-13 RX ADMIN — ASPIRIN 81 MG CHEWABLE TABLET 81 MG: 81 TABLET CHEWABLE at 08:03

## 2022-03-13 RX ADMIN — ATORVASTATIN CALCIUM 80 MG: 40 TABLET, FILM COATED ORAL at 08:03

## 2022-03-13 RX ADMIN — LATANOPROST 1 DROP: 50 SOLUTION/ DROPS OPHTHALMIC at 08:03

## 2022-03-13 RX ADMIN — CLOPIDOGREL 75 MG: 75 TABLET, FILM COATED ORAL at 08:03

## 2022-03-13 RX ADMIN — AMLODIPINE BESYLATE 2.5 MG: 2.5 TABLET ORAL at 08:03

## 2022-03-13 NOTE — ASSESSMENT & PLAN NOTE
Patient currently with intermittent chest pain.  HEART score of 6.  S/p stent placement 1/2022  I have reviewed the EKG and it reveals NSR with 1st degree A-V block.    Chest X-ray is negative for acute cardiopulmonary process.   Obtain serial cardiac markers, which thus far have been negative.    Place in observation on telemetry.     Received ASA x1 in ED  Continue with daily ASA, home BP medications, and statin  Will provide supplemental oxygen for SpO2 <90%   Will have as-needed nitroglycerin  EKG PRN for recurrent chest pain  Cardiology consulted and appreciate recs  Will plan to go for [facility] if CP continues  Cardiac Diet
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
## 2022-03-13 NOTE — ED NOTES
Review of patient's allergies indicates:  No Known Allergies     Patient has verified the spelling of their name and  on armband.   APPEARANCE: Patient is alert, calm, oriented x 4, and does not appear distressed.  SKIN: Skin is normal for race, warm, and dry. Normal skin turgor and mucous membranes moist.  CARDIAC: Normal rate and rhythm, no murmur heard.   RESPIRATORY:Normal rate and effort. Breath sounds clear bilaterally throughout chest. Respirations are equal and unlabored.    GASTRO: Bowel sounds normal, abdomen is soft, no tenderness, and no abdominal distention.  MUSCLE: Full ROM. No bony tenderness or soft tissue tenderness. No obvious deformity.  PERIPHERAL VASCULAR: peripheral pulses present. Normal cap refill. No edema. Warm to touch.  NEURO: 5/5 strength major flexors/extensors bilaterally. Sensory intact to light touch bilaterally. [place] coma scale: eyes open spontaneously-4, oriented & converses-5, obeys commands-6. No neurological abnormalities.   MENTAL STATUS: awake, alert and aware of environment.  EYE: No overt deficits noted. No drainage. Sclera WNL  ENT: EARS: no obvious drainage. NOSE: no active bleeding. THROAT: no redness or swelling.  GENITALIA: Normal external genitalia.  : Voids without complication     pt with dehydration, PNA, poss. COVID19, will admit, case d/w Dr. Brewster

## 2022-03-13 NOTE — ASSESSMENT & PLAN NOTE
-Patient with known CAD, s/p stent placement 01/20/22 which is currently stable  -Resume ASA, Plavix, and statin  -Monitor for s/s of angina/ACS  -Continue to monitor on telemetry
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
## 2022-03-13 NOTE — ASSESSMENT & PLAN NOTE
Hemoglobin A1C   Date Value Ref Range Status   09/14/2021 6.2 (H) 4.0 - 5.6 % Final     Patient's FSGs are controlled on current medication regimen.  -cbg on admit 105  - ACCUcheck ACHS  - Diabetic diet  -Hypoglycemia protocol PRN

## 2022-03-13 NOTE — ASSESSMENT & PLAN NOTE
Chronic, controlled.  Latest blood pressure and vitals reviewed-   Temp:  [97.2 °F (36.2 °C)-97.6 °F (36.4 °C)]   Pulse:  [51-64]   Resp:  [16-23]   BP: (138-192)/(65-88)   SpO2:  [95 %-100 %] .   Home meds for hypertension were reviewed and noted below.   Hypertension Medications             amLODIPine (NORVASC) 2.5 MG tablet Take 1 tablet (2.5 mg total) by mouth nightly.    irbesartan (AVAPRO) 300 MG tablet Take 1 tablet (300 mg total) by mouth every evening.          While in the hospital, will manage blood pressure as follows; Continue home antihypertensive regimen    Will utilize p.r.n. blood pressure medication only if patient's blood pressure greater than  180/110 and he develops symptoms such as worsening chest pain or shortness of breath.

## 2022-03-13 NOTE — ASSESSMENT & PLAN NOTE
Last LDL was   Lab Results   Component Value Date    LDLCALC 50.4 (L) 09/14/2021      Patient is chronically on statin.will continue for now.   Monitor clinically.

## 2022-03-13 NOTE — PROGRESS NOTES
03/12/22 2150   Admission   Initial VN Admission Questions Complete   Shift   Virtual Nurse - Patient Verbalized Approval Of Camera Use;VN Rounding   Safety/Activity   Patient Rounds bed in low position;call light in patient/parent reach;clutter free environment maintained;visualized patient;placement of personal items at bedside   Safety Promotion/Fall Prevention assistive device/personal item within reach;bed alarm set;Fall Risk reviewed with patient/family;side rails raised x 2   Positioning   Body Position supine   Head of Bed (HOB) Positioning HOB at 30-45 degrees   VN cued in to pt's room with permission. Admission questions completed. Plan of care reviewed with pt. Pt denies any questions or concerns at this time. Call bell w/in reach. Instructed to call for needs/assist oob.

## 2022-03-13 NOTE — PROGRESS NOTES
[facility] Medicine  Progress Note    Patient Name: Bandar French  MRN: [id number]  Patient Class: OP- Observation   Admission Date: 3/12/2022  Length of Stay: 0 days  Attending Physician: Efra Carvalho MD  Primary Care Provider: Gunnar Rangel MD        Subjective:     Principal Problem:Other chest pain        HPI:  Bandar French is a 72 y/o male with PMHx CAD s/p stent placement (01/20/2022), mixed hyperlipidemia, DMII, and HTN presents to [facility] ED with complaints of chest pain that started this morning at rest and lasted 5-10mins. Patient describes the chest pain as dull, stabbing pain located in his midsternal area non-radiating, rating pain as 3/10 on numeric pain scale. Patient states he experienced sudden chest pain episode yesterday that lasted a couple of minutes then resolved. Patient denies taking any medications during his episodes. He recently had a cardiac stent placed on 01/20/22 and has been on ASA and Plavix. Patient also reports he had an episode of epistaxis in addition to left sided headache radiating down his neck. Patient denies fever, chills, SOB, palpitations, leg swelling, N/V, or diaphoresis. In ED: Cardiac workup is negative thus far. Due to his recent cardiac procedure and Heart score of 6. Cardiology consulted. Patient will be placed into hospital medicine observation services under my care in collaboration with Dr. Efra Carvalho.         Overview/Hospital Course:  No notes on file    Interval History: No chest pain since admission. States pain will occur randomly. Denies CP/SOB during exertion. Trops negative. Pending stress test.     Review of Systems   Constitutional:  Negative for chills, diaphoresis and fever.   HENT:  Negative for hearing loss and sore throat.    Eyes:  Negative for visual disturbance.   Respiratory:  Negative for cough and shortness of breath.    Cardiovascular:  Negative for chest pain (resolved) and leg swelling.   Gastrointestinal:   Negative for abdominal pain, diarrhea, nausea and vomiting.   Genitourinary:  Negative for difficulty urinating and flank pain.   Musculoskeletal:  Negative for back pain.   Skin:  Negative for rash and wound.   Neurological:  Negative for dizziness, weakness and headaches (resolved).   Psychiatric/Behavioral:  Negative for agitation and confusion.    Objective:     Vital Signs (Most Recent):  Temp: 97.9 °F (36.6 °C) (03/13/22 1635)  Pulse: 64 (03/13/22 1635)  Resp: 18 (03/13/22 1635)  BP: 139/75 (03/13/22 1635)  SpO2: 96 % (03/13/22 1635) Vital Signs (24h Range):  Temp:  [97.2 °F (36.2 °C)-98.2 °F (36.8 °C)] 97.9 °F (36.6 °C)  Pulse:  [52-73] 64  Resp:  [18-23] 18  SpO2:  [95 %-99 %] 96 %  BP: (128-192)/(65-84) 139/75     Weight: 80.2 kg (176 lb 12.9 oz)  Body mass index is 25.37 kg/m².    Intake/Output Summary (Last 24 hours) at 3/13/2022 1736  Last data filed at 3/13/2022 1245  Gross per 24 hour   Intake 480 ml   Output --   Net 480 ml      Physical Exam  Vitals and nursing note reviewed.   Constitutional:       General: He is not in acute distress.     Appearance: Normal appearance. He is not ill-appearing.   HENT:      Head: Normocephalic and atraumatic.      Mouth/Throat:      Mouth: Mucous membranes are moist.   Eyes:      Extraocular Movements: Extraocular movements intact.      Pupils: Pupils are equal, round, and reactive to light.   Cardiovascular:      Rate and Rhythm: Normal rate and regular rhythm.      Pulses: Normal pulses.      Heart sounds: Normal heart sounds. No murmur heard.    No friction rub. No gallop.   Pulmonary:      Effort: Pulmonary effort is normal. No respiratory distress.      Breath sounds: No wheezing or rhonchi.   Abdominal:      General: Bowel sounds are normal. There is no distension.      Palpations: Abdomen is soft.      Tenderness: There is no abdominal tenderness. There is no guarding.   Musculoskeletal:         General: No swelling.      Cervical back: Normal range of motion  and neck supple.      Right lower leg: No edema.      Left lower leg: No edema.   Skin:     General: Skin is warm and dry.      Capillary Refill: Capillary refill takes less than 2 seconds.      Findings: No bruising, erythema or rash.   Neurological:      General: No focal deficit present.      Mental Status: He is alert and oriented to person, place, and time.   Psychiatric:         Mood and Affect: Mood normal.         Behavior: Behavior normal. Behavior is cooperative.       Significant Labs: All pertinent labs within the past 24 hours have been reviewed.    Significant Imaging: I have reviewed all pertinent imaging results/findings within the past 24 hours.      Assessment/Plan:      * Other chest pain  Patient currently with intermittent chest pain.  HEART score of 6.  S/p stent placement 1/2022  I have reviewed the EKG and it reveals NSR with 1st degree A-V block.    Chest X-ray is negative for acute cardiopulmonary process.   Obtain serial cardiac markers, which thus far have been negative.    Place in observation on telemetry.     Received ASA x1 in ED  Continue with daily ASA, home BP medications, and statin  Will provide supplemental oxygen for SpO2 <90%   Will have as-needed nitroglycerin  EKG PRN for recurrent chest pain  Cardiology consulted and appreciate recs  No further chest pain since admission. Trops negative. Pain doesn't appear cardiac in nature, however pt has significant CAD hx.  Stress test tomorrow        Episodic headache  -Report history of frontal headache radiating down neck  -likely 2/2 to hypertension ('s)  -CT head negative for acute intracranial process  -Tylenol PRN for headache  -Control BP; will resume home meds        Essential hypertension  Chronic, controlled.  Latest blood pressure and vitals reviewed-   Temp:  [97.2 °F (36.2 °C)-98.2 °F (36.8 °C)]   Pulse:  [52-73]   Resp:  [18-23]   BP: (128-192)/(65-84)   SpO2:  [95 %-99 %] .   Home meds for hypertension were  reviewed and noted below.   Hypertension Medications             amLODIPine (NORVASC) 2.5 MG tablet Take 1 tablet (2.5 mg total) by mouth nightly.    irbesartan (AVAPRO) 300 MG tablet Take 1 tablet (300 mg total) by mouth every evening.          While in the hospital, will manage blood pressure as follows; Continue home antihypertensive regimen    Will utilize p.r.n. blood pressure medication only if patient's blood pressure greater than  180/110 and he develops symptoms such as worsening chest pain or shortness of breath.        Type 2 diabetes mellitus without complication, without long-term current use of insulin  Hemoglobin A1C   Date Value Ref Range Status   09/14/2021 6.2 (H) 4.0 - 5.6 % Final     Patient's FSGs are controlled on current medication regimen.  -cbg on admit 105  - ACCUcheck ACHS  - Diabetic diet  -Hypoglycemia protocol PRN        Hyperlipidemia, mixed  Last LDL was   Lab Results   Component Value Date    LDLCALC 50.4 (L) 09/14/2021      Patient is chronically on statin.will continue for now.   Monitor clinically.          Coronary artery disease involving native coronary artery of native heart with angina pectoris  -Patient with known CAD, s/p stent placement 01/20/22 which is currently stable  -Resume ASA, Plavix, and statin  -Monitor for s/s of angina/ACS  -Continue to monitor on telemetry          VTE Risk Mitigation (From admission, onward)         Ordered     Reason for No Pharmacological VTE Prophylaxis  Once        Question:  Reasons:  Answer:  Already adequately anticoagulated on oral Anticoagulants    03/12/22 1850     IP VTE HIGH RISK PATIENT  Once         03/12/22 1850     Place sequential compression device  Until discontinued         03/12/22 1850                Discharge Planning   DL:      Code Status: Full Code   Is the patient medically ready for discharge?:     Reason for patient still in hospital (select all that apply): Patient trending condition, Treatment and Consult  recommendations                     Efra Carvalho MD  Department of Hospital Medicine   [facility]

## 2022-03-13 NOTE — ASSESSMENT & PLAN NOTE
-Report history of frontal headache radiating down neck  -likely 2/2 to hypertension ('s)  -CT head negative for acute intracranial process  -Tylenol PRN for headache  -Control BP; will resume home meds

## 2022-03-13 NOTE — ASSESSMENT & PLAN NOTE
Patient currently with intermittent chest pain.  HEART score of 6.  S/p stent placement 1/2022  I have reviewed the EKG and it reveals NSR with 1st degree A-V block.    Chest X-ray is negative for acute cardiopulmonary process.   Obtain serial cardiac markers, which thus far have been negative.    Place in observation on telemetry.     Received ASA x1 in ED  Continue with daily ASA, home BP medications, and statin  Will provide supplemental oxygen for SpO2 <90%   Will have as-needed nitroglycerin  EKG PRN for recurrent chest pain  Cardiology consulted and appreciate recs  No further chest pain since admission. Trops negative. Pain doesn't appear cardiac in nature, however pt has significant CAD hx.  Stress test tomorrow

## 2022-03-13 NOTE — ASSESSMENT & PLAN NOTE
Chronic, controlled.  Latest blood pressure and vitals reviewed-   Temp:  [97.2 °F (36.2 °C)-98.2 °F (36.8 °C)]   Pulse:  [52-73]   Resp:  [18-23]   BP: (128-192)/(65-84)   SpO2:  [95 %-99 %] .   Home meds for hypertension were reviewed and noted below.   Hypertension Medications             amLODIPine (NORVASC) 2.5 MG tablet Take 1 tablet (2.5 mg total) by mouth nightly.    irbesartan (AVAPRO) 300 MG tablet Take 1 tablet (300 mg total) by mouth every evening.          While in the hospital, will manage blood pressure as follows; Continue home antihypertensive regimen    Will utilize p.r.n. blood pressure medication only if patient's blood pressure greater than  180/110 and he develops symptoms such as worsening chest pain or shortness of breath.
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
## 2022-03-13 NOTE — SUBJECTIVE & OBJECTIVE
Interval History: No chest pain since admission. States pain will occur randomly. Denies CP/SOB during exertion. Trops negative. Pending stress test.     Review of Systems   Constitutional:  Negative for chills, diaphoresis and fever.   HENT:  Negative for hearing loss and sore throat.    Eyes:  Negative for visual disturbance.   Respiratory:  Negative for cough and shortness of breath.    Cardiovascular:  Negative for chest pain (resolved) and leg swelling.   Gastrointestinal:  Negative for abdominal pain, diarrhea, nausea and vomiting.   Genitourinary:  Negative for difficulty urinating and flank pain.   Musculoskeletal:  Negative for back pain.   Skin:  Negative for rash and wound.   Neurological:  Negative for dizziness, weakness and headaches (resolved).   Psychiatric/Behavioral:  Negative for agitation and confusion.    Objective:     Vital Signs (Most Recent):  Temp: 97.9 °F (36.6 °C) (03/13/22 1635)  Pulse: 64 (03/13/22 1635)  Resp: 18 (03/13/22 1635)  BP: 139/75 (03/13/22 1635)  SpO2: 96 % (03/13/22 1635) Vital Signs (24h Range):  Temp:  [97.2 °F (36.2 °C)-98.2 °F (36.8 °C)] 97.9 °F (36.6 °C)  Pulse:  [52-73] 64  Resp:  [18-23] 18  SpO2:  [95 %-99 %] 96 %  BP: (128-192)/(65-84) 139/75     Weight: 80.2 kg (176 lb 12.9 oz)  Body mass index is 25.37 kg/m².    Intake/Output Summary (Last 24 hours) at 3/13/2022 1736  Last data filed at 3/13/2022 1245  Gross per 24 hour   Intake 480 ml   Output --   Net 480 ml      Physical Exam  Vitals and nursing note reviewed.   Constitutional:       General: He is not in acute distress.     Appearance: Normal appearance. He is not ill-appearing.   HENT:      Head: Normocephalic and atraumatic.      Mouth/Throat:      Mouth: Mucous membranes are moist.   Eyes:      Extraocular Movements: Extraocular movements intact.      Pupils: Pupils are equal, round, and reactive to light.   Cardiovascular:      Rate and Rhythm: Normal rate and regular rhythm.      Pulses: Normal pulses.       Heart sounds: Normal heart sounds. No murmur heard.    No friction rub. No gallop.   Pulmonary:      Effort: Pulmonary effort is normal. No respiratory distress.      Breath sounds: No wheezing or rhonchi.   Abdominal:      General: Bowel sounds are normal. There is no distension.      Palpations: Abdomen is soft.      Tenderness: There is no abdominal tenderness. There is no guarding.   Musculoskeletal:         General: No swelling.      Cervical back: Normal range of motion and neck supple.      Right lower leg: No edema.      Left lower leg: No edema.   Skin:     General: Skin is warm and dry.      Capillary Refill: Capillary refill takes less than 2 seconds.      Findings: No bruising, erythema or rash.   Neurological:      General: No focal deficit present.      Mental Status: He is alert and oriented to person, place, and time.   Psychiatric:         Mood and Affect: Mood normal.         Behavior: Behavior normal. Behavior is cooperative.       Significant Labs: All pertinent labs within the past 24 hours have been reviewed.    Significant Imaging: I have reviewed all pertinent imaging results/findings within the past 24 hours.

## 2022-03-13 NOTE — HPI
Bandar French is a 70 y/o male with PMHx CAD s/p stent placement (01/20/2022), mixed hyperlipidemia, DMII, and HTN presents to [facility] ED with complaints of chest pain that started this morning at rest and lasted 5-10mins. Patient describes the chest pain as dull, stabbing pain located in his midsternal area non-radiating, rating pain as 3/10 on numeric pain scale. Patient states he experienced sudden chest pain episode yesterday that lasted a couple of minutes then resolved. Patient denies taking any medications during his episodes. He recently had a cardiac stent placed on 01/20/22 and has been on ASA and Plavix. Patient also reports he had an episode of epistaxis in addition to left sided headache radiating down his neck. Patient denies fever, chills, SOB, palpitations, leg swelling, N/V, or diaphoresis. In ED: Cardiac workup is negative thus far. Due to his recent cardiac procedure and Heart score of 6. Cardiology consulted. Patient will be placed into hospital medicine observation services under my care in collaboration with Dr. Efra Carvalho.

## 2022-03-13 NOTE — PLAN OF CARE
Problem: Adult Inpatient Plan of Care  Goal: Plan of Care Review  Chart reviewed and care plan initiated

## 2022-03-13 NOTE — PROGRESS NOTES
[facility]           Pharmacy        Current Drug Shortage     Due to national backorder and [facility] is critically low on inventory of Dextrose 50% (D50) Syringes and Vials, pharmacy has automatically switched from D50% to D10% IVPB at the equivalent dose until resolution of the shortage per P&T approved protocol.               Khoa Shelton, PharmD  [phone]

## 2022-03-14 ENCOUNTER — PATIENT MESSAGE (OUTPATIENT)
Dept: INTERNAL MEDICINE | Facility: CLINIC | Age: 72
End: 2022-03-14
Payer: MEDICARE

## 2022-03-14 ENCOUNTER — TELEPHONE (OUTPATIENT)
Dept: INTERNAL MEDICINE | Facility: CLINIC | Age: 72
End: 2022-03-14
Payer: MEDICARE

## 2022-03-14 VITALS
HEART RATE: 64 BPM | HEIGHT: 70 IN | DIASTOLIC BLOOD PRESSURE: 75 MMHG | WEIGHT: 176.81 LBS | SYSTOLIC BLOOD PRESSURE: 155 MMHG | TEMPERATURE: 98 F | BODY MASS INDEX: 25.31 KG/M2 | RESPIRATION RATE: 18 BRPM | OXYGEN SATURATION: 100 %

## 2022-03-14 LAB
CV STRESS BASE HR: 54 BPM
D DIMER PPP IA.FEU-MCNC: 0.4 MG/L FEU
DIASTOLIC BLOOD PRESSURE: 78 MMHG
OHS CV CPX 85 PERCENT MAX PREDICTED HEART RATE MALE: 127
OHS CV CPX MAX PREDICTED HEART RATE: 149
OHS CV CPX PATIENT IS FEMALE: 0
OHS CV CPX PATIENT IS MALE: 1
OHS CV CPX PEAK DIASTOLIC BLOOD PRESSURE: 78 MMHG
OHS CV CPX PEAK HEAR RATE: 89 BPM
OHS CV CPX PEAK RATE PRESSURE PRODUCT: NORMAL
OHS CV CPX PEAK SYSTOLIC BLOOD PRESSURE: 145 MMHG
OHS CV CPX PERCENT MAX PREDICTED HEART RATE ACHIEVED: 60
OHS CV CPX RATE PRESSURE PRODUCT PRESENTING: 7830
POCT GLUCOSE: 118 MG/DL (ref 70–110)
POCT GLUCOSE: 121 MG/DL (ref 70–110)
POCT GLUCOSE: 124 MG/DL (ref 70–110)
SYSTOLIC BLOOD PRESSURE: 145 MMHG

## 2022-03-14 PROCEDURE — 36415 COLL VENOUS BLD VENIPUNCTURE: CPT | Performed by: NURSE PRACTITIONER

## 2022-03-14 PROCEDURE — 25000003 PHARM REV CODE 250

## 2022-03-14 PROCEDURE — G0378 HOSPITAL OBSERVATION PER HR: HCPCS

## 2022-03-14 PROCEDURE — 99226 PR SUBSEQUENT OBSERVATION CARE,LEVEL III: ICD-10-PCS | Mod: ,,, | Performed by: NURSE PRACTITIONER

## 2022-03-14 PROCEDURE — 94761 N-INVAS EAR/PLS OXIMETRY MLT: CPT

## 2022-03-14 PROCEDURE — 85379 FIBRIN DEGRADATION QUANT: CPT | Performed by: NURSE PRACTITIONER

## 2022-03-14 PROCEDURE — 99226 PR SUBSEQUENT OBSERVATION CARE,LEVEL III: CPT | Mod: ,,, | Performed by: NURSE PRACTITIONER

## 2022-03-14 RX ORDER — AMLODIPINE BESYLATE 5 MG/1
5 TABLET ORAL NIGHTLY
Qty: 30 TABLET | Refills: 0 | Status: SHIPPED | OUTPATIENT
Start: 2022-03-14 | End: 2022-03-23

## 2022-03-14 RX ORDER — REGADENOSON 0.08 MG/ML
0.4 INJECTION, SOLUTION INTRAVENOUS ONCE
Status: DISCONTINUED | OUTPATIENT
Start: 2022-03-14 | End: 2022-03-14 | Stop reason: HOSPADM

## 2022-03-14 RX ORDER — AMLODIPINE BESYLATE 5 MG/1
5 TABLET ORAL NIGHTLY
Status: DISCONTINUED | OUTPATIENT
Start: 2022-03-14 | End: 2022-03-14 | Stop reason: HOSPADM

## 2022-03-14 RX ADMIN — CLOPIDOGREL 75 MG: 75 TABLET, FILM COATED ORAL at 11:03

## 2022-03-14 RX ADMIN — ASPIRIN 81 MG CHEWABLE TABLET 81 MG: 81 TABLET CHEWABLE at 11:03

## 2022-03-14 RX ADMIN — DORZOLAMIDE HYDROCHLORIDE AND TIMOLOL MALEATE 1 DROP: 20; 5 SOLUTION/ DROPS OPHTHALMIC at 11:03

## 2022-03-14 RX ADMIN — Medication 5000 UNITS: at 11:03

## 2022-03-14 RX ADMIN — LOSARTAN POTASSIUM 100 MG: 50 TABLET, FILM COATED ORAL at 11:03

## 2022-03-14 NOTE — TELEPHONE ENCOUNTER
----- Message from Luiza Felder sent at 3/14/2022 10:09 AM CDT -----  Regarding: Hosp f/u appt  Patient is preparing for discharge from [facility] and is requiring a hospital follow up appointment with  within 7 days.  If possible, can you please assist with scheduling this patient and message me back?    Dx chest pain     Thank you,    Luiza Felder  High End Access Navigator/Flasher discharge project   [facility]

## 2022-03-14 NOTE — ASSESSMENT & PLAN NOTE
Recent RCA KAMI  LAD disease medically managed  Stress test pending today  Continue DAPT, statin, ARB  Not on BB 2/2 to bradycardia  Patient requests follow up with Dr Tompkins as outpatient- CM notified  Will plan for Cardiac rehab once patient follows up in clinic

## 2022-03-14 NOTE — SUBJECTIVE & OBJECTIVE
Review of Systems   Constitutional: Negative. Negative for diaphoresis.   HENT: Negative.     Eyes: Negative.    Cardiovascular:  Negative for chest pain, dyspnea on exertion, irregular heartbeat, leg swelling, near-syncope, orthopnea, palpitations, paroxysmal nocturnal dyspnea and syncope.   Respiratory: Negative.     Endocrine: Negative.    Hematologic/Lymphatic: Negative.    Skin: Negative.    Musculoskeletal: Negative.    Gastrointestinal: Negative.    Genitourinary: Negative.    Neurological: Negative.    Psychiatric/Behavioral: Negative.     Allergic/Immunologic: Negative.    Objective:     Vital Signs (Most Recent):  Temp: 97.9 °F (36.6 °C) (03/14/22 0821)  Pulse: 63 (03/14/22 1207)  Resp: 18 (03/14/22 0821)  BP: 130/75 (03/14/22 1125)  SpO2: 97 % (03/14/22 1000) Vital Signs (24h Range):  Temp:  [96.9 °F (36.1 °C)-97.9 °F (36.6 °C)] 97.9 °F (36.6 °C)  Pulse:  [52-64] 63  Resp:  [16-18] 18  SpO2:  [94 %-99 %] 97 %  BP: (120-153)/(67-76) 130/75     Weight: 80.2 kg (176 lb 12.9 oz)  Body mass index is 25.37 kg/m².     SpO2: 97 %  O2 Device (Oxygen Therapy): room air      Intake/Output Summary (Last 24 hours) at 3/14/2022 1453  Last data filed at 3/13/2022 1730  Gross per 24 hour   Intake 240 ml   Output --   Net 240 ml       Lines/Drains/Airways       Peripheral Intravenous Line  Duration                  Peripheral IV - Single Lumen 03/12/22 1230 20 G Right Antecubital 2 days                    Physical Exam  Constitutional:       General: He is not in acute distress.     Appearance: He is not diaphoretic.   HENT:      Head: Atraumatic.   Eyes:      General:         Right eye: No discharge.         Left eye: No discharge.   Cardiovascular:      Rate and Rhythm: Normal rate and regular rhythm.   Pulmonary:      Effort: Pulmonary effort is normal.      Breath sounds: Normal breath sounds.   Abdominal:      General: Bowel sounds are normal.      Palpations: Abdomen is soft.   Musculoskeletal:      Right lower  leg: No edema.      Left lower leg: No edema.   Skin:     General: Skin is warm and dry.      Capillary Refill: Capillary refill takes less than 2 seconds.   Neurological:      Mental Status: He is oriented to person, place, and time.   Psychiatric:         Mood and Affect: Mood normal.         Behavior: Behavior normal.         Thought Content: Thought content normal.         Judgment: Judgment normal.       Significant Labs: BMP:   Recent Labs   Lab 03/13/22  0351   GLU 99      K 4.1      CO2 26   BUN 15   CREATININE 0.8   CALCIUM 9.0   MG 2.2   , CMP   Recent Labs   Lab 03/13/22  0351      K 4.1      CO2 26   GLU 99   BUN 15   CREATININE 0.8   CALCIUM 9.0   ANIONGAP 7*   ESTGFRAFRICA >60   EGFRNONAA >60   , CBC   Recent Labs   Lab 03/13/22 0351   WBC 8.37   HGB 13.6*   HCT 39.5*      , INR No results for input(s): INR, PROTIME in the last 48 hours., Lipid Panel No results for input(s): CHOL, HDL, LDLCALC, TRIG, CHOLHDL in the last 48 hours., Troponin   Recent Labs   Lab 03/12/22 2111 03/13/22  0351 03/13/22  0922   TROPONINI <0.006 0.010 0.006   , and All pertinent lab results from the last 24 hours have been reviewed.    Significant Imaging: Echocardiogram: Transthoracic echo (TTE) complete (Cupid Only):   Results for orders placed or performed during the hospital encounter of 01/20/22   Echo   Result Value Ref Range    BSA 2.02 m2    TDI SEPTAL 0.08 m/s    LV LATERAL E/E' RATIO 12.43 m/s    LV SEPTAL E/E' RATIO 10.88 m/s    LA WIDTH 3.80 cm    TDI LATERAL 0.07 m/s    PV PEAK VELOCITY 0.87 cm/s    LVIDd 4.71 3.5 - 6.0 cm    IVS 1.00 (A) 0.6 - 1.1 cm    Posterior Wall 1.04 0.6 - 1.1 cm    Ao root annulus 3.36 cm    LVIDs 1.50 2.1 - 4.0 cm    FS 68 28 - 44 %    LA volume 50.18 cm3    STJ 2.65 cm    Ascending aorta 2.67 cm    LV mass 169.55 g    LA size 3.56 cm    RV S' 16.78 cm/s    Left Ventricle Relative Wall Thickness 0.44 cm    AV mean gradient 5 mmHg    AV valve area 4.10 cm2     AV Velocity Ratio 0.66     AV index (prosthetic) 0.90     MV valve area p 1/2 method 3.04 cm2    MV valve area by continuity eq 3.54 cm2    E/A ratio 0.92     Mean e' 0.08 m/s    E wave deceleration time 249.37 msec    IVRT 85.63 msec    Pulm vein S/D ratio 1.11     LVOT diameter 2.41 cm    LVOT area 4.6 cm2    LVOT peak darrel 1.07 m/s    LVOT peak VTI 23.76 cm    Ao peak darrel 1.61 m/s    Ao VTI 26.39 cm    LVOT stroke volume 108.33 cm3    AV peak gradient 10 mmHg    MV peak gradient 4 mmHg    E/E' ratio 11.60 m/s    MV Peak E Darrel 0.87 m/s    TR Max Darrel 1.90 m/s    MV VTI 30.58 cm    MV stenosis pressure 1/2 time 72.32 ms    MV Peak A Darrel 0.95 m/s    PV Peak S Darrel 0.31 m/s    PV Peak D Darrel 0.28 m/s    LV Systolic Volume 34.20 mL    LV Systolic Volume Index 17.0 mL/m2    LV Diastolic Volume 102.99 mL    LV Diastolic Volume Index 51.24 mL/m2    LA Volume Index 25.0 mL/m2    LV Mass Index 84 g/m2    RA Major Axis 4.00 cm    Left Atrium Minor Axis 4.00 cm    Left Atrium Major Axis 4.80 cm    Triscuspid Valve Regurgitation Peak Gradient 14 mmHg    LA Volume Index (Mod) 26.0 mL/m2    LA volume (mod) 52.26 cm3    RA Width 3.70 cm    Right Atrial Pressure (from IVC) 3 mmHg    EF 60 %    RVDD 2.40 cm    TAPSE 1.90 cm    Right ventricular length in diastole (apical 4-chamber view) 5.50 cm    TV rest pulmonary artery pressure 17 mmHg    Narrative    · The left ventricle is normal in size with concentric remodeling and   normal systolic function.  · The estimated ejection fraction is 60%.  · Grade I left ventricular diastolic dysfunction.  · Normal right ventricular size with normal right ventricular systolic   function.  · The estimated PA systolic pressure is 17 mmHg.  · Normal central venous pressure (3 mmHg).

## 2022-03-14 NOTE — DISCHARGE SUMMARY
[facility] Medicine  Discharge Summary      Patient Name: Bandar French  MRN: [id number]  Patient Class: OP- Observation  Admission Date: 3/12/2022  Hospital Length of Stay: 0 days  Discharge Date and Time: No discharge date for patient encounter.  Attending Physician: Efra Carvalho MD   Discharging Provider: Efra Carvalho MD  Primary Care Provider: Gunnar Rangel MD      HPI:   Bandar French is a 72 y/o male with PMHx CAD s/p stent placement (01/20/2022), mixed hyperlipidemia, DMII, and HTN presents to [facility] ED with complaints of chest pain that started this morning at rest and lasted 5-10mins. Patient describes the chest pain as dull, stabbing pain located in his midsternal area non-radiating, rating pain as 3/10 on numeric pain scale. Patient states he experienced sudden chest pain episode yesterday that lasted a couple of minutes then resolved. Patient denies taking any medications during his episodes. He recently had a cardiac stent placed on 01/20/22 and has been on ASA and Plavix. Patient also reports he had an episode of epistaxis in addition to left sided headache radiating down his neck. Patient denies fever, chills, SOB, palpitations, leg swelling, N/V, or diaphoresis. In ED: Cardiac workup is negative thus far. Due to his recent cardiac procedure and Heart score of 6. Cardiology consulted. Patient will be placed into hospital medicine observation services under my care in collaboration with Dr. Efra Carvalho.         * No surgery found *      Hospital Course:   See individual problem list.       Goals of Care Treatment Preferences:  Code Status: Full Code    Living Will: Yes              Consults:   Consults (From admission, onward)        Status Ordering Provider     [facility]  Once        Provider:  (Not yet assigned)    RUPESH Kim          * Other chest pain  Patient currently with intermittent chest pain.  HEART score of 6.  S/p stent placement  1/2022  I have reviewed the EKG and it reveals NSR with 1st degree A-V block.    Chest X-ray is negative for acute cardiopulmonary process.   Obtain serial cardiac markers, which thus far have been negative.    Place in observation on telemetry.     Received ASA x1 in ED  Continue with daily ASA, home BP medications, and statin  Will provide supplemental oxygen for SpO2 <90%   Will have as-needed nitroglycerin  EKG PRN for recurrent chest pain  Cardiology consulted and appreciate recs  No further chest pain since admission. Trops negative. Pain doesn't appear cardiac in nature, however pt has significant CAD hx.  NM stress test without signs of ischemia  Cleared for discharge home with cardio follow up        Episodic headache  -Report history of frontal headache radiating down neck  -likely 2/2 to hypertension ('s)  -CT head negative for acute intracranial process  -Tylenol PRN for headache  -Control BP; will resume home meds        Essential hypertension  Chronic, controlled.  Latest blood pressure and vitals reviewed-   Temp:  [96.9 °F (36.1 °C)-97.9 °F (36.6 °C)]   Pulse:  [52-64]   Resp:  [16-18]   BP: (120-153)/(67-76)   SpO2:  [94 %-99 %] .   Home meds for hypertension were reviewed and noted below.   Hypertension Medications             amLODIPine (NORVASC) 2.5 MG tablet Take 1 tablet (2.5 mg total) by mouth nightly.    irbesartan (AVAPRO) 300 MG tablet Take 1 tablet (300 mg total) by mouth every evening.          While in the hospital, will manage blood pressure as follows; Continue home antihypertensive regimen    Will utilize p.r.n. blood pressure medication only if patient's blood pressure greater than  180/110 and he develops symptoms such as worsening chest pain or shortness of breath.        Type 2 diabetes mellitus without complication, without long-term current use of insulin  Hemoglobin A1C   Date Value Ref Range Status   09/14/2021 6.2 (H) 4.0 - 5.6 % Final     Patient's FSGs are controlled  on current medication regimen.  -cbg on admit 105  - ACCUcheck ACHS  - Diabetic diet  -Hypoglycemia protocol PRN        Hyperlipidemia, mixed  Last LDL was   Lab Results   Component Value Date    LDLCALC 50.4 (L) 09/14/2021      Patient is chronically on statin.will continue for now.   Monitor clinically.          Coronary artery disease involving native coronary artery of native heart with angina pectoris  -Patient with known CAD, s/p stent placement 01/20/22 which is currently stable  -Resume ASA, Plavix, and statin  -Monitor for s/s of angina/ACS  -Continue to monitor on telemetry          Final Active Diagnoses:    Diagnosis Date Noted POA    PRINCIPAL PROBLEM:  Other chest pain [R07.89] 03/12/2022 Yes    Episodic headache [R51.9] 03/12/2022 Yes    Essential hypertension [I10] 08/26/2020 Yes     Chronic    Type 2 diabetes mellitus without complication, without long-term current use of insulin [E11.9] 11/28/2019 Yes    Hyperlipidemia, mixed [E78.2] 09/25/2019 Yes     Chronic    Coronary artery disease involving native coronary artery of native heart with angina pectoris [I25.119] 09/25/2019 Yes      Problems Resolved During this Admission:       Discharged Condition: good    Disposition:     Follow Up:    Patient Instructions:   No discharge procedures on file.    Significant Diagnostic Studies: Labs: All labs within the past 24 hours have been reviewed    Pending Diagnostic Studies:     None         Medications:  Reconciled Home Medications:      Medication List      CHANGE how you take these medications    amLODIPine 5 MG tablet  Commonly known as: NORVASC  Take 1 tablet (5 mg total) by mouth nightly.  What changed:   · medication strength  · how much to take        CONTINUE taking these medications    aspirin 81 MG Chew  Take 1 tablet (81 mg total) by mouth once daily.     blood sugar diagnostic Strp  Commonly known as: BLOOD GLUCOSE TEST  1 each by Misc.(Non-Drug; Combo Route) route once daily.      CENTRUM SILVER ORAL  Take 1 tablet by mouth once daily.     chlorpheniramine 4 mg tablet  Commonly known as: CHLOR-TRIMETON  Take 4 mg by mouth as needed.     cholecalciferol (vitamin D3) 125 mcg (5,000 unit) Tab  Take 5,000 Units by mouth once daily.     clopidogreL 75 mg tablet  Commonly known as: PLAVIX  Take 1 tablet (75 mg total) by mouth once daily.     DEXCOM G6  Misc  Generic drug: blood-glucose meter,continuous  Use as directed     DEXCOM G6 SENSOR Joanne  Generic drug: blood-glucose sensor  Use as directed     DEXCOM G6 TRANSMITTER Joanne  Generic drug: blood-glucose transmitter  Use as directed     dorzolamide-timolol 2-0.5% 22.3-6.8 mg/mL ophthalmic solution  Commonly known as: COSOPT  Place 1 drop into both eyes 2 (two) times daily.     fluticasone propionate 50 mcg/actuation nasal spray  Commonly known as: FLONASE  2 sprays (100 mcg total) by Each Nostril route once daily.     ICAPS AREDS2 250 mg-200 unit -12.5 mg-1 mg Cap  Generic drug: vit C-E-zinc ox-dayron-lut-zeax  Take 2 capsules by mouth once daily.     irbesartan 300 MG tablet  Commonly known as: AVAPRO  Take 1 tablet (300 mg total) by mouth every evening.     latanoprost 0.005 % ophthalmic solution  Place 1 drop into both eyes every evening.     MICRO THIN LANCETS 33 gauge Misc  Generic drug: lancets     nitroGLYCERIN 0.4 MG SL tablet  Commonly known as: NITROSTAT  Place 1 tablet (0.4 mg total) under the tongue every 5 (five) minutes as needed for Chest pain.     rosuvastatin 40 MG Tab  Commonly known as: CRESTOR  Take 1 tablet (40 mg total) by mouth every evening.     TUMERIC-GING-OLIVE-OREG-CAPRYL ORAL  Take 1 tablet by mouth Daily.     VITAMIN C WITH ZARINA HIPS 500 MG tablet  Generic drug: ascorbic acid (vitamin C)  Take 500 mg by mouth once daily.     zinc 50 mg Tab  Take 50 mg by mouth Daily.            Indwelling Lines/Drains at time of discharge:   Lines/Drains/Airways     None                 Time spent on the discharge of patient:  36 minutes         Efra Carvalho MD  Department of [facility] Medicine  [place] [facility]

## 2022-03-14 NOTE — PROGRESS NOTES
[place] - Telemetry  Cardiology  Progress Note    Patient Name: Bandar French  MRN: [id number]  Admission Date: 3/12/2022  Hospital Length of Stay: 0 days  Code Status: Full Code   Attending Physician: Efra Carvalho MD   Primary Care Physician: Gunnar Rangel MD  Expected Discharge Date:   Principal Problem:Other chest pain    Subjective:     Hospital Course:   03/14/2022 No chest pain overnight. Home BP readings elevated. Norvasc up titrated. Stress test planned for today.           Review of Systems   Constitutional: Negative. Negative for diaphoresis.   HENT: Negative.     Eyes: Negative.    Cardiovascular:  Negative for chest pain, dyspnea on exertion, irregular heartbeat, leg swelling, near-syncope, orthopnea, palpitations, paroxysmal nocturnal dyspnea and syncope.   Respiratory: Negative.     Endocrine: Negative.    Hematologic/Lymphatic: Negative.    Skin: Negative.    Musculoskeletal: Negative.    Gastrointestinal: Negative.    Genitourinary: Negative.    Neurological: Negative.    Psychiatric/Behavioral: Negative.     Allergic/Immunologic: Negative.    Objective:     Vital Signs (Most Recent):  Temp: 97.9 °F (36.6 °C) (03/14/22 0821)  Pulse: 63 (03/14/22 1207)  Resp: 18 (03/14/22 0821)  BP: 130/75 (03/14/22 1125)  SpO2: 97 % (03/14/22 1000) Vital Signs (24h Range):  Temp:  [96.9 °F (36.1 °C)-97.9 °F (36.6 °C)] 97.9 °F (36.6 °C)  Pulse:  [52-64] 63  Resp:  [16-18] 18  SpO2:  [94 %-99 %] 97 %  BP: (120-153)/(67-76) 130/75     Weight: 80.2 kg (176 lb 12.9 oz)  Body mass index is 25.37 kg/m².     SpO2: 97 %  O2 Device (Oxygen Therapy): room air      Intake/Output Summary (Last 24 hours) at 3/14/2022 1453  Last data filed at 3/13/2022 1730  Gross per 24 hour   Intake 240 ml   Output --   Net 240 ml       Lines/Drains/Airways       Peripheral Intravenous Line  Duration                  Peripheral IV - Single Lumen 03/12/22 1230 20 G Right Antecubital 2 days                    Physical Exam  Constitutional:        General: He is not in acute distress.     Appearance: He is not diaphoretic.   HENT:      Head: Atraumatic.   Eyes:      General:         Right eye: No discharge.         Left eye: No discharge.   Cardiovascular:      Rate and Rhythm: Normal rate and regular rhythm.   Pulmonary:      Effort: Pulmonary effort is normal.      Breath sounds: Normal breath sounds.   Abdominal:      General: Bowel sounds are normal.      Palpations: Abdomen is soft.   Musculoskeletal:      Right lower leg: No edema.      Left lower leg: No edema.   Skin:     General: Skin is warm and dry.      Capillary Refill: Capillary refill takes less than 2 seconds.   Neurological:      Mental Status: He is oriented to person, place, and time.   Psychiatric:         Mood and Affect: Mood normal.         Behavior: Behavior normal.         Thought Content: Thought content normal.         Judgment: Judgment normal.       Significant Labs: BMP:   Recent Labs   Lab 03/13/22  0351   GLU 99      K 4.1      CO2 26   BUN 15   CREATININE 0.8   CALCIUM 9.0   MG 2.2   , CMP   Recent Labs   Lab 03/13/22  0351      K 4.1      CO2 26   GLU 99   BUN 15   CREATININE 0.8   CALCIUM 9.0   ANIONGAP 7*   ESTGFRAFRICA >60   EGFRNONAA >60   , CBC   Recent Labs   Lab 03/13/22  0351   WBC 8.37   HGB 13.6*   HCT 39.5*      , INR No results for input(s): INR, PROTIME in the last 48 hours., Lipid Panel No results for input(s): CHOL, HDL, LDLCALC, TRIG, CHOLHDL in the last 48 hours., Troponin   Recent Labs   Lab 03/12/22  2111 03/13/22  0351 03/13/22  0922   TROPONINI <0.006 0.010 0.006   , and All pertinent lab results from the last 24 hours have been reviewed.    Significant Imaging: Echocardiogram: Transthoracic echo (TTE) complete (Cupid Only):   Results for orders placed or performed during the hospital encounter of 01/20/22   Echo   Result Value Ref Range    BSA 2.02 m2    TDI SEPTAL 0.08 m/s    LV LATERAL E/E' RATIO 12.43 m/s    LV SEPTAL  E/E' RATIO 10.88 m/s    LA WIDTH 3.80 cm    TDI LATERAL 0.07 m/s    PV PEAK VELOCITY 0.87 cm/s    LVIDd 4.71 3.5 - 6.0 cm    IVS 1.00 (A) 0.6 - 1.1 cm    Posterior Wall 1.04 0.6 - 1.1 cm    Ao root annulus 3.36 cm    LVIDs 1.50 2.1 - 4.0 cm    FS 68 28 - 44 %    LA volume 50.18 cm3    STJ 2.65 cm    Ascending aorta 2.67 cm    LV mass 169.55 g    LA size 3.56 cm    RV S' 16.78 cm/s    Left Ventricle Relative Wall Thickness 0.44 cm    AV mean gradient 5 mmHg    AV valve area 4.10 cm2    AV Velocity Ratio 0.66     AV index (prosthetic) 0.90     MV valve area p 1/2 method 3.04 cm2    MV valve area by continuity eq 3.54 cm2    E/A ratio 0.92     Mean e' 0.08 m/s    E wave deceleration time 249.37 msec    IVRT 85.63 msec    Pulm vein S/D ratio 1.11     LVOT diameter 2.41 cm    LVOT area 4.6 cm2    LVOT peak darrel 1.07 m/s    LVOT peak VTI 23.76 cm    Ao peak darrel 1.61 m/s    Ao VTI 26.39 cm    LVOT stroke volume 108.33 cm3    AV peak gradient 10 mmHg    MV peak gradient 4 mmHg    E/E' ratio 11.60 m/s    MV Peak E Darrel 0.87 m/s    TR Max Darrel 1.90 m/s    MV VTI 30.58 cm    MV stenosis pressure 1/2 time 72.32 ms    MV Peak A Darrel 0.95 m/s    PV Peak S Darrel 0.31 m/s    PV Peak D Darrel 0.28 m/s    LV Systolic Volume 34.20 mL    LV Systolic Volume Index 17.0 mL/m2    LV Diastolic Volume 102.99 mL    LV Diastolic Volume Index 51.24 mL/m2    LA Volume Index 25.0 mL/m2    LV Mass Index 84 g/m2    RA Major Axis 4.00 cm    Left Atrium Minor Axis 4.00 cm    Left Atrium Major Axis 4.80 cm    Triscuspid Valve Regurgitation Peak Gradient 14 mmHg    LA Volume Index (Mod) 26.0 mL/m2    LA volume (mod) 52.26 cm3    RA Width 3.70 cm    Right Atrial Pressure (from IVC) 3 mmHg    EF 60 %    RVDD 2.40 cm    TAPSE 1.90 cm    Right ventricular length in diastole (apical 4-chamber view) 5.50 cm    TV rest pulmonary artery pressure 17 mmHg    Narrative    · The left ventricle is normal in size with concentric remodeling and   normal systolic function.  ·  The estimated ejection fraction is 60%.  · Grade I left ventricular diastolic dysfunction.  · Normal right ventricular size with normal right ventricular systolic   function.  · The estimated PA systolic pressure is 17 mmHg.  · Normal central venous pressure (3 mmHg).        Assessment and Plan:     Brief HPI: Patient seen this morning on rounds without CV complaint. NPO for stress test.     Essential hypertension  BP poorly controlled as outpatient with recent addition of Norvasc- up titrated  Continue ARB    Type 2 diabetes mellitus without complication, without long-term current use of insulin  AccuChecks AC/HS with SSI     Hyperlipidemia, mixed  High intensity statin     Coronary artery disease involving native coronary artery of native heart with angina pectoris  Recent RCA KAMI  LAD disease medically managed  Stress test pending today  Continue DAPT, statin, ARB  Not on BB 2/2 to bradycardia  Patient requests follow up with Dr Tompkins as outpatient- CM notified  Will plan for Cardiac rehab once patient follows up in clinic         VTE Risk Mitigation (From admission, onward)         Ordered     Reason for No Pharmacological VTE Prophylaxis  Once        Question:  Reasons:  Answer:  Already adequately anticoagulated on oral Anticoagulants    03/12/22 1850     IP VTE HIGH RISK PATIENT  Once         03/12/22 1850     Place sequential compression device  Until discontinued         03/12/22 1850                Carlos Enrique Giang NP  Cardiology  [place] - Telemetry

## 2022-03-14 NOTE — CONSULTS
[place] - Telemetry  Cardiology  Consult Note    Patient Name: Bandar French  MRN: [id number]  Admission Date: 3/12/2022  Hospital Length of Stay: 0 days  Code Status: Full Code   Attending Provider: Efra Carvalho MD   Consulting Provider: LUIS Sanchez, ANP  Primary Care Physician: Gunnar Rangel MD  Principal Problem:Other chest pain        [facility]  Consult performed by: LUIS Boggs, ANP  Consult ordered by: Ritika Thompson NP        See full consult note dated 3/12/2022 by Dr. Pito Ha

## 2022-03-14 NOTE — HOSPITAL COURSE
03/14/2022 No chest pain overnight. Home BP readings elevated. Norvasc up titrated. Stress test planned for today.

## 2022-03-14 NOTE — ASSESSMENT & PLAN NOTE
Chronic, controlled.  Latest blood pressure and vitals reviewed-   Temp:  [96.9 °F (36.1 °C)-97.9 °F (36.6 °C)]   Pulse:  [52-64]   Resp:  [16-18]   BP: (120-153)/(67-76)   SpO2:  [94 %-99 %] .   Home meds for hypertension were reviewed and noted below.   Hypertension Medications             amLODIPine (NORVASC) 2.5 MG tablet Take 1 tablet (2.5 mg total) by mouth nightly.    irbesartan (AVAPRO) 300 MG tablet Take 1 tablet (300 mg total) by mouth every evening.          While in the hospital, will manage blood pressure as follows; Continue home antihypertensive regimen    Will utilize p.r.n. blood pressure medication only if patient's blood pressure greater than  180/110 and he develops symptoms such as worsening chest pain or shortness of breath.

## 2022-03-14 NOTE — ASSESSMENT & PLAN NOTE
Patient currently with intermittent chest pain.  HEART score of 6.  S/p stent placement 1/2022  I have reviewed the EKG and it reveals NSR with 1st degree A-V block.    Chest X-ray is negative for acute cardiopulmonary process.   Obtain serial cardiac markers, which thus far have been negative.    Place in observation on telemetry.     Received ASA x1 in ED  Continue with daily ASA, home BP medications, and statin  Will provide supplemental oxygen for SpO2 <90%   Will have as-needed nitroglycerin  EKG PRN for recurrent chest pain  Cardiology consulted and appreciate recs  No further chest pain since admission. Trops negative. Pain doesn't appear cardiac in nature, however pt has significant CAD hx.  NM stress test without signs of ischemia  Cleared for discharge home with cardio follow up

## 2022-03-14 NOTE — PLAN OF CARE
ARLETTE met with pt at bedside to complete assessment. Pt will have Wife Marci [phone] transport him home at d.c. SW will request f/u apts. White board updated with CM name and contact information.  Discharge brochure provided.  Pt encouraged to call with any questions or concerns.  Cm will continue to follow pt through transitions of care and assist with any discharge needs.    Saman Loyola, LANEY  [phone]    Future Appointments   Date Time Provider Department Center   4/6/2022  7:00 AM LAB, [facility] [place] [facility] [place]   4[facility]  [facility]   4/6/2022 10:00 AM Heather You MD [facility]   4/12/2022  1:45 PM Gunnar Rangel MD [facility] [place]   6/9/2022  9:30 AM Syd Alvarado Jr., MD [facility] C[facility]IO [place]        03/14/22 0958   Discharge Assessment   Assessment Type Discharge Planning Assessment   Confirmed/corrected address, phone number and insurance Yes   Confirmed Demographics Correct on Facesheet   Source of Information patient   When was your last doctors appointment? 02/01/22   Does patient/caregiver understand observation status Yes   Reason For Admission Other chest pain   Lives With spouse   Facility Arrived From: HOME   Do you expect to return to your current living situation? Yes   Do you have help at home or someone to help you manage your care at home? Yes   Who are your caregiver(s) and their phone number(s)? Wife Marci [phone]   Prior to hospitilization cognitive status: Alert/Oriented   Current cognitive status: Alert/Oriented   Walking or Climbing Stairs Difficulty none   Dressing/Bathing Difficulty none   Home Accessibility wheelchair accessible   Home Layout Able to live on 1st floor   Equipment Currently Used at Home none   Readmission within 30 days? No   Patient currently being followed by outpatient case management? No   Do you currently have service(s) that help you manage your care at home? No   Do you take prescription  medications? Yes   Do you have prescription coverage? Yes   Coverage [employer] MANAGED MEDICARE   Do you have any problems affording any of your prescribed medications? No   Is the patient taking medications as prescribed? yes   Who is going to help you get home at discharge? Wife Marci [phone]   How do you get to doctors appointments? car, drives self   Are you on dialysis? No   Do you take coumadin? No   Discharge Plan A Home with family   DME Needed Upon Discharge  other (see comments)  (TBD)   Discharge Plan discussed with: Patient   Discharge Barriers Identified None   Relationship/Environment   Name(s) of Who Lives With Patient Lainey Covarrubias [phone]

## 2022-03-14 NOTE — PLAN OF CARE
SW met with pt and pts wife Marci at bedside to complete final assessment. Pt will have his wife help transport him home at d/c. Pt has f/u apts scheduled on avs. Rounds completed on pt.  All questions addressed.  Bedside nurse to discuss d/c medications.  Discussed importance to attend all f/u appts and take medications as prescribed.  Verbalized understanding.    Saman Loyola, LANEY  [phone]    Future Appointments   Date Time Provider Department Center   3/23/2022  1:40 PM Mega Tompkins MD [facility] [place] [facility]   3/28/2022  9:00 AM Gunnar Rangel MD [facility] [place]   4/6/2022  7:00 AM LAB, [facility] [place]   4/6/2022  7:15 AM LAB, [facility] [place]   4/6/2022 10:00 AM Heather You MD [facility]   4/12/2022  1:45 PM Gunnar Rangel MD [facility]   6/9/2022  9:30 AM Syd Alvarado Jr., MD [facility] [place]         03/14/22 1624   Final Note   Assessment Type Final Discharge Note   Anticipated Discharge Disposition Home   What phone number can be called within the next 1-3 days to see how you are doing after discharge? [phone]   Hospital Resources/Appts/Education Provided Appointments scheduled by Navigator/Coordinator   Post-Acute Status   Coverage [employer] MANAGED MEDICARE   Discharge Delays None known at this time

## 2022-03-14 NOTE — PLAN OF CARE
VN note: VN completed AVS and attachments and notified bedside nurse, Ramin. Will cont to be available and intervene prn.

## 2022-03-16 ENCOUNTER — LAB VISIT (OUTPATIENT)
Dept: LAB | Facility: HOSPITAL | Age: 72
End: 2022-03-16
Attending: INTERNAL MEDICINE
Payer: MEDICARE

## 2022-03-16 DIAGNOSIS — E11.9 TYPE 2 DIABETES MELLITUS WITHOUT COMPLICATION, WITHOUT LONG-TERM CURRENT USE OF INSULIN: ICD-10-CM

## 2022-03-16 LAB
ESTIMATED AVG GLUCOSE: 134 MG/DL (ref 68–131)
HBA1C MFR BLD: 6.3 % (ref 4–5.6)

## 2022-03-16 PROCEDURE — 36415 COLL VENOUS BLD VENIPUNCTURE: CPT | Mod: PO | Performed by: INTERNAL MEDICINE

## 2022-03-16 PROCEDURE — 83036 HEMOGLOBIN GLYCOSYLATED A1C: CPT | Performed by: INTERNAL MEDICINE

## 2022-03-19 PROCEDURE — 99454 PR REMOTE MNTR, PHYS PARAM, INITIAL, EA 30 DAYS: ICD-10-PCS | Mod: S$GLB,,, | Performed by: INTERNAL MEDICINE

## 2022-03-19 PROCEDURE — 99454 REM MNTR PHYSIOL PARAM 16-30: CPT | Mod: S$GLB,,, | Performed by: INTERNAL MEDICINE

## 2022-03-22 DIAGNOSIS — I10 HYPERTENSION, UNSPECIFIED TYPE: Primary | ICD-10-CM

## 2022-03-23 ENCOUNTER — OFFICE VISIT (OUTPATIENT)
Dept: CARDIOLOGY | Facility: CLINIC | Age: 72
End: 2022-03-23
Payer: MEDICARE

## 2022-03-23 VITALS
HEART RATE: 62 BPM | DIASTOLIC BLOOD PRESSURE: 58 MMHG | OXYGEN SATURATION: 62 % | HEIGHT: 70 IN | SYSTOLIC BLOOD PRESSURE: 110 MMHG | WEIGHT: 184.06 LBS | BODY MASS INDEX: 26.35 KG/M2

## 2022-03-23 DIAGNOSIS — E78.2 HYPERLIPIDEMIA, MIXED: Chronic | ICD-10-CM

## 2022-03-23 DIAGNOSIS — I10 ESSENTIAL HYPERTENSION: Chronic | ICD-10-CM

## 2022-03-23 DIAGNOSIS — Z95.5 PRESENCE OF STENT IN CORONARY ARTERY: Chronic | ICD-10-CM

## 2022-03-23 DIAGNOSIS — R04.0 EPISTAXIS: ICD-10-CM

## 2022-03-23 DIAGNOSIS — I25.10 CORONARY ARTERY DISEASE INVOLVING NATIVE CORONARY ARTERY OF NATIVE HEART WITHOUT ANGINA PECTORIS: Primary | ICD-10-CM

## 2022-03-23 DIAGNOSIS — E11.9 TYPE 2 DIABETES MELLITUS WITHOUT COMPLICATION, WITHOUT LONG-TERM CURRENT USE OF INSULIN: ICD-10-CM

## 2022-03-23 PROCEDURE — 99999 PR PBB SHADOW E&M-EST. PATIENT-LVL IV: CPT | Mod: PBBFAC,,, | Performed by: INTERNAL MEDICINE

## 2022-03-23 PROCEDURE — 1157F PR ADVANCE CARE PLAN OR EQUIV PRESENT IN MEDICAL RECORD: ICD-10-PCS | Mod: CPTII,S$GLB,, | Performed by: INTERNAL MEDICINE

## 2022-03-23 PROCEDURE — 3008F PR BODY MASS INDEX (BMI) DOCUMENTED: ICD-10-PCS | Mod: CPTII,S$GLB,, | Performed by: INTERNAL MEDICINE

## 2022-03-23 PROCEDURE — 4010F ACE/ARB THERAPY RXD/TAKEN: CPT | Mod: CPTII,S$GLB,, | Performed by: INTERNAL MEDICINE

## 2022-03-23 PROCEDURE — 1159F PR MEDICATION LIST DOCUMENTED IN MEDICAL RECORD: ICD-10-PCS | Mod: CPTII,S$GLB,, | Performed by: INTERNAL MEDICINE

## 2022-03-23 PROCEDURE — 3008F BODY MASS INDEX DOCD: CPT | Mod: CPTII,S$GLB,, | Performed by: INTERNAL MEDICINE

## 2022-03-23 PROCEDURE — 3044F HG A1C LEVEL LT 7.0%: CPT | Mod: CPTII,S$GLB,, | Performed by: INTERNAL MEDICINE

## 2022-03-23 PROCEDURE — 99214 OFFICE O/P EST MOD 30 MIN: CPT | Mod: S$GLB,,, | Performed by: INTERNAL MEDICINE

## 2022-03-23 PROCEDURE — 99499 RISK ADDL DX/OHS AUDIT: ICD-10-PCS | Mod: HCNC,S$GLB,, | Performed by: INTERNAL MEDICINE

## 2022-03-23 PROCEDURE — 4010F PR ACE/ARB THEARPY RXD/TAKEN: ICD-10-PCS | Mod: CPTII,S$GLB,, | Performed by: INTERNAL MEDICINE

## 2022-03-23 PROCEDURE — 99214 PR OFFICE/OUTPT VISIT, EST, LEVL IV, 30-39 MIN: ICD-10-PCS | Mod: S$GLB,,, | Performed by: INTERNAL MEDICINE

## 2022-03-23 PROCEDURE — 1126F PR PAIN SEVERITY QUANTIFIED, NO PAIN PRESENT: ICD-10-PCS | Mod: CPTII,S$GLB,, | Performed by: INTERNAL MEDICINE

## 2022-03-23 PROCEDURE — 99499 UNLISTED E&M SERVICE: CPT | Mod: HCNC,S$GLB,, | Performed by: INTERNAL MEDICINE

## 2022-03-23 PROCEDURE — 3078F PR MOST RECENT DIASTOLIC BLOOD PRESSURE < 80 MM HG: ICD-10-PCS | Mod: CPTII,S$GLB,, | Performed by: INTERNAL MEDICINE

## 2022-03-23 PROCEDURE — 1159F MED LIST DOCD IN RCRD: CPT | Mod: CPTII,S$GLB,, | Performed by: INTERNAL MEDICINE

## 2022-03-23 PROCEDURE — 3044F PR MOST RECENT HEMOGLOBIN A1C LEVEL <7.0%: ICD-10-PCS | Mod: CPTII,S$GLB,, | Performed by: INTERNAL MEDICINE

## 2022-03-23 PROCEDURE — 1157F ADVNC CARE PLAN IN RCRD: CPT | Mod: CPTII,S$GLB,, | Performed by: INTERNAL MEDICINE

## 2022-03-23 PROCEDURE — 3078F DIAST BP <80 MM HG: CPT | Mod: CPTII,S$GLB,, | Performed by: INTERNAL MEDICINE

## 2022-03-23 PROCEDURE — 3072F PR LOW RISK FOR RETINOPATHY: ICD-10-PCS | Mod: CPTII,S$GLB,, | Performed by: INTERNAL MEDICINE

## 2022-03-23 PROCEDURE — 1126F AMNT PAIN NOTED NONE PRSNT: CPT | Mod: CPTII,S$GLB,, | Performed by: INTERNAL MEDICINE

## 2022-03-23 PROCEDURE — 3074F SYST BP LT 130 MM HG: CPT | Mod: CPTII,S$GLB,, | Performed by: INTERNAL MEDICINE

## 2022-03-23 PROCEDURE — 3074F PR MOST RECENT SYSTOLIC BLOOD PRESSURE < 130 MM HG: ICD-10-PCS | Mod: CPTII,S$GLB,, | Performed by: INTERNAL MEDICINE

## 2022-03-23 PROCEDURE — 99999 PR PBB SHADOW E&M-EST. PATIENT-LVL IV: ICD-10-PCS | Mod: PBBFAC,,, | Performed by: INTERNAL MEDICINE

## 2022-03-23 PROCEDURE — 3072F LOW RISK FOR RETINOPATHY: CPT | Mod: CPTII,S$GLB,, | Performed by: INTERNAL MEDICINE

## 2022-03-23 RX ORDER — AMLODIPINE BESYLATE 2.5 MG/1
2.5 TABLET ORAL NIGHTLY
Qty: 30 TABLET | Refills: 0 | Status: SHIPPED | OUTPATIENT
Start: 2022-03-23 | End: 2022-03-25

## 2022-03-23 NOTE — PROGRESS NOTES
Subjective:   @Patient ID:  Bandar French is a 71 y.o. male who presents for follow-up of CAD       HPI:   Here for f/u   Patient admitted 1/2022 with [facility] with PCI to LCX and RCA. Symptoms were mainly jaw pain.  Residual moderate LAD. Readmitted 3/2022 with headaches, chest pressure and had epistaxis episodes after sneezing. Stress MPI with no ischemia. Norvasc dose increased to 5 mg daily.     Today he stated that he is doing well. Since Norvasc dose increased to 5 mg he feels sluggish. SBP in 110s today    Unclear etiology for the headaches. He has glaucoma. CT head with no acute findings.   He is very active with no issues  LDL at goal         Prior cardiovascular  Hx  --------------------------------  - Stress MPI 3/13/2022 -ve for ischemia     - Heart Catheterization  1/20/2022  · There was two vessel coronary artery disease.  · Procedure done for unstable angina  · The PCI was successful.  · The 1st Mrg lesion was 99% stenosed with 0% stenosis post-intervention.  · A STENT RESOLUTE VICKY [id number] stent was successfully placed. Post dilated with 3.0 NC balloon at high pressure. IVUS guided  · The Prox RCA lesion was 70% stenosed with 0% stenosis post-intervention.  · A STENT RESOLUTE VICKY [id number] stent was successfully placed . Post dilated with 3.5 NC balloon. IVUS guided.  · The Prox LAD to Mid LAD lesion was 50% stenosed. Medical treatment at this time.  · The estimated blood loss was none.  · The pre-procedure left ventricular end diastolic pressure was 17.  · No LV gram done. Just pressure.     - ASA/Plavix  - High intense statin   - Avoid BB due to bradycardia  - Medical tx for residual LAD disease. If symptomatic as outpatient consider non invasive evaluation versus physiologic evaluation.   - F/U with Dr. Alvarado                   Patient Active Problem List    Diagnosis Date Noted    Episodic headache 03/12/2022    Other chest pain 03/12/2022    Epistaxis 03/11/2022    Presence of stent in  coronary artery 11/17/2021    Steatosis of liver 11/17/2021    Benign paroxysmal positional vertigo 09/22/2021    Essential hypertension 08/26/2020    Type 2 diabetes mellitus without complication, without long-term current use of insulin 11/28/2019    Coronary artery disease involving native coronary artery of native heart without angina pectoris 09/25/2019     Review of old records showed the presence of coronary artery disease with a stent of the circumflex in October of 2014. Cardiac catheterization in 2017 showed a 20% lesion in the right coronary artery, but the left coronary artery was free of disease.  Echocardiography in January 2019 showed left ventricular systolic function be normal, ejection fraction 60-65%        Hyperlipidemia, mixed 09/25/2019    Osteoarthritis 09/25/2019    Osteoarthritis of right knee 07/22/2019                    LAST HbA1c  Lab Results   Component Value Date    HGBA1C 6.3 (H) 03/16/2022       Lipid panel  Lab Results   Component Value Date    CHOL 99 (L) 09/14/2021    CHOL 113 08/19/2020     Lab Results   Component Value Date    HDL 28 (L) 09/14/2021    HDL 31 08/19/2020     Lab Results   Component Value Date    LDLCALC 50.4 (L) 09/14/2021    LDLCALC 61 08/19/2020     Lab Results   Component Value Date    TRIG 103 09/14/2021     Lab Results   Component Value Date    CHOLHDL 28.3 09/14/2021            Review of Systems   Constitutional: Negative for chills and fever.   HENT: Positive for nosebleeds. Negative for hearing loss.         As in HPI    Eyes: Negative for blurred vision.   Cardiovascular:        As in HPI    Respiratory: Negative for hemoptysis and shortness of breath.    Hematologic/Lymphatic: Negative for bleeding problem.   Skin: Negative for itching.   Musculoskeletal: Negative for falls.   Gastrointestinal: Negative for abdominal pain and hematochezia.   Genitourinary: Negative for hematuria.   Neurological: Negative for dizziness and loss of balance.    Psychiatric/Behavioral: Negative for altered mental status and depression.       Objective:   Physical Exam  Constitutional:       Appearance: He is well-developed.   HENT:      Head: Normocephalic and atraumatic.   Eyes:      Conjunctiva/sclera: Conjunctivae normal.   Neck:      Vascular: No carotid bruit or JVD.   Cardiovascular:      Rate and Rhythm: Normal rate and regular rhythm.      Pulses:           Carotid pulses are 2+ on the right side and 2+ on the left side.       Radial pulses are 2+ on the right side and 2+ on the left side.      Heart sounds: Normal heart sounds. No murmur heard.    No friction rub. No gallop.   Pulmonary:      Effort: Pulmonary effort is normal. No respiratory distress.      Breath sounds: Normal breath sounds. No stridor. No wheezing.   Musculoskeletal:      Cervical back: Neck supple.   Skin:     General: Skin is warm and dry.   Neurological:      Mental Status: He is alert and oriented to person, place, and time.   Psychiatric:         Behavior: Behavior normal.         Assessment:     1. Coronary artery disease involving native coronary artery of native heart without angina pectoris    2. Essential hypertension    3. Epistaxis    4. Hyperlipidemia, mixed    5. Presence of stent in coronary artery    6. Type 2 diabetes mellitus without complication, without long-term current use of insulin        Plan:     - Stable CAD. Residual non obstructive CAD   - Continue High intense statin   - LDL at goal  - ASA/plavix  - ENT evaluation   - Cut Norvasc to 2.5 mg daily and monitor BP at home   - Continue ARB      I spent 5-10 minutes asking, assessing, assisting, arranging and advising heart healthy diet improvements. This included low-salt meals, portion control and health food alternatives. I also encourage 30 minutes of moderate exercise 3-4x a week.       6 months f/u     Pertinent cardiac images and EKG reviewed independently.    Continue with current medical plan and lifestyle  changes.  Return sooner for concerns or questions. If symptoms persist go to the ED  I have reviewed all pertinent data including patient's medical history in detail and updated the computerized patient record.     Orders Placed This Encounter   Procedures    Ambulatory referral/consult to ENT     Standing Status:   Future     Standing Expiration Date:   4/23/2023     Referral Priority:   Routine     Referral Type:   Consultation     Referral Reason:   Specialty Services Required     Requested Specialty:   Otolaryngology     Number of Visits Requested:   1       Follow up as scheduled.     He expressed verbal understanding and agreed with the plan    Patient's Medications   New Prescriptions    No medications on file   Previous Medications    ASCORBIC ACID, VITAMIN C, (VITAMIN C) 500 MG TABLET    Take 500 mg by mouth once daily.    ASPIRIN 81 MG CHEW    Take 1 tablet (81 mg total) by mouth once daily.    BLOOD SUGAR DIAGNOSTIC (BLOOD GLUCOSE TEST) STRP    1 each by Misc.(Non-Drug; Combo Route) route once daily.    BLOOD-GLUCOSE METER,CONTINUOUS (DEXCOM G6 ) MISC    Use as directed    BLOOD-GLUCOSE SENSOR (DEXCOM G6 SENSOR) ROSALEE    Use as directed    BLOOD-GLUCOSE TRANSMITTER (DEXCOM G6 TRANSMITTER) ROSALEE    Use as directed    CHLORPHENIRAMINE (CHLOR-TRIMETON) 4 MG TABLET    Take 4 mg by mouth as needed.     CHOLECALCIFEROL, VITAMIN D3, 125 MCG (5,000 UNIT) TAB    Take 5,000 Units by mouth once daily.    CLOPIDOGREL (PLAVIX) 75 MG TABLET    Take 1 tablet (75 mg total) by mouth once daily.    DORZOLAMIDE-TIMOLOL 2-0.5% (COSOPT) 22.3-6.8 MG/ML OPHTHALMIC SOLUTION    Place 1 drop into both eyes 2 (two) times daily.    FLUTICASONE PROPIONATE (FLONASE) 50 MCG/ACTUATION NASAL SPRAY    2 sprays (100 mcg total) by Each Nostril route once daily.    FOLIC ACID/MULTIVIT-MIN/LUTEIN (CENTRUM SILVER ORAL)    Take 1 tablet by mouth once daily.    IRBESARTAN (AVAPRO) 300 MG TABLET    Take 1 tablet (300 mg total) by mouth  every evening.    LATANOPROST 0.005 % OPHTHALMIC SOLUTION    Place 1 drop into both eyes every evening.    MICRO THIN LANCETS 33 GAUGE MISC        NITROGLYCERIN (NITROSTAT) 0.4 MG SL TABLET    Place 1 tablet (0.4 mg total) under the tongue every 5 (five) minutes as needed for Chest pain.    ROSUVASTATIN (CRESTOR) 40 MG TAB    Take 1 tablet (40 mg total) by mouth every evening.    TUMERIC-GING-OLIVE-OREG-CAPRYL ORAL    Take 1 tablet by mouth Daily.    VIT C-E-ZINC OX-KAREN-LUT-ZEAX (ICAPS AREDS2) 250 MG-200 UNIT -12.5 MG-1 MG CAP    Take 2 capsules by mouth once daily.    ZINC 50 MG TAB    Take 50 mg by mouth Daily.   Modified Medications    Modified Medication Previous Medication    AMLODIPINE (NORVASC) 2.5 MG TABLET amLODIPine (NORVASC) 5 MG tablet       Take 1 tablet (2.5 mg total) by mouth nightly.    Take 1 tablet (5 mg total) by mouth nightly.   Discontinued Medications    No medications on file

## 2022-03-28 ENCOUNTER — OFFICE VISIT (OUTPATIENT)
Dept: INTERNAL MEDICINE | Facility: CLINIC | Age: 72
End: 2022-03-28
Payer: MEDICARE

## 2022-03-28 VITALS
SYSTOLIC BLOOD PRESSURE: 98 MMHG | DIASTOLIC BLOOD PRESSURE: 52 MMHG | WEIGHT: 181.75 LBS | HEIGHT: 70 IN | BODY MASS INDEX: 26.02 KG/M2 | OXYGEN SATURATION: 97 % | HEART RATE: 63 BPM

## 2022-03-28 DIAGNOSIS — E11.9 TYPE 2 DIABETES MELLITUS WITHOUT COMPLICATION, WITHOUT LONG-TERM CURRENT USE OF INSULIN: ICD-10-CM

## 2022-03-28 DIAGNOSIS — E78.2 HYPERLIPIDEMIA, MIXED: ICD-10-CM

## 2022-03-28 DIAGNOSIS — I25.10 CORONARY ARTERY DISEASE INVOLVING NATIVE CORONARY ARTERY OF NATIVE HEART WITHOUT ANGINA PECTORIS: Primary | ICD-10-CM

## 2022-03-28 DIAGNOSIS — I11.9 HYPERTENSIVE HEART DISEASE WITHOUT HEART FAILURE: ICD-10-CM

## 2022-03-28 PROBLEM — R51.9 EPISODIC HEADACHE: Status: RESOLVED | Noted: 2022-03-12 | Resolved: 2022-03-28

## 2022-03-28 PROBLEM — R07.89 OTHER CHEST PAIN: Status: RESOLVED | Noted: 2022-03-12 | Resolved: 2022-03-28

## 2022-03-28 PROBLEM — R04.0 EPISTAXIS: Status: RESOLVED | Noted: 2022-03-11 | Resolved: 2022-03-28

## 2022-03-28 PROCEDURE — 1126F AMNT PAIN NOTED NONE PRSNT: CPT | Mod: CPTII,S$GLB,, | Performed by: INTERNAL MEDICINE

## 2022-03-28 PROCEDURE — 3044F PR MOST RECENT HEMOGLOBIN A1C LEVEL <7.0%: ICD-10-PCS | Mod: CPTII,S$GLB,, | Performed by: INTERNAL MEDICINE

## 2022-03-28 PROCEDURE — 1126F PR PAIN SEVERITY QUANTIFIED, NO PAIN PRESENT: ICD-10-PCS | Mod: CPTII,S$GLB,, | Performed by: INTERNAL MEDICINE

## 2022-03-28 PROCEDURE — 99214 OFFICE O/P EST MOD 30 MIN: CPT | Mod: S$GLB,,, | Performed by: INTERNAL MEDICINE

## 2022-03-28 PROCEDURE — 3074F PR MOST RECENT SYSTOLIC BLOOD PRESSURE < 130 MM HG: ICD-10-PCS | Mod: CPTII,S$GLB,, | Performed by: INTERNAL MEDICINE

## 2022-03-28 PROCEDURE — 1157F PR ADVANCE CARE PLAN OR EQUIV PRESENT IN MEDICAL RECORD: ICD-10-PCS | Mod: CPTII,S$GLB,, | Performed by: INTERNAL MEDICINE

## 2022-03-28 PROCEDURE — 1159F MED LIST DOCD IN RCRD: CPT | Mod: CPTII,S$GLB,, | Performed by: INTERNAL MEDICINE

## 2022-03-28 PROCEDURE — 3072F LOW RISK FOR RETINOPATHY: CPT | Mod: CPTII,S$GLB,, | Performed by: INTERNAL MEDICINE

## 2022-03-28 PROCEDURE — 1160F RVW MEDS BY RX/DR IN RCRD: CPT | Mod: CPTII,S$GLB,, | Performed by: INTERNAL MEDICINE

## 2022-03-28 PROCEDURE — 99999 PR PBB SHADOW E&M-EST. PATIENT-LVL IV: ICD-10-PCS | Mod: PBBFAC,,, | Performed by: INTERNAL MEDICINE

## 2022-03-28 PROCEDURE — 1160F PR REVIEW ALL MEDS BY PRESCRIBER/CLIN PHARMACIST DOCUMENTED: ICD-10-PCS | Mod: CPTII,S$GLB,, | Performed by: INTERNAL MEDICINE

## 2022-03-28 PROCEDURE — 1157F ADVNC CARE PLAN IN RCRD: CPT | Mod: CPTII,S$GLB,, | Performed by: INTERNAL MEDICINE

## 2022-03-28 PROCEDURE — 3044F HG A1C LEVEL LT 7.0%: CPT | Mod: CPTII,S$GLB,, | Performed by: INTERNAL MEDICINE

## 2022-03-28 PROCEDURE — 3008F BODY MASS INDEX DOCD: CPT | Mod: CPTII,S$GLB,, | Performed by: INTERNAL MEDICINE

## 2022-03-28 PROCEDURE — 99214 PR OFFICE/OUTPT VISIT, EST, LEVL IV, 30-39 MIN: ICD-10-PCS | Mod: S$GLB,,, | Performed by: INTERNAL MEDICINE

## 2022-03-28 PROCEDURE — 3078F PR MOST RECENT DIASTOLIC BLOOD PRESSURE < 80 MM HG: ICD-10-PCS | Mod: CPTII,S$GLB,, | Performed by: INTERNAL MEDICINE

## 2022-03-28 PROCEDURE — 3078F DIAST BP <80 MM HG: CPT | Mod: CPTII,S$GLB,, | Performed by: INTERNAL MEDICINE

## 2022-03-28 PROCEDURE — 3074F SYST BP LT 130 MM HG: CPT | Mod: CPTII,S$GLB,, | Performed by: INTERNAL MEDICINE

## 2022-03-28 PROCEDURE — 3072F PR LOW RISK FOR RETINOPATHY: ICD-10-PCS | Mod: CPTII,S$GLB,, | Performed by: INTERNAL MEDICINE

## 2022-03-28 PROCEDURE — 4010F PR ACE/ARB THEARPY RXD/TAKEN: ICD-10-PCS | Mod: CPTII,S$GLB,, | Performed by: INTERNAL MEDICINE

## 2022-03-28 PROCEDURE — 4010F ACE/ARB THERAPY RXD/TAKEN: CPT | Mod: CPTII,S$GLB,, | Performed by: INTERNAL MEDICINE

## 2022-03-28 PROCEDURE — 3008F PR BODY MASS INDEX (BMI) DOCUMENTED: ICD-10-PCS | Mod: CPTII,S$GLB,, | Performed by: INTERNAL MEDICINE

## 2022-03-28 PROCEDURE — 1159F PR MEDICATION LIST DOCUMENTED IN MEDICAL RECORD: ICD-10-PCS | Mod: CPTII,S$GLB,, | Performed by: INTERNAL MEDICINE

## 2022-03-28 PROCEDURE — 99999 PR PBB SHADOW E&M-EST. PATIENT-LVL IV: CPT | Mod: PBBFAC,,, | Performed by: INTERNAL MEDICINE

## 2022-03-28 NOTE — PROGRESS NOTES
[facility] Note    Chief Complaint      Chief Complaint   Patient presents with    Follow-up     Hospital visit       History of Present Illness      Bandar French is a 71 y.o. male with chronic conditions of CAD, HTN, HLD, DM2, osteoarthritis who presents today for: hospital follow up.  Had another episode of chest pain 3/2022.  Also presented with epistaxis and left sided headache.  Nuclear stress test did not show ischemia.  Epistaxis attributed to hypertensive episode.  Amlodipine increased from 2.5 mg to 5 mg.  Irbesartan continued at 300 mg.  Followed up with Dr. Tompkins after discharge and amlodipine decreased back to 2.5 mg.    DM2: A1C 6.3.  Diet controlled.  Eye exam UTD with Dr. Shannon.  HTN: BP slightly low on irbesartan, amlodipine.   CAD: Sees Dr. Tompkins, cardiology  On ASA, plavix, irbesartan, crestor.  S/P PCI x 2 1/2022.  Denies CP, SOB.    HLD: Controlled on crestor.  LDL 50.     Flu shot 2021. Prevnar 2015.  Pneumovax UTD.  Zostavax 2013.  Shingrix UTD.  COVID UTD.  Cscope Dr. Copeland, 2016, no polyps, 10 yr interval.    Past Medical History:  Past Medical History:   Diagnosis Date    Depression     Episodic headache 3/12/2022    Glaucoma        Past Surgical History:   has a past surgical history that includes Knee arthroscopy (Left, 1967); Knee arthroscopy (Right, 2012); Hand surgery (Right, 12/2004); Coronary stent placement (2014); Tonsillectomy; Total knee arthroplasty (Right, 7/22/2019); Adenoidectomy; Cosmetic surgery (Bilateral, 06/2019); Eye surgery (Left, 03/2019); Joint replacement (Right, 7/22/19); Left heart catheterization (Left, 1/20/2022); and Coronary angiography (N/A, 1/20/2022).    Family History:  family history includes Aneurysm in his father; Dementia in his mother; Diabetes in his mother; Heart disease in his brother; Migraines in his sister; No Known Problems in his daughter, son, and son; Other in his brother and son; Pneumonia in his brother.      Social History:  Social History     Tobacco Use    Smoking status: Former Smoker     Packs/day: 2.00     Years: 20.00     Pack years: 40.00     Types: Cigarettes, Cigars     Start date: 1968     Quit date:      Years since quittin.2    Smokeless tobacco: Never Used   Substance Use Topics    Alcohol use: Not Currently     Alcohol/week: 0.0 standard drinks     Comment: Discontinued     Drug use: Never       I personally reviewed all past medical, surgical, social and family history.    Review of Systems   Constitutional: Negative for chills, fever and malaise/fatigue.   Respiratory: Negative for shortness of breath.    Cardiovascular: Negative for chest pain.   Gastrointestinal: Negative for constipation, diarrhea, nausea and vomiting.   Skin: Negative for rash.   Neurological: Negative for weakness.   All other systems reviewed and are negative.       Medications:  Outpatient Encounter Medications as of 3/28/2022   Medication Sig Dispense Refill    amLODIPine (NORVASC) 2.5 MG tablet TAKE 1 TABLET(2.5 MG) BY MOUTH EVERY NIGHT 90 tablet 3    ascorbic acid, vitamin C, (VITAMIN C) 500 MG tablet Take 500 mg by mouth once daily.      aspirin 81 MG Chew Take 1 tablet (81 mg total) by mouth once daily. 30 tablet 11    blood sugar diagnostic (BLOOD GLUCOSE TEST) Strp 1 each by Misc.(Non-Drug; Combo Route) route once daily. 30 each 11    blood-glucose meter,continuous (DEXCOM G6 ) Misc Use as directed 1 each 11    blood-glucose sensor (DEXCOM G6 SENSOR) Joanne Use as directed 10 each 11    blood-glucose transmitter (DEXCOM G6 TRANSMITTER) Joanne Use as directed 1 each 3    chlorpheniramine (CHLOR-TRIMETON) 4 mg tablet Take 4 mg by mouth as needed.       cholecalciferol, vitamin D3, 125 mcg (5,000 unit) Tab Take 5,000 Units by mouth once daily.      clopidogreL (PLAVIX) 75 mg tablet Take 1 tablet (75 mg total) by mouth once daily. 90 tablet 3    dorzolamide-timolol 2-0.5% (COSOPT)  22.3-6.8 mg/mL ophthalmic solution Place 1 drop into both eyes 2 (two) times daily.      fluticasone propionate (FLONASE) 50 mcg/actuation nasal spray 2 sprays (100 mcg total) by Each Nostril route once daily. 16 g 11    folic acid/multivit-min/lutein (CENTRUM SILVER ORAL) Take 1 tablet by mouth once daily.      irbesartan (AVAPRO) 300 MG tablet Take 1 tablet (300 mg total) by mouth every evening. 90 tablet 1    latanoprost 0.005 % ophthalmic solution Place 1 drop into both eyes every evening.      MICRO THIN LANCETS 33 gauge Misc       nitroGLYCERIN (NITROSTAT) 0.4 MG SL tablet Place 1 tablet (0.4 mg total) under the tongue every 5 (five) minutes as needed for Chest pain. 25 tablet 11    rosuvastatin (CRESTOR) 40 MG Tab Take 1 tablet (40 mg total) by mouth every evening. 90 tablet 3    TUMERIC-GING-OLIVE-OREG-CAPRYL ORAL Take 1 tablet by mouth Daily.      vit C-E-zinc ox-dayron-lut-zeax (ICAPS AREDS2) 250 mg-200 unit -12.5 mg-1 mg Cap Take 2 capsules by mouth once daily.      zinc 50 mg Tab Take 50 mg by mouth Daily.       No facility-administered encounter medications on file as of 3/28/2022.       Allergies:  Review of patient's allergies indicates:  No Known Allergies    Health Maintenance:  Immunization History   Administered Date(s) Administered    COVID-19, MRNA, LN-S, PF (MODERNA FULL 0.5 ML DOSE) 03/10/2021, 03/10/2021, 04/12/2021    Influenza (FLUAD) - Quadrivalent - Adjuvanted - PF *Preferred* (65+) 09/16/2020    Influenza - High Dose - PF (65 years and older) 09/25/2019    Influenza - Quadrivalent - High Dose - PF (65 years and older) 10/21/2021    Pneumococcal Polysaccharide - 23 Valent 09/25/2019    Tdap 10/21/2021    Zoster 01/02/2013    Zoster Recombinant 10/14/2020, 05/10/2021      Health Maintenance   Topic Date Due    Foot Exam  09/16/2021    Lipid Panel  09/14/2022    Hemoglobin A1c  09/16/2022    Eye Exam  09/27/2022    High Dose Statin  03/23/2023    TETANUS VACCINE   "10/21/2031    Hepatitis C Screening  Completed    Abdominal Aortic Aneurysm Screening  Completed        Physical Exam      Vital Signs  Pulse: 63  SpO2: 97 %  BP: (!) 98/52  BP Location: Right arm  Patient Position: Sitting  Pain Score: 0-No pain  Height and Weight  Height: 5' 10" (177.8 cm)  Weight: 82.5 kg (181 lb 12.3 oz)  BSA (Calculated - sq m): 2.02 sq meters  BMI (Calculated): 26.1  Weight in (lb) to have BMI = 25: 173.9]    Physical Exam  Vitals reviewed.   Constitutional:       Appearance: He is well-developed.   HENT:      Head: Normocephalic and atraumatic.      Right Ear: External ear normal.      Left Ear: External ear normal.   Cardiovascular:      Rate and Rhythm: Normal rate and regular rhythm.      Heart sounds: Normal heart sounds. No murmur heard.  Pulmonary:      Effort: Pulmonary effort is normal.      Breath sounds: Normal breath sounds. No wheezing or rales.   Abdominal:      General: Bowel sounds are normal.      Palpations: Abdomen is soft.          Laboratory:  CBC:  Recent Labs   Lab 01/20/22  0904 03/12/22  1229 03/13/22  0351   WBC 8.27 8.73 8.37   RBC 4.76 4.68 4.54 L   Hemoglobin 14.1 13.9 L 13.6 L   Hematocrit 40.7 41.1 39.5 L   Platelets 190 201 184   MCV 86 88 87   MCH 29.6 29.7 30.0   MCHC 34.6 33.8 34.4     CMP:  Recent Labs   Lab 09/14/21  0752 01/20/22  0904 03/12/22  1229 03/13/22  0351   Glucose 118 H 132 H 105 99   Calcium 9.0 9.0 9.5 9.0   Albumin 4.0 4.2 4.4  --    Total Protein 6.8 6.8 7.4  --    Sodium 142 139 140 141   Potassium 4.3 4.1 4.4 4.1   CO2 25 21 L 26 26   Chloride 108 108 106 108   BUN 20 20 11 15   Alkaline Phosphatase 62 61 67  --    ALT 34 22 24  --    AST 19 15 18  --    Total Bilirubin 0.9 0.8 0.8  --      URINALYSIS:  Recent Labs   Lab 07/10/19  0844   Color, UA Yellow   Specific Gravity, UA 1.010   pH, UA 6.0   Protein, UA Negative   Nitrite, UA Negative   Leukocytes, UA Negative   Urobilinogen, UA Negative      LIPIDS:  Recent Labs   Lab " 08/19/20  0000 09/14/21  0752   HDL 31 28 L   Cholesterol 113 99 L   Triglycerides  --  103   LDL Cholesterol 61 50.4 L   HDL/Cholesterol Ratio  --  28.3   Non-HDL Cholesterol  --  71   Total Cholesterol/HDL Ratio  --  3.5     TSH:      A1C:  Recent Labs   Lab 11/26/19  1057 08/26/20  0921 02/26/21  0954 09/14/21  0752 03/16/22  1000   Hemoglobin A1C 6.8 H 6.1 H 5.8 H 6.2 H 6.3 H       Assessment/Plan     Bandar French is a 71 y.o.male with:    1. Coronary artery disease involving native coronary artery of native heart without angina pectoris  Continue current meds.    2. Type 2 diabetes mellitus without complication, without long-term current use of insulin  Continue current meds.    3. Hypertensive heart disease without heart failure  Continue current meds.    4. Hyperlipidemia, mixed  Continue current meds.      Chronic conditions status updated as per HPI.  Other than changes above, cont current medications and maintain follow up with specialists.  No follow-ups on file.    Future Appointments   Date Time Provider Department [place]   4/6/2022  7:00 AM LAB, [facility] [place]   4/6/2022  7:15 AM LAB, [facility] [place]   4/6/2022 10:00 AM Heather You MD [facility]   4/12/2022  1:45 PM Gunnar Rangel MD [facility]ARE [place]   6/14/2022  9:40 AM Audelia Cesar MD [facility] JOSE Rangel MD  [facility]

## 2022-04-06 ENCOUNTER — PATIENT MESSAGE (OUTPATIENT)
Dept: OTHER | Facility: OTHER | Age: 72
End: 2022-04-06
Payer: MEDICARE

## 2022-04-06 ENCOUNTER — PATIENT MESSAGE (OUTPATIENT)
Dept: INTERNAL MEDICINE | Facility: CLINIC | Age: 72
End: 2022-04-06
Payer: MEDICARE

## 2022-04-06 ENCOUNTER — PATIENT MESSAGE (OUTPATIENT)
Dept: CARDIOLOGY | Facility: CLINIC | Age: 72
End: 2022-04-06
Payer: MEDICARE

## 2022-04-07 ENCOUNTER — PATIENT MESSAGE (OUTPATIENT)
Dept: INTERNAL MEDICINE | Facility: CLINIC | Age: 72
End: 2022-04-07
Payer: MEDICARE
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
## 2022-04-13 ENCOUNTER — NURSE TRIAGE (OUTPATIENT)
Dept: ADMINISTRATIVE | Facility: CLINIC | Age: 72
End: 2022-04-13
Payer: MEDICARE

## 2022-04-13 ENCOUNTER — OFFICE VISIT (OUTPATIENT)
Dept: OPHTHALMOLOGY | Facility: CLINIC | Age: 72
End: 2022-04-13
Payer: MEDICARE

## 2022-04-13 ENCOUNTER — PATIENT MESSAGE (OUTPATIENT)
Dept: OPHTHALMOLOGY | Facility: CLINIC | Age: 72
End: 2022-04-13

## 2022-04-13 ENCOUNTER — HOSPITAL ENCOUNTER (EMERGENCY)
Facility: HOSPITAL | Age: 72
Discharge: HOME | End: 2022-04-14
Attending: EMERGENCY MEDICINE
Payer: MEDICARE

## 2022-04-13 DIAGNOSIS — Z97.3 WEARS CONTACT LENSES: ICD-10-CM

## 2022-04-13 DIAGNOSIS — H25.813 COMBINED FORMS OF AGE-RELATED CATARACT OF BOTH EYES: ICD-10-CM

## 2022-04-13 DIAGNOSIS — H18.891 CORNEAL IRRITATION OF RIGHT EYE: Primary | ICD-10-CM

## 2022-04-13 DIAGNOSIS — H40.1430 PSEUDOEXFOLIATION (PXF) GLAUCOMA OF BOTH EYES: ICD-10-CM

## 2022-04-13 DIAGNOSIS — H40.1132 PRIMARY OPEN ANGLE GLAUCOMA (POAG) OF BOTH EYES, MODERATE STAGE: Primary | ICD-10-CM

## 2022-04-13 DIAGNOSIS — H04.129 DRY EYE: ICD-10-CM

## 2022-04-13 DIAGNOSIS — H02.402 PTOSIS OF LEFT EYELID: ICD-10-CM

## 2022-04-13 PROCEDURE — 1126F AMNT PAIN NOTED NONE PRSNT: CPT | Mod: CPTII,S$GLB,, | Performed by: STUDENT IN AN ORGANIZED HEALTH CARE EDUCATION/TRAINING PROGRAM

## 2022-04-13 PROCEDURE — 1101F PR PT FALLS ASSESS DOC 0-1 FALLS W/OUT INJ PAST YR: ICD-10-PCS | Mod: CPTII,S$GLB,, | Performed by: STUDENT IN AN ORGANIZED HEALTH CARE EDUCATION/TRAINING PROGRAM

## 2022-04-13 PROCEDURE — 1160F RVW MEDS BY RX/DR IN RCRD: CPT | Mod: CPTII,S$GLB,, | Performed by: STUDENT IN AN ORGANIZED HEALTH CARE EDUCATION/TRAINING PROGRAM

## 2022-04-13 PROCEDURE — 92133 POSTERIOR SEGMENT OCT OPTIC NERVE(OCULAR COHERENCE TOMOGRAPHY) - OU - BOTH EYES: ICD-10-PCS | Mod: S$GLB,,, | Performed by: STUDENT IN AN ORGANIZED HEALTH CARE EDUCATION/TRAINING PROGRAM

## 2022-04-13 PROCEDURE — 99283 EMERGENCY DEPT VISIT LOW MDM: CPT

## 2022-04-13 PROCEDURE — 1157F ADVNC CARE PLAN IN RCRD: CPT | Mod: CPTII,S$GLB,, | Performed by: STUDENT IN AN ORGANIZED HEALTH CARE EDUCATION/TRAINING PROGRAM

## 2022-04-13 PROCEDURE — 1126F PR PAIN SEVERITY QUANTIFIED, NO PAIN PRESENT: ICD-10-PCS | Mod: CPTII,S$GLB,, | Performed by: STUDENT IN AN ORGANIZED HEALTH CARE EDUCATION/TRAINING PROGRAM

## 2022-04-13 PROCEDURE — 99214 OFFICE O/P EST MOD 30 MIN: CPT | Mod: S$GLB,,, | Performed by: STUDENT IN AN ORGANIZED HEALTH CARE EDUCATION/TRAINING PROGRAM

## 2022-04-13 PROCEDURE — 25000003 PHARM REV CODE 250: Performed by: EMERGENCY MEDICINE

## 2022-04-13 PROCEDURE — 92133 CPTRZD OPH DX IMG PST SGM ON: CPT | Mod: S$GLB,,, | Performed by: STUDENT IN AN ORGANIZED HEALTH CARE EDUCATION/TRAINING PROGRAM

## 2022-04-13 PROCEDURE — 4010F ACE/ARB THERAPY RXD/TAKEN: CPT | Mod: CPTII,S$GLB,, | Performed by: STUDENT IN AN ORGANIZED HEALTH CARE EDUCATION/TRAINING PROGRAM

## 2022-04-13 PROCEDURE — 99999 PR PBB SHADOW E&M-EST. PATIENT-LVL III: CPT | Mod: PBBFAC,,, | Performed by: STUDENT IN AN ORGANIZED HEALTH CARE EDUCATION/TRAINING PROGRAM

## 2022-04-13 PROCEDURE — 99214 PR OFFICE/OUTPT VISIT, EST, LEVL IV, 30-39 MIN: ICD-10-PCS | Mod: S$GLB,,, | Performed by: STUDENT IN AN ORGANIZED HEALTH CARE EDUCATION/TRAINING PROGRAM

## 2022-04-13 PROCEDURE — 99999 PR PBB SHADOW E&M-EST. PATIENT-LVL III: ICD-10-PCS | Mod: PBBFAC,,, | Performed by: STUDENT IN AN ORGANIZED HEALTH CARE EDUCATION/TRAINING PROGRAM

## 2022-04-13 PROCEDURE — 3288F FALL RISK ASSESSMENT DOCD: CPT | Mod: CPTII,S$GLB,, | Performed by: STUDENT IN AN ORGANIZED HEALTH CARE EDUCATION/TRAINING PROGRAM

## 2022-04-13 PROCEDURE — 3044F HG A1C LEVEL LT 7.0%: CPT | Mod: CPTII,S$GLB,, | Performed by: STUDENT IN AN ORGANIZED HEALTH CARE EDUCATION/TRAINING PROGRAM

## 2022-04-13 PROCEDURE — 3044F PR MOST RECENT HEMOGLOBIN A1C LEVEL <7.0%: ICD-10-PCS | Mod: CPTII,S$GLB,, | Performed by: STUDENT IN AN ORGANIZED HEALTH CARE EDUCATION/TRAINING PROGRAM

## 2022-04-13 PROCEDURE — 1101F PT FALLS ASSESS-DOCD LE1/YR: CPT | Mod: CPTII,S$GLB,, | Performed by: STUDENT IN AN ORGANIZED HEALTH CARE EDUCATION/TRAINING PROGRAM

## 2022-04-13 PROCEDURE — 1160F PR REVIEW ALL MEDS BY PRESCRIBER/CLIN PHARMACIST DOCUMENTED: ICD-10-PCS | Mod: CPTII,S$GLB,, | Performed by: STUDENT IN AN ORGANIZED HEALTH CARE EDUCATION/TRAINING PROGRAM

## 2022-04-13 PROCEDURE — 1157F PR ADVANCE CARE PLAN OR EQUIV PRESENT IN MEDICAL RECORD: ICD-10-PCS | Mod: CPTII,S$GLB,, | Performed by: STUDENT IN AN ORGANIZED HEALTH CARE EDUCATION/TRAINING PROGRAM

## 2022-04-13 PROCEDURE — 3288F PR FALLS RISK ASSESSMENT DOCUMENTED: ICD-10-PCS | Mod: CPTII,S$GLB,, | Performed by: STUDENT IN AN ORGANIZED HEALTH CARE EDUCATION/TRAINING PROGRAM

## 2022-04-13 PROCEDURE — 4010F PR ACE/ARB THEARPY RXD/TAKEN: ICD-10-PCS | Mod: CPTII,S$GLB,, | Performed by: STUDENT IN AN ORGANIZED HEALTH CARE EDUCATION/TRAINING PROGRAM

## 2022-04-13 PROCEDURE — 1159F MED LIST DOCD IN RCRD: CPT | Mod: CPTII,S$GLB,, | Performed by: STUDENT IN AN ORGANIZED HEALTH CARE EDUCATION/TRAINING PROGRAM

## 2022-04-13 PROCEDURE — 99499 UNLISTED E&M SERVICE: CPT | Mod: S$GLB,,, | Performed by: STUDENT IN AN ORGANIZED HEALTH CARE EDUCATION/TRAINING PROGRAM

## 2022-04-13 PROCEDURE — 1159F PR MEDICATION LIST DOCUMENTED IN MEDICAL RECORD: ICD-10-PCS | Mod: CPTII,S$GLB,, | Performed by: STUDENT IN AN ORGANIZED HEALTH CARE EDUCATION/TRAINING PROGRAM

## 2022-04-13 PROCEDURE — 99499 RISK ADDL DX/OHS AUDIT: ICD-10-PCS | Mod: S$GLB,,, | Performed by: STUDENT IN AN ORGANIZED HEALTH CARE EDUCATION/TRAINING PROGRAM

## 2022-04-13 RX ORDER — OXYMETAZOLINE HYDROCHLORIDE OPHTHALMIC 1 MG/ML
1 SOLUTION/ DROPS OPHTHALMIC DAILY PRN
Qty: 30 EACH | Refills: 11 | Status: SHIPPED | OUTPATIENT
Start: 2022-04-13 | End: 2023-04-26

## 2022-04-13 RX ORDER — PROPARACAINE HYDROCHLORIDE 5 MG/ML
2 SOLUTION/ DROPS OPHTHALMIC
Status: COMPLETED | OUTPATIENT
Start: 2022-04-13 | End: 2022-04-13

## 2022-04-13 RX ORDER — MOXIFLOXACIN 5 MG/ML
1 SOLUTION/ DROPS OPHTHALMIC
Status: COMPLETED | OUTPATIENT
Start: 2022-04-13 | End: 2022-04-13

## 2022-04-13 RX ADMIN — FLUORESCEIN SODIUM 1 EACH: 1 STRIP OPHTHALMIC at 10:04

## 2022-04-13 RX ADMIN — PROPARACAINE HYDROCHLORIDE 2 DROP: 5 SOLUTION/ DROPS OPHTHALMIC at 10:04

## 2022-04-13 RX ADMIN — MOXIFLOXACIN HYDROCHLORIDE 1 DROP: 5 SOLUTION/ DROPS OPHTHALMIC at 10:04

## 2022-04-14 ENCOUNTER — PATIENT MESSAGE (OUTPATIENT)
Dept: OPHTHALMOLOGY | Facility: CLINIC | Age: 72
End: 2022-04-14
Payer: MEDICARE

## 2022-04-14 VITALS
SYSTOLIC BLOOD PRESSURE: 123 MMHG | WEIGHT: 181 LBS | TEMPERATURE: 98 F | RESPIRATION RATE: 18 BRPM | DIASTOLIC BLOOD PRESSURE: 66 MMHG | BODY MASS INDEX: 25.91 KG/M2 | HEART RATE: 55 BPM | OXYGEN SATURATION: 97 % | HEIGHT: 70 IN

## 2022-04-14 PROCEDURE — 25000003 PHARM REV CODE 250: Performed by: EMERGENCY MEDICINE

## 2022-04-14 RX ORDER — ERYTHROMYCIN 5 MG/G
OINTMENT OPHTHALMIC
Status: COMPLETED | OUTPATIENT
Start: 2022-04-14 | End: 2022-04-14

## 2022-04-14 RX ADMIN — ERYTHROMYCIN 1 INCH: 5 OINTMENT OPHTHALMIC at 12:04

## 2022-04-14 NOTE — FIRST PROVIDER EVALUATION
Emergency Department TeleTriage Encounter Note      CHIEF COMPLAINT    Chief Complaint   Patient presents with    Eye Pain     Right eye pain after have a OCT procedure at [facility]. Pt reports that the doctor had to repeat the test on the affected eye x2. + blurry vision and pain with extraocular movement. Onset of pain after numbing gtts wore off.        VITAL SIGNS   Initial Vitals [04/13/22 2024]   BP Pulse Resp Temp SpO2   (!) 155/77 69 18 97.9 °F (36.6 °C) 98 %      MAP       --            ALLERGIES    Review of patient's allergies indicates:  No Known Allergies    PROVIDER TRIAGE NOTE  71 year old male to the ER for evaluation of pain and blurry vision to the right eye following and ophthalmic procedure performed earlier today at [facility].  Patient appears uncomfortable.      ORDERS  Labs Reviewed - No data to display    ED Orders (720h ago, onward)    Start Ordered     Status Ordering Provider    04/13/22 2032 04/13/22 2031  Visual acuity screening  Once         Ordered MELI GREER            Virtual Visit Note: The provider triage portion of this emergency department evaluation and documentation was performed via [email or web address], a HIPAA-compliant telemedicine application, in concert with a tele-presenter in the room. A face to face patient evaluation with one of my colleagues will occur once the patient is placed in an emergency department room.      DISCLAIMER: This note was prepared with [employer]User[employer] voice recognition transcription software. Garbled syntax, mangled pronouns, and other bizarre constructions may be attributed to that software system.

## 2022-04-14 NOTE — TELEPHONE ENCOUNTER
Pt calling reporting severe right eye pain after procedure today. I have tried calling the on call doctor and resident, to voicemail. Return call to patient who is heading to the ED now for further evaluation.     Reason for Disposition   Severe eye pain    Protocols used: EYE PAIN-A-AH

## 2022-04-14 NOTE — ED NOTES
Pt c/o pain to B eyes after having testing/ procedure done earlier today. Pt also reports problems with vision. No toher complaints. +A/O x 4 w/ ABCs intact, NAD. VSS.     Review of patient's allergies indicates:  No Known Allergies     Patient has verified the spelling of their name and  on armband.   APPEARANCE: Patient is alert, calm, oriented x 4, and does not appear distressed.  SKIN: Skin is normal for race, warm, and dry. Normal skin turgor and mucous membranes moist.  CARDIAC: Normal rate and rhythm, no murmur heard.   RESPIRATORY:Normal rate and effort. Breath sounds clear bilaterally throughout chest. Respirations are equal and unlabored.    GASTRO: Bowel sounds normal, abdomen is soft, no tenderness, and no abdominal distention.  MUSCLE: Full ROM. No bony tenderness or soft tissue tenderness. No obvious deformity.  PERIPHERAL VASCULAR: peripheral pulses present. Normal cap refill. No edema. Warm to touch.  NEURO: 5/5 strength major flexors/extensors bilaterally. Sensory intact to light touch bilaterally. Jacques coma scale: eyes open spontaneously-4, oriented & converses-5, obeys commands-6. No neurological abnormalities.   MENTAL STATUS: awake, alert and aware of environment.  EYE: No overt deficits noted. No drainage. Sclera WNL  ENT: EARS: no obvious drainage. NOSE: no active bleeding. THROAT: no redness or swelling.  : Voids without complication          ED workup and orders in progress. Pt SR up x 2. Bed in lowest position with wheels locked. Call bell within reach of pt.
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
## 2022-04-14 NOTE — ED NOTES
Visual acuity completed. Patient states he normally wears glasses or contacts, but is not wearing corrective lens at this time.

## 2022-04-22 ENCOUNTER — NURSE TRIAGE (OUTPATIENT)
Dept: ADMINISTRATIVE | Facility: CLINIC | Age: 72
End: 2022-04-22
Payer: MEDICARE

## 2022-04-22 ENCOUNTER — PATIENT MESSAGE (OUTPATIENT)
Dept: CARDIOLOGY | Facility: CLINIC | Age: 72
End: 2022-04-22
Payer: MEDICARE

## 2022-04-22 NOTE — TELEPHONE ENCOUNTER
[facility] incoming call - Pt c/o  Increasing systolic bp that averages between 141 and 145. Denies any symptoms at this time and takes his bp regularly for the HTN program . Information only protocol followed and pt advised to contact [facility] for advice on possible increase of HTN medication. [facility] information given to Pt and he intends to follow up. Encounter routed to PCP. Pt is asking for advice for when to take more medication. Educated Pt that a  Medical Provider would have to determine that.     Reason for Disposition   Health Information question, no triage required and triager able to answer question    Protocols used: INFORMATION ONLY CALL - NO TRIAGE-A-

## 2022-05-04 ENCOUNTER — OFFICE VISIT (OUTPATIENT)
Dept: INTERNAL MEDICINE | Facility: CLINIC | Age: 72
End: 2022-05-04
Payer: MEDICARE

## 2022-05-04 VITALS
HEART RATE: 62 BPM | WEIGHT: 179.69 LBS | SYSTOLIC BLOOD PRESSURE: 110 MMHG | DIASTOLIC BLOOD PRESSURE: 58 MMHG | HEIGHT: 70 IN | OXYGEN SATURATION: 98 % | BODY MASS INDEX: 25.73 KG/M2

## 2022-05-04 DIAGNOSIS — I11.9 HYPERTENSIVE HEART DISEASE WITHOUT HEART FAILURE: Primary | ICD-10-CM

## 2022-05-04 PROCEDURE — 1126F AMNT PAIN NOTED NONE PRSNT: CPT | Mod: CPTII,S$GLB,, | Performed by: INTERNAL MEDICINE

## 2022-05-04 PROCEDURE — 4010F PR ACE/ARB THEARPY RXD/TAKEN: ICD-10-PCS | Mod: CPTII,S$GLB,, | Performed by: INTERNAL MEDICINE

## 2022-05-04 PROCEDURE — 99214 OFFICE O/P EST MOD 30 MIN: CPT | Mod: S$GLB,,, | Performed by: INTERNAL MEDICINE

## 2022-05-04 PROCEDURE — 3044F HG A1C LEVEL LT 7.0%: CPT | Mod: CPTII,S$GLB,, | Performed by: INTERNAL MEDICINE

## 2022-05-04 PROCEDURE — 99214 PR OFFICE/OUTPT VISIT, EST, LEVL IV, 30-39 MIN: ICD-10-PCS | Mod: S$GLB,,, | Performed by: INTERNAL MEDICINE

## 2022-05-04 PROCEDURE — 3072F PR LOW RISK FOR RETINOPATHY: ICD-10-PCS | Mod: CPTII,S$GLB,, | Performed by: INTERNAL MEDICINE

## 2022-05-04 PROCEDURE — 3008F BODY MASS INDEX DOCD: CPT | Mod: CPTII,S$GLB,, | Performed by: INTERNAL MEDICINE

## 2022-05-04 PROCEDURE — 1159F PR MEDICATION LIST DOCUMENTED IN MEDICAL RECORD: ICD-10-PCS | Mod: CPTII,S$GLB,, | Performed by: INTERNAL MEDICINE

## 2022-05-04 PROCEDURE — 3078F PR MOST RECENT DIASTOLIC BLOOD PRESSURE < 80 MM HG: ICD-10-PCS | Mod: CPTII,S$GLB,, | Performed by: INTERNAL MEDICINE

## 2022-05-04 PROCEDURE — 99999 PR PBB SHADOW E&M-EST. PATIENT-LVL IV: ICD-10-PCS | Mod: PBBFAC,,, | Performed by: INTERNAL MEDICINE

## 2022-05-04 PROCEDURE — 99499 UNLISTED E&M SERVICE: CPT | Mod: S$GLB,,, | Performed by: INTERNAL MEDICINE

## 2022-05-04 PROCEDURE — 1159F MED LIST DOCD IN RCRD: CPT | Mod: CPTII,S$GLB,, | Performed by: INTERNAL MEDICINE

## 2022-05-04 PROCEDURE — 3044F PR MOST RECENT HEMOGLOBIN A1C LEVEL <7.0%: ICD-10-PCS | Mod: CPTII,S$GLB,, | Performed by: INTERNAL MEDICINE

## 2022-05-04 PROCEDURE — 3074F SYST BP LT 130 MM HG: CPT | Mod: CPTII,S$GLB,, | Performed by: INTERNAL MEDICINE

## 2022-05-04 PROCEDURE — 1157F ADVNC CARE PLAN IN RCRD: CPT | Mod: CPTII,S$GLB,, | Performed by: INTERNAL MEDICINE

## 2022-05-04 PROCEDURE — 3072F LOW RISK FOR RETINOPATHY: CPT | Mod: CPTII,S$GLB,, | Performed by: INTERNAL MEDICINE

## 2022-05-04 PROCEDURE — 3078F DIAST BP <80 MM HG: CPT | Mod: CPTII,S$GLB,, | Performed by: INTERNAL MEDICINE

## 2022-05-04 PROCEDURE — 1157F PR ADVANCE CARE PLAN OR EQUIV PRESENT IN MEDICAL RECORD: ICD-10-PCS | Mod: CPTII,S$GLB,, | Performed by: INTERNAL MEDICINE

## 2022-05-04 PROCEDURE — 4010F ACE/ARB THERAPY RXD/TAKEN: CPT | Mod: CPTII,S$GLB,, | Performed by: INTERNAL MEDICINE

## 2022-05-04 PROCEDURE — 3008F PR BODY MASS INDEX (BMI) DOCUMENTED: ICD-10-PCS | Mod: CPTII,S$GLB,, | Performed by: INTERNAL MEDICINE

## 2022-05-04 PROCEDURE — 3074F PR MOST RECENT SYSTOLIC BLOOD PRESSURE < 130 MM HG: ICD-10-PCS | Mod: CPTII,S$GLB,, | Performed by: INTERNAL MEDICINE

## 2022-05-04 PROCEDURE — 99999 PR PBB SHADOW E&M-EST. PATIENT-LVL IV: CPT | Mod: PBBFAC,,, | Performed by: INTERNAL MEDICINE

## 2022-05-04 PROCEDURE — 1126F PR PAIN SEVERITY QUANTIFIED, NO PAIN PRESENT: ICD-10-PCS | Mod: CPTII,S$GLB,, | Performed by: INTERNAL MEDICINE

## 2022-05-04 PROCEDURE — 99499 RISK ADDL DX/OHS AUDIT: ICD-10-PCS | Mod: S$GLB,,, | Performed by: INTERNAL MEDICINE

## 2022-05-04 NOTE — PROGRESS NOTES
[facility] Note    Chief Complaint      Chief Complaint   Patient presents with    Hypertension     High and low at times       History of Present Illness      Bandar French is a 71 y.o. male with chronic conditions of CAD, HTN, HLD, DM2, osteoarthritis who presents today for: fluctuating BP control.  Had recent hospital visit for chest pain/epistaxis attributed to hypertensive urgency. Discharged on amlodipine 2.5 mg.  BP initial did well but for 1 week 140/70s.  Dr. Tompkins increased to 5 mg daily and after 7 days, BP improved.  Amlodipine 5 mg, irbesartan 300 mg current regimen. BP at goal today 110/58.    Flu shot 2021. Prevnar 2015.  Pneumovax UTD.  Zostavax 2013.  Shingrix UTD.  COVID UTD.  Cscope Dr. Copeland, 2016, no polyps, 10 yr interval.    Past Medical History:  Past Medical History:   Diagnosis Date    Arthritis     Cataract     Depression     Diabetes mellitus     Episodic headache 3/12/2022    Glaucoma     Hypertension        Past Surgical History:   has a past surgical history that includes Knee arthroscopy (Left, 1967); Knee arthroscopy (Right, 2012); Hand surgery (Right, 12/2004); Coronary stent placement (2014); Tonsillectomy; Total knee arthroplasty (Right, 07/22/2019); Adenoidectomy; Cosmetic surgery (Bilateral, 06/2019); Eye surgery (Left, 03/2019); Joint replacement (Right, 07/22/2019); Left heart catheterization (Left, 01/20/2022); and Coronary angiography (N/A, 01/20/2022).    Family History:  family history includes Aneurysm in his father; Dementia in his mother; Diabetes in his mother; Glaucoma in his brother; Heart disease in his brother; Migraines in his sister; No Known Problems in his daughter, son, and son; Other in his brother and son; Pneumonia in his brother.     Social History:  Social History     Tobacco Use    Smoking status: Former Smoker     Packs/day: 2.00     Years: 20.00     Pack years: 40.00     Types: Cigarettes, Cigars     Start date: 1/1/1968      Quit date: 1986     Years since quittin.3    Smokeless tobacco: Never Used   Substance Use Topics    Alcohol use: Not Currently     Comment: Discontinued     Drug use: Never       I personally reviewed all past medical, surgical, social and family history.    Review of Systems   Constitutional: Negative for chills, fever and malaise/fatigue.   Respiratory: Negative for shortness of breath.    Cardiovascular: Negative for chest pain.   Gastrointestinal: Negative for constipation, diarrhea, nausea and vomiting.   Skin: Negative for rash.   Neurological: Negative for weakness.   All other systems reviewed and are negative.       Medications:  Outpatient Encounter Medications as of 2022   Medication Sig Dispense Refill    amLODIPine (NORVASC) 2.5 MG tablet TAKE 1 TABLET(2.5 MG) BY MOUTH EVERY NIGHT (Patient taking differently: 5 mg.) 90 tablet 3    ascorbic acid, vitamin C, (VITAMIN C) 500 MG tablet Take 500 mg by mouth once daily.      aspirin 81 MG Chew Take 1 tablet (81 mg total) by mouth once daily. 30 tablet 11    blood sugar diagnostic (BLOOD GLUCOSE TEST) Strp 1 each by Misc.(Non-Drug; Combo Route) route once daily. 30 each 11    blood-glucose meter,continuous (DEXCOM G6 ) Misc Use as directed 1 each 11    blood-glucose sensor (DEXCOM G6 SENSOR) Joanne Use as directed 10 each 11    blood-glucose transmitter (DEXCOM G6 TRANSMITTER) Joanne Use as directed 1 each 3    chlorpheniramine (CHLOR-TRIMETON) 4 mg tablet Take 4 mg by mouth as needed.       cholecalciferol, vitamin D3, 125 mcg (5,000 unit) Tab Take 5,000 Units by mouth once daily.      clopidogreL (PLAVIX) 75 mg tablet Take 1 tablet (75 mg total) by mouth once daily. 90 tablet 3    dorzolamide-timolol 2-0.5% (COSOPT) 22.3-6.8 mg/mL ophthalmic solution Place 1 drop into both eyes 2 (two) times daily.      folic acid/multivit-min/lutein (CENTRUM SILVER ORAL) Take 1 tablet by mouth once daily.      irbesartan (AVAPRO)  300 MG tablet Take 1 tablet (300 mg total) by mouth every evening. 90 tablet 1    latanoprost 0.005 % ophthalmic solution Place 1 drop into both eyes every evening.      MICRO THIN LANCETS 33 gauge Misc       nitroGLYCERIN (NITROSTAT) 0.4 MG SL tablet Place 1 tablet (0.4 mg total) under the tongue every 5 (five) minutes as needed for Chest pain. 25 tablet 11    oxymetazoline, PF, (UPNEEQ, PF,) 0.1 % Dpet Apply 1 drop to eye daily as needed (droopy eyelids). 30 each 11    rosuvastatin (CRESTOR) 40 MG Tab Take 1 tablet (40 mg total) by mouth every evening. 90 tablet 3    TUMERIC-GING-OLIVE-OREG-CAPRYL ORAL Take 1 tablet by mouth Daily.      vit C-E-zinc ox-dayron-lut-zeax (ICAPS AREDS2) 250 mg-200 unit -12.5 mg-1 mg Cap Take 2 capsules by mouth once daily.      zinc 50 mg Tab Take 50 mg by mouth Daily.       No facility-administered encounter medications on file as of 5/4/2022.       Allergies:  Review of patient's allergies indicates:  No Known Allergies    Health Maintenance:  Immunization History   Administered Date(s) Administered    COVID-19, MRNA, LN-S, PF (MODERNA FULL 0.5 ML DOSE) 03/10/2021, 03/10/2021, 04/12/2021    Influenza (FLUAD) - Quadrivalent - Adjuvanted - PF *Preferred* (65+) 09/16/2020    Influenza - High Dose - PF (65 years and older) 09/25/2019    Influenza - Quadrivalent - High Dose - PF (65 years and older) 10/21/2021    Pneumococcal Polysaccharide - 23 Valent 09/25/2019    Tdap 10/21/2021    Zoster 01/02/2013    Zoster Recombinant 10/14/2020, 05/10/2021      Health Maintenance   Topic Date Due    Foot Exam  09/16/2021    Lipid Panel  09/14/2022    Hemoglobin A1c  09/16/2022    Eye Exam  09/27/2022    High Dose Statin  04/13/2023    TETANUS VACCINE  10/21/2031    Hepatitis C Screening  Completed    Abdominal Aortic Aneurysm Screening  Completed        Physical Exam      Vital Signs  Pulse: 62  SpO2: 98 %  BP: (!) 110/58  BP Location: Right arm  Patient Position:  "Sitting  Pain Score: 0-No pain  Height and Weight  Height: 5' 10" (177.8 cm)  Weight: 81.5 kg (179 lb 10.8 oz)  BSA (Calculated - sq m): 2.01 sq meters  BMI (Calculated): 25.8  Weight in (lb) to have BMI = 25: 173.9]    Physical Exam  Vitals reviewed.   Constitutional:       Appearance: He is well-developed.   HENT:      Head: Normocephalic and atraumatic.      Right Ear: External ear normal.      Left Ear: External ear normal.   Cardiovascular:      Rate and Rhythm: Normal rate and regular rhythm.      Heart sounds: Normal heart sounds. No murmur heard.  Pulmonary:      Effort: Pulmonary effort is normal.      Breath sounds: Normal breath sounds. No wheezing or rales.   Abdominal:      General: Bowel sounds are normal.      Palpations: Abdomen is soft.          Laboratory:  CBC:  Recent Labs   Lab 01/20/22  0904 03/12/22  1229 03/13/22  0351   WBC 8.27 8.73 8.37   RBC 4.76 4.68 4.54 L   Hemoglobin 14.1 13.9 L 13.6 L   Hematocrit 40.7 41.1 39.5 L   Platelets 190 201 184   MCV 86 88 87   MCH 29.6 29.7 30.0   MCHC 34.6 33.8 34.4     CMP:  Recent Labs   Lab 09/14/21  0752 01/20/22  0904 03/12/22  1229 03/13/22  0351   Glucose 118 H 132 H 105 99   Calcium 9.0 9.0 9.5 9.0   Albumin 4.0 4.2 4.4  --    Total Protein 6.8 6.8 7.4  --    Sodium 142 139 140 141   Potassium 4.3 4.1 4.4 4.1   CO2 25 21 L 26 26   Chloride 108 108 106 108   BUN 20 20 11 15   Alkaline Phosphatase 62 61 67  --    ALT 34 22 24  --    AST 19 15 18  --    Total Bilirubin 0.9 0.8 0.8  --      URINALYSIS:  Recent Labs   Lab 07/10/19  0844   Color, UA Yellow   Specific Gravity, UA 1.010   pH, UA 6.0   Protein, UA Negative   Nitrite, UA Negative   Leukocytes, UA Negative   Urobilinogen, UA Negative      LIPIDS:  Recent Labs   Lab 08/19/20  0000 09/14/21  0752   HDL 31 28 L   Cholesterol 113 99 L   Triglycerides  --  103   LDL Cholesterol 61 50.4 L   HDL/Cholesterol Ratio  --  28.3   Non-HDL Cholesterol  --  71   Total Cholesterol/HDL Ratio  --  3.5 "     TSH:      A1C:  Recent Labs   Lab 11/26/19  1057 08/26/20  0921 02/26/21  0954 09/14/21  0752 03/16/22  1000   Hemoglobin A1C 6.8 H 6.1 H 5.8 H 6.2 H 6.3 H       Assessment/Plan     Bandar French is a 71 y.o.male with:    1. Hypertensive heart disease without heart failure  Continue current meds.      Chronic conditions status updated as per HPI.  Other than changes above, cont current medications and maintain follow up with specialists.  No follow-ups on file.    Future Appointments   Date Time Provider Department Center   6/14/2022  9:40 AM Audelia Cesar MD [facility]   9/26/2022  7:30 AM LAB, [facility] [place]   9/26/2022  7:45 AM LAB, [facility] [place]   9/28/2022  1:30 PM Gunnar Rangel MD [facility]       Gunnar Rangel MD  [facility]

## 2022-05-10 ENCOUNTER — PATIENT MESSAGE (OUTPATIENT)
Dept: INTERNAL MEDICINE | Facility: CLINIC | Age: 72
End: 2022-05-10
Payer: MEDICARE

## 2022-05-10 DIAGNOSIS — I10 ESSENTIAL HYPERTENSION: Chronic | ICD-10-CM

## 2022-05-10 RX ORDER — AMLODIPINE BESYLATE 5 MG/1
5 TABLET ORAL DAILY
Qty: 90 TABLET | Refills: 1 | Status: SHIPPED | OUTPATIENT
Start: 2022-05-10 | End: 2022-09-16 | Stop reason: SDUPTHER

## 2022-05-10 NOTE — TELEPHONE ENCOUNTER
No new care gaps identified.  [facility] Embedded Care Gaps. Reference number: [id number]. 5/10/2022   3:27:37 PM CDT

## 2022-05-15 ENCOUNTER — PATIENT MESSAGE (OUTPATIENT)
Dept: INTERNAL MEDICINE | Facility: CLINIC | Age: 72
End: 2022-05-15
Payer: MEDICARE

## 2022-05-16 ENCOUNTER — PATIENT MESSAGE (OUTPATIENT)
Dept: INTERNAL MEDICINE | Facility: CLINIC | Age: 72
End: 2022-05-16
Payer: MEDICARE

## 2022-06-10 ENCOUNTER — PATIENT MESSAGE (OUTPATIENT)
Dept: INTERNAL MEDICINE | Facility: CLINIC | Age: 72
End: 2022-06-10
Payer: MEDICARE

## 2022-06-13 RX ORDER — LATANOPROST 50 UG/ML
1 SOLUTION/ DROPS OPHTHALMIC NIGHTLY
Qty: 2.5 ML | Refills: 0 | Status: SHIPPED | OUTPATIENT
Start: 2022-06-13 | End: 2022-08-15

## 2022-06-14 ENCOUNTER — OFFICE VISIT (OUTPATIENT)
Dept: OTOLARYNGOLOGY | Facility: CLINIC | Age: 72
End: 2022-06-14
Payer: MEDICARE

## 2022-06-14 VITALS
BODY MASS INDEX: 25.66 KG/M2 | HEIGHT: 70 IN | HEART RATE: 52 BPM | SYSTOLIC BLOOD PRESSURE: 114 MMHG | WEIGHT: 179.25 LBS | DIASTOLIC BLOOD PRESSURE: 68 MMHG

## 2022-06-14 DIAGNOSIS — R04.0 EPISTAXIS: Primary | ICD-10-CM

## 2022-06-14 DIAGNOSIS — H93.13 TINNITUS OF BOTH EARS: Chronic | ICD-10-CM

## 2022-06-14 DIAGNOSIS — R42 DIZZINESS: Chronic | ICD-10-CM

## 2022-06-14 PROCEDURE — 1101F PR PT FALLS ASSESS DOC 0-1 FALLS W/OUT INJ PAST YR: ICD-10-PCS | Mod: CPTII,S$GLB,, | Performed by: OTOLARYNGOLOGY

## 2022-06-14 PROCEDURE — 1157F PR ADVANCE CARE PLAN OR EQUIV PRESENT IN MEDICAL RECORD: ICD-10-PCS | Mod: CPTII,S$GLB,, | Performed by: OTOLARYNGOLOGY

## 2022-06-14 PROCEDURE — 1126F PR PAIN SEVERITY QUANTIFIED, NO PAIN PRESENT: ICD-10-PCS | Mod: CPTII,S$GLB,, | Performed by: OTOLARYNGOLOGY

## 2022-06-14 PROCEDURE — 3078F DIAST BP <80 MM HG: CPT | Mod: CPTII,S$GLB,, | Performed by: OTOLARYNGOLOGY

## 2022-06-14 PROCEDURE — 3008F PR BODY MASS INDEX (BMI) DOCUMENTED: ICD-10-PCS | Mod: CPTII,S$GLB,, | Performed by: OTOLARYNGOLOGY

## 2022-06-14 PROCEDURE — 3044F PR MOST RECENT HEMOGLOBIN A1C LEVEL <7.0%: ICD-10-PCS | Mod: CPTII,S$GLB,, | Performed by: OTOLARYNGOLOGY

## 2022-06-14 PROCEDURE — 99999 PR PBB SHADOW E&M-EST. PATIENT-LVL V: CPT | Mod: PBBFAC,,, | Performed by: OTOLARYNGOLOGY

## 2022-06-14 PROCEDURE — 3072F LOW RISK FOR RETINOPATHY: CPT | Mod: CPTII,S$GLB,, | Performed by: OTOLARYNGOLOGY

## 2022-06-14 PROCEDURE — 3074F PR MOST RECENT SYSTOLIC BLOOD PRESSURE < 130 MM HG: ICD-10-PCS | Mod: CPTII,S$GLB,, | Performed by: OTOLARYNGOLOGY

## 2022-06-14 PROCEDURE — 3072F PR LOW RISK FOR RETINOPATHY: ICD-10-PCS | Mod: CPTII,S$GLB,, | Performed by: OTOLARYNGOLOGY

## 2022-06-14 PROCEDURE — 1101F PT FALLS ASSESS-DOCD LE1/YR: CPT | Mod: CPTII,S$GLB,, | Performed by: OTOLARYNGOLOGY

## 2022-06-14 PROCEDURE — 3008F BODY MASS INDEX DOCD: CPT | Mod: CPTII,S$GLB,, | Performed by: OTOLARYNGOLOGY

## 2022-06-14 PROCEDURE — 1157F ADVNC CARE PLAN IN RCRD: CPT | Mod: CPTII,S$GLB,, | Performed by: OTOLARYNGOLOGY

## 2022-06-14 PROCEDURE — 1159F MED LIST DOCD IN RCRD: CPT | Mod: CPTII,S$GLB,, | Performed by: OTOLARYNGOLOGY

## 2022-06-14 PROCEDURE — 1126F AMNT PAIN NOTED NONE PRSNT: CPT | Mod: CPTII,S$GLB,, | Performed by: OTOLARYNGOLOGY

## 2022-06-14 PROCEDURE — 1159F PR MEDICATION LIST DOCUMENTED IN MEDICAL RECORD: ICD-10-PCS | Mod: CPTII,S$GLB,, | Performed by: OTOLARYNGOLOGY

## 2022-06-14 PROCEDURE — 99204 PR OFFICE/OUTPT VISIT, NEW, LEVL IV, 45-59 MIN: ICD-10-PCS | Mod: S$GLB,,, | Performed by: OTOLARYNGOLOGY

## 2022-06-14 PROCEDURE — 3074F SYST BP LT 130 MM HG: CPT | Mod: CPTII,S$GLB,, | Performed by: OTOLARYNGOLOGY

## 2022-06-14 PROCEDURE — 3044F HG A1C LEVEL LT 7.0%: CPT | Mod: CPTII,S$GLB,, | Performed by: OTOLARYNGOLOGY

## 2022-06-14 PROCEDURE — 4010F ACE/ARB THERAPY RXD/TAKEN: CPT | Mod: CPTII,S$GLB,, | Performed by: OTOLARYNGOLOGY

## 2022-06-14 PROCEDURE — 3288F FALL RISK ASSESSMENT DOCD: CPT | Mod: CPTII,S$GLB,, | Performed by: OTOLARYNGOLOGY

## 2022-06-14 PROCEDURE — 99999 PR PBB SHADOW E&M-EST. PATIENT-LVL V: ICD-10-PCS | Mod: PBBFAC,,, | Performed by: OTOLARYNGOLOGY

## 2022-06-14 PROCEDURE — 3078F PR MOST RECENT DIASTOLIC BLOOD PRESSURE < 80 MM HG: ICD-10-PCS | Mod: CPTII,S$GLB,, | Performed by: OTOLARYNGOLOGY

## 2022-06-14 PROCEDURE — 99204 OFFICE O/P NEW MOD 45 MIN: CPT | Mod: S$GLB,,, | Performed by: OTOLARYNGOLOGY

## 2022-06-14 PROCEDURE — 3288F PR FALLS RISK ASSESSMENT DOCUMENTED: ICD-10-PCS | Mod: CPTII,S$GLB,, | Performed by: OTOLARYNGOLOGY

## 2022-06-14 PROCEDURE — 4010F PR ACE/ARB THEARPY RXD/TAKEN: ICD-10-PCS | Mod: CPTII,S$GLB,, | Performed by: OTOLARYNGOLOGY

## 2022-06-14 RX ORDER — MUPIROCIN 20 MG/G
OINTMENT TOPICAL 2 TIMES DAILY
Qty: 15 G | Refills: 3 | Status: SHIPPED | OUTPATIENT
Start: 2022-06-14 | End: 2022-06-24

## 2022-06-14 NOTE — PATIENT INSTRUCTIONS
NOSEBLEED TREATMENT AND PREVENTION    Nosebleeds some can be frightening, but typically are not life threatening. Nosebleeds are common during the  winter months when the heated air dries out the nasal membranes so crusting, cracking, and bleeding occurs.  Nosebleeds may be cause from trauma to the nose and even picking the nose.    How to stop a Nosebleed:  1. Pinch the soft parts of the nose between your thumb and two fingers.  2. Hold it for 10 minutes without releasing (timed by a clock).  3. Keep head higher than the level of the heart, sit upright.  4. Positioned forward, chin tucked to chest to avoid swallowing any blood.            If Re-bleeding Occurs:  Spray nose two times on both sides with decongestant spray (such as Afrin®  or Dmitriy-Synephrine®) and pinch nose and position head forward again.    To Prevent Re-bleeding After Bleeding Has Stopped  1. Do not pick, rub, or blow nose for at least a week.  2. Gently spraying a saline solution in nose 2-3 times per day.  3. Applying size of a pea amount of lubricant (normal saline gel, A&D ointment,  Vaseline, antibiotic ointment), put on the end of your fingertip and apply it inside  the nose on the middle portion (the septum) 2-3 times a day to keep the skin lubricated.   4. Use a humidifier in bedroom or any room in your home you spend long periods of time.  5. Engage in only light activity. No strenuous activity. No heavy lifting or straining.   6. Use a stool softener, if needed, to avoid escessive straining.   7. No bending over at the hips. Keep nose above your heart at all times.  8. Sneeze with an open mouth to reduce pressure from nose.   9. Avoid foods or drinks hot in temperature for at least 48 hours then progress slowly.  10. Avoid hot steamy showers or baths for one week.    If the nosebleeds persist or are recurrent, see your health provider. Cauterization of the nasal septum may be  recommended.    When to call the Doctor or go to a  Hospital Emergency Room:  Bleeding cannot be stopped or keeps reoccurring.  Bleeding is rapid or if blood loss is large.  You feel weak or faint, presumably from blood loss.  Bleeding begins by going down the back of the throat rather than the front of the nose.     How to prevent Nosebleeds:  Avoid Aspirin on NSAIDs that cause thinning of the blood.  Do not sleep or sit for long periods of time under a ceiling fan or other source of aggressive airflow.  Use a bedside humifier to increase moisture.  Use Nasal Saline or Saline Gel in the nose throughout the day to increase nasal moisture.   Use Vaseline or antibiotic ointment inside the nostrils 1-2 times a day, especially at night.    Do not vigorously blow the nose and do not pick the nose.        [facility] [address], [address]  [place] [address]  [phone]    Your physician may determine a need for one or more tests of your balance system at the time of your visit. One test is called a videonystagmogram (VNG), the other is called Posturography.  We ask that you prepare in the event that either of these tests are ordered.  The tests are simple and painless and take about 90 minutes (combined).  Please dress comfortably.  Certain medications will affect the results of theses tests.  In order to prevent this from happening, it will be necessary for you to follow these instructions:    DO NOT  take any of the following medications for at least 24 hours prior to the test:  Sleeping pills (Seconal, Donnatol, Dalmane, etc.)  Tranquilizers (Librium, Valium, Equanil, etc.)  Anti-histamines or over-the-counter cold medications  Anti-dizzy medications (Antivert, Dramamine, etc.)  Muscle relaxants    Seizure medications (Dilantin, Phenobartibal, etc.) interfere with the accuracy of the test.  They should be stopped BUT ONLY AFTER CLEARANCE FROM THE PHYSICIAN WHO PRESCRIBED THESE MEDICATIONS.    Heart or blood pressure medications ARE allowed.    DO  NOT  eat or drink anything other than small sips of water for 4 hours prior to the VNG.    DO NOT drink alcoholic beverages for 24 hours prior to the test.      DO NOT wear makeup, especially mascara or eye liner for the VNG test.    Ladies who are scheduled for Posturography should wear slacks rather than skirts or dresses.    If you are diabetic, please inform the  when booking your appointment.  Please schedule a time that would make fasting for 4 hours most convenient to your medication routine.  If you have any concerns, check with your primary care physician.    Testing is scheduled at [facility] at [facility]. After the physician orders your balance testing, a representative from the main campus will call you to schedule your appointment. If you need to cancel or re-schedule, please call them at [phone].    If you have any questions, please call [phone] to reach us at [facility] in [place].       [place]orne Cooksey Exercises    In bed or sitting  Eye movements -- at first slow, then quick  up and down  from side to side  focusing on finger moving from 3 feet to 1 foot away from face  Head movements at first slow, then quick, later with eyes closed  bending forward and backward  turning from side to side.    Sitting  Eye movements and head movements as above  Shoulder shrugging and circling  Bending forward and picking up objects from the ground    Standing  Eye, head and shoulder movements as before  Changing from sitting to standing position with eyes open and shut  Throwing a small ball from hand to hand (above eye level)  Throwing a ball from hand to hand under knee    Changing from sitting to standing and turning around in between    Moving about (in class)  Cho[place]aw around center person who will throw a large ball and to whom it will be returned    Walk across room with eyes open and then closed  Walk up and down slope with eyes open and  then closed  Walk up and down steps with eyes open and then closed    Any game involving stooping and stretching and aiming such as bowling and basketball    Diligence and perseverance are required but the earlier and more regularly the exercise regimen is carried out, the faster and more complete will be the return to normal activity. Ideally these activities should be done with a supervised group.     Individual patients should be accompanied by a friend or relative who also learns the exercises.    (Adapted from Toya and Mo, 1984 and Eileen, 1994; 2000)
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
## 2022-06-20 ENCOUNTER — PATIENT MESSAGE (OUTPATIENT)
Dept: OTHER | Facility: OTHER | Age: 72
End: 2022-06-20
Payer: MEDICARE

## 2022-06-27 ENCOUNTER — TELEPHONE (OUTPATIENT)
Dept: CARDIOLOGY | Facility: CLINIC | Age: 72
End: 2022-06-27
Payer: MEDICARE

## 2022-06-27 NOTE — TELEPHONE ENCOUNTER
----- Message from Sherri Bower MA sent at 6/27/2022 12:16 PM CDT -----  Lidia the patient is returning your phone call please call [phone]. Thank you.

## 2022-06-29 ENCOUNTER — PATIENT MESSAGE (OUTPATIENT)
Dept: INTERNAL MEDICINE | Facility: CLINIC | Age: 72
End: 2022-06-29
Payer: MEDICARE

## 2022-07-01 ENCOUNTER — PATIENT MESSAGE (OUTPATIENT)
Dept: INTERNAL MEDICINE | Facility: CLINIC | Age: 72
End: 2022-07-01
Payer: MEDICARE

## 2022-07-01 RX ORDER — DORZOLAMIDE HYDROCHLORIDE AND TIMOLOL MALEATE 20; 5 MG/ML; MG/ML
1 SOLUTION/ DROPS OPHTHALMIC 2 TIMES DAILY
Qty: 10 ML | Refills: 1 | Status: SHIPPED | OUTPATIENT
Start: 2022-07-01 | End: 2022-09-12

## 2022-07-19 ENCOUNTER — PATIENT MESSAGE (OUTPATIENT)
Dept: OTHER | Facility: OTHER | Age: 72
End: 2022-07-19
Payer: MEDICARE

## 2022-08-16 RX ORDER — LATANOPROST 50 UG/ML
SOLUTION/ DROPS OPHTHALMIC
Qty: 5 ML | Refills: 3 | Status: SHIPPED | OUTPATIENT
Start: 2022-08-16 | End: 2022-09-16 | Stop reason: SDUPTHER

## 2022-09-01 ENCOUNTER — OFFICE VISIT (OUTPATIENT)
Dept: CARDIOLOGY | Facility: CLINIC | Age: 72
End: 2022-09-01
Payer: MEDICARE

## 2022-09-01 VITALS
SYSTOLIC BLOOD PRESSURE: 122 MMHG | WEIGHT: 176.38 LBS | OXYGEN SATURATION: 95 % | BODY MASS INDEX: 25.25 KG/M2 | HEIGHT: 70 IN | HEART RATE: 58 BPM | DIASTOLIC BLOOD PRESSURE: 74 MMHG

## 2022-09-01 DIAGNOSIS — E11.9 TYPE 2 DIABETES MELLITUS WITHOUT COMPLICATION, WITHOUT LONG-TERM CURRENT USE OF INSULIN: ICD-10-CM

## 2022-09-01 DIAGNOSIS — I25.10 CORONARY ARTERY DISEASE INVOLVING NATIVE CORONARY ARTERY OF NATIVE HEART WITHOUT ANGINA PECTORIS: ICD-10-CM

## 2022-09-01 DIAGNOSIS — E78.2 HYPERLIPIDEMIA, MIXED: Primary | Chronic | ICD-10-CM

## 2022-09-01 DIAGNOSIS — Z95.5 PRESENCE OF STENT IN CORONARY ARTERY: Chronic | ICD-10-CM

## 2022-09-01 DIAGNOSIS — I10 ESSENTIAL HYPERTENSION: Chronic | ICD-10-CM

## 2022-09-01 DIAGNOSIS — I11.9 HYPERTENSIVE HEART DISEASE WITHOUT HEART FAILURE: ICD-10-CM

## 2022-09-01 PROCEDURE — 1157F PR ADVANCE CARE PLAN OR EQUIV PRESENT IN MEDICAL RECORD: ICD-10-PCS | Mod: CPTII,S$GLB,, | Performed by: INTERNAL MEDICINE

## 2022-09-01 PROCEDURE — 99214 PR OFFICE/OUTPT VISIT, EST, LEVL IV, 30-39 MIN: ICD-10-PCS | Mod: S$GLB,,, | Performed by: INTERNAL MEDICINE

## 2022-09-01 PROCEDURE — 99999 PR PBB SHADOW E&M-EST. PATIENT-LVL III: CPT | Mod: PBBFAC,,, | Performed by: INTERNAL MEDICINE

## 2022-09-01 PROCEDURE — 1126F PR PAIN SEVERITY QUANTIFIED, NO PAIN PRESENT: ICD-10-PCS | Mod: CPTII,S$GLB,, | Performed by: INTERNAL MEDICINE

## 2022-09-01 PROCEDURE — 3072F PR LOW RISK FOR RETINOPATHY: ICD-10-PCS | Mod: CPTII,S$GLB,, | Performed by: INTERNAL MEDICINE

## 2022-09-01 PROCEDURE — 1157F ADVNC CARE PLAN IN RCRD: CPT | Mod: CPTII,S$GLB,, | Performed by: INTERNAL MEDICINE

## 2022-09-01 PROCEDURE — 3044F HG A1C LEVEL LT 7.0%: CPT | Mod: CPTII,S$GLB,, | Performed by: INTERNAL MEDICINE

## 2022-09-01 PROCEDURE — 3044F PR MOST RECENT HEMOGLOBIN A1C LEVEL <7.0%: ICD-10-PCS | Mod: CPTII,S$GLB,, | Performed by: INTERNAL MEDICINE

## 2022-09-01 PROCEDURE — 3074F SYST BP LT 130 MM HG: CPT | Mod: CPTII,S$GLB,, | Performed by: INTERNAL MEDICINE

## 2022-09-01 PROCEDURE — 1126F AMNT PAIN NOTED NONE PRSNT: CPT | Mod: CPTII,S$GLB,, | Performed by: INTERNAL MEDICINE

## 2022-09-01 PROCEDURE — 3008F BODY MASS INDEX DOCD: CPT | Mod: CPTII,S$GLB,, | Performed by: INTERNAL MEDICINE

## 2022-09-01 PROCEDURE — 99999 PR PBB SHADOW E&M-EST. PATIENT-LVL III: ICD-10-PCS | Mod: PBBFAC,,, | Performed by: INTERNAL MEDICINE

## 2022-09-01 PROCEDURE — 3072F LOW RISK FOR RETINOPATHY: CPT | Mod: CPTII,S$GLB,, | Performed by: INTERNAL MEDICINE

## 2022-09-01 PROCEDURE — 3078F PR MOST RECENT DIASTOLIC BLOOD PRESSURE < 80 MM HG: ICD-10-PCS | Mod: CPTII,S$GLB,, | Performed by: INTERNAL MEDICINE

## 2022-09-01 PROCEDURE — 4010F ACE/ARB THERAPY RXD/TAKEN: CPT | Mod: CPTII,S$GLB,, | Performed by: INTERNAL MEDICINE

## 2022-09-01 PROCEDURE — 1159F PR MEDICATION LIST DOCUMENTED IN MEDICAL RECORD: ICD-10-PCS | Mod: CPTII,S$GLB,, | Performed by: INTERNAL MEDICINE

## 2022-09-01 PROCEDURE — 4010F PR ACE/ARB THEARPY RXD/TAKEN: ICD-10-PCS | Mod: CPTII,S$GLB,, | Performed by: INTERNAL MEDICINE

## 2022-09-01 PROCEDURE — 3078F DIAST BP <80 MM HG: CPT | Mod: CPTII,S$GLB,, | Performed by: INTERNAL MEDICINE

## 2022-09-01 PROCEDURE — 1159F MED LIST DOCD IN RCRD: CPT | Mod: CPTII,S$GLB,, | Performed by: INTERNAL MEDICINE

## 2022-09-01 PROCEDURE — 3008F PR BODY MASS INDEX (BMI) DOCUMENTED: ICD-10-PCS | Mod: CPTII,S$GLB,, | Performed by: INTERNAL MEDICINE

## 2022-09-01 PROCEDURE — 3074F PR MOST RECENT SYSTOLIC BLOOD PRESSURE < 130 MM HG: ICD-10-PCS | Mod: CPTII,S$GLB,, | Performed by: INTERNAL MEDICINE

## 2022-09-01 PROCEDURE — 99214 OFFICE O/P EST MOD 30 MIN: CPT | Mod: S$GLB,,, | Performed by: INTERNAL MEDICINE

## 2022-09-01 NOTE — PROGRESS NOTES
Subjective:   @Patient ID:  Bandar French is a 72 y.o. male who presents for follow-up of CAD       HPI:   Here for f/u   He is doing well   6 months ago he had brief chest pain that resolved. It was different than his anginal symptoms. No recurrent symptoms since then     Compliant with DPAT with no issues.   BP is elevated today. Accidentally he was taking Norvasc 2.5 mg instead of 5 mg. Also he is taking  OTC cough medications for sore throat and cough. Normally his BP is well controlled.   He is very active with no issues  LDL at goal   He works out 3 times a week. Elliptical for 20 minutes     Historically:    Patient admitted 1/2022 with [facility] with PCI to LCX and RCA. Symptoms were mainly jaw pain.  Residual moderate LAD. Readmitted 3/2022 with headaches, chest pressure and had epistaxis episodes after sneezing. Stress MPI with no ischemia. Norvasc dose increased to 5 mg daily.       Unclear etiology for the headaches. He has glaucoma. CT head with no acute findings.           Prior cardiovascular  Hx  --------------------------------  - Stress MPI 3/13/2022 -ve for ischemia     - Heart Catheterization  1/20/2022  There was two vessel coronary artery disease.  Procedure done for unstable angina  The PCI was successful.  The 1st Mrg lesion was 99% stenosed with 0% stenosis post-intervention.  A STENT RESOLUTE VICKY [id number] stent was successfully placed. Post dilated with 3.0 NC balloon at high pressure. IVUS guided  The Prox RCA lesion was 70% stenosed with 0% stenosis post-intervention.  A STENT RESOLUTE VICKY [id number] stent was successfully placed . Post dilated with 3.5 NC balloon. IVUS guided.  The Prox LAD to Mid LAD lesion was 50% stenosed. Medical treatment at this time.  The estimated blood loss was none.  The pre-procedure left ventricular end diastolic pressure was 17.  No LV gram done. Just pressure.     - ASA/Plavix  - High intense statin   - Avoid BB due to bradycardia  - Medical tx for residual  LAD disease. If symptomatic as outpatient consider non invasive evaluation versus physiologic evaluation.   - F/U with Dr. Alvarado                   Patient Active Problem List    Diagnosis Date Noted    Hypertensive heart disease without heart failure 2022    Presence of stent in coronary artery 2021    Steatosis of liver 2021    Benign paroxysmal positional vertigo 2021    Essential hypertension 2020    Type 2 diabetes mellitus without complication, without long-term current use of insulin 2019    Coronary artery disease involving native coronary artery of native heart without angina pectoris 2019     Review of old records showed the presence of coronary artery disease with a stent of the circumflex in 2014. Cardiac catheterization in  showed a 20% lesion in the right coronary artery, but the left coronary artery was free of disease.  Echocardiography in 2019 showed left ventricular systolic function be normal, ejection fraction 60-65%        Hyperlipidemia, mixed 2019    Osteoarthritis 2019    Osteoarthritis of right knee 2019           Right Arm BP - Sittin/72  Left Arm BP - Sittin/74        LAST HbA1c  Lab Results   Component Value Date    HGBA1C 6.3 (H) 2022       Lipid panel  Lab Results   Component Value Date    CHOL 99 (L) 2021    CHOL 113 2020     Lab Results   Component Value Date    HDL 28 (L) 2021    HDL 31 2020     Lab Results   Component Value Date    LDLCALC 50.4 (L) 2021    LDLCALC 61 2020     Lab Results   Component Value Date    TRIG 103 2021     Lab Results   Component Value Date    CHOLHDL 28.3 2021            Review of Systems   Constitutional: Negative for chills and fever.   HENT:  Negative for hearing loss and nosebleeds.         As in HPI    Eyes:  Negative for blurred vision.   Cardiovascular:         As in HPI    Respiratory:  Negative for  hemoptysis and shortness of breath.    Hematologic/Lymphatic: Negative for bleeding problem.   Skin:  Negative for itching.   Musculoskeletal:  Negative for falls.   Gastrointestinal:  Negative for abdominal pain and hematochezia.   Genitourinary:  Negative for hematuria.   Neurological:  Negative for dizziness and loss of balance.   Psychiatric/Behavioral:  Negative for altered mental status and depression.      Objective:   Physical Exam  Constitutional:       Appearance: He is well-developed.   HENT:      Head: Normocephalic and atraumatic.   Eyes:      Conjunctiva/sclera: Conjunctivae normal.   Neck:      Vascular: No carotid bruit or JVD.   Cardiovascular:      Rate and Rhythm: Normal rate and regular rhythm.      Pulses:           Carotid pulses are 2+ on the right side and 2+ on the left side.       Radial pulses are 2+ on the right side and 2+ on the left side.      Heart sounds: Normal heart sounds. No murmur heard.    No friction rub. No gallop.   Pulmonary:      Effort: Pulmonary effort is normal. No respiratory distress.      Breath sounds: Normal breath sounds. No stridor. No wheezing.   Musculoskeletal:      Cervical back: Neck supple.   Skin:     General: Skin is warm and dry.   Neurological:      Mental Status: He is alert and oriented to person, place, and time.   Psychiatric:         Behavior: Behavior normal.       Assessment:     1. Hyperlipidemia, mixed    2. Essential hypertension    3. Presence of stent in coronary artery    4. Coronary artery disease involving native coronary artery of native heart without angina pectoris    5. Hypertensive heart disease without heart failure    6. Type 2 diabetes mellitus without complication, without long-term current use of insulin        Plan:     - Stable CAD. Residual non obstructive CAD   - Continue High intense statin   - LDL at goal  - ASA/Plavix  - Continue Norvasc   - Continue ARB      I spent 5-10 minutes asking, assessing, assisting, arranging  and advising heart healthy diet improvements. This included low-salt meals, portion control and health food alternatives. I also encourage 30 minutes of moderate exercise 3-4x a week.       6 months f/u     Pertinent cardiac images and EKG reviewed independently.    Continue with current medical plan and lifestyle changes.  Return sooner for concerns or questions. If symptoms persist go to the ED  I have reviewed all pertinent data including patient's medical history in detail and updated the computerized patient record.     No orders of the defined types were placed in this encounter.      Follow up as scheduled.     He expressed verbal understanding and agreed with the plan    Patient's Medications   New Prescriptions    No medications on file   Previous Medications    AMLODIPINE (NORVASC) 5 MG TABLET    Take 1 tablet (5 mg total) by mouth once daily.    ASCORBIC ACID, VITAMIN C, (VITAMIN C) 500 MG TABLET    Take 500 mg by mouth once daily.    ASPIRIN 81 MG CHEW    Take 1 tablet (81 mg total) by mouth once daily.    BLOOD SUGAR DIAGNOSTIC (BLOOD GLUCOSE TEST) STRP    1 each by Misc.(Non-Drug; Combo Route) route once daily.    BLOOD-GLUCOSE METER,CONTINUOUS (DEXCOM G6 ) MISC    Use as directed    BLOOD-GLUCOSE SENSOR (DEXCOM G6 SENSOR) ROSALEE    Use as directed    BLOOD-GLUCOSE TRANSMITTER (DEXCOM G6 TRANSMITTER) ROSALEE    Use as directed    CHLORPHENIRAMINE (CHLOR-TRIMETON) 4 MG TABLET    Take 4 mg by mouth as needed.     CHOLECALCIFEROL, VITAMIN D3, 125 MCG (5,000 UNIT) TAB    Take 5,000 Units by mouth once daily.    CLOPIDOGREL (PLAVIX) 75 MG TABLET    Take 1 tablet (75 mg total) by mouth once daily.    DORZOLAMIDE-TIMOLOL 2-0.5% (COSOPT) 22.3-6.8 MG/ML OPHTHALMIC SOLUTION    Place 1 drop into both eyes 2 (two) times daily.    FOLIC ACID/MULTIVIT-MIN/LUTEIN (CENTRUM SILVER ORAL)    Take 1 tablet by mouth once daily.    IRBESARTAN (AVAPRO) 300 MG TABLET    Take 1 tablet (300 mg total) by mouth every evening.     LATANOPROST 0.005 % OPHTHALMIC SOLUTION    INSTILL 1 DROP IN BOTH EYES EVERY EVENING    MICRO THIN LANCETS 33 GAUGE MISC        NITROGLYCERIN (NITROSTAT) 0.4 MG SL TABLET    Place 1 tablet (0.4 mg total) under the tongue every 5 (five) minutes as needed for Chest pain.    OXYMETAZOLINE, PF, (UPNEEQ, PF,) 0.1 % DPET    Apply 1 drop to eye daily as needed (droopy eyelids).    ROSUVASTATIN (CRESTOR) 40 MG TAB    Take 1 tablet (40 mg total) by mouth every evening.    TUMERIC-GING-OLIVE-OREG-CAPRYL ORAL    Take 1 tablet by mouth Daily.    VIT C-E-ZINC OX-KAREN-LUT-ZEAX (ICAPS AREDS2) 250 MG-200 UNIT -12.5 MG-1 MG CAP    Take 2 capsules by mouth once daily.    ZINC 50 MG TAB    Take 50 mg by mouth Daily.   Modified Medications    No medications on file   Discontinued Medications    No medications on file

## 2022-09-13 ENCOUNTER — PES CALL (OUTPATIENT)
Dept: ADMINISTRATIVE | Facility: CLINIC | Age: 72
End: 2022-09-13
Payer: MEDICARE

## 2022-09-16 NOTE — TELEPHONE ENCOUNTER
----- Message from Smita June sent at 9/16/2022  3:56 PM CDT -----  Contact: [phone]  Requesting an RX refill or newrefill  RX name and strength (copy/paste from chart):    latanoprost 0.005 % ophthalmic solution    amLODIPine (NORVASC) 5 MG tablet  Is this a 30 day or 90 day RX: 90  Pharmacy name and phone # (copy/paste from chart):    89    [facility] (Now [facility]) - [place], [place] - [address]  [address] [address]  Phone: [phone] Fax: [phone]      The doctors have asked that we provide their patients with the following 2 reminders -- prescription refills can take up to 72 hours, and a friendly reminder that in the future you can use your [facility] account to request refills: aware

## 2022-09-16 NOTE — TELEPHONE ENCOUNTER
No new care gaps identified.  [facility] Embedded Care Gaps. Reference number: [id number]. 9/16/2022   4:02:23 PM CDT

## 2022-09-18 RX ORDER — LATANOPROST 50 UG/ML
1 SOLUTION/ DROPS OPHTHALMIC NIGHTLY
Qty: 5 ML | Refills: 3 | Status: SHIPPED | OUTPATIENT
Start: 2022-09-18 | End: 2023-06-29

## 2022-09-18 RX ORDER — AMLODIPINE BESYLATE 5 MG/1
5 TABLET ORAL DAILY
Qty: 90 TABLET | Refills: 3 | Status: SHIPPED | OUTPATIENT
Start: 2022-09-18 | End: 2022-11-01 | Stop reason: SDUPTHER

## 2022-09-26 ENCOUNTER — LAB VISIT (OUTPATIENT)
Dept: LAB | Facility: HOSPITAL | Age: 72
End: 2022-09-26
Attending: INTERNAL MEDICINE
Payer: MEDICARE

## 2022-09-26 DIAGNOSIS — E11.69 HYPERLIPIDEMIA ASSOCIATED WITH TYPE 2 DIABETES MELLITUS: ICD-10-CM

## 2022-09-26 DIAGNOSIS — E11.9 TYPE 2 DIABETES MELLITUS WITHOUT COMPLICATION, WITHOUT LONG-TERM CURRENT USE OF INSULIN: ICD-10-CM

## 2022-09-26 DIAGNOSIS — E78.5 HYPERLIPIDEMIA ASSOCIATED WITH TYPE 2 DIABETES MELLITUS: ICD-10-CM

## 2022-09-26 DIAGNOSIS — Z11.59 SCREENING FOR VIRAL DISEASE: ICD-10-CM

## 2022-09-26 LAB
ALBUMIN SERPL BCP-MCNC: 4.1 G/DL (ref 3.5–5.2)
ALP SERPL-CCNC: 61 U/L (ref 55–135)
ALT SERPL W/O P-5'-P-CCNC: 29 U/L (ref 10–44)
ANION GAP SERPL CALC-SCNC: 6 MMOL/L (ref 8–16)
AST SERPL-CCNC: 19 U/L (ref 10–40)
BILIRUB SERPL-MCNC: 0.6 MG/DL (ref 0.1–1)
BUN SERPL-MCNC: 19 MG/DL (ref 8–23)
CALCIUM SERPL-MCNC: 8.9 MG/DL (ref 8.7–10.5)
CHLORIDE SERPL-SCNC: 107 MMOL/L (ref 95–110)
CHOLEST SERPL-MCNC: 103 MG/DL (ref 120–199)
CHOLEST/HDLC SERPL: 4.1 {RATIO} (ref 2–5)
CO2 SERPL-SCNC: 27 MMOL/L (ref 23–29)
CREAT SERPL-MCNC: 0.8 MG/DL (ref 0.5–1.4)
EST. GFR  (NO RACE VARIABLE): >60 ML/MIN/1.73 M^2
ESTIMATED AVG GLUCOSE: 120 MG/DL (ref 68–131)
ESTIMATED AVG GLUCOSE: 120 MG/DL (ref 68–131)
GLUCOSE SERPL-MCNC: 126 MG/DL (ref 70–110)
HBA1C MFR BLD: 5.8 % (ref 4–5.6)
HBA1C MFR BLD: 5.8 % (ref 4–5.6)
HDLC SERPL-MCNC: 25 MG/DL (ref 40–75)
HDLC SERPL: 24.3 % (ref 20–50)
LDLC SERPL CALC-MCNC: 49.4 MG/DL (ref 63–159)
NONHDLC SERPL-MCNC: 78 MG/DL
POTASSIUM SERPL-SCNC: 4.5 MMOL/L (ref 3.5–5.1)
PROT SERPL-MCNC: 6.4 G/DL (ref 6–8.4)
SARS-COV-2 IGG SERPL IA-ACNC: 4365.3 AU/ML
SARS-COV-2 IGG SERPL QL IA: POSITIVE
SODIUM SERPL-SCNC: 140 MMOL/L (ref 136–145)
TRIGL SERPL-MCNC: 143 MG/DL (ref 30–150)

## 2022-09-26 PROCEDURE — 80061 LIPID PANEL: CPT | Performed by: INTERNAL MEDICINE

## 2022-09-26 PROCEDURE — 83036 HEMOGLOBIN GLYCOSYLATED A1C: CPT | Performed by: INTERNAL MEDICINE

## 2022-09-26 PROCEDURE — 36415 COLL VENOUS BLD VENIPUNCTURE: CPT | Mod: PO | Performed by: INTERNAL MEDICINE

## 2022-09-26 PROCEDURE — 86769 SARS-COV-2 COVID-19 ANTIBODY: CPT | Performed by: INTERNAL MEDICINE

## 2022-09-26 PROCEDURE — 80053 COMPREHEN METABOLIC PANEL: CPT | Performed by: INTERNAL MEDICINE

## 2022-09-27 ENCOUNTER — PES CALL (OUTPATIENT)
Dept: ADMINISTRATIVE | Facility: CLINIC | Age: 72
End: 2022-09-27
Payer: MEDICARE

## 2022-09-28 ENCOUNTER — OFFICE VISIT (OUTPATIENT)
Dept: INTERNAL MEDICINE | Facility: CLINIC | Age: 72
End: 2022-09-28
Payer: MEDICARE

## 2022-09-28 VITALS
HEIGHT: 70 IN | DIASTOLIC BLOOD PRESSURE: 64 MMHG | OXYGEN SATURATION: 98 % | SYSTOLIC BLOOD PRESSURE: 120 MMHG | WEIGHT: 178.88 LBS | HEART RATE: 57 BPM | BODY MASS INDEX: 25.61 KG/M2

## 2022-09-28 DIAGNOSIS — I25.10 CORONARY ARTERY DISEASE INVOLVING NATIVE CORONARY ARTERY OF NATIVE HEART WITHOUT ANGINA PECTORIS: ICD-10-CM

## 2022-09-28 DIAGNOSIS — E11.9 TYPE 2 DIABETES MELLITUS WITHOUT COMPLICATION, WITHOUT LONG-TERM CURRENT USE OF INSULIN: Primary | ICD-10-CM

## 2022-09-28 DIAGNOSIS — I11.9 HYPERTENSIVE HEART DISEASE WITHOUT HEART FAILURE: ICD-10-CM

## 2022-09-28 DIAGNOSIS — H81.10 BENIGN PAROXYSMAL POSITIONAL VERTIGO, UNSPECIFIED LATERALITY: ICD-10-CM

## 2022-09-28 DIAGNOSIS — E78.2 HYPERLIPIDEMIA, MIXED: Chronic | ICD-10-CM

## 2022-09-28 DIAGNOSIS — M17.12 PRIMARY OSTEOARTHRITIS OF LEFT KNEE: ICD-10-CM

## 2022-09-28 PROCEDURE — 3072F LOW RISK FOR RETINOPATHY: CPT | Mod: CPTII,S$GLB,, | Performed by: INTERNAL MEDICINE

## 2022-09-28 PROCEDURE — 3008F BODY MASS INDEX DOCD: CPT | Mod: CPTII,S$GLB,, | Performed by: INTERNAL MEDICINE

## 2022-09-28 PROCEDURE — 3061F NEG MICROALBUMINURIA REV: CPT | Mod: CPTII,S$GLB,, | Performed by: INTERNAL MEDICINE

## 2022-09-28 PROCEDURE — 1126F PR PAIN SEVERITY QUANTIFIED, NO PAIN PRESENT: ICD-10-PCS | Mod: CPTII,S$GLB,, | Performed by: INTERNAL MEDICINE

## 2022-09-28 PROCEDURE — 1126F AMNT PAIN NOTED NONE PRSNT: CPT | Mod: CPTII,S$GLB,, | Performed by: INTERNAL MEDICINE

## 2022-09-28 PROCEDURE — 3074F SYST BP LT 130 MM HG: CPT | Mod: CPTII,S$GLB,, | Performed by: INTERNAL MEDICINE

## 2022-09-28 PROCEDURE — 4010F PR ACE/ARB THEARPY RXD/TAKEN: ICD-10-PCS | Mod: CPTII,S$GLB,, | Performed by: INTERNAL MEDICINE

## 2022-09-28 PROCEDURE — 1157F PR ADVANCE CARE PLAN OR EQUIV PRESENT IN MEDICAL RECORD: ICD-10-PCS | Mod: CPTII,S$GLB,, | Performed by: INTERNAL MEDICINE

## 2022-09-28 PROCEDURE — 3008F PR BODY MASS INDEX (BMI) DOCUMENTED: ICD-10-PCS | Mod: CPTII,S$GLB,, | Performed by: INTERNAL MEDICINE

## 2022-09-28 PROCEDURE — 1159F MED LIST DOCD IN RCRD: CPT | Mod: CPTII,S$GLB,, | Performed by: INTERNAL MEDICINE

## 2022-09-28 PROCEDURE — 3044F PR MOST RECENT HEMOGLOBIN A1C LEVEL <7.0%: ICD-10-PCS | Mod: CPTII,S$GLB,, | Performed by: INTERNAL MEDICINE

## 2022-09-28 PROCEDURE — 3078F PR MOST RECENT DIASTOLIC BLOOD PRESSURE < 80 MM HG: ICD-10-PCS | Mod: CPTII,S$GLB,, | Performed by: INTERNAL MEDICINE

## 2022-09-28 PROCEDURE — 1160F RVW MEDS BY RX/DR IN RCRD: CPT | Mod: CPTII,S$GLB,, | Performed by: INTERNAL MEDICINE

## 2022-09-28 PROCEDURE — 3072F PR LOW RISK FOR RETINOPATHY: ICD-10-PCS | Mod: CPTII,S$GLB,, | Performed by: INTERNAL MEDICINE

## 2022-09-28 PROCEDURE — 99999 PR PBB SHADOW E&M-EST. PATIENT-LVL V: ICD-10-PCS | Mod: PBBFAC,,, | Performed by: INTERNAL MEDICINE

## 2022-09-28 PROCEDURE — 3066F PR DOCUMENTATION OF TREATMENT FOR NEPHROPATHY: ICD-10-PCS | Mod: CPTII,S$GLB,, | Performed by: INTERNAL MEDICINE

## 2022-09-28 PROCEDURE — 1157F ADVNC CARE PLAN IN RCRD: CPT | Mod: CPTII,S$GLB,, | Performed by: INTERNAL MEDICINE

## 2022-09-28 PROCEDURE — 1160F PR REVIEW ALL MEDS BY PRESCRIBER/CLIN PHARMACIST DOCUMENTED: ICD-10-PCS | Mod: CPTII,S$GLB,, | Performed by: INTERNAL MEDICINE

## 2022-09-28 PROCEDURE — 99214 PR OFFICE/OUTPT VISIT, EST, LEVL IV, 30-39 MIN: ICD-10-PCS | Mod: S$GLB,,, | Performed by: INTERNAL MEDICINE

## 2022-09-28 PROCEDURE — 3066F NEPHROPATHY DOC TX: CPT | Mod: CPTII,S$GLB,, | Performed by: INTERNAL MEDICINE

## 2022-09-28 PROCEDURE — 99999 PR PBB SHADOW E&M-EST. PATIENT-LVL V: CPT | Mod: PBBFAC,,, | Performed by: INTERNAL MEDICINE

## 2022-09-28 PROCEDURE — 1159F PR MEDICATION LIST DOCUMENTED IN MEDICAL RECORD: ICD-10-PCS | Mod: CPTII,S$GLB,, | Performed by: INTERNAL MEDICINE

## 2022-09-28 PROCEDURE — 3061F PR NEG MICROALBUMINURIA RESULT DOCUMENTED/REVIEW: ICD-10-PCS | Mod: CPTII,S$GLB,, | Performed by: INTERNAL MEDICINE

## 2022-09-28 PROCEDURE — 3074F PR MOST RECENT SYSTOLIC BLOOD PRESSURE < 130 MM HG: ICD-10-PCS | Mod: CPTII,S$GLB,, | Performed by: INTERNAL MEDICINE

## 2022-09-28 PROCEDURE — 3078F DIAST BP <80 MM HG: CPT | Mod: CPTII,S$GLB,, | Performed by: INTERNAL MEDICINE

## 2022-09-28 PROCEDURE — 99214 OFFICE O/P EST MOD 30 MIN: CPT | Mod: S$GLB,,, | Performed by: INTERNAL MEDICINE

## 2022-09-28 PROCEDURE — 4010F ACE/ARB THERAPY RXD/TAKEN: CPT | Mod: CPTII,S$GLB,, | Performed by: INTERNAL MEDICINE

## 2022-09-28 PROCEDURE — 3044F HG A1C LEVEL LT 7.0%: CPT | Mod: CPTII,S$GLB,, | Performed by: INTERNAL MEDICINE

## 2022-09-28 NOTE — PROGRESS NOTES
[facility] Note    Chief Complaint      Chief Complaint   Patient presents with    Annual Exam       History of Present Illness      Bandar French is a 72 y.o. male with chronic conditions of CAD, HTN, HLD, DM2, osteoarthritis who presents today for: annual preventative visit.  Has been seeing Dr. Westbrook for osteoarthritis of the left knee.  S/p R TKA. Pt strongly prefers robotic TKA which Dr. Westbrook does not offer.  Will reach out for second opinion.    DM2: A1C 5.8.  Diet controlled.  Eye exam UTD with Dr. Shannon.  HTN: BP slightly low on irbesartan, amlodipine.   CAD: Sees Dr. Tompkins, cardiology  On ASA, plavix, irbesartan, crestor.  S/P PCI x 2 1/2022.  Denies CP, SOB.    HLD: Controlled on crestor.  LDL 50.     Flu shot 2021. Prevnar 2015.  Pneumovax UTD.  Zostavax 2013.  Shingrix UTD.  COVID UTD.  Cscope Dr. Copeland, 2016, no polyps, 10 yr interval.    Past Medical History:  Past Medical History:   Diagnosis Date    Arthritis     Cataract     Depression     Diabetes mellitus     Episodic headache 3/12/2022    Glaucoma     Hypertension        Past Surgical History:   has a past surgical history that includes Knee arthroscopy (Left, 1967); Knee arthroscopy (Right, 2012); Hand surgery (Right, 12/2004); Coronary stent placement (2014); Tonsillectomy; Total knee arthroplasty (Right, 07/22/2019); Adenoidectomy; Cosmetic surgery (Bilateral, 06/2019); Eye surgery (Left, 03/2019); Joint replacement (Right, 07/22/2019); Left heart catheterization (Left, 01/20/2022); and Coronary angiography (N/A, 01/20/2022).    Family History:  family history includes Aneurysm in his father; Dementia in his mother; Diabetes in his mother; Glaucoma in his brother; Heart disease in his brother; Migraines in his sister; No Known Problems in his daughter, son, and son; Other in his brother and son; Pneumonia in his brother.     Social History:  Social History     Tobacco Use    Smoking status: Former     Packs/day: 2.00      Years: 20.00     Pack years: 40.00     Types: Cigarettes, Cigars     Start date: 1968     Quit date: 1986     Years since quittin.7    Smokeless tobacco: Never   Substance Use Topics    Alcohol use: Not Currently     Comment: Discontinued     Drug use: Never       I personally reviewed all past medical, surgical, social and family history.    Review of Systems   Constitutional:  Negative for chills, fever and malaise/fatigue.   Respiratory:  Negative for shortness of breath.    Cardiovascular:  Negative for chest pain.   Gastrointestinal:  Negative for constipation, diarrhea, nausea and vomiting.   Skin:  Negative for rash.   Neurological:  Negative for weakness.   All other systems reviewed and are negative.     Medications:  Outpatient Encounter Medications as of 2022   Medication Sig Dispense Refill    amLODIPine (NORVASC) 5 MG tablet Take 1 tablet (5 mg total) by mouth once daily. 90 tablet 3    ascorbic acid, vitamin C, (VITAMIN C) 500 MG tablet Take 500 mg by mouth once daily.      aspirin 81 MG Chew Take 1 tablet (81 mg total) by mouth once daily. 30 tablet 11    blood sugar diagnostic (BLOOD GLUCOSE TEST) Strp 1 each by Misc.(Non-Drug; Combo Route) route once daily. 30 each 11    blood-glucose meter,continuous (DEXCOM G6 ) Misc Use as directed 1 each 11    blood-glucose sensor (DEXCOM G6 SENSOR) Joanne Use as directed 10 each 11    blood-glucose transmitter (DEXCOM G6 TRANSMITTER) Joanne Use as directed 1 each 3    chlorpheniramine (CHLOR-TRIMETON) 4 mg tablet Take 4 mg by mouth as needed.       cholecalciferol, vitamin D3, 125 mcg (5,000 unit) Tab Take 5,000 Units by mouth once daily.      clopidogreL (PLAVIX) 75 mg tablet Take 1 tablet (75 mg total) by mouth once daily. 90 tablet 3    dorzolamide-timolol 2-0.5% (COSOPT) 22.3-6.8 mg/mL ophthalmic solution Place 1 drop into both eyes 2 (two) times daily. 20 mL 3    folic acid/multivit-min/lutein (CENTRUM SILVER ORAL) Take 1 tablet  by mouth once daily.      irbesartan (AVAPRO) 300 MG tablet TAKE 1 TABLET (300 MG TOTAL) BY MOUTH EVERY EVENING. 90 tablet 2    latanoprost 0.005 % ophthalmic solution Place 1 drop into both eyes every evening. 5 mL 3    latanoprost 0.005 % ophthalmic solution Place 1 drop into both eyes every evening. 5 mL 3    MICRO THIN LANCETS 33 gauge Misc       nitroGLYCERIN (NITROSTAT) 0.4 MG SL tablet Place 1 tablet (0.4 mg total) under the tongue every 5 (five) minutes as needed for Chest pain. 25 tablet 11    oxymetazoline, PF, (UPNEEQ, PF,) 0.1 % Dpet Apply 1 drop to eye daily as needed (droopy eyelids). 30 each 11    rosuvastatin (CRESTOR) 40 MG Tab Take 1 tablet (40 mg total) by mouth every evening. 90 tablet 3    TUMERIC-GING-OLIVE-OREG-CAPRYL ORAL Take 1 tablet by mouth Daily.      vit C-E-zinc ox-dayron-lut-zeax (ICAPS AREDS2) 250 mg-200 unit -12.5 mg-1 mg Cap Take 2 capsules by mouth once daily.      zinc 50 mg Tab Take 50 mg by mouth Daily.       No facility-administered encounter medications on file as of 9/28/2022.       Allergies:  Review of patient's allergies indicates:  No Known Allergies    Health Maintenance:  Immunization History   Administered Date(s) Administered    COVID-19, MRNA, LN-S, PF (MODERNA FULL 0.5 ML DOSE) 03/10/2021, 03/10/2021, 04/12/2021    Influenza (FLUAD) - Quadrivalent - Adjuvanted - PF *Preferred* (65+) 09/16/2020    Influenza - High Dose - PF (65 years and older) 09/25/2019    Influenza - Quadrivalent - High Dose - PF (65 years and older) 10/21/2021    Pneumococcal Polysaccharide - 23 Valent 09/25/2019    Tdap 10/21/2021    Zoster 01/02/2013    Zoster Recombinant 10/14/2020, 05/10/2021      Health Maintenance   Topic Date Due    Foot Exam  09/16/2021    Eye Exam  09/27/2022    Hemoglobin A1c  03/26/2023    High Dose Statin  09/01/2023    Lipid Panel  09/26/2023    TETANUS VACCINE  10/21/2031    Hepatitis C Screening  Completed    Abdominal Aortic Aneurysm Screening  Completed     "    Physical Exam      Vital Signs  Pulse: (!) 57  SpO2: 98 %  BP: 120/64  BP Location: Left arm  Patient Position: Sitting  Pain Score: 0-No pain  Height and Weight  Height: 5' 10" (177.8 cm)  Weight: 81.1 kg (178 lb 14.5 oz)  BSA (Calculated - sq m): 2 sq meters  BMI (Calculated): 25.7  Weight in (lb) to have BMI = 25: 173.9]    Physical Exam  Vitals reviewed.   Constitutional:       Appearance: He is well-developed.   HENT:      Head: Normocephalic and atraumatic.      Right Ear: External ear normal.      Left Ear: External ear normal.   Cardiovascular:      Rate and Rhythm: Normal rate and regular rhythm.      Heart sounds: Normal heart sounds. No murmur heard.  Pulmonary:      Effort: Pulmonary effort is normal.      Breath sounds: Normal breath sounds. No wheezing or rales.   Abdominal:      General: Bowel sounds are normal.      Palpations: Abdomen is soft.        Laboratory:  CBC:  Recent Labs   Lab 01/20/22  0904 03/12/22  1229 03/13/22  0351   WBC 8.27 8.73 8.37   RBC 4.76 4.68 4.54 L   Hemoglobin 14.1 13.9 L 13.6 L   Hematocrit 40.7 41.1 39.5 L   Platelets 190 201 184   MCV 86 88 87   MCH 29.6 29.7 30.0   MCHC 34.6 33.8 34.4     CMP:  Recent Labs   Lab 01/20/22  0904 03/12/22  1229 03/13/22  0351 09/26/22  0744   Glucose 132 H 105   < > 126 H   Calcium 9.0 9.5   < > 8.9   Albumin 4.2 4.4  --  4.1   Total Protein 6.8 7.4  --  6.4   Sodium 139 140   < > 140   Potassium 4.1 4.4   < > 4.5   CO2 21 L 26   < > 27   Chloride 108 106   < > 107   BUN 20 11   < > 19   Alkaline Phosphatase 61 67  --  61   ALT 22 24  --  29   AST 15 18  --  19   Total Bilirubin 0.8 0.8  --  0.6    < > = values in this interval not displayed.     URINALYSIS:       LIPIDS:  Recent Labs   Lab 08/19/20  0000 09/14/21  0752 09/26/22  0744   HDL 31 28 L 25 L   Cholesterol 113 99 L 103 L   Triglycerides  --  103 143   LDL Cholesterol 61 50.4 L 49.4 L   HDL/Cholesterol Ratio  --  28.3 24.3   Non-HDL Cholesterol  --  71 78   Total " Cholesterol/HDL Ratio  --  3.5 4.1     TSH:      A1C:  Recent Labs   Lab 11/26/19  1057 08/26/20  0921 02/26/21  0954 09/14/21  0752 03/16/22  1000 09/26/22  0744   Hemoglobin A1C 6.8 H 6.1 H 5.8 H 6.2 H 6.3 H 5.8 H  5.8 H       Assessment/Plan     Bandar French is a 72 y.o.male with:    1. Type 2 diabetes mellitus without complication, without long-term current use of insulin  - Comprehensive Metabolic Panel; Future  - Hemoglobin A1C; Future  Continue current meds.  Labs reviewed.  Eye exam scheduled  2. Hypertensive heart disease without heart failure  Continue current meds.    3. Coronary artery disease involving native coronary artery of native heart without angina pectoris  Continue current meds.    4. Hyperlipidemia, mixed  - Lipid Panel; Future  Continue current meds.    5. Benign paroxysmal positional vertigo, unspecified laterality    6. Primary osteoarthritis of left knee  - Ambulatory referral/consult to Orthopedics; Future     Chronic conditions status updated as per HPI.  Other than changes above, cont current medications and maintain follow up with specialists.  No follow-ups on file.    Future Appointments   Date Time Provider Department Center   10/6/2022  8:00 AM [facility]   10/12/2022  9:45 AM [facility] Eric Aguirre   10/12/2022 10:15 AM [facility] Eric Rangel MD  [facility]

## 2022-10-04 ENCOUNTER — PATIENT MESSAGE (OUTPATIENT)
Dept: CARDIOLOGY | Facility: CLINIC | Age: 72
End: 2022-10-04
Payer: MEDICARE

## 2022-10-06 ENCOUNTER — OFFICE VISIT (OUTPATIENT)
Dept: INTERNAL MEDICINE | Facility: CLINIC | Age: 72
End: 2022-10-06
Payer: MEDICARE

## 2022-10-06 VITALS
WEIGHT: 177.69 LBS | OXYGEN SATURATION: 99 % | HEART RATE: 60 BPM | SYSTOLIC BLOOD PRESSURE: 118 MMHG | DIASTOLIC BLOOD PRESSURE: 62 MMHG | BODY MASS INDEX: 25.44 KG/M2 | HEIGHT: 70 IN

## 2022-10-06 DIAGNOSIS — K76.0 STEATOSIS OF LIVER: ICD-10-CM

## 2022-10-06 DIAGNOSIS — I10 ESSENTIAL HYPERTENSION: Chronic | ICD-10-CM

## 2022-10-06 DIAGNOSIS — E78.2 HYPERLIPIDEMIA, MIXED: Chronic | ICD-10-CM

## 2022-10-06 DIAGNOSIS — E11.9 TYPE 2 DIABETES MELLITUS WITHOUT COMPLICATION, WITHOUT LONG-TERM CURRENT USE OF INSULIN: ICD-10-CM

## 2022-10-06 DIAGNOSIS — H81.10 BENIGN PAROXYSMAL POSITIONAL VERTIGO, UNSPECIFIED LATERALITY: ICD-10-CM

## 2022-10-06 DIAGNOSIS — Z00.00 ENCOUNTER FOR PREVENTIVE HEALTH EXAMINATION: Primary | ICD-10-CM

## 2022-10-06 DIAGNOSIS — Z95.5 PRESENCE OF STENT IN CORONARY ARTERY: Chronic | ICD-10-CM

## 2022-10-06 DIAGNOSIS — M17.11 PRIMARY OSTEOARTHRITIS OF RIGHT KNEE: ICD-10-CM

## 2022-10-06 DIAGNOSIS — I25.10 CORONARY ARTERY DISEASE INVOLVING NATIVE CORONARY ARTERY OF NATIVE HEART WITHOUT ANGINA PECTORIS: ICD-10-CM

## 2022-10-06 DIAGNOSIS — I11.9 HYPERTENSIVE HEART DISEASE WITHOUT HEART FAILURE: ICD-10-CM

## 2022-10-06 DIAGNOSIS — M19.90 OSTEOARTHRITIS, UNSPECIFIED OSTEOARTHRITIS TYPE, UNSPECIFIED SITE: ICD-10-CM

## 2022-10-06 PROCEDURE — 3072F PR LOW RISK FOR RETINOPATHY: ICD-10-PCS | Mod: CPTII,S$GLB,,

## 2022-10-06 PROCEDURE — 4010F PR ACE/ARB THEARPY RXD/TAKEN: ICD-10-PCS | Mod: CPTII,S$GLB,,

## 2022-10-06 PROCEDURE — 3288F PR FALLS RISK ASSESSMENT DOCUMENTED: ICD-10-PCS | Mod: CPTII,S$GLB,,

## 2022-10-06 PROCEDURE — 1101F PR PT FALLS ASSESS DOC 0-1 FALLS W/OUT INJ PAST YR: ICD-10-PCS | Mod: CPTII,S$GLB,,

## 2022-10-06 PROCEDURE — 1170F FXNL STATUS ASSESSED: CPT | Mod: CPTII,S$GLB,,

## 2022-10-06 PROCEDURE — 1160F RVW MEDS BY RX/DR IN RCRD: CPT | Mod: CPTII,S$GLB,,

## 2022-10-06 PROCEDURE — 4010F ACE/ARB THERAPY RXD/TAKEN: CPT | Mod: CPTII,S$GLB,,

## 2022-10-06 PROCEDURE — 3066F PR DOCUMENTATION OF TREATMENT FOR NEPHROPATHY: ICD-10-PCS | Mod: CPTII,S$GLB,,

## 2022-10-06 PROCEDURE — 3078F DIAST BP <80 MM HG: CPT | Mod: CPTII,S$GLB,,

## 2022-10-06 PROCEDURE — 3044F HG A1C LEVEL LT 7.0%: CPT | Mod: CPTII,S$GLB,,

## 2022-10-06 PROCEDURE — 1101F PT FALLS ASSESS-DOCD LE1/YR: CPT | Mod: CPTII,S$GLB,,

## 2022-10-06 PROCEDURE — 99999 PR PBB SHADOW E&M-EST. PATIENT-LVL V: ICD-10-PCS | Mod: PBBFAC,,,

## 2022-10-06 PROCEDURE — G0439 PR MEDICARE ANNUAL WELLNESS SUBSEQUENT VISIT: ICD-10-PCS | Mod: S$GLB,,,

## 2022-10-06 PROCEDURE — 1170F PR FUNCTIONAL STATUS ASSESSED: ICD-10-PCS | Mod: CPTII,S$GLB,,

## 2022-10-06 PROCEDURE — 3074F SYST BP LT 130 MM HG: CPT | Mod: CPTII,S$GLB,,

## 2022-10-06 PROCEDURE — 3072F LOW RISK FOR RETINOPATHY: CPT | Mod: CPTII,S$GLB,,

## 2022-10-06 PROCEDURE — 3074F PR MOST RECENT SYSTOLIC BLOOD PRESSURE < 130 MM HG: ICD-10-PCS | Mod: CPTII,S$GLB,,

## 2022-10-06 PROCEDURE — 1157F PR ADVANCE CARE PLAN OR EQUIV PRESENT IN MEDICAL RECORD: ICD-10-PCS | Mod: CPTII,S$GLB,,

## 2022-10-06 PROCEDURE — 3061F NEG MICROALBUMINURIA REV: CPT | Mod: CPTII,S$GLB,,

## 2022-10-06 PROCEDURE — 1159F PR MEDICATION LIST DOCUMENTED IN MEDICAL RECORD: ICD-10-PCS | Mod: CPTII,S$GLB,,

## 2022-10-06 PROCEDURE — 3288F FALL RISK ASSESSMENT DOCD: CPT | Mod: CPTII,S$GLB,,

## 2022-10-06 PROCEDURE — 1160F PR REVIEW ALL MEDS BY PRESCRIBER/CLIN PHARMACIST DOCUMENTED: ICD-10-PCS | Mod: CPTII,S$GLB,,

## 2022-10-06 PROCEDURE — 3008F PR BODY MASS INDEX (BMI) DOCUMENTED: ICD-10-PCS | Mod: CPTII,S$GLB,,

## 2022-10-06 PROCEDURE — 99999 PR PBB SHADOW E&M-EST. PATIENT-LVL V: CPT | Mod: PBBFAC,,,

## 2022-10-06 PROCEDURE — 1157F ADVNC CARE PLAN IN RCRD: CPT | Mod: CPTII,S$GLB,,

## 2022-10-06 PROCEDURE — 3066F NEPHROPATHY DOC TX: CPT | Mod: CPTII,S$GLB,,

## 2022-10-06 PROCEDURE — G0439 PPPS, SUBSEQ VISIT: HCPCS | Mod: S$GLB,,,

## 2022-10-06 PROCEDURE — 3061F PR NEG MICROALBUMINURIA RESULT DOCUMENTED/REVIEW: ICD-10-PCS | Mod: CPTII,S$GLB,,

## 2022-10-06 PROCEDURE — 3008F BODY MASS INDEX DOCD: CPT | Mod: CPTII,S$GLB,,

## 2022-10-06 PROCEDURE — 3078F PR MOST RECENT DIASTOLIC BLOOD PRESSURE < 80 MM HG: ICD-10-PCS | Mod: CPTII,S$GLB,,

## 2022-10-06 PROCEDURE — 1159F MED LIST DOCD IN RCRD: CPT | Mod: CPTII,S$GLB,,

## 2022-10-06 PROCEDURE — 3044F PR MOST RECENT HEMOGLOBIN A1C LEVEL <7.0%: ICD-10-PCS | Mod: CPTII,S$GLB,,

## 2022-10-06 RX ORDER — LANOLIN ALCOHOL/MO/W.PET/CERES
250 CREAM (GRAM) TOPICAL DAILY
COMMUNITY

## 2022-10-06 NOTE — PATIENT INSTRUCTIONS
Schedule eye exam         Counseling and Referral of Other Preventative  (Italic type indicates deductible and co-insurance are waived)    Patient Name: Bandar French  Today's Date: 10/6/2022    Health Maintenance         Date Due Completion Date    Eye Exam 10/13/2022 (Originally 9/27/2022) 9/27/2021    Influenza Vaccine (1) 11/07/2022 (Originally 9/1/2022) 10/21/2021    COVID-19 Vaccine (3 - Booster for Moderna series) 10/06/2023 (Originally 6/7/2021) 4/12/2021    Pneumococcal Vaccines (Age 65+) (2 - PCV) 10/06/2023 (Originally 9/25/2020) 9/25/2019    Hemoglobin A1c 03/26/2023 9/26/2022    Diabetes Urine Screening 09/26/2023 9/26/2022    Lipid Panel 09/26/2023 9/26/2022    High Dose Statin 10/06/2023 10/6/2022    Foot Exam 10/06/2023 10/6/2022 (Done)    Override on 10/6/2022: Done    Override on 9/16/2020: Done    Colorectal Cancer Screening 09/09/2026 9/9/2016    TETANUS VACCINE 10/21/2031 10/21/2021          No orders of the defined types were placed in this encounter.      The following information is provided to all patients.  This information is to help you find resources for any of the problems found today that may be affecting your health:                Living healthy guide: www.[facility].[place].gov      Understanding Diabetes: www.diabetes.org      Eating healthy: www.cdc.gov/healthyweight      CDC home safety checklist: www.cdc.gov/steadi/patient.html      Agency on Aging: www.goea.[place].[facility]      Alcoholics anonymous (AA): www.aa.org      Physical Activity: www.[place]inette.nih.gov/uu0vexg      Tobacco use: www.quitwithusla.org

## 2022-10-06 NOTE — PROGRESS NOTES
"Cc: medicare awv and comprehensive health risk assessment    Bandar French presented for a  Medicare AWV and comprehensive Health Risk Assessment today.  She is a patient of Dr. Rangel and is new to me.   The following components were reviewed and updated:    Medical history  Family History  Social history  Allergies and Current Medications  Health Risk Assessment  Health Maintenance  Care Team     ** See Completed Assessments for Annual Wellness Visit within the encounter summary.**    Review of Systems   Constitutional:  Negative for chills, fatigue, fever and unexpected weight change.   HENT:  Negative for congestion, mouth sores, sinus pressure, sore throat and trouble swallowing.    Eyes:  Negative for visual disturbance.   Respiratory:  Negative for cough, chest tightness and shortness of breath.    Cardiovascular:  Negative for chest pain, palpitations and leg swelling.   Gastrointestinal:  Negative for abdominal pain, constipation, diarrhea, nausea and vomiting.   Musculoskeletal:  Negative for arthralgias and myalgias.   Skin:  Negative for color change, pallor and rash.   Neurological:  Negative for dizziness, weakness, numbness and headaches.        The following assessments were completed:  Living Situation  CAGE  Depression Screening  Timed Get Up and Go  Whisper Test  Cognitive Function Screening  Nutrition Screening  ADL Screening  PAQ Screening  Review for opioid screen:  pt does not have rx for opioid  Review for substance use disorder: pt does not use substance          Vitals:    10/06/22 0816   BP: 118/62   Pulse: 60   SpO2: 99%   Weight: 80.6 kg (177 lb 11.1 oz)   Height: 5' 10" (1.778 m)     Body mass index is 25.5 kg/m².    Physical Exam  Constitutional:       Appearance: Normal appearance.   HENT:      Head: Normocephalic and atraumatic.   Cardiovascular:      Rate and Rhythm: Normal rate and regular rhythm.      Pulses: Normal pulses.      Heart sounds: Normal heart sounds.   Pulmonary:     "  Effort: Pulmonary effort is normal.      Breath sounds: Normal breath sounds.   Feet:      Right foot:      Protective Sensation: 7 sites tested.  7 sites sensed.      Skin integrity: Skin integrity normal.      Toenail Condition: Right toenails are normal.      Left foot:      Protective Sensation: 7 sites tested.  7 sites sensed.      Skin integrity: Skin integrity normal.      Toenail Condition: Left toenails are normal.   Skin:     General: Skin is warm and dry.      Capillary Refill: Capillary refill takes less than 2 seconds.   Neurological:      General: No focal deficit present.      Mental Status: He is alert and oriented to person, place, and time.   Psychiatric:         Mood and Affect: Mood normal.         Behavior: Behavior normal.      Diagnoses and health risks identified today and associated recommendations/orders:    1. Encounter for preventive health examination  -Assessment and evaluation performed at stated above    2. Type 2 diabetes mellitus without complication, without long-term current use of insulin  -Stable on diet and exercise.  Followed by PCP    3. Presence of stent in coronary artery  -Stable on aspirin, clopidogrel.  Followed by Cards    4. Hyperlipidemia, mixed  -Stable on rosuvastatin.  Followed by PCP    5. Steatosis of liver  -Stable on current regimen.  Followed by PCP    6. Coronary artery disease involving native coronary artery of native heart without angina pectoris  -Stable on aspirin, clopidogrel.  Followed by Cards    7. Osteoarthritis, unspecified osteoarthritis type, unspecified site  8. Primary osteoarthritis of right knee  -Stable on current regimen.  Followed by PCP    9. Hypertensive heart disease without heart failure  10. Essential hypertension  -Stable on irbesartan, amlodipine.  Followed by Cards    11. Benign paroxysmal positional vertigo, unspecified laterality  -Stable, followed by PCP    Fahad Portillo with a 5-10 year written screening schedule and  personal prevention plan. Recommendations were developed using the USPSTF age appropriate recommendations. Education, counseling, and referrals were provided as needed. After Visit Summary printed and given to patient which includes a list of additional screenings\tests needed.    Follow up in about 1 year (around 10/6/2023), or if symptoms worsen or fail to improve.      Bel Boone, LATRICE    I offered to discuss advanced care planning, including how to pick a person who would make decisions for you if you were unable to make them for yourself, called a health care power of , and what kind of decisions you might make such as use of life sustaining treatments such as ventilators and tube feeding when faced with a life limiting illness recorded on a living will that they will need to know. (How you want to be cared for as you near the end of your natural life)     X  Patient has advanced directives on file, which we reviewed, and they do not wish to make changes.

## 2022-10-10 ENCOUNTER — PATIENT MESSAGE (OUTPATIENT)
Dept: INTERNAL MEDICINE | Facility: CLINIC | Age: 72
End: 2022-10-10
Payer: MEDICARE

## 2022-10-10 DIAGNOSIS — E78.2 HYPERLIPIDEMIA, MIXED: ICD-10-CM

## 2022-10-10 RX ORDER — ROSUVASTATIN CALCIUM 40 MG/1
40 TABLET, COATED ORAL NIGHTLY
Qty: 90 TABLET | Refills: 3 | Status: SHIPPED | OUTPATIENT
Start: 2022-10-10 | End: 2023-10-15

## 2022-10-10 NOTE — TELEPHONE ENCOUNTER
No new care gaps identified.  [facility] Embedded Care Gaps. Reference number: [id number]. 10/10/2022   7:36:08 AM CDT

## 2022-10-11 NOTE — PROGRESS NOTES
Subjective:       Patient ID: Bandar French is a 72 y.o. male.    Chief Complaint: Glaucoma     HPI    DLS: 4/13/22  Pt sts he no longer wear contact in OD feels like it wasn't   working.     1. POAG OU  2. NS OU  3. KAMI  4. Refractive Error  5. Hx Ptosis Repair OU  6. Ptosis OS  7. Type 2 DM no DR    MEDS:  Cosopt BID OU  Latanoprost QHS OU  Last edited by Katia Valerio on 10/12/2022 10:32 AM.            Assessment & Plan   Primary open angle glaucoma (POAG) of both eyes, moderate stage  -     Ha Visual Field - OU - Extended - Both Eyes  -     Posterior Segment OCT Optic Nerve- Both eyes; Future    Pseudoexfoliation (PXF) glaucoma of both eyes  -     Posterior Segment OCT Optic Nerve- Both eyes; Future    Combined forms of age-related cataract of both eyes    Wears contact lenses    Dry eye    Ptosis of left eyelid    Diabetes mellitus type 2 without retinopathy       POAG OU - mild-moderate OU  PXG  -Fhx (+brother), Steroids (-),Trauma(-)  -Drops: Latanoprost qHS OU // Dorzolamide-timolol BID OU  -Drop intolerance/contraindication: -  -Laser: -  -Surgeries: laser retina tear OS // ptosis repair OS  -CCT: 593 // 541  -Gonio: CBB OU  Tm 20's    4/22 RNFL dec G/NS B TI/T/TS/N/NI OD // dec G/TI B T/NS OS  10/22 HVF 24-2 Nonspecific defects VFI 97% OD // Nonspecific ?early IAD VFI 97% OS --> stable     IOP great and HVF stable - OK to continue present management with drops for now    KAMI OU  With CTL intolerance  PFAT QID and Ocusoft daily      Combined forms of age-related cataract OU  Not visually significant. Monitor.    Refractive error  Wears contact lenses -- going to [facility] for CTL Rx  Stopped wearing on OD       Hx ptosis repair OU  Ptosis OS  Dr. Costa did ptosis repair 2019  Hx eye injury to lid when 18 yo OS  Gave sample of Upneeq - did not use  If ptosis stil bothering - back to [facility]      DM2 without retinopathy OU  BP/BS control with PCP  Yearly DFE    PLAN  Continue present management  with drops for now      RTC 6 months OCT-RNFL      Heather You M.D., M.S.  Department of Ophthalmology   Division of Glaucoma Surgery  [facility]

## 2022-10-12 ENCOUNTER — OFFICE VISIT (OUTPATIENT)
Dept: OPHTHALMOLOGY | Facility: CLINIC | Age: 72
End: 2022-10-12
Payer: MEDICARE

## 2022-10-12 DIAGNOSIS — H40.1430 PSEUDOEXFOLIATION (PXF) GLAUCOMA OF BOTH EYES: ICD-10-CM

## 2022-10-12 DIAGNOSIS — H02.402 PTOSIS OF LEFT EYELID: ICD-10-CM

## 2022-10-12 DIAGNOSIS — E11.9 DIABETES MELLITUS TYPE 2 WITHOUT RETINOPATHY: ICD-10-CM

## 2022-10-12 DIAGNOSIS — Z97.3 WEARS CONTACT LENSES: ICD-10-CM

## 2022-10-12 DIAGNOSIS — H40.1132 PRIMARY OPEN ANGLE GLAUCOMA (POAG) OF BOTH EYES, MODERATE STAGE: Primary | ICD-10-CM

## 2022-10-12 DIAGNOSIS — H04.129 DRY EYE: ICD-10-CM

## 2022-10-12 DIAGNOSIS — H25.813 COMBINED FORMS OF AGE-RELATED CATARACT OF BOTH EYES: ICD-10-CM

## 2022-10-12 PROCEDURE — 3288F PR FALLS RISK ASSESSMENT DOCUMENTED: ICD-10-PCS | Mod: CPTII,S$GLB,, | Performed by: STUDENT IN AN ORGANIZED HEALTH CARE EDUCATION/TRAINING PROGRAM

## 2022-10-12 PROCEDURE — 99999 PR PBB SHADOW E&M-EST. PATIENT-LVL III: CPT | Mod: PBBFAC,,, | Performed by: STUDENT IN AN ORGANIZED HEALTH CARE EDUCATION/TRAINING PROGRAM

## 2022-10-12 PROCEDURE — 92083 EXTENDED VISUAL FIELD XM: CPT | Mod: S$GLB,,, | Performed by: STUDENT IN AN ORGANIZED HEALTH CARE EDUCATION/TRAINING PROGRAM

## 2022-10-12 PROCEDURE — 1126F PR PAIN SEVERITY QUANTIFIED, NO PAIN PRESENT: ICD-10-PCS | Mod: CPTII,S$GLB,, | Performed by: STUDENT IN AN ORGANIZED HEALTH CARE EDUCATION/TRAINING PROGRAM

## 2022-10-12 PROCEDURE — 92014 PR EYE EXAM, EST PATIENT,COMPREHESV: ICD-10-PCS | Mod: S$GLB,,, | Performed by: STUDENT IN AN ORGANIZED HEALTH CARE EDUCATION/TRAINING PROGRAM

## 2022-10-12 PROCEDURE — 92083 HUMPHREY VISUAL FIELD - OU - BOTH EYES: ICD-10-PCS | Mod: S$GLB,,, | Performed by: STUDENT IN AN ORGANIZED HEALTH CARE EDUCATION/TRAINING PROGRAM

## 2022-10-12 PROCEDURE — 1157F PR ADVANCE CARE PLAN OR EQUIV PRESENT IN MEDICAL RECORD: ICD-10-PCS | Mod: CPTII,S$GLB,, | Performed by: STUDENT IN AN ORGANIZED HEALTH CARE EDUCATION/TRAINING PROGRAM

## 2022-10-12 PROCEDURE — 2023F PR DILATED RETINAL EXAM W/O EVID OF RETINOPATHY: ICD-10-PCS | Mod: CPTII,S$GLB,, | Performed by: STUDENT IN AN ORGANIZED HEALTH CARE EDUCATION/TRAINING PROGRAM

## 2022-10-12 PROCEDURE — 3288F FALL RISK ASSESSMENT DOCD: CPT | Mod: CPTII,S$GLB,, | Performed by: STUDENT IN AN ORGANIZED HEALTH CARE EDUCATION/TRAINING PROGRAM

## 2022-10-12 PROCEDURE — 3044F PR MOST RECENT HEMOGLOBIN A1C LEVEL <7.0%: ICD-10-PCS | Mod: CPTII,S$GLB,, | Performed by: STUDENT IN AN ORGANIZED HEALTH CARE EDUCATION/TRAINING PROGRAM

## 2022-10-12 PROCEDURE — 3066F PR DOCUMENTATION OF TREATMENT FOR NEPHROPATHY: ICD-10-PCS | Mod: CPTII,S$GLB,, | Performed by: STUDENT IN AN ORGANIZED HEALTH CARE EDUCATION/TRAINING PROGRAM

## 2022-10-12 PROCEDURE — 99999 PR PBB SHADOW E&M-EST. PATIENT-LVL III: ICD-10-PCS | Mod: PBBFAC,,, | Performed by: STUDENT IN AN ORGANIZED HEALTH CARE EDUCATION/TRAINING PROGRAM

## 2022-10-12 PROCEDURE — 3061F PR NEG MICROALBUMINURIA RESULT DOCUMENTED/REVIEW: ICD-10-PCS | Mod: CPTII,S$GLB,, | Performed by: STUDENT IN AN ORGANIZED HEALTH CARE EDUCATION/TRAINING PROGRAM

## 2022-10-12 PROCEDURE — 1160F PR REVIEW ALL MEDS BY PRESCRIBER/CLIN PHARMACIST DOCUMENTED: ICD-10-PCS | Mod: CPTII,S$GLB,, | Performed by: STUDENT IN AN ORGANIZED HEALTH CARE EDUCATION/TRAINING PROGRAM

## 2022-10-12 PROCEDURE — 1101F PT FALLS ASSESS-DOCD LE1/YR: CPT | Mod: CPTII,S$GLB,, | Performed by: STUDENT IN AN ORGANIZED HEALTH CARE EDUCATION/TRAINING PROGRAM

## 2022-10-12 PROCEDURE — 2023F DILAT RTA XM W/O RTNOPTHY: CPT | Mod: CPTII,S$GLB,, | Performed by: STUDENT IN AN ORGANIZED HEALTH CARE EDUCATION/TRAINING PROGRAM

## 2022-10-12 PROCEDURE — 3061F NEG MICROALBUMINURIA REV: CPT | Mod: CPTII,S$GLB,, | Performed by: STUDENT IN AN ORGANIZED HEALTH CARE EDUCATION/TRAINING PROGRAM

## 2022-10-12 PROCEDURE — 92014 COMPRE OPH EXAM EST PT 1/>: CPT | Mod: S$GLB,,, | Performed by: STUDENT IN AN ORGANIZED HEALTH CARE EDUCATION/TRAINING PROGRAM

## 2022-10-12 PROCEDURE — 3044F HG A1C LEVEL LT 7.0%: CPT | Mod: CPTII,S$GLB,, | Performed by: STUDENT IN AN ORGANIZED HEALTH CARE EDUCATION/TRAINING PROGRAM

## 2022-10-12 PROCEDURE — 4010F PR ACE/ARB THEARPY RXD/TAKEN: ICD-10-PCS | Mod: CPTII,S$GLB,, | Performed by: STUDENT IN AN ORGANIZED HEALTH CARE EDUCATION/TRAINING PROGRAM

## 2022-10-12 PROCEDURE — 4010F ACE/ARB THERAPY RXD/TAKEN: CPT | Mod: CPTII,S$GLB,, | Performed by: STUDENT IN AN ORGANIZED HEALTH CARE EDUCATION/TRAINING PROGRAM

## 2022-10-12 PROCEDURE — 1101F PR PT FALLS ASSESS DOC 0-1 FALLS W/OUT INJ PAST YR: ICD-10-PCS | Mod: CPTII,S$GLB,, | Performed by: STUDENT IN AN ORGANIZED HEALTH CARE EDUCATION/TRAINING PROGRAM

## 2022-10-12 PROCEDURE — 1126F AMNT PAIN NOTED NONE PRSNT: CPT | Mod: CPTII,S$GLB,, | Performed by: STUDENT IN AN ORGANIZED HEALTH CARE EDUCATION/TRAINING PROGRAM

## 2022-10-12 PROCEDURE — 1159F MED LIST DOCD IN RCRD: CPT | Mod: CPTII,S$GLB,, | Performed by: STUDENT IN AN ORGANIZED HEALTH CARE EDUCATION/TRAINING PROGRAM

## 2022-10-12 PROCEDURE — 1157F ADVNC CARE PLAN IN RCRD: CPT | Mod: CPTII,S$GLB,, | Performed by: STUDENT IN AN ORGANIZED HEALTH CARE EDUCATION/TRAINING PROGRAM

## 2022-10-12 PROCEDURE — 1159F PR MEDICATION LIST DOCUMENTED IN MEDICAL RECORD: ICD-10-PCS | Mod: CPTII,S$GLB,, | Performed by: STUDENT IN AN ORGANIZED HEALTH CARE EDUCATION/TRAINING PROGRAM

## 2022-10-12 PROCEDURE — 3066F NEPHROPATHY DOC TX: CPT | Mod: CPTII,S$GLB,, | Performed by: STUDENT IN AN ORGANIZED HEALTH CARE EDUCATION/TRAINING PROGRAM

## 2022-10-12 PROCEDURE — 1160F RVW MEDS BY RX/DR IN RCRD: CPT | Mod: CPTII,S$GLB,, | Performed by: STUDENT IN AN ORGANIZED HEALTH CARE EDUCATION/TRAINING PROGRAM

## 2022-10-18 ENCOUNTER — TELEPHONE (OUTPATIENT)
Dept: CARDIOLOGY | Facility: CLINIC | Age: 72
End: 2022-10-18
Payer: MEDICARE

## 2022-10-25 ENCOUNTER — PATIENT MESSAGE (OUTPATIENT)
Dept: ORTHOPEDICS | Facility: CLINIC | Age: 72
End: 2022-10-25
Payer: MEDICARE

## 2022-10-25 ENCOUNTER — PATIENT MESSAGE (OUTPATIENT)
Dept: OTHER | Facility: OTHER | Age: 72
End: 2022-10-25
Payer: MEDICARE

## 2022-10-25 DIAGNOSIS — Z96.652 HISTORY OF LEFT KNEE REPLACEMENT: ICD-10-CM

## 2022-10-25 DIAGNOSIS — M17.11 PRIMARY OSTEOARTHRITIS OF RIGHT KNEE: Primary | ICD-10-CM

## 2022-10-25 DIAGNOSIS — M17.12 PRIMARY OSTEOARTHRITIS OF LEFT KNEE: ICD-10-CM

## 2022-10-25 DIAGNOSIS — Z96.651 HISTORY OF RIGHT KNEE JOINT REPLACEMENT: ICD-10-CM

## 2022-11-09 ENCOUNTER — TELEPHONE (OUTPATIENT)
Dept: ORTHOPEDICS | Facility: CLINIC | Age: 72
End: 2022-11-09
Payer: MEDICARE

## 2022-11-09 NOTE — TELEPHONE ENCOUNTER
I called and spoke to the patient and confirmed his xray appointment at  before seeing Dr. Mcghee. The patient verbalized understanding and has no further questions.

## 2022-11-10 ENCOUNTER — HOSPITAL ENCOUNTER (OUTPATIENT)
Dept: RADIOLOGY | Facility: HOSPITAL | Age: 72
Discharge: HOME | End: 2022-11-10
Attending: ORTHOPAEDIC SURGERY
Payer: MEDICARE

## 2022-11-10 ENCOUNTER — OFFICE VISIT (OUTPATIENT)
Dept: ORTHOPEDICS | Facility: CLINIC | Age: 72
End: 2022-11-10
Payer: MEDICARE

## 2022-11-10 VITALS — WEIGHT: 178.25 LBS | HEIGHT: 70 IN | BODY MASS INDEX: 25.52 KG/M2

## 2022-11-10 DIAGNOSIS — M17.12 PRIMARY OSTEOARTHRITIS OF LEFT KNEE: Primary | ICD-10-CM

## 2022-11-10 DIAGNOSIS — M17.12 PRIMARY OSTEOARTHRITIS OF LEFT KNEE: ICD-10-CM

## 2022-11-10 DIAGNOSIS — Z96.651 HISTORY OF RIGHT KNEE JOINT REPLACEMENT: ICD-10-CM

## 2022-11-10 PROCEDURE — 1125F AMNT PAIN NOTED PAIN PRSNT: CPT | Mod: CPTII,S$GLB,, | Performed by: ORTHOPAEDIC SURGERY

## 2022-11-10 PROCEDURE — 99204 OFFICE O/P NEW MOD 45 MIN: CPT | Mod: S$GLB,,, | Performed by: ORTHOPAEDIC SURGERY

## 2022-11-10 PROCEDURE — 3044F PR MOST RECENT HEMOGLOBIN A1C LEVEL <7.0%: ICD-10-PCS | Mod: CPTII,S$GLB,, | Performed by: ORTHOPAEDIC SURGERY

## 2022-11-10 PROCEDURE — 3072F PR LOW RISK FOR RETINOPATHY: ICD-10-PCS | Mod: CPTII,S$GLB,, | Performed by: ORTHOPAEDIC SURGERY

## 2022-11-10 PROCEDURE — 3044F HG A1C LEVEL LT 7.0%: CPT | Mod: CPTII,S$GLB,, | Performed by: ORTHOPAEDIC SURGERY

## 2022-11-10 PROCEDURE — 3072F LOW RISK FOR RETINOPATHY: CPT | Mod: CPTII,S$GLB,, | Performed by: ORTHOPAEDIC SURGERY

## 2022-11-10 PROCEDURE — 1125F PR PAIN SEVERITY QUANTIFIED, PAIN PRESENT: ICD-10-PCS | Mod: CPTII,S$GLB,, | Performed by: ORTHOPAEDIC SURGERY

## 2022-11-10 PROCEDURE — 99999 PR PBB SHADOW E&M-EST. PATIENT-LVL II: CPT | Mod: PBBFAC,,, | Performed by: ORTHOPAEDIC SURGERY

## 2022-11-10 PROCEDURE — 3008F PR BODY MASS INDEX (BMI) DOCUMENTED: ICD-10-PCS | Mod: CPTII,S$GLB,, | Performed by: ORTHOPAEDIC SURGERY

## 2022-11-10 PROCEDURE — 73564 X-RAY EXAM KNEE 4 OR MORE: CPT | Mod: 26,50,, | Performed by: RADIOLOGY

## 2022-11-10 PROCEDURE — 99499 UNLISTED E&M SERVICE: CPT | Mod: HCNC,S$GLB,, | Performed by: ORTHOPAEDIC SURGERY

## 2022-11-10 PROCEDURE — 3061F PR NEG MICROALBUMINURIA RESULT DOCUMENTED/REVIEW: ICD-10-PCS | Mod: CPTII,S$GLB,, | Performed by: ORTHOPAEDIC SURGERY

## 2022-11-10 PROCEDURE — 1101F PR PT FALLS ASSESS DOC 0-1 FALLS W/OUT INJ PAST YR: ICD-10-PCS | Mod: CPTII,S$GLB,, | Performed by: ORTHOPAEDIC SURGERY

## 2022-11-10 PROCEDURE — 3288F PR FALLS RISK ASSESSMENT DOCUMENTED: ICD-10-PCS | Mod: CPTII,S$GLB,, | Performed by: ORTHOPAEDIC SURGERY

## 2022-11-10 PROCEDURE — 99499 RISK ADDL DX/OHS AUDIT: ICD-10-PCS | Mod: HCNC,S$GLB,, | Performed by: ORTHOPAEDIC SURGERY

## 2022-11-10 PROCEDURE — 1101F PT FALLS ASSESS-DOCD LE1/YR: CPT | Mod: CPTII,S$GLB,, | Performed by: ORTHOPAEDIC SURGERY

## 2022-11-10 PROCEDURE — 3061F NEG MICROALBUMINURIA REV: CPT | Mod: CPTII,S$GLB,, | Performed by: ORTHOPAEDIC SURGERY

## 2022-11-10 PROCEDURE — 4010F PR ACE/ARB THEARPY RXD/TAKEN: ICD-10-PCS | Mod: CPTII,S$GLB,, | Performed by: ORTHOPAEDIC SURGERY

## 2022-11-10 PROCEDURE — 3066F NEPHROPATHY DOC TX: CPT | Mod: CPTII,S$GLB,, | Performed by: ORTHOPAEDIC SURGERY

## 2022-11-10 PROCEDURE — 99999 PR PBB SHADOW E&M-EST. PATIENT-LVL II: ICD-10-PCS | Mod: PBBFAC,,, | Performed by: ORTHOPAEDIC SURGERY

## 2022-11-10 PROCEDURE — 3066F PR DOCUMENTATION OF TREATMENT FOR NEPHROPATHY: ICD-10-PCS | Mod: CPTII,S$GLB,, | Performed by: ORTHOPAEDIC SURGERY

## 2022-11-10 PROCEDURE — 1157F PR ADVANCE CARE PLAN OR EQUIV PRESENT IN MEDICAL RECORD: ICD-10-PCS | Mod: CPTII,S$GLB,, | Performed by: ORTHOPAEDIC SURGERY

## 2022-11-10 PROCEDURE — 3008F BODY MASS INDEX DOCD: CPT | Mod: CPTII,S$GLB,, | Performed by: ORTHOPAEDIC SURGERY

## 2022-11-10 PROCEDURE — 4010F ACE/ARB THERAPY RXD/TAKEN: CPT | Mod: CPTII,S$GLB,, | Performed by: ORTHOPAEDIC SURGERY

## 2022-11-10 PROCEDURE — 1157F ADVNC CARE PLAN IN RCRD: CPT | Mod: CPTII,S$GLB,, | Performed by: ORTHOPAEDIC SURGERY

## 2022-11-10 PROCEDURE — 3288F FALL RISK ASSESSMENT DOCD: CPT | Mod: CPTII,S$GLB,, | Performed by: ORTHOPAEDIC SURGERY

## 2022-11-10 PROCEDURE — 99204 PR OFFICE/OUTPT VISIT, NEW, LEVL IV, 45-59 MIN: ICD-10-PCS | Mod: S$GLB,,, | Performed by: ORTHOPAEDIC SURGERY

## 2022-11-10 PROCEDURE — 73564 XR KNEE ORTHO BILAT WITH FLEXION: ICD-10-PCS | Mod: 26,50,, | Performed by: RADIOLOGY

## 2022-11-10 PROCEDURE — 73564 X-RAY EXAM KNEE 4 OR MORE: CPT | Mod: TC,50
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
## 2022-11-15 ENCOUNTER — PATIENT MESSAGE (OUTPATIENT)
Dept: ADMINISTRATIVE | Facility: OTHER | Age: 72
End: 2022-11-15
Payer: MEDICARE

## 2022-12-13 ENCOUNTER — PATIENT MESSAGE (OUTPATIENT)
Dept: OTHER | Facility: OTHER | Age: 72
End: 2022-12-13
Payer: MEDICARE

## 2022-12-20 ENCOUNTER — PATIENT MESSAGE (OUTPATIENT)
Dept: CARDIOLOGY | Facility: CLINIC | Age: 72
End: 2022-12-20
Payer: MEDICARE

## 2022-12-20 ENCOUNTER — PATIENT MESSAGE (OUTPATIENT)
Dept: INTERNAL MEDICINE | Facility: CLINIC | Age: 72
End: 2022-12-20
Payer: MEDICARE

## 2022-12-22 ENCOUNTER — OFFICE VISIT (OUTPATIENT)
Dept: OPTOMETRY | Facility: CLINIC | Age: 72
End: 2022-12-22
Payer: MEDICARE

## 2022-12-22 DIAGNOSIS — H01.029 SQUAMOUS BLEPHARITIS OF BOTH UPPER AND LOWER EYELID: Primary | ICD-10-CM

## 2022-12-22 PROCEDURE — 1157F PR ADVANCE CARE PLAN OR EQUIV PRESENT IN MEDICAL RECORD: ICD-10-PCS | Mod: HCNC,CPTII,S$GLB, | Performed by: OPTOMETRIST

## 2022-12-22 PROCEDURE — 3044F PR MOST RECENT HEMOGLOBIN A1C LEVEL <7.0%: ICD-10-PCS | Mod: HCNC,CPTII,S$GLB, | Performed by: OPTOMETRIST

## 2022-12-22 PROCEDURE — 3061F NEG MICROALBUMINURIA REV: CPT | Mod: HCNC,CPTII,S$GLB, | Performed by: OPTOMETRIST

## 2022-12-22 PROCEDURE — 3066F PR DOCUMENTATION OF TREATMENT FOR NEPHROPATHY: ICD-10-PCS | Mod: HCNC,CPTII,S$GLB, | Performed by: OPTOMETRIST

## 2022-12-22 PROCEDURE — 1101F PR PT FALLS ASSESS DOC 0-1 FALLS W/OUT INJ PAST YR: ICD-10-PCS | Mod: HCNC,CPTII,S$GLB, | Performed by: OPTOMETRIST

## 2022-12-22 PROCEDURE — 92002 PR EYE EXAM, NEW PATIENT,INTERMED: ICD-10-PCS | Mod: HCNC,S$GLB,, | Performed by: OPTOMETRIST

## 2022-12-22 PROCEDURE — 3044F HG A1C LEVEL LT 7.0%: CPT | Mod: HCNC,CPTII,S$GLB, | Performed by: OPTOMETRIST

## 2022-12-22 PROCEDURE — 1125F PR PAIN SEVERITY QUANTIFIED, PAIN PRESENT: ICD-10-PCS | Mod: HCNC,CPTII,S$GLB, | Performed by: OPTOMETRIST

## 2022-12-22 PROCEDURE — 92002 INTRM OPH EXAM NEW PATIENT: CPT | Mod: HCNC,S$GLB,, | Performed by: OPTOMETRIST

## 2022-12-22 PROCEDURE — 4010F ACE/ARB THERAPY RXD/TAKEN: CPT | Mod: HCNC,CPTII,S$GLB, | Performed by: OPTOMETRIST

## 2022-12-22 PROCEDURE — 3288F FALL RISK ASSESSMENT DOCD: CPT | Mod: HCNC,CPTII,S$GLB, | Performed by: OPTOMETRIST

## 2022-12-22 PROCEDURE — 1157F ADVNC CARE PLAN IN RCRD: CPT | Mod: HCNC,CPTII,S$GLB, | Performed by: OPTOMETRIST

## 2022-12-22 PROCEDURE — 1125F AMNT PAIN NOTED PAIN PRSNT: CPT | Mod: HCNC,CPTII,S$GLB, | Performed by: OPTOMETRIST

## 2022-12-22 PROCEDURE — 3066F NEPHROPATHY DOC TX: CPT | Mod: HCNC,CPTII,S$GLB, | Performed by: OPTOMETRIST

## 2022-12-22 PROCEDURE — 99999 PR PBB SHADOW E&M-EST. PATIENT-LVL II: ICD-10-PCS | Mod: PBBFAC,HCNC,, | Performed by: OPTOMETRIST

## 2022-12-22 PROCEDURE — 1101F PT FALLS ASSESS-DOCD LE1/YR: CPT | Mod: HCNC,CPTII,S$GLB, | Performed by: OPTOMETRIST

## 2022-12-22 PROCEDURE — 1159F PR MEDICATION LIST DOCUMENTED IN MEDICAL RECORD: ICD-10-PCS | Mod: HCNC,CPTII,S$GLB, | Performed by: OPTOMETRIST

## 2022-12-22 PROCEDURE — 1159F MED LIST DOCD IN RCRD: CPT | Mod: HCNC,CPTII,S$GLB, | Performed by: OPTOMETRIST

## 2022-12-22 PROCEDURE — 4010F PR ACE/ARB THEARPY RXD/TAKEN: ICD-10-PCS | Mod: HCNC,CPTII,S$GLB, | Performed by: OPTOMETRIST

## 2022-12-22 PROCEDURE — 3061F PR NEG MICROALBUMINURIA RESULT DOCUMENTED/REVIEW: ICD-10-PCS | Mod: HCNC,CPTII,S$GLB, | Performed by: OPTOMETRIST

## 2022-12-22 PROCEDURE — 99999 PR PBB SHADOW E&M-EST. PATIENT-LVL II: CPT | Mod: PBBFAC,HCNC,, | Performed by: OPTOMETRIST

## 2022-12-22 PROCEDURE — 3288F PR FALLS RISK ASSESSMENT DOCUMENTED: ICD-10-PCS | Mod: HCNC,CPTII,S$GLB, | Performed by: OPTOMETRIST

## 2022-12-22 RX ORDER — DOXYCYCLINE 100 MG/1
100 CAPSULE ORAL 2 TIMES DAILY
Qty: 42 CAPSULE | Refills: 0 | Status: SHIPPED | OUTPATIENT
Start: 2022-12-22 | End: 2023-01-19

## 2022-12-22 NOTE — PROGRESS NOTES
HPI    Dls: 10/12/22 Dr. You     71 y/o male presents today with c/o x 2 months swollen lids os crusting os   pain nasally os occasion bleeding lids os  then started od nasal crusting   swollen lids od mucous in am ou pain ou off/on some itching ou no tearing.   Pt states recently started using baby shampoo improve a little. Pt sates   when using cosopt gtts burning like fire.   Pt wears scls ou both but mostly only using os.     Eye meds  Cosopt BID OU   Latanoprost QHS OU      Last edited by Jennifer Orr MA on 12/22/2022 10:02 AM.            Assessment /Plan     For exam results, see Encounter Report.    Squamous blepharitis of both upper and lower eyelid  -     doxycycline (MONODOX) 100 MG capsule; Take 1 capsule (100 mg total) by mouth 2 (two) times daily. BID PO x 14 days, taper QD PO x 14 days  Dispense: 42 capsule; Refill: 0  -Doxycycline BID PO, taper QD PO x 14 days  -Ocusoft lid scrubs       RTC 1 yr

## 2022-12-29 DIAGNOSIS — M17.12 PRIMARY OSTEOARTHRITIS OF LEFT KNEE: Primary | ICD-10-CM

## 2023-01-04 ENCOUNTER — PATIENT MESSAGE (OUTPATIENT)
Dept: ORTHOPEDICS | Facility: CLINIC | Age: 73
End: 2023-01-04
Payer: MEDICARE

## 2023-01-05 ENCOUNTER — PATIENT MESSAGE (OUTPATIENT)
Dept: ORTHOPEDICS | Facility: CLINIC | Age: 73
End: 2023-01-05
Payer: MEDICARE

## 2023-01-06 ENCOUNTER — TELEPHONE (OUTPATIENT)
Dept: ORTHOPEDICS | Facility: CLINIC | Age: 73
End: 2023-01-06
Payer: MEDICARE

## 2023-01-06 ENCOUNTER — PATIENT MESSAGE (OUTPATIENT)
Dept: CARDIOLOGY | Facility: CLINIC | Age: 73
End: 2023-01-06
Payer: MEDICARE

## 2023-01-06 NOTE — TELEPHONE ENCOUNTER
----- Message from Jonny Prabhakar sent at 2023  7:56 AM CST -----  Regardin/23 Surgery Cancelled  Good morning Dr. Mcghee,     This patient is cancelling his surgery due to family issues. He will call us back to get rescheduled.       Sincerely,   Alan Prabhakar MS, OTC  OR & Clinical Assistant to Dr. Stevenson Mcghee III  Phone: [phone]  Fax: [phone]

## 2023-01-09 ENCOUNTER — EDUCATION (OUTPATIENT)
Dept: ORTHOPEDICS | Facility: CLINIC | Age: 73
End: 2023-01-09

## 2023-01-16 ENCOUNTER — PATIENT MESSAGE (OUTPATIENT)
Dept: ORTHOPEDICS | Facility: CLINIC | Age: 73
End: 2023-01-16
Payer: MEDICARE

## 2023-01-17 ENCOUNTER — PATIENT MESSAGE (OUTPATIENT)
Dept: ORTHOPEDICS | Facility: CLINIC | Age: 73
End: 2023-01-17
Payer: MEDICARE

## 2023-01-25 ENCOUNTER — OFFICE VISIT (OUTPATIENT)
Dept: CARDIOLOGY | Facility: CLINIC | Age: 73
End: 2023-01-25
Payer: MEDICARE

## 2023-01-25 VITALS
BODY MASS INDEX: 25.25 KG/M2 | WEIGHT: 176.38 LBS | SYSTOLIC BLOOD PRESSURE: 116 MMHG | OXYGEN SATURATION: 98 % | DIASTOLIC BLOOD PRESSURE: 65 MMHG | HEART RATE: 60 BPM | HEIGHT: 70 IN

## 2023-01-25 DIAGNOSIS — I10 ESSENTIAL HYPERTENSION: Chronic | ICD-10-CM

## 2023-01-25 DIAGNOSIS — Z95.5 PRESENCE OF STENT IN CORONARY ARTERY: Chronic | ICD-10-CM

## 2023-01-25 DIAGNOSIS — I25.10 CORONARY ARTERY DISEASE INVOLVING NATIVE CORONARY ARTERY OF NATIVE HEART WITHOUT ANGINA PECTORIS: ICD-10-CM

## 2023-01-25 DIAGNOSIS — I11.9 HYPERTENSIVE HEART DISEASE WITHOUT HEART FAILURE: ICD-10-CM

## 2023-01-25 DIAGNOSIS — E78.2 HYPERLIPIDEMIA, MIXED: Primary | Chronic | ICD-10-CM

## 2023-01-25 PROCEDURE — 3074F SYST BP LT 130 MM HG: CPT | Mod: HCNC,CPTII,S$GLB, | Performed by: INTERNAL MEDICINE

## 2023-01-25 PROCEDURE — 3074F PR MOST RECENT SYSTOLIC BLOOD PRESSURE < 130 MM HG: ICD-10-PCS | Mod: HCNC,CPTII,S$GLB, | Performed by: INTERNAL MEDICINE

## 2023-01-25 PROCEDURE — 99499 RISK ADDL DX/OHS AUDIT: ICD-10-PCS | Mod: HCNC,S$GLB,, | Performed by: INTERNAL MEDICINE

## 2023-01-25 PROCEDURE — 1126F PR PAIN SEVERITY QUANTIFIED, NO PAIN PRESENT: ICD-10-PCS | Mod: HCNC,CPTII,S$GLB, | Performed by: INTERNAL MEDICINE

## 2023-01-25 PROCEDURE — 3078F PR MOST RECENT DIASTOLIC BLOOD PRESSURE < 80 MM HG: ICD-10-PCS | Mod: HCNC,CPTII,S$GLB, | Performed by: INTERNAL MEDICINE

## 2023-01-25 PROCEDURE — 3072F LOW RISK FOR RETINOPATHY: CPT | Mod: HCNC,CPTII,S$GLB, | Performed by: INTERNAL MEDICINE

## 2023-01-25 PROCEDURE — 1159F PR MEDICATION LIST DOCUMENTED IN MEDICAL RECORD: ICD-10-PCS | Mod: HCNC,CPTII,S$GLB, | Performed by: INTERNAL MEDICINE

## 2023-01-25 PROCEDURE — 1159F MED LIST DOCD IN RCRD: CPT | Mod: HCNC,CPTII,S$GLB, | Performed by: INTERNAL MEDICINE

## 2023-01-25 PROCEDURE — 1157F PR ADVANCE CARE PLAN OR EQUIV PRESENT IN MEDICAL RECORD: ICD-10-PCS | Mod: HCNC,CPTII,S$GLB, | Performed by: INTERNAL MEDICINE

## 2023-01-25 PROCEDURE — 99214 PR OFFICE/OUTPT VISIT, EST, LEVL IV, 30-39 MIN: ICD-10-PCS | Mod: HCNC,S$GLB,, | Performed by: INTERNAL MEDICINE

## 2023-01-25 PROCEDURE — 99214 OFFICE O/P EST MOD 30 MIN: CPT | Mod: HCNC,S$GLB,, | Performed by: INTERNAL MEDICINE

## 2023-01-25 PROCEDURE — 1126F AMNT PAIN NOTED NONE PRSNT: CPT | Mod: HCNC,CPTII,S$GLB, | Performed by: INTERNAL MEDICINE

## 2023-01-25 PROCEDURE — 3072F PR LOW RISK FOR RETINOPATHY: ICD-10-PCS | Mod: HCNC,CPTII,S$GLB, | Performed by: INTERNAL MEDICINE

## 2023-01-25 PROCEDURE — 99499 UNLISTED E&M SERVICE: CPT | Mod: HCNC,S$GLB,, | Performed by: INTERNAL MEDICINE

## 2023-01-25 PROCEDURE — 1160F PR REVIEW ALL MEDS BY PRESCRIBER/CLIN PHARMACIST DOCUMENTED: ICD-10-PCS | Mod: HCNC,CPTII,S$GLB, | Performed by: INTERNAL MEDICINE

## 2023-01-25 PROCEDURE — 1160F RVW MEDS BY RX/DR IN RCRD: CPT | Mod: HCNC,CPTII,S$GLB, | Performed by: INTERNAL MEDICINE

## 2023-01-25 PROCEDURE — 99999 PR PBB SHADOW E&M-EST. PATIENT-LVL IV: CPT | Mod: PBBFAC,HCNC,, | Performed by: INTERNAL MEDICINE

## 2023-01-25 PROCEDURE — 3078F DIAST BP <80 MM HG: CPT | Mod: HCNC,CPTII,S$GLB, | Performed by: INTERNAL MEDICINE

## 2023-01-25 PROCEDURE — 3008F PR BODY MASS INDEX (BMI) DOCUMENTED: ICD-10-PCS | Mod: HCNC,CPTII,S$GLB, | Performed by: INTERNAL MEDICINE

## 2023-01-25 PROCEDURE — 99999 PR PBB SHADOW E&M-EST. PATIENT-LVL IV: ICD-10-PCS | Mod: PBBFAC,HCNC,, | Performed by: INTERNAL MEDICINE

## 2023-01-25 PROCEDURE — 1157F ADVNC CARE PLAN IN RCRD: CPT | Mod: HCNC,CPTII,S$GLB, | Performed by: INTERNAL MEDICINE

## 2023-01-25 PROCEDURE — 3008F BODY MASS INDEX DOCD: CPT | Mod: HCNC,CPTII,S$GLB, | Performed by: INTERNAL MEDICINE

## 2023-01-25 NOTE — PROGRESS NOTES
Subjective:   @Patient ID:  Bandar French is a 72 y.o. male who presents for follow-up of CAD       HPI:   Here for f/u   He is doing well   He is active with no angina exertion.  Occasionally he gets some cold sensation in his chest associated with some palpitation.  But this is completely different than his NSTEMI symptoms.  Which mainly with jaw pain      Compliant with DPAT with no issues.   BP is well controlled.   LDL at goal   He works out 3 times a week. Elliptical for 20 minutes   Plan to have knee surgery    Historically:    Patient admitted 1/2022 with [facility] with PCI to LCX and RCA. Symptoms were mainly jaw pain.  Residual moderate LAD. Readmitted 3/2022 with headaches, chest pressure and had epistaxis episodes after sneezing. Stress MPI with no ischemia. Norvasc dose increased to 5 mg daily.       Unclear etiology for the headaches. He has glaucoma. CT head with no acute findings.           Prior cardiovascular  Hx  --------------------------------  - Stress MPI 3/13/2022 -ve for ischemia     - Heart Catheterization  1/20/2022  There was two vessel coronary artery disease.  Procedure done for unstable angina  The PCI was successful.  The 1st Mrg lesion was 99% stenosed with 0% stenosis post-intervention.  A STENT RESOLUTE VICKY [id number] stent was successfully placed. Post dilated with 3.0 NC balloon at high pressure. IVUS guided  The Prox RCA lesion was 70% stenosed with 0% stenosis post-intervention.  A STENT RESOLUTE VICKY [id number] stent was successfully placed . Post dilated with 3.5 NC balloon. IVUS guided.  The Prox LAD to Mid LAD lesion was 50% stenosed. Medical treatment at this time.  The estimated blood loss was none.  The pre-procedure left ventricular end diastolic pressure was 17.  No LV gram done. Just pressure.     - ASA/Plavix  - High intense statin   - Avoid BB due to bradycardia  - Medical tx for residual LAD disease. If symptomatic as outpatient consider non invasive evaluation versus  physiologic evaluation.   - F/U with Dr. Alvarado                   Patient Active Problem List    Diagnosis Date Noted    Hypertensive heart disease without heart failure 03/28/2022    Presence of stent in coronary artery 11/17/2021    Steatosis of liver 11/17/2021    Benign paroxysmal positional vertigo 09/22/2021    Essential hypertension 08/26/2020    Type 2 diabetes mellitus without complication, without long-term current use of insulin 11/28/2019    Coronary artery disease involving native coronary artery of native heart without angina pectoris 09/25/2019     Review of old records showed the presence of coronary artery disease with a stent of the circumflex in October of 2014. Cardiac catheterization in 2017 showed a 20% lesion in the right coronary artery, but the left coronary artery was free of disease.  Echocardiography in January 2019 showed left ventricular systolic function be normal, ejection fraction 60-65%        Hyperlipidemia, mixed 09/25/2019    Osteoarthritis 09/25/2019    Osteoarthritis of right knee 07/22/2019                    LAST HbA1c  Lab Results   Component Value Date    HGBA1C 5.8 (H) 09/26/2022    HGBA1C 5.8 (H) 09/26/2022       Lipid panel  Lab Results   Component Value Date    CHOL 103 (L) 09/26/2022    CHOL 99 (L) 09/14/2021    CHOL 113 08/19/2020     Lab Results   Component Value Date    HDL 25 (L) 09/26/2022    HDL 28 (L) 09/14/2021    HDL 31 08/19/2020     Lab Results   Component Value Date    LDLCALC 49.4 (L) 09/26/2022    LDLCALC 50.4 (L) 09/14/2021    LDLCALC 61 08/19/2020     Lab Results   Component Value Date    TRIG 143 09/26/2022    TRIG 103 09/14/2021     Lab Results   Component Value Date    CHOLHDL 24.3 09/26/2022    CHOLHDL 28.3 09/14/2021            Review of Systems   Constitutional: Negative for chills and fever.   HENT:  Negative for hearing loss and nosebleeds.         As in HPI    Eyes:  Negative for blurred vision.   Cardiovascular:         As in HPI     Respiratory:  Negative for hemoptysis and shortness of breath.    Hematologic/Lymphatic: Negative for bleeding problem.   Skin:  Negative for itching.   Musculoskeletal:  Negative for falls.   Gastrointestinal:  Negative for abdominal pain and hematochezia.   Genitourinary:  Negative for hematuria.   Neurological:  Negative for dizziness and loss of balance.   Psychiatric/Behavioral:  Negative for altered mental status and depression.      Objective:   Physical Exam  Constitutional:       Appearance: He is well-developed.   HENT:      Head: Normocephalic and atraumatic.   Eyes:      Conjunctiva/sclera: Conjunctivae normal.   Neck:      Vascular: No carotid bruit or JVD.   Cardiovascular:      Rate and Rhythm: Normal rate and regular rhythm.      Pulses:           Carotid pulses are 2+ on the right side and 2+ on the left side.       Radial pulses are 2+ on the right side and 2+ on the left side.      Heart sounds: Normal heart sounds. No murmur heard.    No friction rub. No gallop.   Pulmonary:      Effort: Pulmonary effort is normal. No respiratory distress.      Breath sounds: Normal breath sounds. No stridor. No wheezing.   Musculoskeletal:      Cervical back: Neck supple.   Skin:     General: Skin is warm and dry.   Neurological:      Mental Status: He is alert and oriented to person, place, and time.   Psychiatric:         Behavior: Behavior normal.       Assessment:     1. Hyperlipidemia, mixed    2. Essential hypertension    3. Presence of stent in coronary artery    4. Coronary artery disease involving native coronary artery of native heart without angina pectoris    5. Hypertensive heart disease without heart failure        Plan:     - Stable CAD. Residual non obstructive CAD.  Reported symptoms is completely different than his anginal symptoms.  Other possible differential if any arrhythmia.  Instructed to use a cardia mobile when he gets this occasional cold sensation and palpitation in the chest .    METS is more than 4 with no angina with exertion.  Stress test less than a year was negative for ischemia.  He has acceptable risk to moderate risk noncardiovascular surgery.  Okay to proceed with planned surgery.  Okay to hold aspirin and Plavix if needed for 3-5 days preop and restarted postop.    - Continue High intense statin   - LDL at goal  - ASA/Plavix  - Continue Norvasc   - Continue ARB      I spent 5-10 minutes asking, assessing, assisting, arranging and advising heart healthy diet improvements. This included low-salt meals, portion control and health food alternatives. I also encourage 30 minutes of moderate exercise 3-4x a week.       6 months f/u     Pertinent cardiac images and EKG reviewed independently.    Continue with current medical plan and lifestyle changes.  Return sooner for concerns or questions. If symptoms persist go to the ED  I have reviewed all pertinent data including patient's medical history in detail and updated the computerized patient record.     No orders of the defined types were placed in this encounter.        Follow up as scheduled.     He expressed verbal understanding and agreed with the plan    Patient's Medications   New Prescriptions    No medications on file   Previous Medications    AMLODIPINE (NORVASC) 5 MG TABLET    Take 1 tablet (5 mg total) by mouth once daily.    ASCORBIC ACID, VITAMIN C, (VITAMIN C) 500 MG TABLET    Take 500 mg by mouth once daily.    ASPIRIN 81 MG CHEW    Take 1 tablet (81 mg total) by mouth once daily.    BLOOD SUGAR DIAGNOSTIC (BLOOD GLUCOSE TEST) STRP    1 each by Misc.(Non-Drug; Combo Route) route once daily.    BLOOD-GLUCOSE METER,CONTINUOUS (DEXCOM G6 ) MISC    Use as directed    BLOOD-GLUCOSE SENSOR (DEXCOM G6 SENSOR) ROSALEE    Use as directed    BLOOD-GLUCOSE TRANSMITTER (DEXCOM G6 TRANSMITTER) ROSALEE    Use as directed    CHLORPHENIRAMINE (CHLOR-TRIMETON) 4 MG TABLET    Take 4 mg by mouth as needed.     CHOLECALCIFEROL, VITAMIN D3,  125 MCG (5,000 UNIT) TAB    Take 5,000 Units by mouth once daily.    CLOPIDOGREL (PLAVIX) 75 MG TABLET    Take 1 tablet (75 mg total) by mouth once daily.    DORZOLAMIDE-TIMOLOL 2-0.5% (COSOPT) 22.3-6.8 MG/ML OPHTHALMIC SOLUTION    Place 1 drop into both eyes 2 (two) times daily.    FOLIC ACID/MULTIVIT-MIN/LUTEIN (CENTRUM SILVER ORAL)    Take 1 tablet by mouth once daily.    IRBESARTAN (AVAPRO) 300 MG TABLET    TAKE 1 TABLET (300 MG TOTAL) BY MOUTH EVERY EVENING.    LATANOPROST 0.005 % OPHTHALMIC SOLUTION    Place 1 drop into both eyes every evening.    LATANOPROST 0.005 % OPHTHALMIC SOLUTION    Place 1 drop into both eyes every evening.    MAGNESIUM OXIDE (MAG-OX) 400 MG (241.3 MG MAGNESIUM) TABLET    Take 250 mg by mouth once daily.    MICRO THIN LANCETS 33 GAUGE MISC        NITROGLYCERIN (NITROSTAT) 0.4 MG SL TABLET    Place 1 tablet (0.4 mg total) under the tongue every 5 (five) minutes as needed for Chest pain.    OXYMETAZOLINE, PF, (UPNEEQ, PF,) 0.1 % DPET    Apply 1 drop to eye daily as needed (droopy eyelids).    ROSUVASTATIN (CRESTOR) 40 MG TAB    Take 1 tablet (40 mg total) by mouth every evening.    TUMERIC-GING-OLIVE-OREG-CAPRYL ORAL    Take 1 tablet by mouth Daily.    VIT C-E-ZINC OX-KAREN-LUT-ZEAX (ICAPS AREDS2) 250 MG-200 UNIT -12.5 MG-1 MG CAP    Take 2 capsules by mouth once daily.   Modified Medications    No medications on file   Discontinued Medications    No medications on file

## 2023-02-06 RX ORDER — NITROGLYCERIN 0.4 MG/1
0.4 TABLET SUBLINGUAL EVERY 5 MIN PRN
Qty: 25 TABLET | Refills: 11 | Status: SHIPPED | OUTPATIENT
Start: 2023-02-06 | End: 2024-02-06

## 2023-02-07 DIAGNOSIS — Z00.00 ENCOUNTER FOR MEDICARE ANNUAL WELLNESS EXAM: ICD-10-CM

## 2023-02-08 ENCOUNTER — PATIENT MESSAGE (OUTPATIENT)
Dept: OPTOMETRY | Facility: CLINIC | Age: 73
End: 2023-02-08
Payer: MEDICARE

## 2023-02-09 ENCOUNTER — PATIENT MESSAGE (OUTPATIENT)
Dept: ADMINISTRATIVE | Facility: OTHER | Age: 73
End: 2023-02-09
Payer: MEDICARE

## 2023-02-09 DIAGNOSIS — Z00.00 ENCOUNTER FOR MEDICARE ANNUAL WELLNESS EXAM: ICD-10-CM

## 2023-02-15 ENCOUNTER — PATIENT MESSAGE (OUTPATIENT)
Dept: PRIMARY CARE CLINIC | Facility: CLINIC | Age: 73
End: 2023-02-15
Payer: MEDICARE

## 2023-03-01 ENCOUNTER — PATIENT MESSAGE (OUTPATIENT)
Dept: PRIMARY CARE CLINIC | Facility: CLINIC | Age: 73
End: 2023-03-01
Payer: MEDICARE

## 2023-03-02 RX ORDER — CLOPIDOGREL BISULFATE 75 MG/1
75 TABLET ORAL DAILY
Qty: 90 TABLET | Refills: 3 | Status: SHIPPED | OUTPATIENT
Start: 2023-03-02 | End: 2024-03-02

## 2023-03-02 NOTE — TELEPHONE ENCOUNTER
Pt requesting refill of Plavix, med pended. Pt states Dr Alvarado prescribed in the past but no longer there. Pt states he isn't sure if he needs to continue the Plavix.     LOV with Gunnar Rangel MD , 9/28/2022

## 2023-03-08 ENCOUNTER — PATIENT MESSAGE (OUTPATIENT)
Dept: PRIMARY CARE CLINIC | Facility: CLINIC | Age: 73
End: 2023-03-08
Payer: MEDICARE

## 2023-03-09 ENCOUNTER — PATIENT MESSAGE (OUTPATIENT)
Dept: PRIMARY CARE CLINIC | Facility: CLINIC | Age: 73
End: 2023-03-09
Payer: MEDICARE

## 2023-03-09 ENCOUNTER — PATIENT MESSAGE (OUTPATIENT)
Dept: OTHER | Facility: OTHER | Age: 73
End: 2023-03-09
Payer: MEDICARE

## 2023-03-17 DIAGNOSIS — M17.12 PRIMARY OSTEOARTHRITIS OF LEFT KNEE: Primary | ICD-10-CM

## 2023-03-27 ENCOUNTER — LAB VISIT (OUTPATIENT)
Dept: LAB | Facility: HOSPITAL | Age: 73
End: 2023-03-27
Attending: INTERNAL MEDICINE
Payer: MEDICARE

## 2023-03-27 DIAGNOSIS — E78.2 HYPERLIPIDEMIA, MIXED: Chronic | ICD-10-CM

## 2023-03-27 DIAGNOSIS — E11.9 TYPE 2 DIABETES MELLITUS WITHOUT COMPLICATION, WITHOUT LONG-TERM CURRENT USE OF INSULIN: ICD-10-CM

## 2023-03-27 LAB
ALBUMIN SERPL BCP-MCNC: 3.9 G/DL (ref 3.5–5.2)
ALP SERPL-CCNC: 59 U/L (ref 55–135)
ALT SERPL W/O P-5'-P-CCNC: 22 U/L (ref 10–44)
ANION GAP SERPL CALC-SCNC: 6 MMOL/L (ref 8–16)
AST SERPL-CCNC: 18 U/L (ref 10–40)
BILIRUB SERPL-MCNC: 0.6 MG/DL (ref 0.1–1)
BUN SERPL-MCNC: 15 MG/DL (ref 8–23)
CALCIUM SERPL-MCNC: 8.8 MG/DL (ref 8.7–10.5)
CHLORIDE SERPL-SCNC: 110 MMOL/L (ref 95–110)
CHOLEST SERPL-MCNC: 102 MG/DL (ref 120–199)
CHOLEST/HDLC SERPL: 3.1 {RATIO} (ref 2–5)
CO2 SERPL-SCNC: 27 MMOL/L (ref 23–29)
CREAT SERPL-MCNC: 0.9 MG/DL (ref 0.5–1.4)
EST. GFR  (NO RACE VARIABLE): >60 ML/MIN/1.73 M^2
GLUCOSE SERPL-MCNC: 132 MG/DL (ref 70–110)
HDLC SERPL-MCNC: 33 MG/DL (ref 40–75)
HDLC SERPL: 32.4 % (ref 20–50)
LDLC SERPL CALC-MCNC: 52 MG/DL (ref 63–159)
NONHDLC SERPL-MCNC: 69 MG/DL
POTASSIUM SERPL-SCNC: 4.4 MMOL/L (ref 3.5–5.1)
PROT SERPL-MCNC: 6.5 G/DL (ref 6–8.4)
SODIUM SERPL-SCNC: 143 MMOL/L (ref 136–145)
TRIGL SERPL-MCNC: 85 MG/DL (ref 30–150)

## 2023-03-27 PROCEDURE — 80053 COMPREHEN METABOLIC PANEL: CPT | Mod: HCNC | Performed by: INTERNAL MEDICINE

## 2023-03-27 PROCEDURE — 36415 COLL VENOUS BLD VENIPUNCTURE: CPT | Mod: HCNC,PO | Performed by: INTERNAL MEDICINE

## 2023-03-27 PROCEDURE — 83036 HEMOGLOBIN GLYCOSYLATED A1C: CPT | Mod: HCNC | Performed by: INTERNAL MEDICINE

## 2023-03-27 PROCEDURE — 80061 LIPID PANEL: CPT | Mod: HCNC | Performed by: INTERNAL MEDICINE

## 2023-03-28 LAB
ESTIMATED AVG GLUCOSE: 128 MG/DL (ref 68–131)
HBA1C MFR BLD: 6.1 % (ref 4–5.6)

## 2023-03-30 ENCOUNTER — OFFICE VISIT (OUTPATIENT)
Dept: ORTHOPEDICS | Facility: CLINIC | Age: 73
End: 2023-03-30
Payer: MEDICARE

## 2023-03-30 VITALS — HEIGHT: 70 IN | WEIGHT: 180.44 LBS | BODY MASS INDEX: 25.83 KG/M2

## 2023-03-30 DIAGNOSIS — M17.12 PRIMARY OSTEOARTHRITIS OF LEFT KNEE: Primary | ICD-10-CM

## 2023-03-30 PROCEDURE — 1157F PR ADVANCE CARE PLAN OR EQUIV PRESENT IN MEDICAL RECORD: ICD-10-PCS | Mod: HCNC,CPTII,S$GLB, | Performed by: ORTHOPAEDIC SURGERY

## 2023-03-30 PROCEDURE — 1159F MED LIST DOCD IN RCRD: CPT | Mod: HCNC,CPTII,S$GLB, | Performed by: ORTHOPAEDIC SURGERY

## 2023-03-30 PROCEDURE — 4010F ACE/ARB THERAPY RXD/TAKEN: CPT | Mod: HCNC,CPTII,S$GLB, | Performed by: ORTHOPAEDIC SURGERY

## 2023-03-30 PROCEDURE — 99213 OFFICE O/P EST LOW 20 MIN: CPT | Mod: HCNC,S$GLB,, | Performed by: ORTHOPAEDIC SURGERY

## 2023-03-30 PROCEDURE — 3008F PR BODY MASS INDEX (BMI) DOCUMENTED: ICD-10-PCS | Mod: HCNC,CPTII,S$GLB, | Performed by: ORTHOPAEDIC SURGERY

## 2023-03-30 PROCEDURE — 3288F FALL RISK ASSESSMENT DOCD: CPT | Mod: HCNC,CPTII,S$GLB, | Performed by: ORTHOPAEDIC SURGERY

## 2023-03-30 PROCEDURE — 3044F PR MOST RECENT HEMOGLOBIN A1C LEVEL <7.0%: ICD-10-PCS | Mod: HCNC,CPTII,S$GLB, | Performed by: ORTHOPAEDIC SURGERY

## 2023-03-30 PROCEDURE — 3072F LOW RISK FOR RETINOPATHY: CPT | Mod: HCNC,CPTII,S$GLB, | Performed by: ORTHOPAEDIC SURGERY

## 2023-03-30 PROCEDURE — 1159F PR MEDICATION LIST DOCUMENTED IN MEDICAL RECORD: ICD-10-PCS | Mod: HCNC,CPTII,S$GLB, | Performed by: ORTHOPAEDIC SURGERY

## 2023-03-30 PROCEDURE — 1101F PT FALLS ASSESS-DOCD LE1/YR: CPT | Mod: HCNC,CPTII,S$GLB, | Performed by: ORTHOPAEDIC SURGERY

## 2023-03-30 PROCEDURE — 99213 PR OFFICE/OUTPT VISIT, EST, LEVL III, 20-29 MIN: ICD-10-PCS | Mod: HCNC,S$GLB,, | Performed by: ORTHOPAEDIC SURGERY

## 2023-03-30 PROCEDURE — 1125F PR PAIN SEVERITY QUANTIFIED, PAIN PRESENT: ICD-10-PCS | Mod: HCNC,CPTII,S$GLB, | Performed by: ORTHOPAEDIC SURGERY

## 2023-03-30 PROCEDURE — 3288F PR FALLS RISK ASSESSMENT DOCUMENTED: ICD-10-PCS | Mod: HCNC,CPTII,S$GLB, | Performed by: ORTHOPAEDIC SURGERY

## 2023-03-30 PROCEDURE — 1125F AMNT PAIN NOTED PAIN PRSNT: CPT | Mod: HCNC,CPTII,S$GLB, | Performed by: ORTHOPAEDIC SURGERY

## 2023-03-30 PROCEDURE — 3008F BODY MASS INDEX DOCD: CPT | Mod: HCNC,CPTII,S$GLB, | Performed by: ORTHOPAEDIC SURGERY

## 2023-03-30 PROCEDURE — 3044F HG A1C LEVEL LT 7.0%: CPT | Mod: HCNC,CPTII,S$GLB, | Performed by: ORTHOPAEDIC SURGERY

## 2023-03-30 PROCEDURE — 3072F PR LOW RISK FOR RETINOPATHY: ICD-10-PCS | Mod: HCNC,CPTII,S$GLB, | Performed by: ORTHOPAEDIC SURGERY

## 2023-03-30 PROCEDURE — 99999 PR PBB SHADOW E&M-EST. PATIENT-LVL III: ICD-10-PCS | Mod: PBBFAC,HCNC,, | Performed by: ORTHOPAEDIC SURGERY

## 2023-03-30 PROCEDURE — 99999 PR PBB SHADOW E&M-EST. PATIENT-LVL III: CPT | Mod: PBBFAC,HCNC,, | Performed by: ORTHOPAEDIC SURGERY

## 2023-03-30 PROCEDURE — 1101F PR PT FALLS ASSESS DOC 0-1 FALLS W/OUT INJ PAST YR: ICD-10-PCS | Mod: HCNC,CPTII,S$GLB, | Performed by: ORTHOPAEDIC SURGERY

## 2023-03-30 PROCEDURE — 1157F ADVNC CARE PLAN IN RCRD: CPT | Mod: HCNC,CPTII,S$GLB, | Performed by: ORTHOPAEDIC SURGERY

## 2023-03-30 PROCEDURE — 4010F PR ACE/ARB THEARPY RXD/TAKEN: ICD-10-PCS | Mod: HCNC,CPTII,S$GLB, | Performed by: ORTHOPAEDIC SURGERY

## 2023-03-30 NOTE — PROGRESS NOTES
Pre-op R MORELIA TKA 5/15/23  Rescheduled from february, he and his wife had covid for 3rd time, improving long covid symptoms    0-120 degree knee range of motion 6° valgus alignment tender palpation mediolateral joint lines mild.  Moderate effusion he is anterior drawer stable varus valgus posterior stress no extensor lag he is good 5/5 quad strength.  He is 2+ PT and 1+ DP pulses foot is warm.  He has previous oblique medial and lateral open debridement incisions would need a new total knee incision    R TKA doing ok, did have increased pain with covid infection in January, now back to normal    This patient has significant symptoms in their knee that are affecting their quality of life and daily activities.  They have tried non-operative treatment including analgesics, an exercise program, and activity modification, but the symptoms have persisted. I believe they make a good candidate for knee arthroplasty.      We discussed surgical options including implant type and why I believe the implant selected is a good match for the patient's needs. The patient expressed understanding and agrees to proceed with the planned surgery.      Pre-operative planning will include the following:  A pre-surgical evaluation by will be arranged.  Pre-op orders will be placed.  We will make arrangements with the operating room for proper time and staffing.  We will make Social Service arrangements for the patient.     Implants:   Company: [employer]  System: [employer]  CR/CS  universal tray  conventional poly        CT scan: [facility] protocol for operative planning   Additional discussion about pin sites: risk of wound healing issues and fracture     Dispo: outpatient PT     Admission status: Outpatient/23hr OBS    Location: [facility] TKA 5/15/23  Requests 1 night with cardiac history  Needs cardiac clearance      ASA 81 BID x7 days then resume daily ASA+plavix  Rx cefadroxil

## 2023-04-04 ENCOUNTER — OFFICE VISIT (OUTPATIENT)
Dept: PRIMARY CARE CLINIC | Facility: CLINIC | Age: 73
End: 2023-04-04
Payer: MEDICARE

## 2023-04-04 VITALS
SYSTOLIC BLOOD PRESSURE: 102 MMHG | DIASTOLIC BLOOD PRESSURE: 60 MMHG | WEIGHT: 180.31 LBS | HEIGHT: 70 IN | BODY MASS INDEX: 25.81 KG/M2 | OXYGEN SATURATION: 98 % | HEART RATE: 55 BPM

## 2023-04-04 DIAGNOSIS — I11.9 HYPERTENSIVE HEART DISEASE WITHOUT HEART FAILURE: ICD-10-CM

## 2023-04-04 DIAGNOSIS — I25.119 ATHEROSCLEROSIS OF NATIVE CORONARY ARTERY OF NATIVE HEART WITH ANGINA PECTORIS: ICD-10-CM

## 2023-04-04 DIAGNOSIS — Z12.5 SCREENING FOR MALIGNANT NEOPLASM OF PROSTATE: ICD-10-CM

## 2023-04-04 DIAGNOSIS — E78.2 HYPERLIPIDEMIA, MIXED: Chronic | ICD-10-CM

## 2023-04-04 DIAGNOSIS — I70.0 AORTIC ATHEROSCLEROSIS: ICD-10-CM

## 2023-04-04 DIAGNOSIS — E11.36 TYPE 2 DIABETES MELLITUS WITH DIABETIC CATARACT, WITHOUT LONG-TERM CURRENT USE OF INSULIN: Primary | ICD-10-CM

## 2023-04-04 PROCEDURE — 3008F PR BODY MASS INDEX (BMI) DOCUMENTED: ICD-10-PCS | Mod: CPTII,S$GLB,, | Performed by: INTERNAL MEDICINE

## 2023-04-04 PROCEDURE — 1126F AMNT PAIN NOTED NONE PRSNT: CPT | Mod: CPTII,S$GLB,, | Performed by: INTERNAL MEDICINE

## 2023-04-04 PROCEDURE — 1126F PR PAIN SEVERITY QUANTIFIED, NO PAIN PRESENT: ICD-10-PCS | Mod: CPTII,S$GLB,, | Performed by: INTERNAL MEDICINE

## 2023-04-04 PROCEDURE — 99999 PR PBB SHADOW E&M-EST. PATIENT-LVL IV: ICD-10-PCS | Mod: PBBFAC,,, | Performed by: INTERNAL MEDICINE

## 2023-04-04 PROCEDURE — 3072F PR LOW RISK FOR RETINOPATHY: ICD-10-PCS | Mod: CPTII,S$GLB,, | Performed by: INTERNAL MEDICINE

## 2023-04-04 PROCEDURE — 1159F PR MEDICATION LIST DOCUMENTED IN MEDICAL RECORD: ICD-10-PCS | Mod: CPTII,S$GLB,, | Performed by: INTERNAL MEDICINE

## 2023-04-04 PROCEDURE — 99214 OFFICE O/P EST MOD 30 MIN: CPT | Mod: S$GLB,,, | Performed by: INTERNAL MEDICINE

## 2023-04-04 PROCEDURE — 3078F DIAST BP <80 MM HG: CPT | Mod: CPTII,S$GLB,, | Performed by: INTERNAL MEDICINE

## 2023-04-04 PROCEDURE — 4010F PR ACE/ARB THEARPY RXD/TAKEN: ICD-10-PCS | Mod: CPTII,S$GLB,, | Performed by: INTERNAL MEDICINE

## 2023-04-04 PROCEDURE — 3044F PR MOST RECENT HEMOGLOBIN A1C LEVEL <7.0%: ICD-10-PCS | Mod: CPTII,S$GLB,, | Performed by: INTERNAL MEDICINE

## 2023-04-04 PROCEDURE — 3044F HG A1C LEVEL LT 7.0%: CPT | Mod: CPTII,S$GLB,, | Performed by: INTERNAL MEDICINE

## 2023-04-04 PROCEDURE — 3074F SYST BP LT 130 MM HG: CPT | Mod: CPTII,S$GLB,, | Performed by: INTERNAL MEDICINE

## 2023-04-04 PROCEDURE — 3008F BODY MASS INDEX DOCD: CPT | Mod: CPTII,S$GLB,, | Performed by: INTERNAL MEDICINE

## 2023-04-04 PROCEDURE — 3078F PR MOST RECENT DIASTOLIC BLOOD PRESSURE < 80 MM HG: ICD-10-PCS | Mod: CPTII,S$GLB,, | Performed by: INTERNAL MEDICINE

## 2023-04-04 PROCEDURE — 1157F PR ADVANCE CARE PLAN OR EQUIV PRESENT IN MEDICAL RECORD: ICD-10-PCS | Mod: CPTII,S$GLB,, | Performed by: INTERNAL MEDICINE

## 2023-04-04 PROCEDURE — 4010F ACE/ARB THERAPY RXD/TAKEN: CPT | Mod: CPTII,S$GLB,, | Performed by: INTERNAL MEDICINE

## 2023-04-04 PROCEDURE — 1157F ADVNC CARE PLAN IN RCRD: CPT | Mod: CPTII,S$GLB,, | Performed by: INTERNAL MEDICINE

## 2023-04-04 PROCEDURE — 99999 PR PBB SHADOW E&M-EST. PATIENT-LVL IV: CPT | Mod: PBBFAC,,, | Performed by: INTERNAL MEDICINE

## 2023-04-04 PROCEDURE — 3074F PR MOST RECENT SYSTOLIC BLOOD PRESSURE < 130 MM HG: ICD-10-PCS | Mod: CPTII,S$GLB,, | Performed by: INTERNAL MEDICINE

## 2023-04-04 PROCEDURE — 3072F LOW RISK FOR RETINOPATHY: CPT | Mod: CPTII,S$GLB,, | Performed by: INTERNAL MEDICINE

## 2023-04-04 PROCEDURE — 99214 PR OFFICE/OUTPT VISIT, EST, LEVL IV, 30-39 MIN: ICD-10-PCS | Mod: S$GLB,,, | Performed by: INTERNAL MEDICINE

## 2023-04-04 PROCEDURE — 1159F MED LIST DOCD IN RCRD: CPT | Mod: CPTII,S$GLB,, | Performed by: INTERNAL MEDICINE

## 2023-04-04 NOTE — PROGRESS NOTES
[facility] Note    Chief Complaint      Chief Complaint   Patient presents with    Diabetes     6 month f/u    Headache     Just on right side    Hand Pain       History of Present Illness      Bandar French is a 72 y.o. male with chronic conditions of CAD, HTN, HLD, DM2, osteoarthritis who presents today for: follow up chronic conditions. Complains of right parietal headache in the past few weeks.  Also having right ear discomfort in the same time frame.  Also having left wrist pain and bilateral great toe pain.  Having knee replacement on 5/15/2023 with Dr. Mcghee.  Denies chest pain, shortness of breath.  Able to walk a few city blocks and climb a flight of stairs without chest pain.  Creatinine 0.9.    DM2: A1C 6.1. Home monitoring has been at goal. Diet controlled.  Eye exam UTD with Dr. Shannon.  HTN: BP at goal on irbesartan, amlodipine.   CAD: Sees Dr. Tompkins, cardiology  On ASA, plavix, irbesartan, crestor.  S/P PCI x 2 1/2022.  Denies CP, SOB.    HLD: Controlled on crestor.  LDL 52.     Flu shot 2021. Prevnar 2015.  Pneumovax UTD.  Zostavax 2013.  Shingrix UTD.  COVID UTD.  Cscope Dr. Copeland, 2016, no polyps, 10 yr interval.       Past Medical History:  Past Medical History:   Diagnosis Date    Arthritis     Cataract     Depression     Diabetes mellitus     Episodic headache 3/12/2022    Glaucoma     Hypertension        Past Surgical History:   has a past surgical history that includes Knee arthroscopy (Left, 1967); Knee arthroscopy (Right, 2012); Hand surgery (Right, 12/2004); Coronary stent placement (2014); Tonsillectomy; Total knee arthroplasty (Right, 07/22/2019); Adenoidectomy; Cosmetic surgery (Bilateral, 06/2019); Eye surgery (Left, 03/2019); Joint replacement (Right, 07/22/2019); Left heart catheterization (Left, 01/20/2022); and Coronary angiography (N/A, 01/20/2022).    Family History:  family history includes Aneurysm in his father; Dementia in his mother; Diabetes in his  mother; Glaucoma in his brother; Heart disease in his brother; Migraines in his sister; No Known Problems in his daughter, son, and son; Other in his brother and son; Pneumonia in his brother.     Social History:  Social History     Tobacco Use    Smoking status: Former     Packs/day: 2.00     Years: 20.00     Pack years: 40.00     Types: Cigarettes, Cigars     Start date: 1968     Quit date: 1986     Years since quittin.2    Smokeless tobacco: Never   Substance Use Topics    Alcohol use: Not Currently     Comment: Discontinued     Drug use: Never       I personally reviewed all past medical, surgical, social and family history.    Review of Systems   Constitutional:  Negative for chills, fever and malaise/fatigue.   Respiratory:  Negative for shortness of breath.    Cardiovascular:  Negative for chest pain.   Gastrointestinal:  Negative for constipation, diarrhea, nausea and vomiting.   Skin:  Negative for rash.   Neurological:  Negative for weakness.   All other systems reviewed and are negative.     Medications:  Outpatient Encounter Medications as of 2023   Medication Sig Dispense Refill    amLODIPine (NORVASC) 5 MG tablet Take 1 tablet (5 mg total) by mouth once daily. 90 tablet 3    ascorbic acid, vitamin C, (VITAMIN C) 500 MG tablet Take 500 mg by mouth once daily.      aspirin 81 MG Chew Take 1 tablet (81 mg total) by mouth once daily. 30 tablet 11    blood sugar diagnostic (BLOOD GLUCOSE TEST) Strp 1 each by Misc.(Non-Drug; Combo Route) route once daily. 30 each 11    blood-glucose meter,continuous (DEXCOM G6 ) Misc Use as directed 1 each 11    blood-glucose sensor (DEXCOM G6 SENSOR) Joanne Use as directed 10 each 11    blood-glucose transmitter (DEXCOM G6 TRANSMITTER) Joanne Use as directed 1 each 3    chlorpheniramine (CHLOR-TRIMETON) 4 mg tablet Take 4 mg by mouth as needed.       cholecalciferol, vitamin D3, 125 mcg (5,000 unit) Tab Take 5,000 Units by mouth once daily.       clopidogreL (PLAVIX) 75 mg tablet Take 1 tablet (75 mg total) by mouth once daily. 90 tablet 3    dorzolamide-timolol 2-0.5% (COSOPT) 22.3-6.8 mg/mL ophthalmic solution Place 1 drop into both eyes 2 (two) times daily. 20 mL 3    folic acid/multivit-min/lutein (CENTRUM SILVER ORAL) Take 1 tablet by mouth once daily.      irbesartan (AVAPRO) 300 MG tablet TAKE 1 TABLET (300 MG TOTAL) BY MOUTH EVERY EVENING. 90 tablet 2    latanoprost 0.005 % ophthalmic solution Place 1 drop into both eyes every evening. 5 mL 3    latanoprost 0.005 % ophthalmic solution PLACE 1 DROP INTO BOTH EYES EVERY EVENING. 2.5 mL 3    magnesium oxide (MAG-OX) 400 mg (241.3 mg magnesium) tablet Take 250 mg by mouth once daily.      MICRO THIN LANCETS 33 gauge Misc       nitroGLYCERIN (NITROSTAT) 0.4 MG SL tablet Place 1 tablet (0.4 mg total) under the tongue every 5 (five) minutes as needed for Chest pain. 25 tablet 11    oxymetazoline, PF, (UPNEEQ, PF,) 0.1 % Dpet Apply 1 drop to eye daily as needed (droopy eyelids). 30 each 11    rosuvastatin (CRESTOR) 40 MG Tab Take 1 tablet (40 mg total) by mouth every evening. 90 tablet 3    TUMERIC-GING-OLIVE-OREG-CAPRYL ORAL Take 1 tablet by mouth Daily.      vit C-E-zinc ox-adyron-lut-zeax (ICAPS AREDS2) 250 mg-200 unit -12.5 mg-1 mg Cap Take 2 capsules by mouth once daily.      [DISCONTINUED] blood sugar diagnostic (BLOOD GLUCOSE TEST) Strp 1 each by Misc.(Non-Drug; Combo Route) route once daily. 30 each 11     No facility-administered encounter medications on file as of 4/4/2023.       Allergies:  Review of patient's allergies indicates:  No Known Allergies    Health Maintenance:  Immunization History   Administered Date(s) Administered    COVID-19, MRNA, LN-S, PF (MODERNA FULL 0.5 ML DOSE) 03/10/2021, 03/10/2021, 04/12/2021    Influenza (FLUAD) - Quadrivalent - Adjuvanted - PF *Preferred* (65+) 09/16/2020    Influenza - High Dose - PF (65 years and older) 09/25/2019, 12/02/2022    Influenza - Quadrivalent  "- High Dose - PF (65 years and older) 10/21/2021    Pneumococcal Polysaccharide - 23 Valent 09/25/2019    Tdap 10/21/2021    Zoster 01/02/2013    Zoster Recombinant 10/14/2020, 05/10/2021      Health Maintenance   Topic Date Due    Hemoglobin A1c  09/27/2023    Foot Exam  10/06/2023    Eye Exam  12/22/2023    Lipid Panel  03/27/2024    High Dose Statin  03/30/2024    Aspirin/Antiplatelet Therapy  03/30/2024    TETANUS VACCINE  10/21/2031    Hepatitis C Screening  Completed    Abdominal Aortic Aneurysm Screening  Completed        Physical Exam      Vital Signs  Pulse: (!) 55  SpO2: 98 %  BP: 102/60  BP Location: Right arm  Patient Position: Sitting  Pain Score: 0-No pain  Height and Weight  Height: 5' 10" (177.8 cm)  Weight: 81.8 kg (180 lb 5.4 oz)  BSA (Calculated - sq m): 2.01 sq meters  BMI (Calculated): 25.9  Weight in (lb) to have BMI = 25: 173.9]    Physical Exam  Vitals reviewed.   Constitutional:       Appearance: He is well-developed.   HENT:      Head: Normocephalic and atraumatic.      Right Ear: External ear normal.      Left Ear: External ear normal.   Cardiovascular:      Rate and Rhythm: Normal rate and regular rhythm.      Heart sounds: Normal heart sounds. No murmur heard.  Pulmonary:      Effort: Pulmonary effort is normal.      Breath sounds: Normal breath sounds. No wheezing or rales.   Abdominal:      General: Bowel sounds are normal.      Palpations: Abdomen is soft.        Laboratory:  CBC:  Recent Labs   Lab 01/20/22  0904 03/12/22  1229 03/13/22  0351   WBC 8.27 8.73 8.37   RBC 4.76 4.68 4.54 L   Hemoglobin 14.1 13.9 L 13.6 L   Hematocrit 40.7 41.1 39.5 L   Platelets 190 201 184   MCV 86 88 87   MCH 29.6 29.7 30.0   MCHC 34.6 33.8 34.4     CMP:  Recent Labs   Lab 03/12/22  1229 03/13/22  0351 09/26/22  0744 03/27/23  0719   Glucose 105   < > 126 H 132 H   Calcium 9.5   < > 8.9 8.8   Albumin 4.4  --  4.1 3.9   Total Protein 7.4  --  6.4 6.5   Sodium 140   < > 140 143   Potassium 4.4   < > 4.5 " 4.4   CO2 26   < > 27 27   Chloride 106   < > 107 110   BUN 11   < > 19 15   Alkaline Phosphatase 67  --  61 59   ALT 24  --  29 22   AST 18  --  19 18   Total Bilirubin 0.8  --  0.6 0.6    < > = values in this interval not displayed.     URINALYSIS:       LIPIDS:  Recent Labs   Lab 09/14/21  0752 09/26/22  0744 03/27/23  0719   HDL 28 L 25 L 33 L   Cholesterol 99 L 103 L 102 L   Triglycerides 103 143 85   LDL Cholesterol 50.4 L 49.4 L 52.0 L   HDL/Cholesterol Ratio 28.3 24.3 32.4   Non-HDL Cholesterol 71 78 69   Total Cholesterol/HDL Ratio 3.5 4.1 3.1     TSH:      A1C:  Recent Labs   Lab 08/26/20  0921 02/26/21  0954 09/14/21  0752 03/16/22  1000 09/26/22  0744 03/27/23  0719   Hemoglobin A1C 6.1 H 5.8 H 6.2 H 6.3 H 5.8 H  5.8 H 6.1 H       Assessment/Plan     Bandar French is a 72 y.o.male with:    1. Type 2 diabetes mellitus with diabetic cataract, without long-term current use of insulin  - Comprehensive Metabolic Panel; Future  - Hemoglobin A1C; Future  Continue current meds.  Reviewed labs.  Eye exam UTD  2. Hypertensive heart disease without heart failure  Continue current meds.    3. Atherosclerosis of native coronary artery of native heart with angina pectoris  4. Aortic atherosclerosis  - Lipid Panel; Future  Continue current meds.  F/U with cardiology.  5. Hyperlipidemia, mixed  - Lipid Panel; Future  Continue current meds.    6. Screening for malignant neoplasm of prostate  - PSA, Screening; Future     Medically optimized for intermediate risk procedure with intermediate cardiac risk profile.        Chronic conditions status updated as per HPI.  Other than changes above, cont current medications and maintain follow up with specialists.  No follow-ups on file.    Future Appointments   Date Time Provider Department Center   4/25/2023 11:00 AM Luis Gallegos PT [facility] FUJessie   4/27/2023  1:30 PM [facility] OI-CT2 500 LB LIMIT [facility]   4/27/2023  2:30 PM David Norman NP [facility]  PREOPC Eric Aguirre   4/27/2023  3:40 PM Maria D Hassan, NP [facility]TH[facility] Eric y   5/11/2023  1:30 PM [facility], [facility] OCLASS Eric y   5/17/2023 10:00 AM Ken Dooley, PT [facility] [place]   5/17/2023  2:30 PM TELEMED NURSE, [facility] ORTHO [facility] ORTHO Eric [facility]   5/30/2023  8:00 AM Maria D Hassan, NP [facility] ORTHO Eric [facility]       Gunnar Rangel MD  [facility]

## 2023-04-20 ENCOUNTER — TELEPHONE (OUTPATIENT)
Dept: PREADMISSION TESTING | Facility: HOSPITAL | Age: 73
End: 2023-04-20
Payer: MEDICARE

## 2023-04-20 DIAGNOSIS — Z01.818 PRE-OP TESTING: Primary | ICD-10-CM

## 2023-04-20 DIAGNOSIS — M79.609 PAIN IN EXTREMITY, UNSPECIFIED EXTREMITY: ICD-10-CM

## 2023-04-20 NOTE — TELEPHONE ENCOUNTER
----- Message from Berta Mitchell RN sent at 4/20/2023  9:11 AM CDT -----  (5/15) Please schedule EKG and labs (CBC, PT/INR).  Thanks,  Berta

## 2023-04-20 NOTE — ANESTHESIA PAT ROS NOTE
04/20/2023  Bandar French is a 72 y.o., male.      Pre-op Assessment          Review of Systems         Anesthesia Assessment: Preoperative EQUATION    Planned Procedure: Procedure(s) (LRB):  ARTHROPLASTY, KNEE, TOTAL, USING COMPUTER-ASSISTED NAVIGATION: MORELIA: LEFT: SASCHA - TRIATHALON (Left)  Requested Anesthesia Type:Regional  Surgeon: Stevenson Mcghee III, MD  Service: Orthopedics  Known or anticipated Date of Surgery:5/15/2023    Surgeon notes: reviewed    Electronic QUestionnaire Assessment completed via nurse interview with patient.        Triage considerations:     The patient has no apparent active cardiac condition (No unstable coronary Syndrome such as severe unstable angina or recent [<1 month] myocardial infarction, decompensated CHF, severe valvular   disease or significant arrhythmia)    Previous anesthesia records:Spinal Anesthesia   7/22/19   ARTHROPLASTY, KNEE, TOTAL (Right: Knee       Last PCP note: within 1 month , within [facility] , Dr. Klibert  Subspecialty notes: Ortho, cardiology (Dr. Tompkins)    Other important co-morbidities: DM2, HLD, HTN, and non obs CAD (PCI 2014 & 2022, takes Plavix), Former Smoker, RTKA 2019 (Dr. Westbrook), Grade 1 DD       Tests already available:  Available tests,  within [facility] .   3/27/23 A1C, CMP  3/12/22 EKG  1/20/22 Echo            Instructions given. (See in Nurse's note)    Optimization:  Anesthesia Preop Clinic Assessment  Indicated POC    Medical Opinion Indicated Cardiology       Sub-specialist consult indicated:   [facility] NP      Plan:    Testing:  CBC, EKG, and PT/INR   Pre-anesthesia  visit       Visit focus: possible regional anesthesia and/or nerve block      Consultation: [facility] NP for medical and anesthesia optimization     Patient  has previously scheduled Medical Appointment: 4/27    Navigation: Tests Scheduled.              Consults  scheduled.             Results will be tracked by Preop Clinic.      1/25/23 Cardiology (Dr. Tompkins):    - Stable CAD. Residual non obstructive CAD.  Reported symptoms is completely different than his anginal symptoms.  Other possible differential if any arrhythmia.  Instructed to use a cardia mobile when he gets this occasional cold sensation and palpitation in the chest .   METS is more than 4 with no angina with exertion.  Stress test less than a year was negative for ischemia.  He has acceptable risk to moderate risk noncardiovascular surgery.  Okay to proceed with planned surgery.  Okay to hold aspirin and Plavix if needed for 3-5 days preop and restarted postop.    4/20/23 requested updated cards clearance and 7 day Plavix hold [place] [place]    4/20/23 Cardiology Update (Dr. Tompkins):  MD Berta Messina RN  Caller: Unspecified (Today,  8:52 AM)  Hi,     Ok to proceed with planned surgery. Ok to Hold Plavix and continue ASA. Restart Plavix post op once hemostasis achieved.     Sincerely,   Mega Tompkins MD.   Interventional Cardiologist   [facility] [place]

## 2023-04-20 NOTE — TELEPHONE ENCOUNTER
----- Message from Mega Tompkins MD sent at 4/20/2023 12:40 PM CDT -----  Hi,     Ok to proceed with planned surgery. Ok to Hold Plavix and continue ASA. Restart Plavix post op once hemostasis achieved.     Sincerely,  Mega Tompkins MD.   Interventional Cardiologist  [facility] [place]       ----- Message -----  From: Berta Mitchell RN  Sent: 4/20/2023   8:56 AM CDT  To: David Norman NP, Letty Booker LPN, #    Dr. Tompkins,    This patient is scheduled for surgery (Knee Arthroplasty) on 5/15/23 with Dr. Mcghee. Requesting pre-op risk stratification and management prior to this procedure, along with medication (Plavix) instructions. Requesting 7 day Plavix hold per anesthesia guidelines for neuraxial anesthesia, Aspirin 81 mg does not require hold.  Please advise. Will await your response.    Thanks in advance,  Berta Mitchell RN                                                                                [facility]

## 2023-04-25 ENCOUNTER — CLINICAL SUPPORT (OUTPATIENT)
Dept: REHABILITATION | Facility: HOSPITAL | Age: 73
End: 2023-04-25
Payer: MEDICARE

## 2023-04-25 DIAGNOSIS — M25.562 CHRONIC PAIN OF LEFT KNEE: ICD-10-CM

## 2023-04-25 DIAGNOSIS — M17.12 PRIMARY OSTEOARTHRITIS OF LEFT KNEE: ICD-10-CM

## 2023-04-25 DIAGNOSIS — G89.29 CHRONIC PAIN OF LEFT KNEE: ICD-10-CM

## 2023-04-25 PROCEDURE — 97110 THERAPEUTIC EXERCISES: CPT | Mod: HCNC,PN

## 2023-04-25 PROCEDURE — 97161 PT EVAL LOW COMPLEX 20 MIN: CPT | Mod: HCNC,PN

## 2023-04-26 RX ORDER — PANTOPRAZOLE SODIUM 20 MG/1
TABLET, DELAYED RELEASE ORAL
COMMUNITY
End: 2023-04-27

## 2023-04-26 RX ORDER — ASPIRIN 81 MG/1
TABLET ORAL
COMMUNITY

## 2023-04-26 RX ORDER — METOPROLOL TARTRATE 50 MG/1
TABLET ORAL
COMMUNITY
End: 2023-04-27

## 2023-04-26 RX ORDER — ISOSORBIDE MONONITRATE 60 MG/1
TABLET, EXTENDED RELEASE ORAL
COMMUNITY
End: 2023-04-27

## 2023-04-26 RX ORDER — IRBESARTAN 75 MG/1
TABLET ORAL
COMMUNITY
End: 2023-04-26

## 2023-04-26 RX ORDER — MELOXICAM 15 MG/1
TABLET ORAL
COMMUNITY
End: 2023-04-27

## 2023-04-26 RX ORDER — MULTIVITAMIN/IRON/FOLIC ACID 18MG-0.4MG
TABLET ORAL
COMMUNITY
End: 2023-04-27

## 2023-04-26 NOTE — ASSESSMENT & PLAN NOTE
Plavix  NTG SL as needed for chest pain    He denies any symptoms of chest pain, shortness of breath at rest, peripheral edema, orthopnea, palpitations, lightheadedness, presyncope, or syncope.      Mega Tompkins MD. Interventional Cardiologist   Ok to proceed with planned surgery. Ok to Hold Plavix and continue ASA. Restart Plavix post op once hemostasis achieved.

## 2023-04-26 NOTE — PLAN OF CARE
"[facility]  Physical Therapy Initial Evaluation    Name: Bandar French  Clinic Number: [id number]    Therapy Diagnosis:   Encounter Diagnoses   Name Primary?    Primary osteoarthritis of left knee     Chronic pain of left knee         Physician: Stevenson Mcghee III, *    Physician Orders: PT Eval and Treat   Medical Diagnosis from Referral: M17.12 (ICD-10-CM) - Primary osteoarthritis of left knee  Evaluation Date: 4/25/2023  Authorization Period Expiration: 03/16/2023  Plan of Care Expiration: 4/25/2023 to 05/15/2023  Visit # / Visits authorized: 1/TBD  FOTO: 1/1    Time In: 1105  Time Out: 1140  Total Appointment Time (timed & untimed codes): 35 minutes (1 LCE, 1 TE)  Precautions: Standard    SUBJECTIVE   Zack reports to [facility] today for prehab leading up to scheduled elective left TKA on 05/15/2023.  Primary issue with left knee is pain and occasional instability.  Does voice days of minimal-to-no pain.  History of left knee surgery in 1967.  History of right TKA 2019.      Falls: none  Imaging:  Bilateral knee radiographs: 10/2022:   Findings: "Right knee right knee arthroplasty identified.  The position alignment is satisfactory and unchanged as compared to the previous study.  Left knee: DJD with the narrowing of the tibiofemoral joint spaces more on the lateral side.  No fracture or dislocation.  No bone destruction identified."    Prior Therapy:  none  Social History:  lives with family  Occupation:   Retired.  Hobbies include golf  Prior Level of Function:  See above  Current Level of Function:  Goes to the gym 2-3 days/week where he performs the elliptical, hamstring/knee machines, and leg press.     Pain:  Current 2/10, worst 5/10, best 1/10   Location: left knee anteriorly including patellar tendon  Description: Grabbing, deep  Aggravating Factors: squatting, walking, stair negotiation  Easing Factors: rest    Patients goals: 1. To have his surgery.      Medical History:   Past " Medical History:   Diagnosis Date    Arthritis     Cataract     Depression     Diabetes mellitus     Episodic headache 3/12/2022    Glaucoma     Hypertension        Surgical History:   Bandar French  has a past surgical history that includes Knee arthroscopy (Left, 1967); Knee arthroscopy (Right, 2012); Hand surgery (Right, 12/2004); Coronary stent placement (2014); Tonsillectomy; Total knee arthroplasty (Right, 07/22/2019); Adenoidectomy; Cosmetic surgery (Bilateral, 06/2019); Eye surgery (Left, 03/2019); Joint replacement (Right, 07/22/2019); Left heart catheterization (Left, 01/20/2022); and Coronary angiography (N/A, 01/20/2022).    Medications:   Bandar has a current medication list which includes the following prescription(s): amlodipine, ascorbic acid (vitamin c), aspirin, blood sugar diagnostic, dexcom g6 , dexcom g6 sensor, dexcom g6 transmitter, chlorpheniramine, cholecalciferol (vitamin d3), clopidogrel, dorzolamide-timolol 2-0.5%, folic acid/multivit-min/lutein, irbesartan, latanoprost, latanoprost, magnesium oxide, micro thin lancets, nitroglycerin, upneeq (pf), rosuvastatin, tumeric/ging/olive/oreg/capryl, and icaps areds2.    Allergies:   Review of patient's allergies indicates:  No Known Allergies       OBJECTIVE     Posture:  Noted decreased left quad girth as compared to the right.   Palpation:  TTP left patellar tendon  Effusion: Trace left knee joint effusion.     Range of motion: Right knee:(+) 4 - 0- 122 degrees  Left knee:   (-) 2 - 115 degrees    Quad:  Normal quad set, no lag with Straight leg raise    L/E Strength w/ MicroFET Muscle Pattie Dynamometer Right Left Pain/Dysfunction with Movement   (approx 4 sec hold w/ max contraction)   Quadriceps 24.8 kg  16.5 kg     Hamstrings 15.6 kg  13.9 kg       TUG:  10 seconds (w/ RW)  30 second sit-to-stand test (without U/E support): NT  Gait Analysis: non-antalgic pattern    Limitation/Restriction for FOTO Knee Survey    Therapist  reviewed FOTO scores for Bandar French on 4/25/2023.   FOTO documents entered into [employer] - see Media section.    Limitation Score: staff did not capture.          TREATMENT     Total Treatment time (time-based codes) separate from Evaluation: 10 minutes      Zack received the treatments listed below:      Therapeutic exercises to develop strength, endurance, and ROM for 10 minutes including:  Quad sets 10x  Straight leg raise 2x10/each  Heel slides 2x10  Education: post-op course, expectations regarding range of motion/walking/strength    PATIENT EDUCATION AND HOME EXERCISES     Education provided:   - post-op course  - continue gym routine  - take a week off from gym routine prior to surgery.     Written Home Exercises Provided: Patient instructed to cont prior HEP. Exercises were reviewed and Zack was able to demonstrate them prior to the end of the session.  Zack demonstrated good  understanding of the education provided. See EMR under Patient Instructions for exercises provided during therapy sessions.    ASSESSMENT     Bandar is a 72 y.o. male referred to outpatient Physical Therapy with a medical diagnosis of M17.12 (ICD-10-CM) - Primary osteoarthritis of left knee. Patient presents for prehab; scheduled left TKA on 05/15/2023.  Baseline knee range of motion and strength measurements taken.  Good quad activation.  Patient currently involved in gym routine including thigh resistance training and Elliptical.   At this time, PT recommends patient  to continue with normal gym routine and try to keep his left knee joint calm prior to surgery.  Had TKA in 2019 so he has an adequate understanding of the post-op course.  Post-op scheduled made today.  No need to see patient again until post-op unless something happens to his left knee.  Ready for surgery.     Patient prognosis is Excellent.   Patient will benefit from skilled outpatient Physical Therapy to address the deficits stated above and in the chart below,  provide patient /family education, and to maximize patientt's level of independence.     Plan of care discussed with patient: Yes  Patient's spiritual, cultural and educational needs considered and patient is agreeable to the plan of care and goals as stated below:     Anticipated Barriers for therapy: none    Medical Necessity is demonstrated by the following  History  Co-morbidities and personal factors that may impact the plan of care Co-morbidities:   none    Personal Factors:   no deficits     low   Examination  Body Structures and Functions, activity limitations and participation restrictions that may impact the plan of care Body Regions:   lower extremities    Body Systems:    ROM  strength    Participation Restrictions:   none    Activity limitations:   Learning and applying knowledge  no deficits    General Tasks and Commands  no deficits    Communication  no deficits    Mobility  lifting and carrying objects  walking  squatting    Self care  no deficits    Domestic Life  no deficits    Interactions/Relationships  no deficits    Life Areas  no deficits    Community and Social Life  community life  recreation and leisure         low   Clinical Presentation stable and uncomplicated low   Decision Making/ Complexity Score: low     Goals:  Short Term Goals:  1 week  1. Pt will be independent with HEP supplement PT in improving functional mobility. Met        Plan     Plan of care Certification: 4/25/2023 to 05/15/2023.  Outpatient Physical Therapy 1-2 visits over the course of 3 weeks to include the following interventions: Gait Training, Manual Therapy, Neuromuscular Re-ed, Patient Education, Therapeutic Activites and Therapeutic Exercise.    Luis Gallegos, PT, DPT, OCS

## 2023-04-26 NOTE — ASSESSMENT & PLAN NOTE
Current /65  today.    Taking: Avapro Norvasc   Patient reports home BP readings of:119-140/60-70s   Keeping a healthy weight/BMI can help with better BP control    Lifestyle changes to reduce systolic BP: exercise 30 minutes per day,  5 days per week or 150 minutes weekly; sodium reduction and avoidance of high salt foods such as processed meats, frozen meals and  fast foods.     BP acceptable for surgery. I recommend monitoring BP during perioperative period as uncontrolled pain can elevate blood pressure.

## 2023-04-27 ENCOUNTER — HOSPITAL ENCOUNTER (OUTPATIENT)
Dept: RADIOLOGY | Facility: HOSPITAL | Age: 73
Discharge: HOME | End: 2023-04-27
Attending: ORTHOPAEDIC SURGERY
Payer: MEDICARE

## 2023-04-27 ENCOUNTER — HOSPITAL ENCOUNTER (OUTPATIENT)
Dept: RADIOLOGY | Facility: HOSPITAL | Age: 73
Discharge: HOME | End: 2023-04-27
Attending: PHYSICIAN ASSISTANT
Payer: MEDICARE

## 2023-04-27 ENCOUNTER — OFFICE VISIT (OUTPATIENT)
Dept: ORTHOPEDICS | Facility: CLINIC | Age: 73
End: 2023-04-27
Payer: MEDICARE

## 2023-04-27 ENCOUNTER — OFFICE VISIT (OUTPATIENT)
Dept: INTERNAL MEDICINE | Facility: CLINIC | Age: 73
End: 2023-04-27
Payer: MEDICARE

## 2023-04-27 ENCOUNTER — HOSPITAL ENCOUNTER (OUTPATIENT)
Dept: CARDIOLOGY | Facility: CLINIC | Age: 73
Discharge: HOME | End: 2023-04-27
Payer: MEDICARE

## 2023-04-27 VITALS
HEIGHT: 70 IN | WEIGHT: 180 LBS | DIASTOLIC BLOOD PRESSURE: 65 MMHG | SYSTOLIC BLOOD PRESSURE: 132 MMHG | HEART RATE: 54 BPM | BODY MASS INDEX: 25.77 KG/M2 | OXYGEN SATURATION: 96 % | TEMPERATURE: 98 F

## 2023-04-27 DIAGNOSIS — E78.2 HYPERLIPIDEMIA, MIXED: Chronic | ICD-10-CM

## 2023-04-27 DIAGNOSIS — K76.0 STEATOSIS OF LIVER: ICD-10-CM

## 2023-04-27 DIAGNOSIS — M17.12 PRIMARY OSTEOARTHRITIS OF LEFT KNEE: ICD-10-CM

## 2023-04-27 DIAGNOSIS — H81.10 BENIGN PAROXYSMAL POSITIONAL VERTIGO, UNSPECIFIED LATERALITY: ICD-10-CM

## 2023-04-27 DIAGNOSIS — I10 ESSENTIAL HYPERTENSION: Chronic | ICD-10-CM

## 2023-04-27 DIAGNOSIS — R73.03 PREDIABETES: ICD-10-CM

## 2023-04-27 DIAGNOSIS — Z95.5 PRESENCE OF STENT IN CORONARY ARTERY: Chronic | ICD-10-CM

## 2023-04-27 DIAGNOSIS — Z01.818 PRE-OP TESTING: ICD-10-CM

## 2023-04-27 DIAGNOSIS — D64.9 ANEMIA, UNSPECIFIED TYPE: ICD-10-CM

## 2023-04-27 DIAGNOSIS — Z01.818 PRE-OP EVALUATION: ICD-10-CM

## 2023-04-27 DIAGNOSIS — M17.12 PRIMARY OSTEOARTHRITIS OF LEFT KNEE: Primary | ICD-10-CM

## 2023-04-27 DIAGNOSIS — I25.119 ATHEROSCLEROSIS OF NATIVE CORONARY ARTERY OF NATIVE HEART WITH ANGINA PECTORIS: ICD-10-CM

## 2023-04-27 DIAGNOSIS — M17.11 PRIMARY OSTEOARTHRITIS OF RIGHT KNEE: ICD-10-CM

## 2023-04-27 PROCEDURE — 1157F PR ADVANCE CARE PLAN OR EQUIV PRESENT IN MEDICAL RECORD: ICD-10-PCS | Mod: HCNC,CPTII,S$GLB, | Performed by: NURSE PRACTITIONER

## 2023-04-27 PROCEDURE — 1157F ADVNC CARE PLAN IN RCRD: CPT | Mod: HCNC,CPTII,S$GLB, | Performed by: NURSE PRACTITIONER

## 2023-04-27 PROCEDURE — 3072F PR LOW RISK FOR RETINOPATHY: ICD-10-PCS | Mod: HCNC,CPTII,S$GLB, | Performed by: PHYSICIAN ASSISTANT

## 2023-04-27 PROCEDURE — 73700 CT KNEE WITHOUT CONTRAST LEFT: ICD-10-PCS | Mod: 26,HCNC,LT, | Performed by: RADIOLOGY

## 2023-04-27 PROCEDURE — 1157F PR ADVANCE CARE PLAN OR EQUIV PRESENT IN MEDICAL RECORD: ICD-10-PCS | Mod: HCNC,CPTII,S$GLB, | Performed by: PHYSICIAN ASSISTANT

## 2023-04-27 PROCEDURE — 4010F ACE/ARB THERAPY RXD/TAKEN: CPT | Mod: HCNC,CPTII,S$GLB, | Performed by: NURSE PRACTITIONER

## 2023-04-27 PROCEDURE — 93005 ELECTROCARDIOGRAM TRACING: CPT | Mod: HCNC,S$GLB,, | Performed by: ANESTHESIOLOGY

## 2023-04-27 PROCEDURE — 1125F AMNT PAIN NOTED PAIN PRSNT: CPT | Mod: HCNC,CPTII,S$GLB, | Performed by: NURSE PRACTITIONER

## 2023-04-27 PROCEDURE — 99999 PR PBB SHADOW E&M-EST. PATIENT-LVL III: CPT | Mod: PBBFAC,HCNC,, | Performed by: PHYSICIAN ASSISTANT

## 2023-04-27 PROCEDURE — 73560 XR KNEE 1 OR 2 VIEW LEFT: ICD-10-PCS | Mod: 26,HCNC,LT, | Performed by: RADIOLOGY

## 2023-04-27 PROCEDURE — 4010F ACE/ARB THERAPY RXD/TAKEN: CPT | Mod: HCNC,CPTII,S$GLB, | Performed by: PHYSICIAN ASSISTANT

## 2023-04-27 PROCEDURE — 3044F HG A1C LEVEL LT 7.0%: CPT | Mod: HCNC,CPTII,S$GLB, | Performed by: PHYSICIAN ASSISTANT

## 2023-04-27 PROCEDURE — 3044F PR MOST RECENT HEMOGLOBIN A1C LEVEL <7.0%: ICD-10-PCS | Mod: HCNC,CPTII,S$GLB, | Performed by: PHYSICIAN ASSISTANT

## 2023-04-27 PROCEDURE — 3072F PR LOW RISK FOR RETINOPATHY: ICD-10-PCS | Mod: HCNC,CPTII,S$GLB, | Performed by: NURSE PRACTITIONER

## 2023-04-27 PROCEDURE — 99214 OFFICE O/P EST MOD 30 MIN: CPT | Mod: HCNC,S$GLB,, | Performed by: NURSE PRACTITIONER

## 2023-04-27 PROCEDURE — 99999 PR PBB SHADOW E&M-EST. PATIENT-LVL V: ICD-10-PCS | Mod: PBBFAC,HCNC,, | Performed by: NURSE PRACTITIONER

## 2023-04-27 PROCEDURE — 73560 X-RAY EXAM OF KNEE 1 OR 2: CPT | Mod: TC,HCNC,LT

## 2023-04-27 PROCEDURE — 93010 EKG 12-LEAD: ICD-10-PCS | Mod: HCNC,S$GLB,, | Performed by: INTERNAL MEDICINE

## 2023-04-27 PROCEDURE — 3008F PR BODY MASS INDEX (BMI) DOCUMENTED: ICD-10-PCS | Mod: HCNC,CPTII,S$GLB, | Performed by: NURSE PRACTITIONER

## 2023-04-27 PROCEDURE — 73700 CT LOWER EXTREMITY W/O DYE: CPT | Mod: 26,HCNC,LT, | Performed by: RADIOLOGY

## 2023-04-27 PROCEDURE — 3075F SYST BP GE 130 - 139MM HG: CPT | Mod: HCNC,CPTII,S$GLB, | Performed by: NURSE PRACTITIONER

## 2023-04-27 PROCEDURE — 93005 EKG 12-LEAD: ICD-10-PCS | Mod: HCNC,S$GLB,, | Performed by: ANESTHESIOLOGY

## 2023-04-27 PROCEDURE — 93010 ELECTROCARDIOGRAM REPORT: CPT | Mod: HCNC,S$GLB,, | Performed by: INTERNAL MEDICINE

## 2023-04-27 PROCEDURE — 99214 PR OFFICE/OUTPT VISIT, EST, LEVL IV, 30-39 MIN: ICD-10-PCS | Mod: HCNC,S$GLB,, | Performed by: NURSE PRACTITIONER

## 2023-04-27 PROCEDURE — 4010F PR ACE/ARB THEARPY RXD/TAKEN: ICD-10-PCS | Mod: HCNC,CPTII,S$GLB, | Performed by: NURSE PRACTITIONER

## 2023-04-27 PROCEDURE — 73700 CT LOWER EXTREMITY W/O DYE: CPT | Mod: TC,HCNC,LT

## 2023-04-27 PROCEDURE — 1125F PR PAIN SEVERITY QUANTIFIED, PAIN PRESENT: ICD-10-PCS | Mod: HCNC,CPTII,S$GLB, | Performed by: NURSE PRACTITIONER

## 2023-04-27 PROCEDURE — 3072F LOW RISK FOR RETINOPATHY: CPT | Mod: HCNC,CPTII,S$GLB, | Performed by: PHYSICIAN ASSISTANT

## 2023-04-27 PROCEDURE — 99214 PR OFFICE/OUTPT VISIT, EST, LEVL IV, 30-39 MIN: ICD-10-PCS | Mod: HCNC,S$GLB,, | Performed by: PHYSICIAN ASSISTANT

## 2023-04-27 PROCEDURE — 3044F HG A1C LEVEL LT 7.0%: CPT | Mod: HCNC,CPTII,S$GLB, | Performed by: NURSE PRACTITIONER

## 2023-04-27 PROCEDURE — 99999 PR PBB SHADOW E&M-EST. PATIENT-LVL III: ICD-10-PCS | Mod: PBBFAC,HCNC,, | Performed by: PHYSICIAN ASSISTANT

## 2023-04-27 PROCEDURE — 3044F PR MOST RECENT HEMOGLOBIN A1C LEVEL <7.0%: ICD-10-PCS | Mod: HCNC,CPTII,S$GLB, | Performed by: NURSE PRACTITIONER

## 2023-04-27 PROCEDURE — 3075F PR MOST RECENT SYSTOLIC BLOOD PRESS GE 130-139MM HG: ICD-10-PCS | Mod: HCNC,CPTII,S$GLB, | Performed by: NURSE PRACTITIONER

## 2023-04-27 PROCEDURE — 3078F PR MOST RECENT DIASTOLIC BLOOD PRESSURE < 80 MM HG: ICD-10-PCS | Mod: HCNC,CPTII,S$GLB, | Performed by: NURSE PRACTITIONER

## 2023-04-27 PROCEDURE — 3078F DIAST BP <80 MM HG: CPT | Mod: HCNC,CPTII,S$GLB, | Performed by: NURSE PRACTITIONER

## 2023-04-27 PROCEDURE — 1159F MED LIST DOCD IN RCRD: CPT | Mod: HCNC,CPTII,S$GLB, | Performed by: PHYSICIAN ASSISTANT

## 2023-04-27 PROCEDURE — 1159F PR MEDICATION LIST DOCUMENTED IN MEDICAL RECORD: ICD-10-PCS | Mod: HCNC,CPTII,S$GLB, | Performed by: PHYSICIAN ASSISTANT

## 2023-04-27 PROCEDURE — 3072F LOW RISK FOR RETINOPATHY: CPT | Mod: HCNC,CPTII,S$GLB, | Performed by: NURSE PRACTITIONER

## 2023-04-27 PROCEDURE — 73560 X-RAY EXAM OF KNEE 1 OR 2: CPT | Mod: 26,HCNC,LT, | Performed by: RADIOLOGY

## 2023-04-27 PROCEDURE — 1157F ADVNC CARE PLAN IN RCRD: CPT | Mod: HCNC,CPTII,S$GLB, | Performed by: PHYSICIAN ASSISTANT

## 2023-04-27 PROCEDURE — 99999 PR PBB SHADOW E&M-EST. PATIENT-LVL V: CPT | Mod: PBBFAC,HCNC,, | Performed by: NURSE PRACTITIONER

## 2023-04-27 PROCEDURE — 4010F PR ACE/ARB THEARPY RXD/TAKEN: ICD-10-PCS | Mod: HCNC,CPTII,S$GLB, | Performed by: PHYSICIAN ASSISTANT

## 2023-04-27 PROCEDURE — 1160F RVW MEDS BY RX/DR IN RCRD: CPT | Mod: HCNC,CPTII,S$GLB, | Performed by: PHYSICIAN ASSISTANT

## 2023-04-27 PROCEDURE — 1160F PR REVIEW ALL MEDS BY PRESCRIBER/CLIN PHARMACIST DOCUMENTED: ICD-10-PCS | Mod: HCNC,CPTII,S$GLB, | Performed by: PHYSICIAN ASSISTANT

## 2023-04-27 PROCEDURE — 3008F BODY MASS INDEX DOCD: CPT | Mod: HCNC,CPTII,S$GLB, | Performed by: NURSE PRACTITIONER

## 2023-04-27 PROCEDURE — 99214 OFFICE O/P EST MOD 30 MIN: CPT | Mod: HCNC,S$GLB,, | Performed by: PHYSICIAN ASSISTANT

## 2023-04-27 RX ORDER — PROCHLORPERAZINE EDISYLATE 5 MG/ML
5 INJECTION INTRAMUSCULAR; INTRAVENOUS EVERY 6 HOURS PRN
Status: CANCELLED | OUTPATIENT
Start: 2023-04-27

## 2023-04-27 RX ORDER — PREGABALIN 75 MG/1
75 CAPSULE ORAL NIGHTLY
Status: CANCELLED | OUTPATIENT
Start: 2023-04-27

## 2023-04-27 RX ORDER — ACETAMINOPHEN 500 MG
1000 TABLET ORAL
Status: CANCELLED | OUTPATIENT
Start: 2023-04-27

## 2023-04-27 RX ORDER — FAMOTIDINE 20 MG/1
20 TABLET, FILM COATED ORAL 2 TIMES DAILY
Status: CANCELLED | OUTPATIENT
Start: 2023-04-27

## 2023-04-27 RX ORDER — LIDOCAINE HYDROCHLORIDE 10 MG/ML
1 INJECTION, SOLUTION EPIDURAL; INFILTRATION; INTRACAUDAL; PERINEURAL
Status: CANCELLED | OUTPATIENT
Start: 2023-04-27

## 2023-04-27 RX ORDER — ONDANSETRON 2 MG/ML
4 INJECTION INTRAMUSCULAR; INTRAVENOUS EVERY 8 HOURS PRN
Status: CANCELLED | OUTPATIENT
Start: 2023-04-27

## 2023-04-27 RX ORDER — OXYCODONE HYDROCHLORIDE 5 MG/1
10 TABLET ORAL
Status: CANCELLED | OUTPATIENT
Start: 2023-04-27

## 2023-04-27 RX ORDER — OXYCODONE HYDROCHLORIDE 5 MG/1
5 TABLET ORAL
Status: CANCELLED | OUTPATIENT
Start: 2023-04-27

## 2023-04-27 RX ORDER — SODIUM CHLORIDE 9 MG/ML
INJECTION, SOLUTION INTRAVENOUS CONTINUOUS
Status: CANCELLED | OUTPATIENT
Start: 2023-04-27 | End: 2023-04-28

## 2023-04-27 RX ORDER — SODIUM CHLORIDE 0.9 % (FLUSH) 0.9 %
10 SYRINGE (ML) INJECTION
Status: CANCELLED | OUTPATIENT
Start: 2023-04-27

## 2023-04-27 RX ORDER — CELECOXIB 200 MG/1
200 CAPSULE ORAL DAILY
Status: CANCELLED | OUTPATIENT
Start: 2023-04-28

## 2023-04-27 RX ORDER — SODIUM CHLORIDE 9 MG/ML
INJECTION, SOLUTION INTRAVENOUS
Status: CANCELLED | OUTPATIENT
Start: 2023-04-27

## 2023-04-27 RX ORDER — CELECOXIB 200 MG/1
400 CAPSULE ORAL ONCE
Status: CANCELLED | OUTPATIENT
Start: 2023-04-27 | End: 2023-04-27

## 2023-04-27 RX ORDER — METHOCARBAMOL 750 MG/1
750 TABLET, FILM COATED ORAL 3 TIMES DAILY
Status: CANCELLED | OUTPATIENT
Start: 2023-04-27

## 2023-04-27 RX ORDER — FENTANYL CITRATE 50 UG/ML
100 INJECTION, SOLUTION INTRAMUSCULAR; INTRAVENOUS
Status: CANCELLED | OUTPATIENT
Start: 2023-04-27 | End: 2023-04-28

## 2023-04-27 RX ORDER — CEFAZOLIN SODIUM 2 G/50ML
2 SOLUTION INTRAVENOUS
Status: CANCELLED | OUTPATIENT
Start: 2023-04-27

## 2023-04-27 RX ORDER — MIDAZOLAM HYDROCHLORIDE 1 MG/ML
4 INJECTION INTRAMUSCULAR; INTRAVENOUS
Status: CANCELLED | OUTPATIENT
Start: 2023-04-27 | End: 2023-04-28

## 2023-04-27 RX ORDER — ASPIRIN 81 MG/1
81 TABLET ORAL 2 TIMES DAILY
Status: CANCELLED | OUTPATIENT
Start: 2023-04-27

## 2023-04-27 RX ORDER — TALC
6 POWDER (GRAM) TOPICAL NIGHTLY PRN
Status: CANCELLED | OUTPATIENT
Start: 2023-04-27

## 2023-04-27 RX ORDER — CEFAZOLIN SODIUM 2 G/50ML
2 SOLUTION INTRAVENOUS
Status: CANCELLED | OUTPATIENT
Start: 2023-04-27 | End: 2023-04-28

## 2023-04-27 RX ORDER — ACETAMINOPHEN 500 MG
1000 TABLET ORAL EVERY 6 HOURS
Status: CANCELLED | OUTPATIENT
Start: 2023-04-27

## 2023-04-27 RX ORDER — MUPIROCIN 20 MG/G
1 OINTMENT TOPICAL 2 TIMES DAILY
Status: CANCELLED | OUTPATIENT
Start: 2023-04-27 | End: 2023-05-02

## 2023-04-27 RX ORDER — AMOXICILLIN 250 MG
1 CAPSULE ORAL 2 TIMES DAILY
Status: CANCELLED | OUTPATIENT
Start: 2023-04-27

## 2023-04-27 RX ORDER — BISACODYL 10 MG
10 SUPPOSITORY, RECTAL RECTAL EVERY 12 HOURS PRN
Status: CANCELLED | OUTPATIENT
Start: 2023-04-27

## 2023-04-27 RX ORDER — FENTANYL CITRATE 50 UG/ML
25 INJECTION, SOLUTION INTRAMUSCULAR; INTRAVENOUS EVERY 5 MIN PRN
Status: CANCELLED | OUTPATIENT
Start: 2023-04-27

## 2023-04-27 RX ORDER — PREGABALIN 75 MG/1
75 CAPSULE ORAL
Status: CANCELLED | OUTPATIENT
Start: 2023-04-27

## 2023-04-27 RX ORDER — NALOXONE HCL 0.4 MG/ML
0.02 VIAL (ML) INJECTION
Status: CANCELLED | OUTPATIENT
Start: 2023-04-27

## 2023-04-27 RX ORDER — MUPIROCIN 20 MG/G
1 OINTMENT TOPICAL
Status: CANCELLED | OUTPATIENT
Start: 2023-04-27

## 2023-04-27 RX ORDER — MORPHINE SULFATE 2 MG/ML
2 INJECTION, SOLUTION INTRAMUSCULAR; INTRAVENOUS
Status: CANCELLED | OUTPATIENT
Start: 2023-04-27

## 2023-04-27 RX ORDER — POLYETHYLENE GLYCOL 3350 17 G/17G
17 POWDER, FOR SOLUTION ORAL DAILY
Status: CANCELLED | OUTPATIENT
Start: 2023-04-28
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
## 2023-04-27 NOTE — ASSESSMENT & PLAN NOTE
Has occasional vertigo states it only happens when he looks straight up like when changing a light bulb    Last episode was 2 months ago

## 2023-04-27 NOTE — DISCHARGE INSTRUCTIONS
Your surgery has been scheduled for:_______5/15/23___________________________________    You should report to:  ____[facility], located on the [place] side of the first floor of the           [facility] ([phone])  ____[facility], located on the [facility] side of the            Second floor of the [facility] ([phone])  ____3rd Floor [facility] located on the [facility] side of the [facility] [phone]  _X___[facility]: located at [address] [place] [address]. [address].     Please Note   Tell your doctor if you take Aspirin, products containing Aspirin, herbal medications  or blood thinners, such as Coumadin, Ticlid, or Plavix.  (Consult your provider regarding holding or stopping before surgery).  Arrange for someone to drive you home following surgery.  You will not be allowed to leave the surgical facility alone or drive yourself home following sedation and anesthesia.    Before Surgery  Stop taking all herbal medications, vitamins, and supplements 7 days prior to surgery  No Motrin/Advil (Ibuprofen) 7 days before surgery  No Aleve (Naproxen) 7 days before surgery  Stop Taking Asprin, products containing Aspirin __7___days before surgery   No Goody's/BC Powder 7 days before surgery  Refrain from drinking alcoholic beverages for 24 hours before and after surgery  Stop or limit smoking at least 24 hours prior to surgery  You may take Tylenol for pain    Night before Surgery  Do not eat or drink after midnight  Take a shower or bath (shower is recommended).  Bathe with Hibiclens soap or an antibacterial soap from the neck down.  If not supplied by your surgeon, hibiclens soap will need to be purchased over the counter in pharmacy.  Rinse soap off thoroughly.  Shampoo your hair with your regular shampoo    The Day of Surgery  Take another bath or shower with hibiclens  or any antibacterial soap, to reduce the chance of infection.  Take heart and blood pressure medications with a small sip of water, as advised by the perioperative team.  Do not take fluid pills  You may brush your teeth and rinse your mouth, but do not swallow any additional water.   Do not apply perfumes, powder, body lotions or deodorant on the day of surgery.  Nail polish should be removed.  Do not wear makeup or moisturizer  Wear comfortable clothes, such as a button front shirt and loose fitting pants.  Leave all jewelry, including body piercings, and valuables at home.    Bring any devices you will neeed after surgery such as crutches or canes.  If you have sleep apnea, please bring your CPAP machine  In the event that your physical condition changes including the onset of a cold or respiratory illness, or if you have to delay or cancel your surgery, please notify your surgeon.
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
## 2023-04-27 NOTE — PROGRESS NOTES
Bandar French is a 72 y.o. year old here today for pre surgery optimization visit  in preparation for a Left total knee arthroplasty to be performed by Dr. Mcghee  on 05/15/2023.  he was last seen and treated in the clinic on 3/30/2023. he will be medically optimized by the pre op center. There has been no significant change in medical status since last visit. No fever, chills, malaise, or unexplained weight change.      Allergies, Medications, past medical and surgical history reviewed.    Focused examination performed.    Patient saw surgeon in clinic today. All questions answered. Patient encouraged to call with questions. Contact information given.     Pre, diann, and post operative procedures and expectations discussed. Goals of successful surgery reviewed and include manageable pain levels, surgical site free of infection, medication management, and ambulation with PT/OT assistance. Healthy weight management discussed with patient and caregiver who were receptive to eduction of healthy diet and activity. No other necessary lifestyle changes identified. Educated patient about signs and symptoms of infection, medication management, anticoagulation therapy, risk of tobacco and alcohol use, and self-care to promote healing. Surgical guide given for future reference. Hibiclens given to patient with instructions. All questions were answered.     Bandar French verbalized an understanding to the education and goals. Patient has displayed readiness to engage in care and is ready to proceed with surgery.  Patient reports wife is able and ready to provide assistance at home after discharge.    Surgical and blood consents signed.    Bandar French will contact us if there are any questions, concerns, or changes in medical status prior to surgery.       Joint class: 05/04/2023    Patient has discussed discharge planning with surgeon. Patient will be discharged to home following surgery.   patient will be scheduled with  [facility].     30 minutes of time was spent on patient education, review of records, templating, H&P, , appointment scheduling and optimizing patient for surgery.

## 2023-04-27 NOTE — PATIENT INSTRUCTIONS
Preventive perioperative care    Thromboembolic prophylaxis:  His risk factors for thrombosis include surgical procedure, age, and reduced mobility.I suggest  thromboembolic prophylaxis ( mechanical/pharmacological, weighing the risk benefits of pharmacological agent use considering diann procedural bleeding )  during the perioperative period.I suggested being active in the post operative period.      Postoperative pulmonary complication prophylaxis-Risk factors for post operative pulmonary complications include age over 65 years and ASA class >2- I suggest incentive spirometry use, early ambulation, and pain control so as to avoid diaphragmatic splinting  Brush teeth twice per day, oral rinses, sleep with the head of the bed up 30 degrees     Renal complication prophylaxis-Risk factors for renal complications include age and hypertension . I suggest keeping him well hydrated and avoidance/ minimizing the use of  NSAID's,MCINTYRE 2 Inhibitors ,IV contrast if possible in the perioperative period.I suggested drinking 2 litre's of water a day      Surgical site Infection Prophylaxis-I  suggest appropriate antibiotic for Prophylaxis against Surgical site infections Shower with Hibiclens in the night before surgery and the morning of surgery       In view of BPH the patient  is at risk of postoperative urinary retention.  I suggest avoidance / minimizing the of  Benzodiazepines,Anticholinergic medication,antihistamines ( Benadryl) , if possible in the perioperative period. I suggest using the minimum possible use of opioids for the minimum period of time in the perioperative period. Benadryl avoidance suggested      This visit was focused on Preoperative evaluation, Perioperative Medical management, complication reduction plans. I suggest that the patient follows up with primary care or relevant sub specialists for ongoing health care.    I appreciate the opportunity to be involved in this patients care. Please feel free to  contact me if there were any questions about this consultation.    Patient is pending optimization

## 2023-04-27 NOTE — HPI
This is a 72 y.o. male  who presents today for a preoperative evaluation in preparation for a Orthopedic  procedure.  Scheduled for  5/15/23  Surgery is indicated for left knee replacement.   Patient is new to me.  Details of current problem: The duration of problem is 30 years= worsened in the past 10 years    Reports symptoms of  pain, decreased of ROM, muscle weakness   Aggravating factors include:  bending, squatting, prolonged walking  Relieving factors are  elevation rest  Treated with   Ibuprofen  Reports pain: 1/10  The history has been obtained by speaking with the patient and reviewing the electronic medical record and/or outside health information. Significant health conditions for the perioperative period are discussed below in assessment and plan.     Patient reports current health status to be Good.  Denies any new symptoms before surgery.

## 2023-04-29 PROBLEM — D64.9 ANEMIA: Status: ACTIVE | Noted: 2023-04-29

## 2023-05-01 NOTE — ASSESSMENT & PLAN NOTE
Mega Tompkins MD. Interventional Cardiologist   Ok to proceed with planned surgery. Ok to Hold Plavix and continue ASA. Restart Plavix post op once hemostasis achieved.

## 2023-05-05 ENCOUNTER — PATIENT MESSAGE (OUTPATIENT)
Dept: PRIMARY CARE CLINIC | Facility: CLINIC | Age: 73
End: 2023-05-05
Payer: MEDICARE

## 2023-05-05 DIAGNOSIS — D64.9 ANEMIA, UNSPECIFIED TYPE: Primary | ICD-10-CM

## 2023-05-10 ENCOUNTER — PATIENT MESSAGE (OUTPATIENT)
Dept: ORTHOPEDICS | Facility: CLINIC | Age: 73
End: 2023-05-10
Payer: MEDICARE

## 2023-05-12 ENCOUNTER — PATIENT MESSAGE (OUTPATIENT)
Dept: SURGERY | Facility: HOSPITAL | Age: 73
End: 2023-05-12
Payer: MEDICARE

## 2023-05-12 ENCOUNTER — TELEPHONE (OUTPATIENT)
Dept: ORTHOPEDICS | Facility: CLINIC | Age: 73
End: 2023-05-12
Payer: MEDICARE

## 2023-05-12 RX ORDER — DEXTROMETHORPHAN HYDROBROMIDE, GUAIFENESIN 5; 100 MG/5ML; MG/5ML
650 LIQUID ORAL EVERY 8 HOURS
Qty: 120 TABLET | Refills: 0 | Status: SHIPPED | OUTPATIENT
Start: 2023-05-12

## 2023-05-12 RX ORDER — ASPIRIN 81 MG/1
81 TABLET ORAL 2 TIMES DAILY
Qty: 14 TABLET | Refills: 0 | Status: SHIPPED | OUTPATIENT
Start: 2023-05-12 | End: 2023-05-23

## 2023-05-12 RX ORDER — DOCUSATE SODIUM 100 MG/1
100 CAPSULE, LIQUID FILLED ORAL 2 TIMES DAILY
Qty: 60 CAPSULE | Refills: 0 | Status: SHIPPED | OUTPATIENT
Start: 2023-05-12 | End: 2023-06-15

## 2023-05-12 RX ORDER — METHOCARBAMOL 750 MG/1
750 TABLET, FILM COATED ORAL 3 TIMES DAILY PRN
Qty: 21 TABLET | Refills: 0 | Status: SHIPPED | OUTPATIENT
Start: 2023-05-12 | End: 2023-05-23 | Stop reason: SDUPTHER

## 2023-05-12 RX ORDER — CEFADROXIL 500 MG/1
500 CAPSULE ORAL EVERY 12 HOURS
Qty: 14 CAPSULE | Refills: 0 | Status: SHIPPED | OUTPATIENT
Start: 2023-05-12 | End: 2023-05-23 | Stop reason: SDUPTHER

## 2023-05-12 RX ORDER — OXYCODONE HYDROCHLORIDE 5 MG/1
5 TABLET ORAL EVERY 6 HOURS PRN
Qty: 50 TABLET | Refills: 0 | Status: SHIPPED | OUTPATIENT
Start: 2023-05-12 | End: 2023-06-09 | Stop reason: SDUPTHER

## 2023-05-12 RX ORDER — PANTOPRAZOLE SODIUM 40 MG/1
40 TABLET, DELAYED RELEASE ORAL DAILY
Qty: 30 TABLET | Refills: 0 | Status: SHIPPED | OUTPATIENT
Start: 2023-05-12 | End: 2023-06-29

## 2023-05-12 RX ORDER — CELECOXIB 200 MG/1
200 CAPSULE ORAL DAILY
Qty: 30 CAPSULE | Refills: 0 | Status: SHIPPED | OUTPATIENT
Start: 2023-05-12 | End: 2023-06-27 | Stop reason: SDUPTHER

## 2023-05-12 NOTE — TELEPHONE ENCOUNTER
I called the patient today regarding surgery on 5/15/2023 with Dr. Stevenson Mcghee. I informed the patient that his surgery will be at  [facility] ([facility] [place] [address]). I informed the patient they must arrive at 9:00am and their surgery will start at 11:00am.     Per the [facility] COVID-19 Pre-Procedural Testing Policy (administered 10/26/2022), patients do not need tested for COVID-19 regardless of vaccination status.    I reminded the patient that they cannot eat or drink after midnight, the night before surgery.     I reminded the patient to be careful of their skin over the next few days to make sure they do not get any cuts, scratches or scrapes.    The patient verbalized that they have received their walker, bedside commode and shower chair from [facility].    The patient verbalized understanding and has no further questions.

## 2023-05-15 ENCOUNTER — HOSPITAL ENCOUNTER (OUTPATIENT)
Facility: HOSPITAL | Age: 73
Discharge: HOME | End: 2023-05-16
Attending: ORTHOPAEDIC SURGERY | Admitting: ORTHOPAEDIC SURGERY
Payer: MEDICARE

## 2023-05-15 ENCOUNTER — ANESTHESIA EVENT (OUTPATIENT)
Dept: SURGERY | Facility: HOSPITAL | Age: 73
End: 2023-05-15
Payer: MEDICARE

## 2023-05-15 ENCOUNTER — ANESTHESIA (OUTPATIENT)
Dept: SURGERY | Facility: HOSPITAL | Age: 73
End: 2023-05-15
Payer: MEDICARE

## 2023-05-15 DIAGNOSIS — M17.12 PRIMARY OSTEOARTHRITIS OF LEFT KNEE: ICD-10-CM

## 2023-05-15 LAB
POCT GLUCOSE: 117 MG/DL (ref 70–110)
POCT GLUCOSE: 119 MG/DL (ref 70–110)
POCT GLUCOSE: 213 MG/DL (ref 70–110)

## 2023-05-15 PROCEDURE — 97116 GAIT TRAINING THERAPY: CPT

## 2023-05-15 PROCEDURE — 0055T BONE SRGRY CMPTR CT/MRI IMAG: CPT | Mod: HCNC,,, | Performed by: ORTHOPAEDIC SURGERY

## 2023-05-15 PROCEDURE — 27447 TOTAL KNEE ARTHROPLASTY: CPT | Mod: 22,HCNC,LT, | Performed by: ORTHOPAEDIC SURGERY

## 2023-05-15 PROCEDURE — 25000003 PHARM REV CODE 250: Performed by: ORTHOPAEDIC SURGERY

## 2023-05-15 PROCEDURE — 97161 PT EVAL LOW COMPLEX 20 MIN: CPT

## 2023-05-15 PROCEDURE — D9220A PRA ANESTHESIA: ICD-10-PCS | Mod: ANES,,, | Performed by: ANESTHESIOLOGY

## 2023-05-15 PROCEDURE — 25000003 PHARM REV CODE 250: Performed by: PHYSICIAN ASSISTANT

## 2023-05-15 PROCEDURE — C1713 ANCHOR/SCREW BN/BN,TIS/BN: HCPCS | Performed by: ORTHOPAEDIC SURGERY

## 2023-05-15 PROCEDURE — 94761 N-INVAS EAR/PLS OXIMETRY MLT: CPT

## 2023-05-15 PROCEDURE — 94799 UNLISTED PULMONARY SVC/PX: CPT

## 2023-05-15 PROCEDURE — 25000003 PHARM REV CODE 250: Performed by: STUDENT IN AN ORGANIZED HEALTH CARE EDUCATION/TRAINING PROGRAM

## 2023-05-15 PROCEDURE — 71000033 HC RECOVERY, INTIAL HOUR: Performed by: ORTHOPAEDIC SURGERY

## 2023-05-15 PROCEDURE — D9220A PRA ANESTHESIA: ICD-10-PCS | Mod: CRNA,,, | Performed by: NURSE ANESTHETIST, CERTIFIED REGISTERED

## 2023-05-15 PROCEDURE — D9220A PRA ANESTHESIA: Mod: ANES,,, | Performed by: ANESTHESIOLOGY

## 2023-05-15 PROCEDURE — 63600175 PHARM REV CODE 636 W HCPCS: Performed by: ORTHOPAEDIC SURGERY

## 2023-05-15 PROCEDURE — 36000713 HC OR TIME LEV V EA ADD 15 MIN: Performed by: ORTHOPAEDIC SURGERY

## 2023-05-15 PROCEDURE — 99900035 HC TECH TIME PER 15 MIN (STAT)

## 2023-05-15 PROCEDURE — 36000712 HC OR TIME LEV V 1ST 15 MIN: Performed by: ORTHOPAEDIC SURGERY

## 2023-05-15 PROCEDURE — 37000008 HC ANESTHESIA 1ST 15 MINUTES: Performed by: ORTHOPAEDIC SURGERY

## 2023-05-15 PROCEDURE — 63600175 PHARM REV CODE 636 W HCPCS: Performed by: PHYSICIAN ASSISTANT

## 2023-05-15 PROCEDURE — D9220A PRA ANESTHESIA: Mod: CRNA,,, | Performed by: NURSE ANESTHETIST, CERTIFIED REGISTERED

## 2023-05-15 PROCEDURE — 71000039 HC RECOVERY, EACH ADD'L HOUR: Performed by: ORTHOPAEDIC SURGERY

## 2023-05-15 PROCEDURE — C1776 JOINT DEVICE (IMPLANTABLE): HCPCS | Performed by: ORTHOPAEDIC SURGERY

## 2023-05-15 PROCEDURE — 27201423 OPTIME MED/SURG SUP & DEVICES STERILE SUPPLY: Performed by: ORTHOPAEDIC SURGERY

## 2023-05-15 PROCEDURE — 25000003 PHARM REV CODE 250: Performed by: NURSE ANESTHETIST, CERTIFIED REGISTERED

## 2023-05-15 PROCEDURE — 27447 PR TOTAL KNEE ARTHROPLASTY: ICD-10-PCS | Mod: 22,HCNC,LT, | Performed by: ORTHOPAEDIC SURGERY

## 2023-05-15 PROCEDURE — 0055T PR COMPUTER-ASSIST MUSCSKEL NAVIG, ORTHO PROC, CT/MRI: ICD-10-PCS | Mod: HCNC,,, | Performed by: ORTHOPAEDIC SURGERY

## 2023-05-15 PROCEDURE — 25000003 PHARM REV CODE 250: Performed by: ANESTHESIOLOGY

## 2023-05-15 PROCEDURE — 82962 GLUCOSE BLOOD TEST: CPT | Performed by: ORTHOPAEDIC SURGERY

## 2023-05-15 PROCEDURE — 63600175 PHARM REV CODE 636 W HCPCS: Performed by: STUDENT IN AN ORGANIZED HEALTH CARE EDUCATION/TRAINING PROGRAM

## 2023-05-15 PROCEDURE — 63600175 PHARM REV CODE 636 W HCPCS: Performed by: ANESTHESIOLOGY

## 2023-05-15 PROCEDURE — 63600175 PHARM REV CODE 636 W HCPCS: Performed by: NURSE ANESTHETIST, CERTIFIED REGISTERED

## 2023-05-15 PROCEDURE — 37000009 HC ANESTHESIA EA ADD 15 MINS: Performed by: ORTHOPAEDIC SURGERY

## 2023-05-15 DEVICE — BASEPLATE TRIATHLON TS SZ4: Type: IMPLANTABLE DEVICE | Site: KNEE | Status: FUNCTIONAL

## 2023-05-15 DEVICE — INSERT TIB CS POLYETH 11MM 4: Type: IMPLANTABLE DEVICE | Site: KNEE | Status: FUNCTIONAL

## 2023-05-15 DEVICE — FEMORAL CRUC RTN CEM SZ 4 LEFT: Type: IMPLANTABLE DEVICE | Site: KNEE | Status: FUNCTIONAL

## 2023-05-15 DEVICE — CEMENT BONE SURG SMPLX P RADPQ: Type: IMPLANTABLE DEVICE | Site: KNEE | Status: FUNCTIONAL

## 2023-05-15 DEVICE — PATELLA TRIATHLON 33X9 SYMTRC: Type: IMPLANTABLE DEVICE | Site: KNEE | Status: FUNCTIONAL

## 2023-05-15 RX ORDER — FENTANYL CITRATE 50 UG/ML
25 INJECTION, SOLUTION INTRAMUSCULAR; INTRAVENOUS EVERY 5 MIN PRN
Status: COMPLETED | OUTPATIENT
Start: 2023-05-15 | End: 2023-05-15

## 2023-05-15 RX ORDER — SODIUM CHLORIDE 9 MG/ML
INJECTION, SOLUTION INTRAVENOUS CONTINUOUS
Status: DISCONTINUED | OUTPATIENT
Start: 2023-05-15 | End: 2023-05-16 | Stop reason: HOSPADM

## 2023-05-15 RX ORDER — PROCHLORPERAZINE EDISYLATE 5 MG/ML
5 INJECTION INTRAMUSCULAR; INTRAVENOUS EVERY 6 HOURS PRN
Status: DISCONTINUED | OUTPATIENT
Start: 2023-05-15 | End: 2023-05-16 | Stop reason: HOSPADM

## 2023-05-15 RX ORDER — ONDANSETRON 2 MG/ML
4 INJECTION INTRAMUSCULAR; INTRAVENOUS EVERY 8 HOURS PRN
Status: DISCONTINUED | OUTPATIENT
Start: 2023-05-15 | End: 2023-05-16 | Stop reason: HOSPADM

## 2023-05-15 RX ORDER — NICARDIPINE HYDROCHLORIDE 2.5 MG/ML
INJECTION INTRAVENOUS
Status: DISCONTINUED | OUTPATIENT
Start: 2023-05-15 | End: 2023-05-15

## 2023-05-15 RX ORDER — OXYCODONE HYDROCHLORIDE 5 MG/1
5 TABLET ORAL
Status: DISCONTINUED | OUTPATIENT
Start: 2023-05-15 | End: 2023-05-16 | Stop reason: HOSPADM

## 2023-05-15 RX ORDER — FAMOTIDINE 10 MG/ML
INJECTION INTRAVENOUS
Status: DISCONTINUED | OUTPATIENT
Start: 2023-05-15 | End: 2023-05-15

## 2023-05-15 RX ORDER — SODIUM CHLORIDE 0.9 % (FLUSH) 0.9 %
10 SYRINGE (ML) INJECTION
Status: DISCONTINUED | OUTPATIENT
Start: 2023-05-15 | End: 2023-05-15 | Stop reason: HOSPADM

## 2023-05-15 RX ORDER — NALOXONE HCL 0.4 MG/ML
0.02 VIAL (ML) INJECTION
Status: DISCONTINUED | OUTPATIENT
Start: 2023-05-15 | End: 2023-05-16 | Stop reason: HOSPADM

## 2023-05-15 RX ORDER — FENTANYL CITRATE 50 UG/ML
INJECTION, SOLUTION INTRAMUSCULAR; INTRAVENOUS
Status: DISCONTINUED | OUTPATIENT
Start: 2023-05-15 | End: 2023-05-15

## 2023-05-15 RX ORDER — MIDAZOLAM HYDROCHLORIDE 1 MG/ML
4 INJECTION INTRAMUSCULAR; INTRAVENOUS
Status: DISCONTINUED | OUTPATIENT
Start: 2023-05-15 | End: 2023-05-15 | Stop reason: HOSPADM

## 2023-05-15 RX ORDER — TALC
6 POWDER (GRAM) TOPICAL NIGHTLY PRN
Status: DISCONTINUED | OUTPATIENT
Start: 2023-05-15 | End: 2023-05-15 | Stop reason: HOSPADM

## 2023-05-15 RX ORDER — LIDOCAINE HYDROCHLORIDE 10 MG/ML
1 INJECTION, SOLUTION EPIDURAL; INFILTRATION; INTRACAUDAL; PERINEURAL
Status: DISCONTINUED | OUTPATIENT
Start: 2023-05-15 | End: 2023-05-15 | Stop reason: HOSPADM

## 2023-05-15 RX ORDER — MUPIROCIN 20 MG/G
1 OINTMENT TOPICAL
Status: COMPLETED | OUTPATIENT
Start: 2023-05-15 | End: 2023-05-15

## 2023-05-15 RX ORDER — PREGABALIN 75 MG/1
75 CAPSULE ORAL NIGHTLY
Status: DISCONTINUED | OUTPATIENT
Start: 2023-05-15 | End: 2023-05-16 | Stop reason: HOSPADM

## 2023-05-15 RX ORDER — ACETAMINOPHEN 500 MG
1000 TABLET ORAL EVERY 6 HOURS
Status: DISCONTINUED | OUTPATIENT
Start: 2023-05-15 | End: 2023-05-16 | Stop reason: HOSPADM

## 2023-05-15 RX ORDER — ACETAMINOPHEN 500 MG
1000 TABLET ORAL
Status: COMPLETED | OUTPATIENT
Start: 2023-05-15 | End: 2023-05-15

## 2023-05-15 RX ORDER — SODIUM CHLORIDE 9 MG/ML
INJECTION, SOLUTION INTRAVENOUS
Status: COMPLETED | OUTPATIENT
Start: 2023-05-15 | End: 2023-05-15

## 2023-05-15 RX ORDER — PREGABALIN 75 MG/1
75 CAPSULE ORAL
Status: COMPLETED | OUTPATIENT
Start: 2023-05-15 | End: 2023-05-15

## 2023-05-15 RX ORDER — CELECOXIB 200 MG/1
400 CAPSULE ORAL ONCE
Status: COMPLETED | OUTPATIENT
Start: 2023-05-15 | End: 2023-05-15

## 2023-05-15 RX ORDER — INSULIN ASPART 100 [IU]/ML
0-5 INJECTION, SOLUTION INTRAVENOUS; SUBCUTANEOUS
Status: DISCONTINUED | OUTPATIENT
Start: 2023-05-15 | End: 2023-05-16 | Stop reason: HOSPADM

## 2023-05-15 RX ORDER — VANCOMYCIN HYDROCHLORIDE 1 G/20ML
INJECTION, POWDER, LYOPHILIZED, FOR SOLUTION INTRAVENOUS
Status: DISCONTINUED | OUTPATIENT
Start: 2023-05-15 | End: 2023-05-15 | Stop reason: HOSPADM

## 2023-05-15 RX ORDER — TRANEXAMIC ACID 100 MG/ML
INJECTION, SOLUTION INTRAVENOUS
Status: DISCONTINUED | OUTPATIENT
Start: 2023-05-15 | End: 2023-05-15 | Stop reason: HOSPADM

## 2023-05-15 RX ORDER — GLUCAGON 1 MG
1 KIT INJECTION
Status: DISCONTINUED | OUTPATIENT
Start: 2023-05-15 | End: 2023-05-16 | Stop reason: HOSPADM

## 2023-05-15 RX ORDER — ROPIVACAINE/EPI/CLONIDINE/KET 2.46-0.005
SYRINGE (ML) INJECTION
Status: DISCONTINUED | OUTPATIENT
Start: 2023-05-15 | End: 2023-05-15 | Stop reason: HOSPADM

## 2023-05-15 RX ORDER — FENTANYL CITRATE 50 UG/ML
100 INJECTION, SOLUTION INTRAMUSCULAR; INTRAVENOUS
Status: DISCONTINUED | OUTPATIENT
Start: 2023-05-15 | End: 2023-05-15 | Stop reason: HOSPADM

## 2023-05-15 RX ORDER — METHOCARBAMOL 750 MG/1
750 TABLET, FILM COATED ORAL 3 TIMES DAILY
Status: DISCONTINUED | OUTPATIENT
Start: 2023-05-15 | End: 2023-05-16 | Stop reason: HOSPADM

## 2023-05-15 RX ORDER — DEXTROSE 40 %
15 GEL (GRAM) ORAL
Status: DISCONTINUED | OUTPATIENT
Start: 2023-05-15 | End: 2023-05-16 | Stop reason: HOSPADM

## 2023-05-15 RX ORDER — MIDAZOLAM HYDROCHLORIDE 1 MG/ML
INJECTION, SOLUTION INTRAMUSCULAR; INTRAVENOUS
Status: DISCONTINUED | OUTPATIENT
Start: 2023-05-15 | End: 2023-05-15

## 2023-05-15 RX ORDER — FAMOTIDINE 20 MG/1
20 TABLET, FILM COATED ORAL 2 TIMES DAILY
Status: DISCONTINUED | OUTPATIENT
Start: 2023-05-15 | End: 2023-05-16 | Stop reason: HOSPADM

## 2023-05-15 RX ORDER — MUPIROCIN 20 MG/G
1 OINTMENT TOPICAL 2 TIMES DAILY
Status: DISCONTINUED | OUTPATIENT
Start: 2023-05-15 | End: 2023-05-16 | Stop reason: HOSPADM

## 2023-05-15 RX ORDER — EPHEDRINE SULFATE 50 MG/ML
INJECTION, SOLUTION INTRAVENOUS
Status: DISCONTINUED | OUTPATIENT
Start: 2023-05-15 | End: 2023-05-15

## 2023-05-15 RX ORDER — POLYETHYLENE GLYCOL 3350 17 G/17G
17 POWDER, FOR SOLUTION ORAL DAILY
Status: DISCONTINUED | OUTPATIENT
Start: 2023-05-16 | End: 2023-05-16 | Stop reason: HOSPADM

## 2023-05-15 RX ORDER — KETAMINE HCL IN 0.9 % NACL 50 MG/5 ML
SYRINGE (ML) INTRAVENOUS
Status: DISCONTINUED | OUTPATIENT
Start: 2023-05-15 | End: 2023-05-15

## 2023-05-15 RX ORDER — DEXTROSE 40 %
30 GEL (GRAM) ORAL
Status: DISCONTINUED | OUTPATIENT
Start: 2023-05-15 | End: 2023-05-16 | Stop reason: HOSPADM

## 2023-05-15 RX ORDER — OXYCODONE HYDROCHLORIDE 10 MG/1
10 TABLET ORAL
Status: DISCONTINUED | OUTPATIENT
Start: 2023-05-15 | End: 2023-05-16 | Stop reason: HOSPADM

## 2023-05-15 RX ORDER — DEXAMETHASONE SODIUM PHOSPHATE 4 MG/ML
INJECTION, SOLUTION INTRA-ARTICULAR; INTRALESIONAL; INTRAMUSCULAR; INTRAVENOUS; SOFT TISSUE
Status: DISCONTINUED | OUTPATIENT
Start: 2023-05-15 | End: 2023-05-15

## 2023-05-15 RX ORDER — ASPIRIN 81 MG/1
81 TABLET ORAL 2 TIMES DAILY
Status: DISCONTINUED | OUTPATIENT
Start: 2023-05-15 | End: 2023-05-16 | Stop reason: HOSPADM

## 2023-05-15 RX ORDER — FENTANYL CITRATE 50 UG/ML
25 INJECTION, SOLUTION INTRAMUSCULAR; INTRAVENOUS EVERY 5 MIN PRN
Status: DISCONTINUED | OUTPATIENT
Start: 2023-05-15 | End: 2023-05-15 | Stop reason: HOSPADM

## 2023-05-15 RX ORDER — ONDANSETRON 2 MG/ML
4 INJECTION INTRAMUSCULAR; INTRAVENOUS DAILY PRN
Status: DISCONTINUED | OUTPATIENT
Start: 2023-05-15 | End: 2023-05-15 | Stop reason: HOSPADM

## 2023-05-15 RX ORDER — PROPOFOL 10 MG/ML
VIAL (ML) INTRAVENOUS CONTINUOUS PRN
Status: DISCONTINUED | OUTPATIENT
Start: 2023-05-15 | End: 2023-05-15

## 2023-05-15 RX ORDER — ONDANSETRON 2 MG/ML
INJECTION INTRAMUSCULAR; INTRAVENOUS
Status: DISCONTINUED | OUTPATIENT
Start: 2023-05-15 | End: 2023-05-15

## 2023-05-15 RX ORDER — BISACODYL 10 MG
10 SUPPOSITORY, RECTAL RECTAL EVERY 12 HOURS PRN
Status: DISCONTINUED | OUTPATIENT
Start: 2023-05-15 | End: 2023-05-16 | Stop reason: HOSPADM

## 2023-05-15 RX ORDER — LATANOPROST 50 UG/ML
1 SOLUTION/ DROPS OPHTHALMIC NIGHTLY
Status: DISCONTINUED | OUTPATIENT
Start: 2023-05-15 | End: 2023-05-16 | Stop reason: HOSPADM

## 2023-05-15 RX ORDER — OXYCODONE HYDROCHLORIDE 5 MG/1
5 TABLET ORAL
Status: DISCONTINUED | OUTPATIENT
Start: 2023-05-15 | End: 2023-05-15 | Stop reason: HOSPADM

## 2023-05-15 RX ORDER — LIDOCAINE HYDROCHLORIDE 20 MG/ML
INJECTION INTRAVENOUS
Status: DISCONTINUED | OUTPATIENT
Start: 2023-05-15 | End: 2023-05-15

## 2023-05-15 RX ORDER — HALOPERIDOL 5 MG/ML
0.5 INJECTION INTRAMUSCULAR EVERY 10 MIN PRN
Status: DISCONTINUED | OUTPATIENT
Start: 2023-05-15 | End: 2023-05-15 | Stop reason: HOSPADM

## 2023-05-15 RX ORDER — AMOXICILLIN 250 MG
1 CAPSULE ORAL 2 TIMES DAILY
Status: DISCONTINUED | OUTPATIENT
Start: 2023-05-15 | End: 2023-05-16 | Stop reason: HOSPADM

## 2023-05-15 RX ORDER — MORPHINE SULFATE 2 MG/ML
2 INJECTION, SOLUTION INTRAMUSCULAR; INTRAVENOUS
Status: DISCONTINUED | OUTPATIENT
Start: 2023-05-15 | End: 2023-05-16 | Stop reason: HOSPADM

## 2023-05-15 RX ADMIN — FENTANYL CITRATE 25 MCG: 50 INJECTION, SOLUTION INTRAMUSCULAR; INTRAVENOUS at 03:05

## 2023-05-15 RX ADMIN — CELECOXIB 400 MG: 200 CAPSULE ORAL at 09:05

## 2023-05-15 RX ADMIN — CEFAZOLIN 2 G: 2 INJECTION, POWDER, FOR SOLUTION INTRAMUSCULAR; INTRAVENOUS at 09:05

## 2023-05-15 RX ADMIN — SODIUM CHLORIDE: 9 INJECTION, SOLUTION INTRAVENOUS at 03:05

## 2023-05-15 RX ADMIN — FAMOTIDINE 20 MG: 10 INJECTION, SOLUTION INTRAVENOUS at 12:05

## 2023-05-15 RX ADMIN — ACETAMINOPHEN 1000 MG: 500 TABLET ORAL at 11:05

## 2023-05-15 RX ADMIN — NICARDIPINE HYDROCHLORIDE 0.4 MG: 25 INJECTION INTRAVENOUS at 12:05

## 2023-05-15 RX ADMIN — FENTANYL CITRATE 25 MCG: 50 INJECTION, SOLUTION INTRAMUSCULAR; INTRAVENOUS at 04:05

## 2023-05-15 RX ADMIN — VANCOMYCIN HYDROCHLORIDE 1500 MG: 1.5 INJECTION, POWDER, LYOPHILIZED, FOR SOLUTION INTRAVENOUS at 09:05

## 2023-05-15 RX ADMIN — FAMOTIDINE 20 MG: 20 TABLET ORAL at 09:05

## 2023-05-15 RX ADMIN — ONDANSETRON 4 MG: 2 INJECTION, SOLUTION INTRAMUSCULAR; INTRAVENOUS at 12:05

## 2023-05-15 RX ADMIN — DEXAMETHASONE SODIUM PHOSPHATE 8 MG: 4 INJECTION, SOLUTION INTRAMUSCULAR; INTRAVENOUS at 12:05

## 2023-05-15 RX ADMIN — MUPIROCIN 1 G: 20 OINTMENT TOPICAL at 09:05

## 2023-05-15 RX ADMIN — OXYCODONE HYDROCHLORIDE 5 MG: 5 TABLET ORAL at 11:05

## 2023-05-15 RX ADMIN — METHOCARBAMOL 750 MG: 750 TABLET ORAL at 09:05

## 2023-05-15 RX ADMIN — LATANOPROST 1 DROP: 50 SOLUTION OPHTHALMIC at 09:05

## 2023-05-15 RX ADMIN — ACETAMINOPHEN 1000 MG: 500 TABLET ORAL at 09:05

## 2023-05-15 RX ADMIN — SODIUM CHLORIDE, SODIUM GLUCONATE, SODIUM ACETATE, POTASSIUM CHLORIDE, MAGNESIUM CHLORIDE, SODIUM PHOSPHATE, DIBASIC, AND POTASSIUM PHOSPHATE: .53; .5; .37; .037; .03; .012; .00082 INJECTION, SOLUTION INTRAVENOUS at 01:05

## 2023-05-15 RX ADMIN — LIDOCAINE HYDROCHLORIDE 100 MG: 20 INJECTION INTRAVENOUS at 12:05

## 2023-05-15 RX ADMIN — INSULIN ASPART 1 UNITS: 100 INJECTION, SOLUTION INTRAVENOUS; SUBCUTANEOUS at 09:05

## 2023-05-15 RX ADMIN — TRANEXAMIC ACID 1000 MG: 100 INJECTION, SOLUTION INTRAVENOUS at 02:05

## 2023-05-15 RX ADMIN — METHOCARBAMOL 750 MG: 750 TABLET ORAL at 03:05

## 2023-05-15 RX ADMIN — SODIUM CHLORIDE, SODIUM GLUCONATE, SODIUM ACETATE, POTASSIUM CHLORIDE, MAGNESIUM CHLORIDE, SODIUM PHOSPHATE, DIBASIC, AND POTASSIUM PHOSPHATE: .53; .5; .37; .037; .03; .012; .00082 INJECTION, SOLUTION INTRAVENOUS at 12:05

## 2023-05-15 RX ADMIN — SENNOSIDES AND DOCUSATE SODIUM 1 TABLET: 50; 8.6 TABLET ORAL at 09:05

## 2023-05-15 RX ADMIN — FENTANYL CITRATE 50 MCG: 50 INJECTION, SOLUTION INTRAMUSCULAR; INTRAVENOUS at 12:05

## 2023-05-15 RX ADMIN — Medication 5 MG: at 12:05

## 2023-05-15 RX ADMIN — MIDAZOLAM HYDROCHLORIDE 2 MG: 1 INJECTION, SOLUTION INTRAMUSCULAR; INTRAVENOUS at 12:05

## 2023-05-15 RX ADMIN — PROPOFOL 100 MCG/KG/MIN: 10 INJECTION, EMULSION INTRAVENOUS at 12:05

## 2023-05-15 RX ADMIN — OXYCODONE HYDROCHLORIDE 5 MG: 5 TABLET ORAL at 03:05

## 2023-05-15 RX ADMIN — PREGABALIN 75 MG: 75 CAPSULE ORAL at 09:05

## 2023-05-15 RX ADMIN — ASPIRIN 81 MG: 81 TABLET, COATED ORAL at 09:05

## 2023-05-15 RX ADMIN — CEFAZOLIN 2 G: 2 INJECTION, POWDER, FOR SOLUTION INTRAMUSCULAR; INTRAVENOUS at 12:05

## 2023-05-15 RX ADMIN — SODIUM CHLORIDE: 0.9 INJECTION, SOLUTION INTRAVENOUS at 11:05

## 2023-05-15 RX ADMIN — SODIUM CHLORIDE: 9 INJECTION, SOLUTION INTRAVENOUS at 11:05

## 2023-05-15 RX ADMIN — MEPIVACAINE HYDROCHLORIDE 3 ML: 20 INJECTION, SOLUTION EPIDURAL; INFILTRATION at 12:05

## 2023-05-15 RX ADMIN — ACETAMINOPHEN 1000 MG: 500 TABLET ORAL at 06:05

## 2023-05-15 RX ADMIN — SODIUM CHLORIDE: 0.9 INJECTION, SOLUTION INTRAVENOUS at 09:05

## 2023-05-15 RX ADMIN — EPHEDRINE SULFATE 5 MG: 50 INJECTION INTRAVENOUS at 01:05

## 2023-05-15 RX ADMIN — TRANEXAMIC ACID 1000 MG: 100 INJECTION, SOLUTION INTRAVENOUS at 12:05

## 2023-05-15 NOTE — ANESTHESIA PROCEDURE NOTES
Spinal    Diagnosis: Left knee osteoarthitis  Patient location during procedure: OR  Start time: 5/15/2023 12:21 PM  Timeout: 5/15/2023 12:20 PM  End time: 5/15/2023 12:30 PM    Staffing  Authorizing Provider: Shelley Barkley MD  Performing Provider: Shelley Barkley MD    Spinal Block  Patient position: sitting  Prep: ChloraPrep  Patient monitoring: heart rate, cardiac monitor, continuous pulse ox, continuous capnometry and frequent blood pressure checks  Approach: midline  Location: L3-4  Injection technique: single shot  CSF Fluid: clear free-flowing CSF  Needle  Needle length: 5 in  Additional Documentation: incremental injection  Needle localization: anatomical landmarks  Assessment  Sensory level: T8  Ease of block: moderate  Patient's tolerance of the procedure: comfortable throughout block and no complaints  Medications:    Medications: mepivacaine (CARBOCAINE) injection 20 mg/mL (2%) - Intrathecal   3 mL - 5/15/2023 12:25:00 PM

## 2023-05-15 NOTE — ANESTHESIA POSTPROCEDURE EVALUATION
Anesthesia Post Evaluation    Patient: Bandar French    Procedure(s) Performed: Procedure(s) (LRB):  ARTHROPLASTY, KNEE, TOTAL, USING COMPUTER-ASSISTED NAVIGATION: MORELIA: LEFT: SASCHA - TAHIRA (Left)    Final Anesthesia Type: spinal      Patient location during evaluation: PACU  Patient participation: Yes- Able to Participate  Level of consciousness: awake and alert and oriented  Post-procedure vital signs: reviewed and stable  Pain management: adequate  Airway patency: patent    PONV status at discharge: No PONV  Anesthetic complications: no      Cardiovascular status: hemodynamically stable  Respiratory status: nasal cannula  Hydration status: euvolemic  Follow-up not needed.          Vitals Value Taken Time   /71 05/15/23 1649   Temp 36 °C (96.8 °F) 05/15/23 1649   Pulse 60 05/15/23 1649   Resp 16 05/15/23 1649   SpO2 96 % 05/15/23 1649         Event Time   Out of Recovery 16:35:00         Pain/Holden Score: Pain Rating Prior to Med Admin: 7 (5/15/2023  4:08 PM)  Pain Rating Post Med Admin: 7 (5/15/2023  4:08 PM)  Holden Score: 8 (5/15/2023  4:15 PM)

## 2023-05-15 NOTE — SUBJECTIVE & OBJECTIVE
Principal Problem:Primary osteoarthritis of left knee    Principal Orthopedic Problem: Same    Interval History: Pt seen and examined at bedside. NAEON. Pain controlled.  VSS, AF. No other concerns stated. Urinating.     Review of patient's allergies indicates:  No Known Allergies    Current Facility-Administered Medications   Medication    0.9%  NaCl infusion    acetaminophen tablet 1,000 mg    ceFAZolin 2 g in dextrose 5 % in water (D5W) 5 % 50 mL IVPB (MB+)    fentaNYL 50 mcg/mL injection 100 mcg    fentaNYL 50 mcg/mL injection 25 mcg    fentaNYL 50 mcg/mL injection 25 mcg    haloperidol lactate injection 0.5 mg    LIDOcaine (PF) 10 mg/ml (1%) injection 10 mg    melatonin tablet 6 mg    methocarbamoL tablet 750 mg    midazolam (VERSED) 1 mg/mL injection 4 mg    morphine injection 2 mg    naloxone 0.4 mg/mL injection 0.02 mg    ondansetron injection 4 mg    ondansetron injection 4 mg    oxyCODONE immediate release tablet 10 mg    oxyCODONE immediate release tablet 5 mg    oxyCODONE immediate release tablet 5 mg    pregabalin capsule 75 mg    prochlorperazine injection Soln 5 mg    sodium chloride 0.9% flush 10 mL    tranexamic acid (CYKLOKAPRON) 1,000 mg in sodium chloride 0.9 % 100 mL IVPB (MB+)    tranexamic acid (CYKLOKAPRON) 3,000 mg in sodium chloride 0.9% SolP 100 mL     Facility-Administered Medications Ordered in Other Encounters   Medication    dexAMETHasone injection    ePHEDrine sulfate    famotidine (PF) injection    isolyte infusion    ketamine in 0.9 % sod chloride 50 mg/5 mL (10 mg/mL) injection    LIDOcaine (cardiac) injection    midazolam injection    niCARdipine injection    ondansetron injection    propofol (DIPRIVAN) 10 mg/mL infusion    sodium chloride 0.9% infusion     Objective:     Vital Signs (Most Recent):  Temp: 97.6 °F (36.4 °C) (05/15/23 1517)  Pulse: (!) 55 (05/15/23 1527)  Resp: 17 (05/15/23 1529)  BP: 116/68 (05/15/23 1517)  SpO2: 99 % (05/15/23 1527) Vital Signs (24h Range):  Temp:  " [97.6 °F (36.4 °C)-98.8 °F (37.1 °C)] 97.6 °F (36.4 °C)  Pulse:  [55-75] 55  Resp:  [16-20] 17  SpO2:  [99 %] 99 %  BP: (114-116)/(61-68) 116/68     Weight: 79.4 kg (175 lb)  Height: 5' 10" (177.8 cm)  Body mass index is 25.11 kg/m².      Intake/Output Summary (Last 24 hours) at 5/15/2023 1529  Last data filed at 5/15/2023 1419  Gross per 24 hour   Intake 2000 ml   Output 200 ml   Net 1800 ml        Ortho/SPM Exam    Vitals: Afebrile.  Vital signs stable.  General: No acute distress.  Cardio: Regular rate.  Chest: No increased work of breathing.    Left Lower Extremity Exam    - Dressing c/d/i  - Quad and hip flexor intact  - Compartments soft and compressible  - ROM full (eversion,inversion,plantar/dorsiflexion)  - TA/EHL/Gastroc/FHL assessed in isolation without deficit  - SILT throughout  - DP and PT palpated  2+  - Capillary Refill <3s    Significant Labs: All pertinent labs within the past 24 hours have been reviewed.    Significant Imaging: I have reviewed and interpreted all pertinent imaging results/findings.  "

## 2023-05-15 NOTE — OP NOTE
[facility] Left TKA  OPERATIVE NOTE    Date of Operation:   5/15/2023    Providers Performing:   Surgeon(s):  Stevenson Mcghee III, MD  Assistant: Ken Peters MD    Operative Procedure:   Left Total Knee Arthroplasty (MAKOplasty) CPT 56552  Computer assisted surgical navigation CPT 0055T    -22 modifier for conversion from previous open knee surgery requiring prolonged operative time and increased case complexity and difficulty      Preoperative Diagnosis:   Left Knee Osteoarthritis, ICD-10 M17.12    Previous medial and lateral open knee debridement 1967    Postoperative Diagnosis:   SAME    Components Used:   [place]  Femur: Triathlon CR Size 4  Tibia: Triathlon universal tibial baseplate Size 4  Patella: Triathlon conv Symmetric Patella 33 mm  Tibial Insert: Triathlon fixed bearing CS inset size 11 mm, conv poly  2 packs cement: Simplex    Implant Name Type Inv. Item Serial No.  Lot No. LRB No. Used Action   CEMENT BONE SURG SMPLX P RADPQ - [id number]  CEMENT BONE SURG SMPLX P RADPQ  SASCHA SALES ANA LUISA. [id number] Left 2 Implanted   PATELLA TRIATHLON 33X9 SYMTRC - [id number]  PATELLA TRIATHLON 33X9 SYMTRC  SASCHA [employer]ES ANA LUISA. [id number] Left 1 Implanted   BASEPLATE TRIATHLON TS SZ4 - [id number]  BASEPLATE TRIATHLON TS SZ4  SASCHA SALES ANA LUISA. [id number] Left 1 Implanted   FEMORAL CRUC RTN ANA SZ 4 LEFT - [id number]  FEMORAL CRUC RTN ANA SZ 4 LEFT  SASCHA SALES ANA LUISA. [id number] Left 1 Implanted   INSERT TIB CS POLYETH 11MM 4 - [id number]  INSERT TIB CS POLYETH 11MM 4  SASCHA SALES ANA LUISA. [id number] Left 1 Implanted       Anesthesia:   Spinal+catheter    ARIN cocktail: MICAH    Estimated Blood Loss:   350 cc    Specimens:   None    Complications:   None    Indications:   The patient has failed non-operative measures including medications, injections and activity modifications for their knee.  After an explanation of risks and benefits of knee arthroplasty surgery, the patient wishes to proceed with surgery.    Operative  Notes:   The patient was greeted in the pre-op holding area.  The patients knee was marked with a surgical marker by the surgeon.  Spinal-Epidural anesthesia was administered by the anesthesia team.  The patient was placed in the supine position on the OR table with all bony prominences padded.  A tourniquet was not used.  IV antibiotics were administered.  The leg was prepped and draped in the usual sterile fashion.  A timeout was performed and the correct patient, site and procedure were identified.    The femoral and tibial guide pins where placed with pre-drilling and the arrays were positioned and tightened to the pins.     A midline incision was made with a standard medial parapatellar arthrotomy.  The standard medial capsular release was performed along with the lateral gutter.  The patella was mobilized from the lateral parapatellar ligament and bony osteophytes were removed.  The intercondylar notch was cleared of the ACL.    The hip was then brought through a range of motion and the hip center was identified, medial and lateral malleolus were identified and then began the registration process femur was registered first. The tibia was then registered. We were satisfied with the registration about the femoral and tibial size, it corresponded well on CT scan. Osteophytes were removed. We checked our alignment.     The joint was tensioned in extension and at 90 degrees of flexion to define our gaps. Working with the [place] Robot Tech, the implants were positioned virtually for appropriate size gaps and implant placement.     At this point, the [employer] robotic arm was brought in. It was registered. We cut the femur and tibia. Care was taken during the burring process to protect the soft tissue.  Meniscal remnants were removed following burring.  The knee was brought into flexion and posterior osteophytes were removed.      At this time the trial implants were placed and knee assessed for stability, and flexion  arc. The patella was prepared using free-hand technique and the three-holed lug drill guide was sized and appropriately assessed. Patella tracking was found to be acceptable. The tibial component rotation was marked. The trials were removed. The tibial component was positioned and the keel was drilled and punched.    The wound was copiously irrigated with pulsitile lavage and the bony surfaces dried.  Cement was mixed on the back table and applied to all components.  Cementation of the femoral, tibial and patellar components was performed sequentially with removal of all excess cement. A trial insert was placed to provide compression while the cement dried and a 0.35% betadine solution was left to soak in the surgical field.     After the cement was dry, the trial poly insert was removed. Hemostasis was obtained with bovie electrocautery.  The knee was again copiously irrigated with pulsatile lavage. The final polyethylene insert was placed and the knee reduced.      1 gram of vancomycin powder was placed in the knee. The arthrotomy was closed with interrupted #1 vicryl suture and #2 quill, the subcuticular layer was closed with 2-0 vicryl suture and a running 4-0 monocryl was used to closed the skin surface.  Pin sites were closed with 4-0 monocryl and skin glue. Surgicel sealant was placed over the top of the incision and sterile dressing placed over the wound. Appropriately sized TEDs hose were placed for edema control.     Sponge and needle counts were correct.    The first-assistant was critical to all steps of the operation, including retraction and leg stabilization during exposure and bone preparation, as well as the deep and superficial wound closure.  Dr. Mcghee performed and/or supervised the key portions of this surgical procedure, including evaluation of the bone cuts, reshaping of the bony elements, and insertion of the provisional and final components.    Postoperative Plan:  The patient will be  anticoagulated with 81mg aspirin twice daily for 7 days, then resume daily ASA + plavix  They will receive 24 hours of post-operative antibiotics.    Activity will be weight bearing as tolerated.    Due patient and or surgical factors the patient will receive an Rx for cefadroxil 500mg BID x7 days after discharge per [place] data:   Mari MM, Elda JE, Lux M, Jose FORD. The [facility] [place] Clinical Research Award: Extended Oral Antibiotics Prevent Periprosthetic Joint Infection in High-Risk Cases: 3855 Patients With 1-Year Follow-Up. J Arthroplasty. 2021 Jul;36(7S):S18-S25. doi: 10.1016/j.arth.2021.01.051. Epub 2021 Jan 23. PMID: 06809509.      Signed by: Stevenson Mcghee III, MD

## 2023-05-15 NOTE — PLAN OF CARE
"Patient tolerated PT session well. Patient ambulated 24ft with RW and contact guard assistance. No LOB or SOB noted. Patient has an OPPT appointment on 2023.     Problem: Physical Therapy  Goal: Physical Therapy Goal  Description: Goals to be met by: 2023    Patient will increase functional independence with mobility by performin. Supine to sit with supervision  2. Sit to stand transfer with Supervision  3. Gait x300 feet with Supervision using Rolling Walker  4. Ascend/Descend 6" curb step with Stand by assistance using Rolling Walker  5. Lower extremity exercise program x30 reps per handout, with supervision       Outcome: Ongoing, Progressing     "
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
## 2023-05-15 NOTE — PT/OT/SLP PROGRESS
Occupational Therapy      Patient Name:  Bandar French   MRN:  [id number]    Patient not seen today secondary to pt c PT and eating dinner. Will follow-up tomorrow.    5/15/2023
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
## 2023-05-15 NOTE — NURSING TRANSFER
Nursing Transfer Note      5/15/2023     Reason patient is being transferred: patient neurovascularly intact    Transfer To: room 303    Transfer via bed    Transfer with IVF    Transported by RN x 2    Telemetry:     Medicines sent: mupirocin ointment    Any special needs or follow-up needed: post op pain control    Chart send with patient: Yes    Notified: spouse at bedside    Patient reassessed at:  5/15/23 at 1630    Upon arrival to floor: patient oriented to room, call bell in reach, and bed in lowest position

## 2023-05-15 NOTE — PLAN OF CARE
Pre Op complete with no distress noted.  Wife at bedside.  No questions at this time.  Call light in reach.  Belongings locked in locker. Patient has walker.

## 2023-05-15 NOTE — ASSESSMENT & PLAN NOTE
Bandar WOODY Manolo is a 72 y.o. male s/p L TKA      - Weight bearing status: WBAT  - Pain control: Per APS  - Antibiotics: Ancef  - DVT Prophylaxis: ASA , SCD's at all times when not ambulating.  - PT/OT  - Dispo: PT/OT, home

## 2023-05-16 ENCOUNTER — PATIENT MESSAGE (OUTPATIENT)
Dept: ORTHOPEDICS | Facility: CLINIC | Age: 73
End: 2023-05-16
Payer: MEDICARE

## 2023-05-16 VITALS
HEIGHT: 70 IN | TEMPERATURE: 98 F | DIASTOLIC BLOOD PRESSURE: 63 MMHG | HEART RATE: 59 BPM | WEIGHT: 175 LBS | SYSTOLIC BLOOD PRESSURE: 113 MMHG | OXYGEN SATURATION: 94 % | RESPIRATION RATE: 16 BRPM | BODY MASS INDEX: 25.05 KG/M2

## 2023-05-16 LAB — POCT GLUCOSE: 147 MG/DL (ref 70–110)

## 2023-05-16 PROCEDURE — 97535 SELF CARE MNGMENT TRAINING: CPT | Mod: FS

## 2023-05-16 PROCEDURE — 99232 PR SUBSEQUENT HOSPITAL CARE,LEVL II: ICD-10-PCS | Mod: ,,, | Performed by: ANESTHESIOLOGY

## 2023-05-16 PROCEDURE — 97530 THERAPEUTIC ACTIVITIES: CPT | Mod: FS,CQ

## 2023-05-16 PROCEDURE — 63600175 PHARM REV CODE 636 W HCPCS: Performed by: PHYSICIAN ASSISTANT

## 2023-05-16 PROCEDURE — 99900035 HC TECH TIME PER 15 MIN (STAT): Mod: FS

## 2023-05-16 PROCEDURE — 94761 N-INVAS EAR/PLS OXIMETRY MLT: CPT

## 2023-05-16 PROCEDURE — 97165 OT EVAL LOW COMPLEX 30 MIN: CPT | Mod: FS

## 2023-05-16 PROCEDURE — 99232 SBSQ HOSP IP/OBS MODERATE 35: CPT | Mod: ,,, | Performed by: ANESTHESIOLOGY

## 2023-05-16 PROCEDURE — 99900035 HC TECH TIME PER 15 MIN (STAT)

## 2023-05-16 PROCEDURE — 97116 GAIT TRAINING THERAPY: CPT | Mod: FS,CQ

## 2023-05-16 PROCEDURE — 25000003 PHARM REV CODE 250: Performed by: PHYSICIAN ASSISTANT

## 2023-05-16 RX ORDER — CELECOXIB 200 MG/1
200 CAPSULE ORAL DAILY
Status: DISCONTINUED | OUTPATIENT
Start: 2023-05-16 | End: 2023-05-16 | Stop reason: HOSPADM

## 2023-05-16 RX ADMIN — MUPIROCIN 1 G: 20 OINTMENT TOPICAL at 09:05

## 2023-05-16 RX ADMIN — POLYETHYLENE GLYCOL 3350 17 G: 17 POWDER, FOR SOLUTION ORAL at 09:05

## 2023-05-16 RX ADMIN — CELECOXIB 200 MG: 200 CAPSULE ORAL at 09:05

## 2023-05-16 RX ADMIN — OXYCODONE HYDROCHLORIDE 5 MG: 5 TABLET ORAL at 07:05

## 2023-05-16 RX ADMIN — ASPIRIN 81 MG: 81 TABLET, COATED ORAL at 09:05

## 2023-05-16 RX ADMIN — ACETAMINOPHEN 1000 MG: 500 TABLET ORAL at 06:05

## 2023-05-16 RX ADMIN — METHOCARBAMOL 750 MG: 750 TABLET ORAL at 09:05

## 2023-05-16 RX ADMIN — SENNOSIDES AND DOCUSATE SODIUM 1 TABLET: 50; 8.6 TABLET ORAL at 09:05

## 2023-05-16 RX ADMIN — CEFAZOLIN 2 G: 2 INJECTION, POWDER, FOR SOLUTION INTRAMUSCULAR; INTRAVENOUS at 03:05

## 2023-05-16 RX ADMIN — FAMOTIDINE 20 MG: 20 TABLET ORAL at 09:05
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
## 2023-05-16 NOTE — PROGRESS NOTES
[facility])  Orthopedics  Progress Note    Patient Name: Bandar French  MRN: [id number]  Admission Date: 5/15/2023  Hospital Length of Stay: 0 days  Attending Provider: Stevenson Mcghee III, MD  Primary Care Provider: Gunnar Rangel MD  Follow-up For: Procedure(s) (LRB):  ARTHROPLASTY, KNEE, TOTAL, USING COMPUTER-ASSISTED NAVIGATION: MORELIA: LEFT: SASCHA - TRIATHALON (Left)    Post-Operative Day: 1 Day Post-Op  Subjective:     Principal Problem:Primary osteoarthritis of left knee    Principal Orthopedic Problem: Same    Interval History: Pt seen and examined at bedside. NAEON. Pain controlled.  VSS, AF. No other concerns stated. Urinating.     Review of patient's allergies indicates:  No Known Allergies    Current Facility-Administered Medications   Medication    0.9%  NaCl infusion    acetaminophen tablet 1,000 mg    ceFAZolin 2 g in dextrose 5 % in water (D5W) 5 % 50 mL IVPB (MB+)    fentaNYL 50 mcg/mL injection 100 mcg    fentaNYL 50 mcg/mL injection 25 mcg    fentaNYL 50 mcg/mL injection 25 mcg    haloperidol lactate injection 0.5 mg    LIDOcaine (PF) 10 mg/ml (1%) injection 10 mg    melatonin tablet 6 mg    methocarbamoL tablet 750 mg    midazolam (VERSED) 1 mg/mL injection 4 mg    morphine injection 2 mg    naloxone 0.4 mg/mL injection 0.02 mg    ondansetron injection 4 mg    ondansetron injection 4 mg    oxyCODONE immediate release tablet 10 mg    oxyCODONE immediate release tablet 5 mg    oxyCODONE immediate release tablet 5 mg    pregabalin capsule 75 mg    prochlorperazine injection Soln 5 mg    sodium chloride 0.9% flush 10 mL    tranexamic acid (CYKLOKAPRON) 1,000 mg in sodium chloride 0.9 % 100 mL IVPB (MB+)    tranexamic acid (CYKLOKAPRON) 3,000 mg in sodium chloride 0.9% SolP 100 mL     Facility-Administered Medications Ordered in Other Encounters   Medication    dexAMETHasone injection    ePHEDrine sulfate    famotidine (PF) injection    isolyte infusion  "   ketamine in 0.9 % sod chloride 50 mg/5 mL (10 mg/mL) injection    LIDOcaine (cardiac) injection    midazolam injection    niCARdipine injection    ondansetron injection    propofol (DIPRIVAN) 10 mg/mL infusion    sodium chloride 0.9% infusion     Objective:     Vital Signs (Most Recent):  Temp: 97.6 °F (36.4 °C) (05/15/23 1517)  Pulse: (!) 55 (05/15/23 1527)  Resp: 17 (05/15/23 1529)  BP: 116/68 (05/15/23 1517)  SpO2: 99 % (05/15/23 1527) Vital Signs (24h Range):  Temp:  [97.6 °F (36.4 °C)-98.8 °F (37.1 °C)] 97.6 °F (36.4 °C)  Pulse:  [55-75] 55  Resp:  [16-20] 17  SpO2:  [99 %] 99 %  BP: (114-116)/(61-68) 116/68     Weight: 79.4 kg (175 lb)  Height: 5' 10" (177.8 cm)  Body mass index is 25.11 kg/m².      Intake/Output Summary (Last 24 hours) at 5/15/2023 1529  Last data filed at 5/15/2023 1419  Gross per 24 hour   Intake 2000 ml   Output 200 ml   Net 1800 ml        Ortho/SPM Exam    Vitals: Afebrile.  Vital signs stable.  General: No acute distress.  Cardio: Regular rate.  Chest: No increased work of breathing.    Left Lower Extremity Exam    - Dressing c/d/i  - Quad and hip flexor intact  - Compartments soft and compressible  - ROM full (eversion,inversion,plantar/dorsiflexion)  - TA/EHL/Gastroc/FHL assessed in isolation without deficit  - SILT throughout  - DP and PT palpated  2+  - Capillary Refill <3s    Significant Labs: All pertinent labs within the past 24 hours have been reviewed.    Significant Imaging: I have reviewed and interpreted all pertinent imaging results/findings.    Assessment/Plan:     * Primary osteoarthritis of left knee  Bandar French is a 72 y.o. male s/p L TKA      - Weight bearing status: WBAT  - Pain control: Per APS  - Antibiotics: Ancef  - DVT Prophylaxis: ASA , SCD's at all times when not ambulating.  - PT/OT  - Dispo: PT/OT, home            Ken Peters MD  Orthopedics  [place] [facility])  "

## 2023-05-16 NOTE — PLAN OF CARE
Problem: Adult Inpatient Plan of Care  Goal: Absence of Hospital-Acquired Illness or Injury  Intervention: Identify and Manage Fall Risk  Flowsheets (Taken 5/16/2023 0234)  Safety Promotion/Fall Prevention:   assistive device/personal item within reach   Fall Risk reviewed with patient/family   in recliner, wheels locked   nonskid shoes/socks when out of bed   side rails raised x 2   instructed to call staff for mobility     Problem: Adult Inpatient Plan of Care  Goal: Absence of Hospital-Acquired Illness or Injury  Intervention: Prevent and Manage VTE (Venous Thromboembolism) Risk  Flowsheets (Taken 5/16/2023 0234)  Activity Management: Ankle pumps - L1  VTE Prevention/Management:   dorsiflexion/plantar flexion performed   fluids promoted   remove, assess skin, and reapply foot pump   intravenous hydration  Range of Motion: active ROM (range of motion) encouraged     Problem: Adult Inpatient Plan of Care  Goal: Absence of Hospital-Acquired Illness or Injury  Intervention: Prevent Infection  Flowsheets (Taken 5/16/2023 0234)  Infection Prevention:   hand hygiene promoted   single patient room provided   rest/sleep promoted

## 2023-05-16 NOTE — PLAN OF CARE
Problem: Adult Inpatient Plan of Care  Goal: Plan of Care Review  Outcome: Adequate for Care Transition  Flowsheets (Taken 5/16/2023 1045)  Plan of Care Reviewed With:   patient   spouse  Goal: Patient-Specific Goal (Individualized)  Outcome: Adequate for Care Transition  Goal: Absence of Hospital-Acquired Illness or Injury  Outcome: Adequate for Care Transition  Goal: Optimal Comfort and Wellbeing  Outcome: Adequate for Care Transition  Goal: Readiness for Transition of Care  Outcome: Adequate for Care Transition     Problem: Pain Acute  Goal: Acceptable Pain Control and Functional Ability  Outcome: Adequate for Care Transition     Problem: Adjustment to Surgery (Knee Arthroplasty)  Goal: Optimal Coping  Outcome: Adequate for Care Transition     Problem: Bleeding (Knee Arthroplasty)  Goal: Absence of Bleeding  Outcome: Adequate for Care Transition  Intervention: Monitor and Manage Bleeding  Flowsheets (Taken 5/16/2023 1045)  Bleeding Management: dressing monitored     Problem: Bowel Motility Impaired (Knee Arthroplasty)  Goal: Effective Bowel Elimination  Outcome: Adequate for Care Transition     Problem: Fluid and Electrolyte Imbalance (Knee Arthroplasty)  Goal: Fluid and Electrolyte Balance  Outcome: Adequate for Care Transition     Problem: Functional Ability Impaired (Knee Arthroplasty)  Goal: Optimal Functional Ability  Outcome: Adequate for Care Transition     Problem: Infection (Knee Arthroplasty)  Goal: Absence of Infection Signs and Symptoms  Outcome: Adequate for Care Transition     Problem: Neurovascular Compromise (Knee Arthroplasty)  Goal: Intact Neurovascular Status  Outcome: Adequate for Care Transition     Problem: Ongoing Anesthesia Effects (Knee Arthroplasty)  Goal: Anesthesia/Sedation Recovery  Outcome: Adequate for Care Transition     Problem: Pain (Knee Arthroplasty)  Goal: Acceptable Pain Control  Outcome: Adequate for Care Transition     Problem: Postoperative Nausea and Vomiting (Knee  Arthroplasty)  Goal: Nausea and Vomiting Relief  Outcome: Adequate for Care Transition     Problem: Postoperative Urinary Retention (Knee Arthroplasty)  Goal: Effective Urinary Elimination  Outcome: Adequate for Care Transition     Problem: Respiratory Compromise (Knee Arthroplasty)  Goal: Effective Oxygenation and Ventilation  Outcome: Adequate for Care Transition     Problem: Hypertension Comorbidity  Goal: Blood Pressure in Desired Range  Outcome: Adequate for Care Transition   Plan of care reviewed with patient; verbalized understanding.   Medications reviewed and administered as ordered.  Rounding for safety and patient care per policy.   Safety precautions maintained. Patient working appropriately with PT/OT.  DME at bedside.  Call light within reach, bed wheels locked, bed in lowest position, side rails ^x2, safety maintained. NADN, Appropriate, for discharge.

## 2023-05-16 NOTE — DISCHARGE SUMMARY
[facility])  Orthopedics  Discharge Summary      Patient Name: Bandar French  MRN: [id number]  Admission Date: 5/15/2023  Hospital Length of Stay: 0 days  Discharge Date and Time:  05/16/2023 8:01 AM  Attending Physician: Stevenson Mcghee III, MD   Discharging Provider: Ken Peters MD  Primary Care Provider: Gunnar Rangel MD    HPI: Bandar French is a 72 y.o. male with history of Left knee pain. Pain is worse with activity and weight bearing.  Patient has experienced interference of activities of daily living due to decreased range of motion and an increase in joint pain and swelling.  Patient has failed non-operative treatment including NSAIDs, corticosteroid injections, viscosupplement injections, and activity modification.  Bandar French currently ambulates independently.      Relevant medical conditions of significance in perioperative period:  Glaucoma managed by PCP  Coronary artery disease hypertension aortic atherosclerosis managed by Cardiology  Diabetes managed by Endocrine    Procedure(s) (LRB):  ARTHROPLASTY, KNEE, TOTAL, USING COMPUTER-ASSISTED NAVIGATION: MORELIA: LEFT: SASCHA - TRIUVALDON (Left)      Hospital Course: Patient presented for above procedure.  Tolerated it well and was discharged home POD 1 after voiding, tolerating diet, ambulating, pain controlled.  Discharge instructions, follow-up appointment, and med rec are below.     Consults (From admission, onward)          Status Ordering Provider     Inpatient consult to Social Work  Once        Provider:  (Not yet assigned)    Acknowledged AMERICA RAMSEY     Inpatient consult to Pain Management  Once        Provider:  (Not yet assigned)    Acknowledged AMREICA RAMSEY     Inpatient consult to Respiratory Care  Once        Provider:  (Not yet assigned)    Acknowledged AMERICA RAMSEY            Significant Diagnostic Studies: No pertinent studies.    Pending Diagnostic Studies:       None           Final Active Diagnoses:    Diagnosis Date Noted POA    PRINCIPAL PROBLEM:  Primary osteoarthritis of left knee [M17.12] 05/15/2023 Yes      Problems Resolved During this Admission:      Discharged Condition: stable    Disposition: Home or Self Care    Follow Up:    Patient Instructions:      Activity as tolerated     Sponge bath only until clinic visit     Keep surgical extremity elevated     Lifting restrictions   Order Comments: No strenuous exercise or lifting of > 10 lbs     Weight bearing as tolerated     No driving, operating heavy equipment or signing legal documents while taking pain medication     Leave dressing on - Keep it clean, dry, and intact until clinic visit   Order Comments: Do not remove surgical dressing for 2 weeks post-op. This will be done only by MD at initial post-op visit. If dressing is completely saturated, replace with identical dressing - silver-impregnated hydrocolloid dressing.     Do not get dressings wet. Do not shower.     If dressing continues to be saturated or there are signs of infection, please call [facility] [phone] for further instructions and to make appt to be seen.     Call MD for:  temperature >100.4     Call MD for:  persistent nausea and vomiting     Call MD for:  severe uncontrolled pain     Call MD for:  difficulty breathing, headache or visual disturbances     Call MD for:  redness, tenderness, or signs of infection (pain, swelling, redness, odor or green/yellow discharge around incision site)     Call MD for:  hives     Call MD for:  persistent dizziness or light-headedness     Call MD for:  extreme fatigue     Medications:  Reconciled Home Medications:      Medication List        CONTINUE taking these medications      acetaminophen 650 MG Tbsr  Commonly known as: TYLENOL  Take 1 tablet (650 mg total) by mouth every 8 (eight) hours.     amLODIPine 5 MG tablet  Commonly known as: NORVASC  Take 1 tablet (5 mg total) by mouth once daily.     ascorbic acid  (vitamin C) 500 MG tablet  Commonly known as: VITAMIN C  Take 500 mg by mouth once daily.     * aspirin 81 MG EC tablet  Commonly known as: ECOTRIN  Take 1 tablet (81 mg total) by mouth 2 (two) times daily. Resume home aspirin and plavix 7 days after surgery. for 7 days     * aspirin 81 MG EC tablet  Commonly known as: ECOTRIN  Aspirin     blood sugar diagnostic Strp  Commonly known as: BLOOD GLUCOSE TEST  1 each by Misc.(Non-Drug; Combo Route) route once daily.     cefadroxil 500 MG Cap  Commonly known as: DURICEF  Take 1 capsule (500 mg total) by mouth every 12 (twelve) hours. for 7 days     celecoxib 200 MG capsule  Commonly known as: CeleBREX  Take 1 capsule (200 mg total) by mouth once daily.     CENTRUM SILVER ORAL  Take 1 tablet by mouth once daily.     chlorpheniramine 4 mg tablet  Commonly known as: CHLOR-TRIMETON  Take 4 mg by mouth as needed.     cholecalciferol (vitamin D3) 125 mcg (5,000 unit) Tab  Take 5,000 Units by mouth once daily.     clopidogreL 75 mg tablet  Commonly known as: PLAVIX  Take 1 tablet (75 mg total) by mouth once daily.     DEXCOM G6  Misc  Generic drug: blood-glucose meter,continuous  Use as directed     DEXCOM G6 SENSOR Joanne  Generic drug: blood-glucose sensor  Use as directed     DEXCOM G6 TRANSMITTER Joanne  Generic drug: blood-glucose transmitter  Use as directed     docusate sodium 100 MG capsule  Commonly known as: COLACE  Take 1 capsule (100 mg total) by mouth 2 (two) times daily.     dorzolamide-timolol 2-0.5% 22.3-6.8 mg/mL ophthalmic solution  Commonly known as: COSOPT  Place 1 drop into both eyes 2 (two) times daily.     ICAPS AREDS2 250 mg-200 unit -12.5 mg-1 mg Cap  Generic drug: vit C-E-zinc ox-dayron-lut-zeax  Take 2 capsules by mouth once daily.     irbesartan 300 MG tablet  Commonly known as: AVAPRO  TAKE 1 TABLET (300 MG TOTAL) BY MOUTH EVERY EVENING.     * latanoprost 0.005 % ophthalmic solution  Place 1 drop into both eyes every evening.     * latanoprost  0.005 % ophthalmic solution  PLACE 1 DROP INTO BOTH EYES EVERY EVENING.     magnesium oxide 400 mg (241.3 mg magnesium) tablet  Commonly known as: MAG-OX  Take 250 mg by mouth once daily.     methocarbamoL 750 MG Tab  Commonly known as: ROBAXIN  Take 1 tablet (750 mg total) by mouth 3 (three) times daily as needed.     MICRO THIN LANCETS 33 gauge Misc  Generic drug: lancets     nitroGLYCERIN 0.4 MG SL tablet  Commonly known as: NITROSTAT  Place 1 tablet (0.4 mg total) under the tongue every 5 (five) minutes as needed for Chest pain.     oxyCODONE 5 MG immediate release tablet  Commonly known as: ROXICODONE  Take 1 tablet (5 mg total) by mouth every 6 (six) hours as needed for Pain. May take 1-2 tablets every 6 hours as needed for pain     pantoprazole 40 MG tablet  Commonly known as: PROTONIX  Take 1 tablet (40 mg total) by mouth once daily.     rosuvastatin 40 MG Tab  Commonly known as: CRESTOR  Take 1 tablet (40 mg total) by mouth every evening.           * This list has 4 medication(s) that are the same as other medications prescribed for you. Read the directions carefully, and ask your doctor or other care provider to review them with you.                  Ken Peters MD  Orthopedics  [facility]

## 2023-05-16 NOTE — PT/OT/SLP PROGRESS
Physical Therapy Treatment    Patient Name:  Bandar French   MRN:  [id number]    Recommendations:     Discharge Recommendations: outpatient PT  Discharge Equipment Recommendations: none  Barriers to discharge: None    Assessment:     Bandar French is a 72 y.o. male admitted with a medical diagnosis of Primary osteoarthritis of left knee.  He presents with the following impairments/functional limitations: weakness, impaired functional mobility, gait instability, impaired balance, decreased lower extremity function, pain, decreased ROM, orthopedic precautions. Pt tolerated treatment well, was able to increase distance ambulated, was able to ambulate with RW to gym and back to room following treatment. Was educated and showed agreement on HEP and importance of OOB mobility. Pt will continue to benefit from skilled PT to improve strength, endurance, and return to highest level of function.     Rehab Prognosis: Good; patient would benefit from acute skilled PT services to address these deficits and reach maximum level of function.    Recent Surgery: Procedure(s) (LRB):  ARTHROPLASTY, KNEE, TOTAL, USING COMPUTER-ASSISTED NAVIGATION: MORELIA: LEFT: SASCHA - TAHIRA (Left) 1 Day Post-Op    Plan:     During this hospitalization, patient to be seen daily to address the identified rehab impairments via gait training, therapeutic activities and progress toward the following goals:    Plan of Care Expires:  05/19/23    Subjective     Chief Complaint: Left knee pain  Patient/Family Comments/goals: wants to golf  Pain/Comfort:  Pain Rating 1: 0/10  Pain Addressed 1: Pre-medicate for activity, Reposition      Objective:     Communicated with RN prior to session.  Patient found supine with cryotherapy, FCD upon PT entry to room.     General Precautions: Standard, fall  Orthopedic Precautions: LLE weight bearing as tolerated  Braces: N/A  Respiratory Status: Room air     Functional Mobility:  Bed Mobility:     Rolling Left:   "supervision  Supine <> Sit: supervision  Transfers:   vcs for hand placement  Sit to Stand:  supervision and stand by assistance with rolling walker from bed and mat  Sit <> stand from toilet with (S) with RW  Gait: Ambulated 160' x 2 trials with RW and SBA good rocío with step through pattern. Ascended/descended 6" step RW with SBA/CGA for safety with vc for RW management.       AM-PAC 6 CLICK MOBILITY  Turning over in bed (including adjusting bedclothes, sheets and blankets)?: 4  Sitting down on and standing up from a chair with arms (e.g., wheelchair, bedside commode, etc.): 3  Moving from lying on back to sitting on the side of the bed?: 4  Moving to and from a bed to a chair (including a wheelchair)?: 3  Need to walk in hospital room?: 4  Climbing 3-5 steps with a railing?: 3  Basic Mobility Total Score: 21       Treatment & Education:  Pt was given and demonstrated LE supine exercise program including: QS, GS, AP, SAQ, LAQ, HS, SLR x 10 each    Pt eductaed on PT role and POC. Educated on safety with transfers with concentration on hand placement, orthopedic precautions, pain management, and importance of OOB mobility and sitting exercises, as well as elevation and icing of LLE.      Patient left up in chair with call button in reach and spouse present..    GOALS:   Multidisciplinary Problems       Physical Therapy Goals          Problem: Physical Therapy    Goal Priority Disciplines Outcome Goal Variances Interventions   Physical Therapy Goal     PT, PT/OT Ongoing, Progressing     Description: Goals to be met by: 2023    Patient will increase functional independence with mobility by performin. Supine to sit with supervision  2. Sit to stand transfer with Supervision  3. Gait x300 feet with Supervision using Rolling Walker  4. Ascend/Descend 6" curb step with Stand by assistance using Rolling Walker  5. Lower extremity exercise program x30 reps per handout, with supervision                      "       Time Tracking:     PT Received On: 05/16/23  PT Start Time: 0800     PT Stop Time: 0854  PT Total Time (min): 54 min     Billable Minutes: Gait Training 30 minutes and Therapeutic Activity 24 minutes    Treatment Type: Treatment  PT/PTA: PTA     Number of PTA visits since last PT visit: 1 05/16/2023  I certify that I was present in the room directing the student in service delivery and guiding them using my skilled judgment. As the co-signing therapist I have reviewed the students documentation and am responsible for the treatment, assessment, and plan. Oscar York PTA

## 2023-05-16 NOTE — NURSING
Report received. Care assumed. Patient arrived to unit AAOx4 in hospital bed from PACU. VSS, IVF infusing. Dressing to Left Knee, CDI.  Pt lying supine in bed. Pt denies pain or any other concerns at this time. See assessment. Patient oriented to room. Bed in lowest position, side rails up x2, bed wheels locked and call light within reach.  Pt instructed to call for assistance, verbalized understanding. NADN. Will continue to monitor.

## 2023-05-16 NOTE — PLAN OF CARE
Problem: Adult Inpatient Plan of Care  Goal: Plan of Care Review  Outcome: Ongoing, Progressing  Flowsheets (Taken 5/15/2023 1720)  Plan of Care Reviewed With:   patient   spouse     Problem: Bleeding (Knee Arthroplasty)  Goal: Absence of Bleeding  Intervention: Monitor and Manage Bleeding  Flowsheets (Taken 5/15/2023 1720)  Bleeding Management: dressing monitored     Problem: Hypertension Comorbidity  Goal: Blood Pressure in Desired Range  Intervention: Maintain Blood Pressure Management  Flowsheets (Taken 5/15/2023 1720)  Syncope Management: position changed slowly  Medication Review/Management: medications reviewed   Plan of care reviewed with patient; verbalized understanding.   Medications reviewed and administered as ordered.  Rounding for safety and patient care per policy.   Safety precautions maintained. Patient working appropriately with PT/OT.  DME at bedside.  Call light within reach, bed wheels locked, bed in lowest position, side rails ^x2, safety maintained. NADN, Will continue monitor.

## 2023-05-16 NOTE — PROGRESS NOTES
Acute Pain Service and Perioperative Surgical Home Progress Note    Interval history  Bandar French is a 72 y.o., male,     Surgery:  Procedure(s) (LRB):  ARTHROPLASTY, KNEE, TOTAL, USING COMPUTER-ASSISTED NAVIGATION: MORELIA: LEFT: SASCHA HOFFMANN (Left)    Post Op Day #: 1    Problem List:    Active Hospital Problems    Diagnosis  POA    *Primary osteoarthritis of left knee [M17.12]  Yes      Resolved Hospital Problems   No resolved problems to display.       Subjective:       General appearance of alert, oriented, no complaints   Pain with rest: 4    Numbers   Pain with movement: 4    Numbers   Side Effects    1. Pruritis No    2. Nausea No    3. Motor Blockade Yes, 0=Ability to raise lower extremities off bed    4. Sedation Yes, 1=awake and alert    Schedule Medications:    acetaminophen  1,000 mg Oral Q6H    aspirin  81 mg Oral BID    celecoxib  200 mg Oral Daily    famotidine  20 mg Oral BID    latanoprost  1 drop Both Eyes QHS    methocarbamoL  750 mg Oral TID    mupirocin  1 g Nasal BID    polyethylene glycol  17 g Oral Daily    pregabalin  75 mg Oral QHS    senna-docusate 8.6-50 mg  1 tablet Oral BID        Continuous Infusions:   sodium chloride 0.9% 150 mL/hr at 05/15/23 2316        PRN Medications:  bisacodyL, dextrose 10%, dextrose 10%, dextrose, dextrose, glucagon (human recombinant), insulin aspart U-100, morphine, naloxone, ondansetron, oxyCODONE, oxyCODONE, prochlorperazine       Antibiotics:  Antibiotics (From admission, onward)      Start     Stop Route Frequency Ordered    05/15/23 2100  mupirocin 2 % ointment 1 g         05/20 2059 Nasl 2 times daily 05/15/23 1928               Objective:         Vital Signs (Most Recent):  Temp: 97.9 °F (36.6 °C) (05/16/23 0023)  Pulse: 74 (05/16/23 0351)  Resp: 16 (05/16/23 0351)  BP: 110/64 (05/16/23 0023)  SpO2: (!) 94 % (05/16/23 0351) Vital Signs Range (Last 24H):  Temp:  [96.8 °F (36 °C)-98.8 °F (37.1 °C)]   Pulse:  [55-75]   Resp:  [12-22]   BP:  (110-147)/(61-77)   SpO2:  [92 %-100 %]          I & O (Last 24H):  Intake/Output Summary (Last 24 hours) at 5/16/2023 0521  Last data filed at 5/16/2023 0333  Gross per 24 hour   Intake 2252.5 ml   Output 2550 ml   Net -297.5 ml       Physical Exam:    GA: Alert, comfortable, no acute distress.   Pulmonary: Clear to auscultation . Normal respiratory ratei.  Cardiac: regular rate and rhythm.          Laboratory: reviewed in chart  CBC: No results for input(s): WBC, RBC, HGB, HCT, PLT, MCV, MCH, MCHC in the last 72 hours.    BMP: No results for input(s): NA, K, CO2, CL, BUN, CREATININE, GLU, MG, PHOS, CALCIUM in the last 72 hours.    No results for input(s): PT, INR, PROTIME, APTT in the last 72 hours.          Assessment:         Pain control adequate    Plan:  1) Pain: Multimodal pain regimen ordered which includes acetaminophen, celecoxib, pregabalin, and prn oxycodone.  Well controlled on current regimen.  Will continue to monitor.    2) HTN, DM: continue home regimen   3) CAD: chronic anticoagulation   4) FEN/GI: Tolerating regular diet.     5) Dispo: Pt working well with PT/OT. Case management and SW following along for setting up home health and physical therapy. Plan to discharge home this am.              Evaluator Berta Urias PA-C

## 2023-05-17 ENCOUNTER — CLINICAL SUPPORT (OUTPATIENT)
Dept: REHABILITATION | Facility: HOSPITAL | Age: 73
End: 2023-05-17
Payer: MEDICARE

## 2023-05-17 ENCOUNTER — CLINICAL SUPPORT (OUTPATIENT)
Dept: ORTHOPEDICS | Facility: CLINIC | Age: 73
End: 2023-05-17
Payer: MEDICARE

## 2023-05-17 DIAGNOSIS — G89.18 ACUTE POSTOPERATIVE PAIN OF LEFT KNEE: ICD-10-CM

## 2023-05-17 DIAGNOSIS — M17.12 PRIMARY OSTEOARTHRITIS OF LEFT KNEE: ICD-10-CM

## 2023-05-17 DIAGNOSIS — M25.562 ACUTE POSTOPERATIVE PAIN OF LEFT KNEE: ICD-10-CM

## 2023-05-17 DIAGNOSIS — Z96.652 HISTORY OF LEFT KNEE REPLACEMENT: Primary | ICD-10-CM

## 2023-05-17 DIAGNOSIS — Z74.09 IMPAIRED FUNCTIONAL MOBILITY, BALANCE, GAIT, AND ENDURANCE: ICD-10-CM

## 2023-05-17 DIAGNOSIS — R29.898 DECREASED STRENGTH INVOLVING KNEE JOINT: ICD-10-CM

## 2023-05-17 PROCEDURE — 99499 UNLISTED E&M SERVICE: CPT | Mod: HCNC,95,, | Performed by: ORTHOPAEDIC SURGERY

## 2023-05-17 PROCEDURE — 99499 NO LOS: ICD-10-PCS | Mod: HCNC,95,, | Performed by: ORTHOPAEDIC SURGERY

## 2023-05-17 NOTE — PROGRESS NOTES
Pain Scale: pain tolerable, took med this am and pain relieved     Any issues with Fever: no    Any issues with medications (specifically DVT prophylaxis): no     Passing gas: yes   Urination: yes   Constipation: no BM yet, discussed medications if no BM by tomorrow     Completing at home exercises: yes     Any concerns regarding their dressing/bandage:  no    First PT appointment:   had this morning, flex to 83, went well    Post op appointment: 5/30 @ 0800 - confirmed     Any other concerns: denies           The patient was informed that if they have any urgent issues with their bandage, medications or any other health concerns regarding their surgery to call the 24/7 Orthopedic Post-op Hot Line at [phone]. The patient was reminded that if they have any chest pain or shortness of breath to call 911 or go to the ER.     The patient verbalized understanding and does not have any other questions.

## 2023-05-18 PROBLEM — M25.562 ACUTE POSTOPERATIVE PAIN OF LEFT KNEE: Status: ACTIVE | Noted: 2023-05-18

## 2023-05-18 PROBLEM — G89.18 ACUTE POSTOPERATIVE PAIN OF LEFT KNEE: Status: ACTIVE | Noted: 2023-05-18

## 2023-05-18 PROBLEM — Z74.09 IMPAIRED FUNCTIONAL MOBILITY, BALANCE, GAIT, AND ENDURANCE: Status: ACTIVE | Noted: 2023-05-18

## 2023-05-18 PROBLEM — R29.898 DECREASED STRENGTH INVOLVING KNEE JOINT: Status: ACTIVE | Noted: 2023-05-18

## 2023-05-18 NOTE — PLAN OF CARE
[facility]  Physical Therapy Initial Evaluation    Name: Bandar French  Clinic Number: [id number]    Therapy Diagnosis:   Encounter Diagnoses   Name Primary?    Primary osteoarthritis of left knee     Acute postoperative pain of left knee     Decreased strength involving knee joint     Impaired functional mobility, balance, gait, and endurance         Physician: Stevenson Mcghee III, *    Physician Orders: PT Eval and Treat   Medical Diagnosis from Referral: M17.12 (ICD-10-CM) - Primary osteoarthritis of left knee  Evaluation Date: 5/17/2023  Authorization Period Expiration: 07/19/2023  Plan of Care Expiration: 5/17/2023 to 06/28/2023  Visit # / Visits authorized: 1/20  FOTO: 1/8  PTA visits: 0/5    Time In: 0900  Time Out: 0950  Total Appointment Time (timed & untimed codes): 40 minutes (1 LCE, 1 MT, 1 TE)  Precautions: post-op state     SUBJECTIVE    Zack reports to OPPT today.  Now post-op left TKA.  Surgery date: 05/15/2023.  Comes to PT with wife today.  Doing well.  Pain well controlled.    History of left knee surgery in 1967.  History of right TKA 2019.        Imaging:    See Media  Prior Therapy:  Prehab yes.   Social History:           lives with family  Occupation:               Retired.  Hobbies include golf  Prior Level of Function:       See above  Current Level of Function:   Goes to the gym 2-3 days/week where he performs the elliptical, hamstring/knee machines, and leg press.     Pain:  Current 4/10, worst 7/10, best 4/10   Location: left knee (globally)  Description: Aching, Tight, and Deep  Aggravating Factors: standing, walking, bending  Easing Factors: polar care, pain medications.     Patients goals: 1. To return to playing golf.      Medical History:   Past Medical History:   Diagnosis Date    Arthritis     Cataract     Coronary artery disease     Depression     Diabetes mellitus     Glaucoma     Hyperlipidemia     Hypertension        Surgical History:   Bandar WOODY  Manolo  has a past surgical history that includes Knee arthroscopy (Left, 1967); Knee arthroscopy (Right, 2012); Hand surgery (Right, 12/2004); Coronary stent placement (2014); Tonsillectomy; Total knee arthroplasty (Right, 07/22/2019); Adenoidectomy; Cosmetic surgery (Bilateral, 06/2019); Eye surgery (Left, 03/2019); Joint replacement (Right, 07/22/2019); Left heart catheterization (Left, 01/20/2022); Coronary angiography (N/A, 01/20/2022); and arthroplasty, knee, total, using computer-assisted navigation (Left, 5/15/2023).    Medications:   Bandar has a current medication list which includes the following prescription(s): acetaminophen, amlodipine, ascorbic acid (vitamin c), aspirin, aspirin, blood sugar diagnostic, dexcom g6 , dexcom g6 sensor, dexcom g6 transmitter, cefadroxil, celecoxib, chlorpheniramine, cholecalciferol (vitamin d3), clopidogrel, docusate sodium, dorzolamide-timolol 2-0.5%, folic acid/multivit-min/lutein, irbesartan, latanoprost, latanoprost, magnesium oxide, methocarbamol, micro thin lancets, nitroglycerin, oxycodone, pantoprazole, rosuvastatin, and icaps areds2.    Allergies:   Review of patient's allergies indicates:  No Known Allergies       OBJECTIVE        Observation:  Surgical dressing in place; no excessive drainage.  No s/s of infection.   Posture:         Noted decreased left quad girth as compared to the right.   Effusion:        Large post-op left knee joint effusion.      Range of motion:      Right knee:(+) 4 - 0- 122 degrees  Left knee:  0 - 80     Quad:              Good-to-fair quad activation.      L/E Strength w/ MicroFET Muscle Pattie Dynamometer Right Left Pain/Dysfunction with Movement   (approx 4 sec hold w/ max contraction)   Quadriceps    Not tested today.    Hamstrings          TUG: deferred  30 second sit-to-stand test (without U/E support): NT  Gait Analysis: antalgic step-to pattern using rolling walker.     Limitation/Restriction for FOTO Knee  Survey    Therapist reviewed FOTO scores for Bandar French on 5/17/2023.   FOTO documents entered into .com - see Media section.    Limitation Score: 59% --> 33%         TREATMENT     Total Treatment time (time-based codes) separate from Evaluation: 25 minutes      Zack received the treatments listed below:      Therapeutic exercises to develop strength and ROM for 15 minutes including:  Quad sets towel under knee 10x  SAQs 2x6  Seated partial heel slides 10x  Heel prop 4' (no weight)(shortened lever arm)    Manual therapy techniques: total time 10 minutes, including:  -- grade II-III left knee passive physiological flexion/ext.     PATIENT EDUCATION AND HOME EXERCISES     Education provided:   - home exercise program: partial heel slides, SAQs, quad sets.    Written Home Exercises Provided: Patient instructed to cont prior HEP. Exercises were reviewed and Zack was able to demonstrate them prior to the end of the session.  Zack demonstrated good  understanding of the education provided. See EMR under Patient Instructions for exercises provided during therapy sessions.    ASSESSMENT     Bandar is a 72 y.o. male referred to outpatient Physical Therapy with a medical diagnosis of M17.12 (ICD-10-CM) - Primary osteoarthritis of left knee. Patient now s/p left TKA. Surgery date: 05/15/2023.  POD#2.  Pain well controlled.  Full knee extension.  No s/s of infection. Ambulating effectively with rolling walker. Ready to begin OPPT course.     Patient prognosis is Excellent.   Patient will benefit from skilled outpatient Physical Therapy to address the deficits stated above and in the chart below, provide patient /family education, and to maximize patientt's level of independence.     Plan of care discussed with patient: Yes  Patient's spiritual, cultural and educational needs considered and patient is agreeable to the plan of care and goals as stated below:     Anticipated Barriers for therapy: none    Medical Necessity is  demonstrated by the following  History  Co-morbidities and personal factors that may impact the plan of care Co-morbidities:   Arthritis, High Blood Pressure, Prior Surgery, Visual Impairment    Personal Factors:   no deficits     moderate   Examination  Body Structures and Functions, activity limitations and participation restrictions that may impact the plan of care Body Regions:   lower extremities    Body Systems:    ROM  strength  balance  gait  transfers  edema  scar formation    Participation Restrictions:   See above    Activity limitations:   Learning and applying knowledge  no deficits    General Tasks and Commands  undertaking multiple tasks    Communication  no deficits    Mobility  lifting and carrying objects  walking  driving (bike, car, motorcycle)    Self care  washing oneself (bathing, drying, washing hands)  toileting  dressing    Domestic Life  shopping  cooking  doing house work (cleaning house, washing dishes, laundry)  assisting others    Interactions/Relationships  no deficits    Life Areas  no deficits    Community and Social Life  community life  recreation and leisure         high   Clinical Presentation stable and uncomplicated low   Decision Making/ Complexity Score: low     Goals:  Short Term Goals: 3 weeks  1. Pt will be independent with HEP supplement PT in improving functional mobility.  2. Pt will improve LLE strength to at least 70% of RLE strength as measured via MicroFet handheld dynamometer in order to improve functional mobility  3. Pt will improve left knee AROM to at least 0-110 in order to improve gait    Long Term Goals: 6 weeks  1. Pt will be independent with updated HEP supplement PT in improving functional mobility.  2. Pt will improve LLE strength to at least 80% of RLE strength as measured via MicroFet handheld dynamometer in order to improve functional mobility  3. Pt will improve L knee AROM to at least 0 - 115 in order to improve gait and ability to perform ADLs  4.  "Pt will improve FOTO knee survey score to </= 35% limited in order to demo improved functional mobility.  5. Pt will perform TUG in < 15 seconds without AD in order to demo improved gait speed  6. Pt will perform at least 6 sit to stands without UE support on 30 second sit to stand test in order to demo improved ability to perform transfers      TUG Cutoff Scores:        30" sit to stand Cutoff Scores:          Plan     Plan of care Certification: 5/17/2023 to 06/28/2023.    Outpatient Physical Therapy 3 times weekly for 6 weeks to include the following interventions: Gait Training, Manual Therapy, Moist Heat/ Ice, Neuromuscular Re-ed, Orthotic Management and Training, Patient Education, Therapeutic Activites and Therapeutic Exercise, ASTYM, Kinesiotape    Luis Gallegos, PT, DPT, OCS  "

## 2023-05-19 ENCOUNTER — TELEPHONE (OUTPATIENT)
Dept: ORTHOPEDICS | Facility: CLINIC | Age: 73
End: 2023-05-19
Payer: MEDICARE

## 2023-05-19 ENCOUNTER — HOSPITAL ENCOUNTER (EMERGENCY)
Facility: HOSPITAL | Age: 73
Discharge: HOME | End: 2023-05-19
Attending: EMERGENCY MEDICINE
Payer: MEDICARE

## 2023-05-19 VITALS
TEMPERATURE: 99 F | SYSTOLIC BLOOD PRESSURE: 133 MMHG | DIASTOLIC BLOOD PRESSURE: 65 MMHG | OXYGEN SATURATION: 98 % | RESPIRATION RATE: 17 BRPM | HEART RATE: 75 BPM

## 2023-05-19 DIAGNOSIS — R53.1 WEAKNESS: ICD-10-CM

## 2023-05-19 DIAGNOSIS — M79.89 LEG SWELLING: ICD-10-CM

## 2023-05-19 DIAGNOSIS — I95.9 HYPOTENSION, UNSPECIFIED HYPOTENSION TYPE: Primary | ICD-10-CM

## 2023-05-19 LAB
ALBUMIN SERPL BCP-MCNC: 3.8 G/DL (ref 3.5–5.2)
ALP SERPL-CCNC: 62 U/L (ref 55–135)
ALT SERPL W/O P-5'-P-CCNC: 18 U/L (ref 10–44)
ANION GAP SERPL CALC-SCNC: 7 MMOL/L (ref 8–16)
AST SERPL-CCNC: 20 U/L (ref 10–40)
BASOPHILS # BLD AUTO: 0.04 K/UL (ref 0–0.2)
BASOPHILS NFR BLD: 0.3 % (ref 0–1.9)
BILIRUB SERPL-MCNC: 0.9 MG/DL (ref 0.1–1)
BILIRUB UR QL STRIP: NEGATIVE
BUN SERPL-MCNC: 15 MG/DL (ref 8–23)
CALCIUM SERPL-MCNC: 8.7 MG/DL (ref 8.7–10.5)
CHLORIDE SERPL-SCNC: 110 MMOL/L (ref 95–110)
CLARITY UR: ABNORMAL
CO2 SERPL-SCNC: 24 MMOL/L (ref 23–29)
COLOR UR: YELLOW
CREAT SERPL-MCNC: 0.9 MG/DL (ref 0.5–1.4)
DIFFERENTIAL METHOD: ABNORMAL
EOSINOPHIL # BLD AUTO: 0.1 K/UL (ref 0–0.5)
EOSINOPHIL NFR BLD: 1 % (ref 0–8)
ERYTHROCYTE [DISTWIDTH] IN BLOOD BY AUTOMATED COUNT: 13.3 % (ref 11.5–14.5)
EST. GFR  (NO RACE VARIABLE): >60 ML/MIN/1.73 M^2
GLUCOSE SERPL-MCNC: 158 MG/DL (ref 70–110)
GLUCOSE UR QL STRIP: NEGATIVE
HCT VFR BLD AUTO: 28 % (ref 40–54)
HGB BLD-MCNC: 9.6 G/DL (ref 14–18)
HGB UR QL STRIP: NEGATIVE
IMM GRANULOCYTES # BLD AUTO: 0.14 K/UL (ref 0–0.04)
IMM GRANULOCYTES NFR BLD AUTO: 1.1 % (ref 0–0.5)
KETONES UR QL STRIP: NEGATIVE
LEUKOCYTE ESTERASE UR QL STRIP: NEGATIVE
LYMPHOCYTES # BLD AUTO: 1.2 K/UL (ref 1–4.8)
LYMPHOCYTES NFR BLD: 9.3 % (ref 18–48)
MCH RBC QN AUTO: 30.1 PG (ref 27–31)
MCHC RBC AUTO-ENTMCNC: 34.3 G/DL (ref 32–36)
MCV RBC AUTO: 88 FL (ref 82–98)
MONOCYTES # BLD AUTO: 1.5 K/UL (ref 0.3–1)
MONOCYTES NFR BLD: 11.6 % (ref 4–15)
NEUTROPHILS # BLD AUTO: 9.9 K/UL (ref 1.8–7.7)
NEUTROPHILS NFR BLD: 76.7 % (ref 38–73)
NITRITE UR QL STRIP: NEGATIVE
NRBC BLD-RTO: 0 /100 WBC
PH UR STRIP: 7 [PH] (ref 5–8)
PLATELET # BLD AUTO: 153 K/UL (ref 150–450)
PMV BLD AUTO: 10.4 FL (ref 9.2–12.9)
POCT GLUCOSE: 158 MG/DL (ref 70–110)
POTASSIUM SERPL-SCNC: 4.6 MMOL/L (ref 3.5–5.1)
PROT SERPL-MCNC: 6.6 G/DL (ref 6–8.4)
PROT UR QL STRIP: ABNORMAL
RBC # BLD AUTO: 3.19 M/UL (ref 4.6–6.2)
SODIUM SERPL-SCNC: 141 MMOL/L (ref 136–145)
SP GR UR STRIP: 1.02 (ref 1–1.03)
TROPONIN I SERPL DL<=0.01 NG/ML-MCNC: <0.006 NG/ML (ref 0–0.03)
URN SPEC COLLECT METH UR: ABNORMAL
UROBILINOGEN UR STRIP-ACNC: NEGATIVE EU/DL
WBC # BLD AUTO: 12.91 K/UL (ref 3.9–12.7)

## 2023-05-19 PROCEDURE — 25000003 PHARM REV CODE 250: Mod: HCNC | Performed by: EMERGENCY MEDICINE

## 2023-05-19 PROCEDURE — 84484 ASSAY OF TROPONIN QUANT: CPT | Mod: HCNC | Performed by: EMERGENCY MEDICINE

## 2023-05-19 PROCEDURE — 93005 ELECTROCARDIOGRAM TRACING: CPT | Mod: HCNC

## 2023-05-19 PROCEDURE — 81003 URINALYSIS AUTO W/O SCOPE: CPT | Mod: HCNC | Performed by: EMERGENCY MEDICINE

## 2023-05-19 PROCEDURE — 85025 COMPLETE CBC W/AUTO DIFF WBC: CPT | Mod: HCNC | Performed by: EMERGENCY MEDICINE

## 2023-05-19 PROCEDURE — 93010 ELECTROCARDIOGRAM REPORT: CPT | Mod: HCNC,,, | Performed by: INTERNAL MEDICINE

## 2023-05-19 PROCEDURE — 99285 EMERGENCY DEPT VISIT HI MDM: CPT | Mod: 25,HCNC

## 2023-05-19 PROCEDURE — 93010 EKG 12-LEAD: ICD-10-PCS | Mod: HCNC,,, | Performed by: INTERNAL MEDICINE

## 2023-05-19 PROCEDURE — 96360 HYDRATION IV INFUSION INIT: CPT | Mod: HCNC

## 2023-05-19 PROCEDURE — 82962 GLUCOSE BLOOD TEST: CPT | Mod: HCNC

## 2023-05-19 PROCEDURE — 80053 COMPREHEN METABOLIC PANEL: CPT | Mod: HCNC | Performed by: EMERGENCY MEDICINE

## 2023-05-19 RX ADMIN — SODIUM CHLORIDE 500 ML: 9 INJECTION, SOLUTION INTRAVENOUS at 08:05

## 2023-05-19 NOTE — TELEPHONE ENCOUNTER
Incoming call from patient   Discussed EZ hose - taking daily break, wash/dry/reuse  BP reports he went to ER this morning for BP 86/53 - tests run and all came back normal; states Dr. Mcghee called to check on him this morning - asked me update Dr. Mcghee that he was home and everything ok - MD notified.

## 2023-05-19 NOTE — TELEPHONE ENCOUNTER
The patient's wife said that his blood sugar was 170 and very low BP and went to the ER. Per wife the ER didn't find anything abnormal.     ----- Message from Amanda Jauregui sent at 5/19/2023  1:04 PM CDT -----  Regarding: call back  Contact: [phone]  ZEE JEWELL wife calling regarding Patient Advice (message) for #requesting a call back regarding how pt is doing. Please call
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
## 2023-05-22 ENCOUNTER — PATIENT MESSAGE (OUTPATIENT)
Dept: OTHER | Facility: OTHER | Age: 73
End: 2023-05-22
Payer: MEDICARE

## 2023-05-22 ENCOUNTER — CLINICAL SUPPORT (OUTPATIENT)
Dept: REHABILITATION | Facility: HOSPITAL | Age: 73
End: 2023-05-22
Payer: MEDICARE

## 2023-05-22 DIAGNOSIS — M25.562 ACUTE POSTOPERATIVE PAIN OF LEFT KNEE: Primary | ICD-10-CM

## 2023-05-22 DIAGNOSIS — Z74.09 IMPAIRED FUNCTIONAL MOBILITY, BALANCE, GAIT, AND ENDURANCE: ICD-10-CM

## 2023-05-22 DIAGNOSIS — G89.18 ACUTE POSTOPERATIVE PAIN OF LEFT KNEE: Primary | ICD-10-CM

## 2023-05-22 DIAGNOSIS — R29.898 DECREASED STRENGTH INVOLVING KNEE JOINT: ICD-10-CM

## 2023-05-22 LAB — POCT GLUCOSE: 138 MG/DL (ref 70–110)

## 2023-05-22 PROCEDURE — 97140 MANUAL THERAPY 1/> REGIONS: CPT | Mod: HCNC,PN,CQ

## 2023-05-22 PROCEDURE — 97110 THERAPEUTIC EXERCISES: CPT | Mod: HCNC,PN,CQ

## 2023-05-22 NOTE — PROGRESS NOTES
"[facility]   Physical Therapy Treatment Note      Name: Bandar Fernch  Clinic Number: [id number]    Therapy Diagnosis:   Encounter Diagnoses   Name Primary?    Acute postoperative pain of left knee Yes    Decreased strength involving knee joint     Impaired functional mobility, balance, gait, and endurance      Physician: Stevenson Mcghee III, *    Visit Date: 5/22/2023    Physician Orders: PT Eval and Treat   Medical Diagnosis from Referral: M17.12 (ICD-10-CM) - Primary osteoarthritis of left knee  Evaluation Date: 5/17/2023  Authorization Period Expiration: 07/19/2023  Plan of Care Expiration: 5/17/2023 to 06/28/2023  Visit # / Visits authorized: 1/20  FOTO: 2/8  PTA Visit #: 1/5     Time In: 9:00  Time Out: 9:55  Total Billable Time: 55 minutes (3TE, 1MT)    Precautions: post-op state    Subjective     Pt reports: Since yesterday has not been feeling too well. Describes leg pain with first initial steps as "burning" and reaching a 9/10 on pain scale.   He was compliant with home exercise program.  Response to previous treatment: Eval only  Functional change: Ongoing    Pain: 4/10 at rest 9/10 with first steps   Location: left knee (globally)    Objective      Objective Measures updated at progress report unless specified.     5/22/2023  Vitals at beginning of session  99/57 mmHg   65 bpm    Vitals after bike   101/56  57 bpm    Knee AROM -1-81 degrees    Treatment     Zack received the treatments listed below:      therapeutic exercises to develop strength, endurance, ROM, flexibility, and posture for 40 minutes including: Vitals  Quad sets towel under knee 20x  SAQs 2x8  Seated partial heel slides 20x  Heel prop 4' (no weight)(shortened lever arm)  Bike 5' lvl 1  Nu Step 5' lvl 1    manual therapy techniques: Joint mobilizations, Myofacial release, and Soft tissue Mobilization were applied to the: Left knee for 15 minutes, including:  Patellar mobs grade I-II  Physiological knee " flexion and extension       Patient Education and Home Exercises       Education provided:   - home exercise program: partial heel slides, SAQs, quad sets.    Written Home Exercises Provided: Patient instructed to cont prior HEP. Exercises were reviewed and Zack was able to demonstrate them prior to the end of the session.  Zack demonstrated good  understanding of the education provided. See EMR under Patient Instructions for exercises provided during therapy sessions    Assessment     Bandar is a 72 y.o. male referred to outpatient Physical Therapy with a medical diagnosis of M17.12 (ICD-10-CM) - Primary osteoarthritis of left knee. Patient now s/p left TKA. Surgery date: 05/15/2023.  POD#7. Full knee extension. No s/s of infection. Ambulating effectively with rolling walker. Patient has not been to therapy since initial evaluation due to hypotension episode. Went to ER where he was cleared of any blood clots. Patient presents with overall general feeling of fatigue and malaise. Blood pressure taken at beginning of session showed in low range. Reports taking pain medication at 8 am today. Focused on general knee ROM and low level mat strengthening today due to presentation. Increased pain with initial standing/steps but improves with walking. Vitals taken at end of session show minimal change. Will monitor patient response to session and progress as appropriate.     Zack Is progressing well towards his goals.   Pt prognosis is Excellent.     Pt will continue to benefit from skilled outpatient physical therapy to address the deficits listed in the problem list box on initial evaluation, provide pt/family education and to maximize pt's level of independence in the home and community environment.     Pt's spiritual, cultural and educational needs considered and pt agreeable to plan of care and goals.     Anticipated barriers to physical therapy: None    Goals: Short Term Goals: 3 weeks  1. Pt will be independent with HEP  supplement PT in improving functional mobility.  2. Pt will improve LLE strength to at least 70% of RLE strength as measured via MicroFet handheld dynamometer in order to improve functional mobility  3. Pt will improve left knee AROM to at least 0-110 in order to improve gait     Long Term Goals: 6 weeks  1. Pt will be independent with updated HEP supplement PT in improving functional mobility.  2. Pt will improve LLE strength to at least 80% of RLE strength as measured via MicroFet handheld dynamometer in order to improve functional mobility  3. Pt will improve L knee AROM to at least 0 - 115 in order to improve gait and ability to perform ADLs  4. Pt will improve FOTO knee survey score to </= 35% limited in order to demo improved functional mobility.  5. Pt will perform TUG in < 15 seconds without AD in order to demo improved gait speed  6. Pt will perform at least 6 sit to stands without UE support on 30 second sit to stand test in order to demo improved ability to perform transfers    Plan     Plan of care Certification: 5/17/2023 to 06/28/2023.  Cont with treatment per ANJALI Stevenson, PTA

## 2023-05-23 ENCOUNTER — PATIENT MESSAGE (OUTPATIENT)
Dept: OTHER | Facility: OTHER | Age: 73
End: 2023-05-23
Payer: MEDICARE

## 2023-05-23 ENCOUNTER — PATIENT MESSAGE (OUTPATIENT)
Dept: PRIMARY CARE CLINIC | Facility: CLINIC | Age: 73
End: 2023-05-23
Payer: MEDICARE

## 2023-05-23 ENCOUNTER — TELEPHONE (OUTPATIENT)
Dept: ORTHOPEDICS | Facility: CLINIC | Age: 73
End: 2023-05-23
Payer: MEDICARE

## 2023-05-23 ENCOUNTER — OFFICE VISIT (OUTPATIENT)
Dept: ORTHOPEDICS | Facility: CLINIC | Age: 73
End: 2023-05-23
Payer: MEDICARE

## 2023-05-23 VITALS — HEART RATE: 66 BPM | SYSTOLIC BLOOD PRESSURE: 99 MMHG | DIASTOLIC BLOOD PRESSURE: 59 MMHG | TEMPERATURE: 98 F

## 2023-05-23 DIAGNOSIS — I95.9 HYPOTENSION, UNSPECIFIED HYPOTENSION TYPE: ICD-10-CM

## 2023-05-23 DIAGNOSIS — Z96.652 STATUS POST LEFT KNEE REPLACEMENT: Primary | ICD-10-CM

## 2023-05-23 PROCEDURE — 3072F PR LOW RISK FOR RETINOPATHY: ICD-10-PCS | Mod: HCNC,CPTII,S$GLB, | Performed by: PHYSICIAN ASSISTANT

## 2023-05-23 PROCEDURE — 3044F HG A1C LEVEL LT 7.0%: CPT | Mod: HCNC,CPTII,S$GLB, | Performed by: PHYSICIAN ASSISTANT

## 2023-05-23 PROCEDURE — 3078F DIAST BP <80 MM HG: CPT | Mod: HCNC,CPTII,S$GLB, | Performed by: PHYSICIAN ASSISTANT

## 2023-05-23 PROCEDURE — 4010F ACE/ARB THERAPY RXD/TAKEN: CPT | Mod: HCNC,CPTII,S$GLB, | Performed by: PHYSICIAN ASSISTANT

## 2023-05-23 PROCEDURE — 3078F PR MOST RECENT DIASTOLIC BLOOD PRESSURE < 80 MM HG: ICD-10-PCS | Mod: HCNC,CPTII,S$GLB, | Performed by: PHYSICIAN ASSISTANT

## 2023-05-23 PROCEDURE — 1125F AMNT PAIN NOTED PAIN PRSNT: CPT | Mod: HCNC,CPTII,S$GLB, | Performed by: PHYSICIAN ASSISTANT

## 2023-05-23 PROCEDURE — 3074F SYST BP LT 130 MM HG: CPT | Mod: HCNC,CPTII,S$GLB, | Performed by: PHYSICIAN ASSISTANT

## 2023-05-23 PROCEDURE — 99999 PR PBB SHADOW E&M-EST. PATIENT-LVL IV: ICD-10-PCS | Mod: PBBFAC,HCNC,, | Performed by: PHYSICIAN ASSISTANT

## 2023-05-23 PROCEDURE — 1159F PR MEDICATION LIST DOCUMENTED IN MEDICAL RECORD: ICD-10-PCS | Mod: HCNC,CPTII,S$GLB, | Performed by: PHYSICIAN ASSISTANT

## 2023-05-23 PROCEDURE — 1125F PR PAIN SEVERITY QUANTIFIED, PAIN PRESENT: ICD-10-PCS | Mod: HCNC,CPTII,S$GLB, | Performed by: PHYSICIAN ASSISTANT

## 2023-05-23 PROCEDURE — 99999 PR PBB SHADOW E&M-EST. PATIENT-LVL IV: CPT | Mod: PBBFAC,HCNC,, | Performed by: PHYSICIAN ASSISTANT

## 2023-05-23 PROCEDURE — 3072F LOW RISK FOR RETINOPATHY: CPT | Mod: HCNC,CPTII,S$GLB, | Performed by: PHYSICIAN ASSISTANT

## 2023-05-23 PROCEDURE — 99024 PR POST-OP FOLLOW-UP VISIT: ICD-10-PCS | Mod: HCNC,S$GLB,, | Performed by: PHYSICIAN ASSISTANT

## 2023-05-23 PROCEDURE — 1157F ADVNC CARE PLAN IN RCRD: CPT | Mod: HCNC,CPTII,S$GLB, | Performed by: PHYSICIAN ASSISTANT

## 2023-05-23 PROCEDURE — 3074F PR MOST RECENT SYSTOLIC BLOOD PRESSURE < 130 MM HG: ICD-10-PCS | Mod: HCNC,CPTII,S$GLB, | Performed by: PHYSICIAN ASSISTANT

## 2023-05-23 PROCEDURE — 1159F MED LIST DOCD IN RCRD: CPT | Mod: HCNC,CPTII,S$GLB, | Performed by: PHYSICIAN ASSISTANT

## 2023-05-23 PROCEDURE — 99024 POSTOP FOLLOW-UP VISIT: CPT | Mod: HCNC,S$GLB,, | Performed by: PHYSICIAN ASSISTANT

## 2023-05-23 PROCEDURE — 3044F PR MOST RECENT HEMOGLOBIN A1C LEVEL <7.0%: ICD-10-PCS | Mod: HCNC,CPTII,S$GLB, | Performed by: PHYSICIAN ASSISTANT

## 2023-05-23 PROCEDURE — 1157F PR ADVANCE CARE PLAN OR EQUIV PRESENT IN MEDICAL RECORD: ICD-10-PCS | Mod: HCNC,CPTII,S$GLB, | Performed by: PHYSICIAN ASSISTANT

## 2023-05-23 PROCEDURE — 4010F PR ACE/ARB THEARPY RXD/TAKEN: ICD-10-PCS | Mod: HCNC,CPTII,S$GLB, | Performed by: PHYSICIAN ASSISTANT

## 2023-05-23 RX ORDER — METHOCARBAMOL 750 MG/1
750 TABLET, FILM COATED ORAL 3 TIMES DAILY PRN
Qty: 21 TABLET | Refills: 0 | Status: SHIPPED | OUTPATIENT
Start: 2023-05-23 | End: 2023-05-30 | Stop reason: SDUPTHER

## 2023-05-23 RX ORDER — CEFADROXIL 500 MG/1
500 CAPSULE ORAL EVERY 12 HOURS
Qty: 14 CAPSULE | Refills: 0 | Status: SHIPPED | OUTPATIENT
Start: 2023-05-23 | End: 2023-05-30

## 2023-05-23 NOTE — TELEPHONE ENCOUNTER
TKA 5/15/23  Syncopal episode 5/19  ER eval neg troponin, neg US DVT, hgb 3 point drop    Doing ok today  Low BP - on 2 BP meds, told him to contact PCP asap to see if which one to hold, has f/u with PCP 6/2    Taking robaxin + oxy at same time - rec spacing apart     Stay hydrated - smaller more frequent po fluid intake    Erythema anterior shin - appears more bruising/hematoma related than infection, pin sites c/d/I but will re-extend cefadroxil for another week    F/u in 1 week for standard 2 week post op visit

## 2023-05-23 NOTE — PROGRESS NOTES
Bandar French presents for unscheduled post-operative visit following a left total knee arthroplasty performed by Dr. Mcghee on 05/15/2023.  Patient is having issues with swelling and pain in the left lower extremity particularly at the distal incision for the navigation post he was seen in the emergency room earlier in the week for a hypotensive episode.    Exam:   Blood pressure (!) 99/59, pulse 66, temperature 97.6 °F (36.4 °C).   Ambulating well with assistive device.  Incision is clean and dry without drainage moderate erythema at the distal incision site tender to light touch  ROM:  5-85    Initial post-operative radiographs reviewed today revealing a well fixed and aligned prosthesis.    A/P:   s/p left total knee replacement      - Outpatient PT:  Tolerating well  - Continue ASA for 1 month post op.  - Pain medication Robaxin refilled  Duricef extended x1 more week  - patient will contact his primary care to discuss blood pressure medication hopes to discontinue 1 of the other his blood pressure medications in his immediate postoperative.

## 2023-05-24 ENCOUNTER — PES CALL (OUTPATIENT)
Dept: ADMINISTRATIVE | Facility: CLINIC | Age: 73
End: 2023-05-24
Payer: MEDICARE

## 2023-05-24 ENCOUNTER — CLINICAL SUPPORT (OUTPATIENT)
Dept: REHABILITATION | Facility: HOSPITAL | Age: 73
End: 2023-05-24
Payer: MEDICARE

## 2023-05-24 ENCOUNTER — PATIENT MESSAGE (OUTPATIENT)
Dept: ORTHOPEDICS | Facility: CLINIC | Age: 73
End: 2023-05-24
Payer: MEDICARE

## 2023-05-24 DIAGNOSIS — R29.898 DECREASED STRENGTH INVOLVING KNEE JOINT: ICD-10-CM

## 2023-05-24 DIAGNOSIS — M25.562 ACUTE POSTOPERATIVE PAIN OF LEFT KNEE: Primary | ICD-10-CM

## 2023-05-24 DIAGNOSIS — Z74.09 IMPAIRED FUNCTIONAL MOBILITY, BALANCE, GAIT, AND ENDURANCE: ICD-10-CM

## 2023-05-24 DIAGNOSIS — G89.18 ACUTE POSTOPERATIVE PAIN OF LEFT KNEE: Primary | ICD-10-CM

## 2023-05-24 PROCEDURE — 97110 THERAPEUTIC EXERCISES: CPT | Mod: HCNC,PN

## 2023-05-24 PROCEDURE — 97140 MANUAL THERAPY 1/> REGIONS: CPT | Mod: HCNC,PN

## 2023-05-24 NOTE — PROGRESS NOTES
[facility]   Physical Therapy Treatment Note      Name: Bandar French  Clinic Number: [id number]    Therapy Diagnosis:   Encounter Diagnoses   Name Primary?    Acute postoperative pain of left knee Yes    Decreased strength involving knee joint     Impaired functional mobility, balance, gait, and endurance      Physician: Stevenson Mcghee III, *    Visit Date: 5/24/2023    Physician Orders: PT Eval and Treat   Medical Diagnosis from Referral: M17.12 (ICD-10-CM) - Primary osteoarthritis of left knee  Evaluation Date: 5/17/2023  Authorization Period Expiration: 07/19/2023  Plan of Care Expiration: 5/17/2023 to 06/28/2023  Visit # / Visits authorized: 2/20 (+ eval) FOTO: 3/8 PTA Visit #: 0/5     Time In: 1000  Time Out: 1055  Total Billable Time: 30 minutes (1 TE, 1MT)  Precautions: post-op state    Subjective     Pt reports: continues to report high level pain and stiffness.    He was compliant with home exercise program.  Response to previous treatment: no adverse effects.   Functional change: Ongoing    Pain: 7/10  Location: left knee (globally)    Objective      Vitals at beginning of session  113/69 mmHg   65 bpm    Knee PROM (-) 3 - 80 degrees    Treatment     Zack received the treatments listed below:      Therapeutic exercises to develop strength, endurance, ROM, flexibility, and posture for 15 minutes with PT 1:1 including:   Quad sets towel under knee 20x --> SAQs 2x8  Seated partial heel slides 2x10 with strap   Heel prop 4' (no weight)(shortened lever arm) (not today)  Leg press for flexion 3 plates dL 2x10  Nu Step 5' lvl 1    Manual therapy techniques: Joint mobilizations, Myofacial release, and Soft tissue Mobilization were applied to the: Left knee for 15 minutes, including:  Patellar mobs grade I-II  Physiological knee flexion and extension       Patient Education and Home Exercises       Education provided:   - home exercise program: partial heel slides, SAQs, quad  sets.    Written Home Exercises Provided: Patient instructed to cont prior HEP. Exercises were reviewed and Zack was able to demonstrate them prior to the end of the session.  Zack demonstrated good  understanding of the education provided. See EMR under Patient Instructions for exercises provided during therapy sessions    Assessment     Bandar is a 72 y.o. male referred to outpatient Physical Therapy with a medical diagnosis of M17.12 (ICD-10-CM) - Primary osteoarthritis of left knee. Patient now s/p left TKA. Surgery date: 05/15/2023.  POD#9.   Increased stiffness, effusion, and joint irritability today.  Knee extension <3 degrees passively.  Focus on improving knee flexion range of motion and quad activation today.  Pain poorly controlled today.     Zack Is progressing well towards his goals.   Pt prognosis is Excellent.     Pt will continue to benefit from skilled outpatient physical therapy to address the deficits listed in the problem list box on initial evaluation, provide pt/family education and to maximize pt's level of independence in the home and community environment.   Pt's spiritual, cultural and educational needs considered and pt agreeable to plan of care and goals.     Anticipated barriers to physical therapy: None    Goals: Short Term Goals: 3 weeks  1. Pt will be independent with HEP supplement PT in improving functional mobility.  Progressing towards  2. Pt will improve LLE strength to at least 70% of RLE strength as measured via MicroFet handheld dynamometer in order to improve functional mobility. Progressing towards  3. Pt will improve left knee AROM to at least 0-110 in order to improve gait. Progressing towards     Long Term Goals: 6 weeks  1. Pt will be independent with updated HEP supplement PT in improving functional mobility. Progressing towards  2. Pt will improve LLE strength to at least 80% of RLE strength as measured via MicroFet handheld dynamometer in order to improve functional  mobility. Progressing towards  3. Pt will improve L knee AROM to at least 0 - 115 in order to improve gait and ability to perform ADLs. Progressing towards  4. Pt will improve FOTO knee survey score to </= 35% limited in order to demo improved functional mobility.  5. Pt will perform TUG in < 15 seconds without AD in order to demo improved gait speed. Progressing towards  6. Pt will perform at least 6 sit to stands without UE support on 30 second sit to stand test in order to demo improved ability to perform transfers. Progressing towards    Plan     Plan of care Certification: 5/17/2023 to 06/28/2023.  Cont with treatment per POC    Luis Gallegos, PT, DPT, OCS

## 2023-05-26 ENCOUNTER — CLINICAL SUPPORT (OUTPATIENT)
Dept: REHABILITATION | Facility: HOSPITAL | Age: 73
End: 2023-05-26
Payer: MEDICARE

## 2023-05-26 DIAGNOSIS — Z74.09 IMPAIRED FUNCTIONAL MOBILITY, BALANCE, GAIT, AND ENDURANCE: ICD-10-CM

## 2023-05-26 DIAGNOSIS — M25.562 ACUTE POSTOPERATIVE PAIN OF LEFT KNEE: Primary | ICD-10-CM

## 2023-05-26 DIAGNOSIS — G89.18 ACUTE POSTOPERATIVE PAIN OF LEFT KNEE: Primary | ICD-10-CM

## 2023-05-26 DIAGNOSIS — R29.898 DECREASED STRENGTH INVOLVING KNEE JOINT: ICD-10-CM

## 2023-05-26 PROCEDURE — 97112 NEUROMUSCULAR REEDUCATION: CPT | Mod: HCNC,PN

## 2023-05-26 PROCEDURE — 97014 ELECTRIC STIMULATION THERAPY: CPT | Mod: HCNC,PN

## 2023-05-26 PROCEDURE — 97140 MANUAL THERAPY 1/> REGIONS: CPT | Mod: HCNC,PN

## 2023-05-26 PROCEDURE — 97110 THERAPEUTIC EXERCISES: CPT | Mod: HCNC,PN

## 2023-05-26 NOTE — PROGRESS NOTES
[facility]   Physical Therapy Treatment Note      Name: Bandar French  Clinic Number: [id number]    Therapy Diagnosis:   Encounter Diagnoses   Name Primary?    Acute postoperative pain of left knee Yes    Decreased strength involving knee joint     Impaired functional mobility, balance, gait, and endurance      Physician: Stevenson Mcghee III, *    Visit Date: 5/26/2023    Physician Orders: PT Eval and Treat   Medical Diagnosis from Referral: M17.12 (ICD-10-CM) - Primary osteoarthritis of left knee  Evaluation Date: 5/17/2023  Authorization Period Expiration: 07/19/2023  Plan of Care Expiration: 5/17/2023 to 06/28/2023  Visit # / Visits authorized: 3/20 (+ eval) FOTO: 4/8 PTA Visit #: 0/5     Time In: 1000  Time Out: 1055  Total Billable Time: 55 minutes (1 NM, 1 ES, 1 TE, 2MT)  Precautions: post-op state    Subjective     Pt reports: reports some improvement in his pain.  BP better now; has been checking at home.  But more stiffness in his knee despite performing knee flexion exercises.   He was compliant with home exercise program.  Response to previous treatment: no adverse effects.   Functional change: Ongoing    Pain: 5/10  Location: left knee (globally)    Objective      Vitals at beginning of session  113/69 mmHg   65 bpm    Knee PROM 0 - 75 degrees    Treatment     Zack received the treatments listed below:      Neuromuscular re-education for proprioception, kinesthetic awareness: total time 15 minutes  Quad sets + NMES 5'   SAQs + NMES 5'  Straight leg raise + NMES 5'    Therapeutic exercises to develop strength, endurance, ROM, flexibility, and posture for 15 minutes  including:   Seated partial heel slides 2x10 with strap   Long sitting heel slides with strap 2x10  Heel prop 4' (no weight)(shortened lever arm) (not today)  Leg press for flexion 3 plates dL 2x10 (not today)  Nu Step 5' lvl 1    Manual therapy techniques: Joint mobilizations, Myofacial release, and Soft tissue  Mobilization were applied to the: Left knee for 25 minutes, including:  Patellar mobs grade I-II  Physiological knee flexion and extension in both long-sitting and short-sitting.   Trial of wall slides    Supervised modalities:  NMES to left quadriceps muscle at 150 Hz 10 seconds on: 10 seconds off with 3 second ramp asymmetrical pulse in conjunction with exercises listed above. Total time 15 minutes.       Patient Education and Home Exercises       Education provided:   - home exercise program: partial heel slides, SAQs, quad sets.    Written Home Exercises Provided: Patient instructed to cont prior HEP. Exercises were reviewed and Zack was able to demonstrate them prior to the end of the session.  Zack demonstrated good  understanding of the education provided. See EMR under Patient Instructions for exercises provided during therapy sessions    Assessment     Bandar is a 72 y.o. male referred to outpatient Physical Therapy with a medical diagnosis of M17.12 (ICD-10-CM) - Primary osteoarthritis of left knee. Patient now s/p left TKA. Surgery date: 05/15/2023.  POD#11.   Increased stiffness into knee flexion.  Struggling to get past 75 degrees at this time.     Zack Is progressing well towards his goals.   Pt prognosis is Excellent.     Pt will continue to benefit from skilled outpatient physical therapy to address the deficits listed in the problem list box on initial evaluation, provide pt/family education and to maximize pt's level of independence in the home and community environment.   Pt's spiritual, cultural and educational needs considered and pt agreeable to plan of care and goals.     Anticipated barriers to physical therapy: None    Goals: Short Term Goals: 3 weeks  1. Pt will be independent with HEP supplement PT in improving functional mobility.  Progressing towards  2. Pt will improve LLE strength to at least 70% of RLE strength as measured via MicroFet handheld dynamometer in order to improve functional  mobility. Progressing towards  3. Pt will improve left knee AROM to at least 0-110 in order to improve gait. Progressing towards     Long Term Goals: 6 weeks  1. Pt will be independent with updated HEP supplement PT in improving functional mobility. Progressing towards  2. Pt will improve LLE strength to at least 80% of RLE strength as measured via MicroFet handheld dynamometer in order to improve functional mobility. Progressing towards  3. Pt will improve L knee AROM to at least 0 - 115 in order to improve gait and ability to perform ADLs. Progressing towards  4. Pt will improve FOTO knee survey score to </= 35% limited in order to demo improved functional mobility.  5. Pt will perform TUG in < 15 seconds without AD in order to demo improved gait speed. Progressing towards  6. Pt will perform at least 6 sit to stands without UE support on 30 second sit to stand test in order to demo improved ability to perform transfers. Progressing towards    Plan     Plan of care Certification: 5/17/2023 to 06/28/2023.  Cont with treatment per POC    Luis Gallegos, PT, DPT, OCS

## 2023-05-29 ENCOUNTER — CLINICAL SUPPORT (OUTPATIENT)
Dept: REHABILITATION | Facility: HOSPITAL | Age: 73
End: 2023-05-29
Payer: MEDICARE

## 2023-05-29 DIAGNOSIS — M25.562 ACUTE POSTOPERATIVE PAIN OF LEFT KNEE: Primary | ICD-10-CM

## 2023-05-29 DIAGNOSIS — Z74.09 IMPAIRED FUNCTIONAL MOBILITY, BALANCE, GAIT, AND ENDURANCE: ICD-10-CM

## 2023-05-29 DIAGNOSIS — G89.18 ACUTE POSTOPERATIVE PAIN OF LEFT KNEE: Primary | ICD-10-CM

## 2023-05-29 DIAGNOSIS — R29.898 DECREASED STRENGTH INVOLVING KNEE JOINT: ICD-10-CM

## 2023-05-29 PROCEDURE — 97112 NEUROMUSCULAR REEDUCATION: CPT | Mod: HCNC,PN

## 2023-05-29 PROCEDURE — 97140 MANUAL THERAPY 1/> REGIONS: CPT | Mod: HCNC,PN

## 2023-05-29 NOTE — PLAN OF CARE
05/15/23 2002   TRAMMELL Message   Medicare Outpatient and Observation Notification regarding financial responsibility Given to patient/caregiver;Explained to patient/caregiver;Signed/date by patient/caregiver   Date TRAMMELL was signed 05/15/23   Time TRAMMELL was signed 2002

## 2023-05-29 NOTE — PLAN OF CARE
[facility])  Discharge Final Note    Primary Care Provider: Gunnar Rangel MD    Expected Discharge Date: 5/16/2023    Final Discharge Note (most recent)       Final Note - 05/16/23 1109          Final Note    Assessment Type Final Discharge Note     Anticipated Discharge Disposition Home or Self Care     Hospital Resources/Appts/Education Provided Provided patient/caregiver with written discharge plan information;Appointments scheduled by Navigator/Coordinator

## 2023-05-30 ENCOUNTER — OFFICE VISIT (OUTPATIENT)
Dept: ORTHOPEDICS | Facility: CLINIC | Age: 73
End: 2023-05-30
Payer: MEDICARE

## 2023-05-30 VITALS
SYSTOLIC BLOOD PRESSURE: 123 MMHG | HEIGHT: 70 IN | BODY MASS INDEX: 24.68 KG/M2 | HEART RATE: 68 BPM | WEIGHT: 172.38 LBS | DIASTOLIC BLOOD PRESSURE: 68 MMHG

## 2023-05-30 DIAGNOSIS — Z96.652 STATUS POST LEFT KNEE REPLACEMENT: Primary | ICD-10-CM

## 2023-05-30 PROCEDURE — 3008F BODY MASS INDEX DOCD: CPT | Mod: HCNC,CPTII,S$GLB, | Performed by: PHYSICIAN ASSISTANT

## 2023-05-30 PROCEDURE — 1101F PT FALLS ASSESS-DOCD LE1/YR: CPT | Mod: HCNC,CPTII,S$GLB, | Performed by: PHYSICIAN ASSISTANT

## 2023-05-30 PROCEDURE — 1125F PR PAIN SEVERITY QUANTIFIED, PAIN PRESENT: ICD-10-PCS | Mod: HCNC,CPTII,S$GLB, | Performed by: PHYSICIAN ASSISTANT

## 2023-05-30 PROCEDURE — 3008F PR BODY MASS INDEX (BMI) DOCUMENTED: ICD-10-PCS | Mod: HCNC,CPTII,S$GLB, | Performed by: PHYSICIAN ASSISTANT

## 2023-05-30 PROCEDURE — 1160F PR REVIEW ALL MEDS BY PRESCRIBER/CLIN PHARMACIST DOCUMENTED: ICD-10-PCS | Mod: HCNC,CPTII,S$GLB, | Performed by: PHYSICIAN ASSISTANT

## 2023-05-30 PROCEDURE — 1160F RVW MEDS BY RX/DR IN RCRD: CPT | Mod: HCNC,CPTII,S$GLB, | Performed by: PHYSICIAN ASSISTANT

## 2023-05-30 PROCEDURE — 99024 POSTOP FOLLOW-UP VISIT: CPT | Mod: HCNC,S$GLB,, | Performed by: PHYSICIAN ASSISTANT

## 2023-05-30 PROCEDURE — 3044F PR MOST RECENT HEMOGLOBIN A1C LEVEL <7.0%: ICD-10-PCS | Mod: HCNC,CPTII,S$GLB, | Performed by: PHYSICIAN ASSISTANT

## 2023-05-30 PROCEDURE — 4010F ACE/ARB THERAPY RXD/TAKEN: CPT | Mod: HCNC,CPTII,S$GLB, | Performed by: PHYSICIAN ASSISTANT

## 2023-05-30 PROCEDURE — 1125F AMNT PAIN NOTED PAIN PRSNT: CPT | Mod: HCNC,CPTII,S$GLB, | Performed by: PHYSICIAN ASSISTANT

## 2023-05-30 PROCEDURE — 3074F SYST BP LT 130 MM HG: CPT | Mod: HCNC,CPTII,S$GLB, | Performed by: PHYSICIAN ASSISTANT

## 2023-05-30 PROCEDURE — 1101F PR PT FALLS ASSESS DOC 0-1 FALLS W/OUT INJ PAST YR: ICD-10-PCS | Mod: HCNC,CPTII,S$GLB, | Performed by: PHYSICIAN ASSISTANT

## 2023-05-30 PROCEDURE — 99999 PR PBB SHADOW E&M-EST. PATIENT-LVL IV: ICD-10-PCS | Mod: PBBFAC,HCNC,, | Performed by: PHYSICIAN ASSISTANT

## 2023-05-30 PROCEDURE — 3288F FALL RISK ASSESSMENT DOCD: CPT | Mod: HCNC,CPTII,S$GLB, | Performed by: PHYSICIAN ASSISTANT

## 2023-05-30 PROCEDURE — 3072F LOW RISK FOR RETINOPATHY: CPT | Mod: HCNC,CPTII,S$GLB, | Performed by: PHYSICIAN ASSISTANT

## 2023-05-30 PROCEDURE — 3078F PR MOST RECENT DIASTOLIC BLOOD PRESSURE < 80 MM HG: ICD-10-PCS | Mod: HCNC,CPTII,S$GLB, | Performed by: PHYSICIAN ASSISTANT

## 2023-05-30 PROCEDURE — 3044F HG A1C LEVEL LT 7.0%: CPT | Mod: HCNC,CPTII,S$GLB, | Performed by: PHYSICIAN ASSISTANT

## 2023-05-30 PROCEDURE — 3074F PR MOST RECENT SYSTOLIC BLOOD PRESSURE < 130 MM HG: ICD-10-PCS | Mod: HCNC,CPTII,S$GLB, | Performed by: PHYSICIAN ASSISTANT

## 2023-05-30 PROCEDURE — 3078F DIAST BP <80 MM HG: CPT | Mod: HCNC,CPTII,S$GLB, | Performed by: PHYSICIAN ASSISTANT

## 2023-05-30 PROCEDURE — 99999 PR PBB SHADOW E&M-EST. PATIENT-LVL IV: CPT | Mod: PBBFAC,HCNC,, | Performed by: PHYSICIAN ASSISTANT

## 2023-05-30 PROCEDURE — 1159F MED LIST DOCD IN RCRD: CPT | Mod: HCNC,CPTII,S$GLB, | Performed by: PHYSICIAN ASSISTANT

## 2023-05-30 PROCEDURE — 4010F PR ACE/ARB THEARPY RXD/TAKEN: ICD-10-PCS | Mod: HCNC,CPTII,S$GLB, | Performed by: PHYSICIAN ASSISTANT

## 2023-05-30 PROCEDURE — 3072F PR LOW RISK FOR RETINOPATHY: ICD-10-PCS | Mod: HCNC,CPTII,S$GLB, | Performed by: PHYSICIAN ASSISTANT

## 2023-05-30 PROCEDURE — 1157F ADVNC CARE PLAN IN RCRD: CPT | Mod: HCNC,CPTII,S$GLB, | Performed by: PHYSICIAN ASSISTANT

## 2023-05-30 PROCEDURE — 99024 PR POST-OP FOLLOW-UP VISIT: ICD-10-PCS | Mod: HCNC,S$GLB,, | Performed by: PHYSICIAN ASSISTANT

## 2023-05-30 PROCEDURE — 1159F PR MEDICATION LIST DOCUMENTED IN MEDICAL RECORD: ICD-10-PCS | Mod: HCNC,CPTII,S$GLB, | Performed by: PHYSICIAN ASSISTANT

## 2023-05-30 PROCEDURE — 1157F PR ADVANCE CARE PLAN OR EQUIV PRESENT IN MEDICAL RECORD: ICD-10-PCS | Mod: HCNC,CPTII,S$GLB, | Performed by: PHYSICIAN ASSISTANT

## 2023-05-30 PROCEDURE — 3288F PR FALLS RISK ASSESSMENT DOCUMENTED: ICD-10-PCS | Mod: HCNC,CPTII,S$GLB, | Performed by: PHYSICIAN ASSISTANT

## 2023-05-30 RX ORDER — METHOCARBAMOL 750 MG/1
750 TABLET, FILM COATED ORAL 3 TIMES DAILY PRN
Qty: 21 TABLET | Refills: 0 | Status: SHIPPED | OUTPATIENT
Start: 2023-05-30 | End: 2023-06-09 | Stop reason: SDUPTHER

## 2023-05-30 RX ORDER — CEFADROXIL 500 MG/1
500 CAPSULE ORAL EVERY 12 HOURS
Qty: 14 CAPSULE | Refills: 0 | Status: SHIPPED | OUTPATIENT
Start: 2023-05-30 | End: 2023-07-19 | Stop reason: ALTCHOICE

## 2023-05-30 NOTE — PROGRESS NOTES
[facility]   Physical Therapy Treatment Note      Name: Bandar French  Clinic Number: [id number]    Therapy Diagnosis:   Encounter Diagnoses   Name Primary?    Acute postoperative pain of left knee Yes    Decreased strength involving knee joint     Impaired functional mobility, balance, gait, and endurance      Physician: Stevenson Mcghee III, *    Visit Date: 5/29/2023    Physician Orders: PT Eval and Treat   Medical Diagnosis from Referral: M17.12 (ICD-10-CM) - Primary osteoarthritis of left knee  Evaluation Date: 5/17/2023  Authorization Period Expiration: 07/19/2023  Plan of Care Expiration: 5/17/2023 to 06/28/2023  Visit # / Visits authorized: 4/20 (+ eval) FOTO: 5/8 PTA Visit #: 0/5     Time In: 1200  Time Out: 1255  Total Billable Time: 30 minutes (1 NM, 1 MT)  Precautions: post-op state    Subjective     Pt reports:  improvement in his pain.  BP better now; has been checking at home.  Continues with frustrating stiffness in his knee despite performing knee flexion exercises.   He was compliant with home exercise program.  Response to previous treatment: no adverse effects.   Functional change: Ongoing    Pain: 5/10  Location: left knee (globally)    Objective      Vitals at beginning of session  113/69 mmHg   65 bpm    Knee PROM 0 - 75 degrees  Tightness felt in distal quad tendon.  Noted ecchymosis with tenderness to medial thigh.     Treatment     Zack received the treatments listed below:      Neuromuscular re-education for proprioception, kinesthetic awareness: total time 10 minutes with PT 1:1, including  Quad sets 20x  SAQs 2x10  Straight leg raise  AA 3x5  Nu Step 5' lvl 1      Therapeutic exercises to develop strength, endurance, ROM, flexibility, and posture for 10 minutes with PT 1:1, including:   Seated partial heel slides 2x10 with strap   Long sitting heel slides with strap 2x10  Heel prop 4' (no weight)(shortened lever arm) (not today)  Leg press for flexion 3 plates  dL 2x10 (not today)      Manual therapy techniques: Joint mobilizations, Myofacial release, and Soft tissue Mobilization were applied to the: Left knee for 15 minutes, including:  Patellar mobs grade I-II  Physiological knee flexion and extension in both long-sitting and short-sitting.          Patient Education and Home Exercises       Education provided:   - home exercise program: partial heel slides, SAQs, quad sets.    Written Home Exercises Provided: Patient instructed to cont prior HEP. Exercises were reviewed and Zack was able to demonstrate them prior to the end of the session.  Zack demonstrated good  understanding of the education provided. See EMR under Patient Instructions for exercises provided during therapy sessions    Assessment     Bandar is a 72 y.o. male referred to outpatient Physical Therapy with a medical diagnosis of M17.12 (ICD-10-CM) - Primary osteoarthritis of left knee. Patient now s/p left TKA. Surgery date: 05/15/2023.  POD#14.   Increased stiffness into knee flexion.  Struggling to get past 75 degrees at this time.     Zack Is progressing well towards his goals.   Pt prognosis is Excellent.     Pt will continue to benefit from skilled outpatient physical therapy to address the deficits listed in the problem list box on initial evaluation, provide pt/family education and to maximize pt's level of independence in the home and community environment.   Pt's spiritual, cultural and educational needs considered and pt agreeable to plan of care and goals.     Anticipated barriers to physical therapy: None    Goals: Short Term Goals: 3 weeks  1. Pt will be independent with HEP supplement PT in improving functional mobility.  Progressing towards  2. Pt will improve LLE strength to at least 70% of RLE strength as measured via MicroFet handheld dynamometer in order to improve functional mobility. Progressing towards  3. Pt will improve left knee AROM to at least 0-110 in order to improve gait.  Progressing towards     Long Term Goals: 6 weeks  1. Pt will be independent with updated HEP supplement PT in improving functional mobility. Progressing towards  2. Pt will improve LLE strength to at least 80% of RLE strength as measured via MicroFet handheld dynamometer in order to improve functional mobility. Progressing towards  3. Pt will improve L knee AROM to at least 0 - 115 in order to improve gait and ability to perform ADLs. Progressing towards  4. Pt will improve FOTO knee survey score to </= 35% limited in order to demo improved functional mobility.  5. Pt will perform TUG in < 15 seconds without AD in order to demo improved gait speed. Progressing towards  6. Pt will perform at least 6 sit to stands without UE support on 30 second sit to stand test in order to demo improved ability to perform transfers. Progressing towards    Plan   Quad strengthening and activation  Knee flexion restoration.     Luis Gallegos, PT, DPT, OCS

## 2023-05-31 ENCOUNTER — CLINICAL SUPPORT (OUTPATIENT)
Dept: REHABILITATION | Facility: HOSPITAL | Age: 73
End: 2023-05-31
Payer: MEDICARE

## 2023-05-31 DIAGNOSIS — G89.18 ACUTE POSTOPERATIVE PAIN OF LEFT KNEE: Primary | ICD-10-CM

## 2023-05-31 DIAGNOSIS — R29.898 DECREASED STRENGTH INVOLVING KNEE JOINT: ICD-10-CM

## 2023-05-31 DIAGNOSIS — Z74.09 IMPAIRED FUNCTIONAL MOBILITY, BALANCE, GAIT, AND ENDURANCE: ICD-10-CM

## 2023-05-31 DIAGNOSIS — M25.562 ACUTE POSTOPERATIVE PAIN OF LEFT KNEE: Primary | ICD-10-CM

## 2023-05-31 PROCEDURE — 97112 NEUROMUSCULAR REEDUCATION: CPT | Mod: HCNC,PN

## 2023-05-31 PROCEDURE — 97140 MANUAL THERAPY 1/> REGIONS: CPT | Mod: HCNC,PN

## 2023-05-31 NOTE — PROGRESS NOTES
[facility]   Physical Therapy Treatment Note      Name: Bandar French  Clinic Number: [id number]    Therapy Diagnosis:   Encounter Diagnoses   Name Primary?    Acute postoperative pain of left knee Yes    Decreased strength involving knee joint     Impaired functional mobility, balance, gait, and endurance      Physician: Stevenson Mcghee III, *    Visit Date: 5/31/2023    Physician Orders: PT Eval and Treat   Medical Diagnosis from Referral: M17.12 (ICD-10-CM) - Primary osteoarthritis of left knee  Evaluation Date: 5/17/2023  Authorization Period Expiration: 07/19/2023  Plan of Care Expiration: 5/17/2023 to 06/28/2023  Visit # / Visits authorized: 4/20 (+ eval) FOTO: 5/8 PTA Visit #: 0/5     Time In: 1000  Time Out: 1055  Total Billable Time: 30 minutes (1 NM, 1 MT)  Precautions: post-op state    Subjective     Pt reports: Good visit with Ortho team.  Removed bandages.   He was compliant with home exercise program.  Response to previous treatment: no adverse effects.   Functional change: Ongoing    Pain: 5/10  Location: left knee (globally)    Objective      Knee PROM 0 - 80 degrees  Tightness felt in distal quad tendon.  Noted ecchymosis with tenderness to medial thigh.   Moderate-to-large post-op effusion.     Treatment     Zack received the treatments listed below:      Neuromuscular re-education for proprioception, kinesthetic awareness: total time 10 minutes with PT 1:1, including  Quad sets 20x  SAQs 2x10  Short arc quads into Straight leg raise 2x10  Nu Step 5' lvl 1    Therapeutic exercises to develop strength, endurance, ROM, flexibility, and posture for 10 minutes with PT 1:1, including:   Seated partial heel slides 2x10 with strap   Long sitting heel slides with strap 2x10  Heel prop 4' (no weight)(shortened lever arm) (not today)  Leg press for flexion 3 plates dL 2x10 (not today)      Manual therapy techniques: Joint mobilizations, Myofacial release, and Soft tissue  Mobilization were applied to the: Left knee for 15 minutes, including:  Patellar mobs grade I-II  Physiological knee flexion and extension in both long-sitting and short-sitting.        Patient Education and Home Exercises       Education provided:   - home exercise program: partial heel slides, SAQs, quad sets.    Written Home Exercises Provided: Patient instructed to cont prior HEP. Exercises were reviewed and Zack was able to demonstrate them prior to the end of the session.  Zack demonstrated good  understanding of the education provided. See EMR under Patient Instructions for exercises provided during therapy sessions    Assessment     Bandar is a 72 y.o. male referred to outpatient Physical Therapy with a medical diagnosis of M17.12 (ICD-10-CM) - Primary osteoarthritis of left knee. Patient now s/p left TKA. Surgery date: 05/15/2023.  POD#16.   Increased stiffness into knee flexion.  Struggling to get past 80 degrees at this time.  Good quad activation.  Moderate effusion.     Zack Is progressing well towards his goals.   Pt prognosis is Excellent.     Pt will continue to benefit from skilled outpatient physical therapy to address the deficits listed in the problem list box on initial evaluation, provide pt/family education and to maximize pt's level of independence in the home and community environment.   Pt's spiritual, cultural and educational needs considered and pt agreeable to plan of care and goals.     Anticipated barriers to physical therapy: None    Goals: Short Term Goals: 3 weeks  1. Pt will be independent with HEP supplement PT in improving functional mobility.  Progressing towards  2. Pt will improve LLE strength to at least 70% of RLE strength as measured via MicroFet handheld dynamometer in order to improve functional mobility. Progressing towards  3. Pt will improve left knee AROM to at least 0-110 in order to improve gait. Progressing towards     Long Term Goals: 6 weeks  1. Pt will be  independent with updated HEP supplement PT in improving functional mobility. Progressing towards  2. Pt will improve LLE strength to at least 80% of RLE strength as measured via MicroFet handheld dynamometer in order to improve functional mobility. Progressing towards  3. Pt will improve L knee AROM to at least 0 - 115 in order to improve gait and ability to perform ADLs. Progressing towards  4. Pt will improve FOTO knee survey score to </= 35% limited in order to demo improved functional mobility.  5. Pt will perform TUG in < 15 seconds without AD in order to demo improved gait speed. Progressing towards  6. Pt will perform at least 6 sit to stands without UE support on 30 second sit to stand test in order to demo improved ability to perform transfers. Progressing towards    Plan   Quad strengthening and activation  Knee flexion restoration.     Luis Gallegos, PT, DPT, OCS

## 2023-05-31 NOTE — PROGRESS NOTES
"Bandar rFench presents for initial post-operative visit following a right total knee arthroplasty performed by Dr. Mcghee on 05/15/2023.  Patient making slow but steady progress with regards to his range of motion.  Still has some erythemic area in the mid anterior calf region much improved previous visit.    Exam:   Blood pressure 123/68, pulse 68, height 5' 10" (1.778 m), weight 78.2 kg (172 lb 6.4 oz).   Ambulating well with assistive device.  Incision is clean and dry without drainage or erythema.   ROM:  3-95 degrees of flexion    Initial post-operative radiographs reviewed today revealing a well fixed and aligned prosthesis.    A/P:  2 weeks s/p right total knee replacement    - The patient was advised to keep the incision clean and dry for the next 24 hours after which he may wash the area with antibacterial soap in the shower. Will not submerge incision until one month post op.  - Outpatient PT:  Tolerating well slow progress  - Continue ASA for 1 month post op.  - Pain medication Robaxin refilled today, Duricef extended for 1 more week  - Reviewed antibiotic prophylaxis   - Follow up in 4 weeks with surgeon. Pt will call clinic with problems/concerns.      "

## 2023-06-02 ENCOUNTER — OFFICE VISIT (OUTPATIENT)
Dept: PRIMARY CARE CLINIC | Facility: CLINIC | Age: 73
End: 2023-06-02
Payer: MEDICARE

## 2023-06-02 ENCOUNTER — CLINICAL SUPPORT (OUTPATIENT)
Dept: REHABILITATION | Facility: HOSPITAL | Age: 73
End: 2023-06-02
Payer: MEDICARE

## 2023-06-02 VITALS
HEART RATE: 62 BPM | SYSTOLIC BLOOD PRESSURE: 110 MMHG | HEIGHT: 70 IN | DIASTOLIC BLOOD PRESSURE: 54 MMHG | BODY MASS INDEX: 24.59 KG/M2 | OXYGEN SATURATION: 99 % | WEIGHT: 171.75 LBS

## 2023-06-02 DIAGNOSIS — I11.9 HYPERTENSIVE HEART DISEASE WITHOUT HEART FAILURE: ICD-10-CM

## 2023-06-02 DIAGNOSIS — I95.9 HYPOTENSION, UNSPECIFIED HYPOTENSION TYPE: Primary | ICD-10-CM

## 2023-06-02 DIAGNOSIS — R29.898 DECREASED STRENGTH INVOLVING KNEE JOINT: ICD-10-CM

## 2023-06-02 DIAGNOSIS — Z74.09 IMPAIRED FUNCTIONAL MOBILITY, BALANCE, GAIT, AND ENDURANCE: ICD-10-CM

## 2023-06-02 DIAGNOSIS — M25.562 ACUTE POSTOPERATIVE PAIN OF LEFT KNEE: Primary | ICD-10-CM

## 2023-06-02 DIAGNOSIS — G89.18 ACUTE POSTOPERATIVE PAIN OF LEFT KNEE: Primary | ICD-10-CM

## 2023-06-02 PROCEDURE — 97140 MANUAL THERAPY 1/> REGIONS: CPT | Mod: HCNC,PN

## 2023-06-02 PROCEDURE — 3072F LOW RISK FOR RETINOPATHY: CPT | Mod: CPTII,S$GLB,, | Performed by: INTERNAL MEDICINE

## 2023-06-02 PROCEDURE — 4010F ACE/ARB THERAPY RXD/TAKEN: CPT | Mod: CPTII,S$GLB,, | Performed by: INTERNAL MEDICINE

## 2023-06-02 PROCEDURE — 1157F ADVNC CARE PLAN IN RCRD: CPT | Mod: CPTII,S$GLB,, | Performed by: INTERNAL MEDICINE

## 2023-06-02 PROCEDURE — 1157F PR ADVANCE CARE PLAN OR EQUIV PRESENT IN MEDICAL RECORD: ICD-10-PCS | Mod: CPTII,S$GLB,, | Performed by: INTERNAL MEDICINE

## 2023-06-02 PROCEDURE — 99214 PR OFFICE/OUTPT VISIT, EST, LEVL IV, 30-39 MIN: ICD-10-PCS | Mod: S$GLB,,, | Performed by: INTERNAL MEDICINE

## 2023-06-02 PROCEDURE — 3078F DIAST BP <80 MM HG: CPT | Mod: CPTII,S$GLB,, | Performed by: INTERNAL MEDICINE

## 2023-06-02 PROCEDURE — 97110 THERAPEUTIC EXERCISES: CPT | Mod: HCNC,PN

## 2023-06-02 PROCEDURE — 97112 NEUROMUSCULAR REEDUCATION: CPT | Mod: HCNC,PN

## 2023-06-02 PROCEDURE — 3072F PR LOW RISK FOR RETINOPATHY: ICD-10-PCS | Mod: CPTII,S$GLB,, | Performed by: INTERNAL MEDICINE

## 2023-06-02 PROCEDURE — 3044F HG A1C LEVEL LT 7.0%: CPT | Mod: CPTII,S$GLB,, | Performed by: INTERNAL MEDICINE

## 2023-06-02 PROCEDURE — 4010F PR ACE/ARB THEARPY RXD/TAKEN: ICD-10-PCS | Mod: CPTII,S$GLB,, | Performed by: INTERNAL MEDICINE

## 2023-06-02 PROCEDURE — 3008F PR BODY MASS INDEX (BMI) DOCUMENTED: ICD-10-PCS | Mod: CPTII,S$GLB,, | Performed by: INTERNAL MEDICINE

## 2023-06-02 PROCEDURE — 3008F BODY MASS INDEX DOCD: CPT | Mod: CPTII,S$GLB,, | Performed by: INTERNAL MEDICINE

## 2023-06-02 PROCEDURE — 1159F PR MEDICATION LIST DOCUMENTED IN MEDICAL RECORD: ICD-10-PCS | Mod: CPTII,S$GLB,, | Performed by: INTERNAL MEDICINE

## 2023-06-02 PROCEDURE — 3044F PR MOST RECENT HEMOGLOBIN A1C LEVEL <7.0%: ICD-10-PCS | Mod: CPTII,S$GLB,, | Performed by: INTERNAL MEDICINE

## 2023-06-02 PROCEDURE — 3074F SYST BP LT 130 MM HG: CPT | Mod: CPTII,S$GLB,, | Performed by: INTERNAL MEDICINE

## 2023-06-02 PROCEDURE — 3074F PR MOST RECENT SYSTOLIC BLOOD PRESSURE < 130 MM HG: ICD-10-PCS | Mod: CPTII,S$GLB,, | Performed by: INTERNAL MEDICINE

## 2023-06-02 PROCEDURE — 1159F MED LIST DOCD IN RCRD: CPT | Mod: CPTII,S$GLB,, | Performed by: INTERNAL MEDICINE

## 2023-06-02 PROCEDURE — 1125F AMNT PAIN NOTED PAIN PRSNT: CPT | Mod: CPTII,S$GLB,, | Performed by: INTERNAL MEDICINE

## 2023-06-02 PROCEDURE — 99999 PR PBB SHADOW E&M-EST. PATIENT-LVL V: CPT | Mod: PBBFAC,,, | Performed by: INTERNAL MEDICINE

## 2023-06-02 PROCEDURE — 3078F PR MOST RECENT DIASTOLIC BLOOD PRESSURE < 80 MM HG: ICD-10-PCS | Mod: CPTII,S$GLB,, | Performed by: INTERNAL MEDICINE

## 2023-06-02 PROCEDURE — 1125F PR PAIN SEVERITY QUANTIFIED, PAIN PRESENT: ICD-10-PCS | Mod: CPTII,S$GLB,, | Performed by: INTERNAL MEDICINE

## 2023-06-02 PROCEDURE — 99999 PR PBB SHADOW E&M-EST. PATIENT-LVL V: ICD-10-PCS | Mod: PBBFAC,,, | Performed by: INTERNAL MEDICINE

## 2023-06-02 PROCEDURE — 99214 OFFICE O/P EST MOD 30 MIN: CPT | Mod: S$GLB,,, | Performed by: INTERNAL MEDICINE

## 2023-06-02 NOTE — PROGRESS NOTES
[facility] Note    Chief Complaint      Chief Complaint   Patient presents with    Follow-up     Hospital f/u       History of Present Illness      Bandar French is a 72 y.o. male with chronic conditions of CAD, HTN, HLD, DM2, osteoarthritis who presents today for: hospital follow up.  Pt had Left TKA 5/15.  Postoperatively, had low blood pressures on previous regimen of amlodipine and irbesartan.  Had hypotensive episodes prompting ER evaluation on 5/19.  Subsequently, recommended to hold amlodipine.  Home readings started to increase over the next few days and restarted amlodipine.  BP today 110/54.  No recurrence of hypotensive symptoms since adjusting blood pressure regimen.  Postop course for knee replacement going well.      Past Medical History:  Past Medical History:   Diagnosis Date    Arthritis     Cataract     Coronary artery disease     Depression     Diabetes mellitus     Glaucoma     Hyperlipidemia     Hypertension        Past Surgical History:   has a past surgical history that includes Knee arthroscopy (Left, 1967); Knee arthroscopy (Right, 2012); Hand surgery (Right, 12/2004); Coronary stent placement (2014); Tonsillectomy; Total knee arthroplasty (Right, 07/22/2019); Adenoidectomy; Cosmetic surgery (Bilateral, 06/2019); Eye surgery (Left, 03/2019); Joint replacement (Right, 07/22/2019); Left heart catheterization (Left, 01/20/2022); Coronary angiography (N/A, 01/20/2022); and arthroplasty, knee, total, using computer-assisted navigation (Left, 5/15/2023).    Family History:  family history includes Aneurysm in his father; Dementia in his mother; Diabetes in his mother; Glaucoma in his brother; Heart disease in his brother; Migraines in his sister; No Known Problems in his daughter, son, and son; Other in his brother and son; Pneumonia in his brother.     Social History:  Social History     Tobacco Use    Smoking status: Former     Packs/day: 2.00     Years: 20.00     Pack years:  40.00     Types: Cigarettes, Cigars     Start date: 1968     Quit date: 1986     Years since quittin.4    Smokeless tobacco: Never   Substance Use Topics    Alcohol use: Not Currently     Comment: Discontinued     Drug use: Never       I personally reviewed all past medical, surgical, social and family history.    Review of Systems   Constitutional:  Negative for chills, fever and malaise/fatigue.   Respiratory:  Negative for shortness of breath.    Cardiovascular:  Negative for chest pain.   Gastrointestinal:  Negative for constipation, diarrhea, nausea and vomiting.   Skin:  Negative for rash.   Neurological:  Negative for weakness.   All other systems reviewed and are negative.     Medications:  Outpatient Encounter Medications as of 2023   Medication Sig Note Dispense Refill    acetaminophen (TYLENOL) 650 MG TbSR Take 1 tablet (650 mg total) by mouth every 8 (eight) hours.  120 tablet 0    amLODIPine (NORVASC) 5 MG tablet Take 1 tablet (5 mg total) by mouth once daily. 2023: Take AM of surgery. 90 tablet 3    ascorbic acid, vitamin C, (VITAMIN C) 500 MG tablet Take 500 mg by mouth once daily. 2023: Hold 7 days prior to surgery.      aspirin (ECOTRIN) 81 MG EC tablet Aspirin 5/15/2023: Resume 7 days after surgery      aspirin (ECOTRIN) 81 MG EC tablet Take 1 tablet (81 mg total) by mouth 2 (two) times daily. Resume home aspirin and plavix 7 days after surgery. for 7 days  14 tablet 0    blood sugar diagnostic (BLOOD GLUCOSE TEST) Strp 1 each by Misc.(Non-Drug; Combo Route) route once daily.  30 each 11    blood-glucose meter,continuous (DEXCOM G6 ) Misc Use as directed  1 each 11    blood-glucose sensor (DEXCOM G6 SENSOR) Joanne Use as directed  10 each 11    blood-glucose transmitter (DEXCOM G6 TRANSMITTER) Joanne Use as directed  1 each 3    cefadroxil (DURICEF) 500 MG Cap Take 1 capsule (500 mg total) by mouth every 12 (twelve) hours.  14 capsule 0    celecoxib (CELEBREX) 200  MG capsule Take 1 capsule (200 mg total) by mouth once daily.  30 capsule 0    chlorpheniramine (CHLOR-TRIMETON) 4 mg tablet Take 4 mg by mouth as needed.  4/27/2023: Take as needed.       cholecalciferol, vitamin D3, 125 mcg (5,000 unit) Tab Take 5,000 Units by mouth once daily. 4/27/2023: Hold 7 days prior to surgery.      clopidogreL (PLAVIX) 75 mg tablet Take 1 tablet (75 mg total) by mouth once daily. 5/15/2023: Resume 7 days after surgery 90 tablet 3    docusate sodium (COLACE) 100 MG capsule Take 1 capsule (100 mg total) by mouth 2 (two) times daily.  60 capsule 0    dorzolamide-timolol 2-0.5% (COSOPT) 22.3-6.8 mg/mL ophthalmic solution Place 1 drop into both eyes 2 (two) times daily. 4/27/2023: Use as directed.  20 mL 3    folic acid/multivit-min/lutein (CENTRUM SILVER ORAL) Take 1 tablet by mouth once daily. 4/27/2023: Hold 7 days prior to surgery.      irbesartan (AVAPRO) 300 MG tablet TAKE 1 TABLET (300 MG TOTAL) BY MOUTH EVERY EVENING. 4/27/2023: Hold AM of surgery. 90 tablet 2    latanoprost 0.005 % ophthalmic solution Place 1 drop into both eyes every evening. 4/27/2023: Use as directed.  5 mL 3    latanoprost 0.005 % ophthalmic solution PLACE 1 DROP INTO BOTH EYES EVERY EVENING. 4/27/2023: Duplicate. 2.5 mL 3    magnesium oxide (MAG-OX) 400 mg (241.3 mg magnesium) tablet Take 250 mg by mouth once daily. 4/27/2023: Hold AM of surgery.      methocarbamoL (ROBAXIN) 750 MG Tab Take 1 tablet (750 mg total) by mouth 3 (three) times daily as needed.  21 tablet 0    MICRO THIN LANCETS 33 gauge Misc        nitroGLYCERIN (NITROSTAT) 0.4 MG SL tablet Place 1 tablet (0.4 mg total) under the tongue every 5 (five) minutes as needed for Chest pain. 4/27/2023: Take as needed 25 tablet 11    oxyCODONE (ROXICODONE) 5 MG immediate release tablet Take 1 tablet (5 mg total) by mouth every 6 (six) hours as needed for Pain. May take 1-2 tablets every 6 hours as needed for pain  50 tablet 0    pantoprazole (PROTONIX) 40 MG  tablet Take 1 tablet (40 mg total) by mouth once daily.  30 tablet 0    rosuvastatin (CRESTOR) 40 MG Tab Take 1 tablet (40 mg total) by mouth every evening. 4/27/2023: Take PM before surgery. 90 tablet 3    vit C-E-zinc ox-dayron-lut-zeax (ICAPS AREDS2) 250 mg-200 unit -12.5 mg-1 mg Cap Take 2 capsules by mouth once daily. 4/27/2023: Hold 7 days prior to surgery.      [DISCONTINUED] cefadroxil (DURICEF) 500 MG Cap Take 1 capsule (500 mg total) by mouth every 12 (twelve) hours. for 7 days  14 capsule 0    [DISCONTINUED] cefadroxil (DURICEF) 500 MG Cap Take 1 capsule (500 mg total) by mouth every 12 (twelve) hours. for 7 days  14 capsule 0    [DISCONTINUED] methocarbamoL (ROBAXIN) 750 MG Tab Take 1 tablet (750 mg total) by mouth 3 (three) times daily as needed.  21 tablet 0    [DISCONTINUED] methocarbamoL (ROBAXIN) 750 MG Tab Take 1 tablet (750 mg total) by mouth 3 (three) times daily as needed.  21 tablet 0     No facility-administered encounter medications on file as of 6/2/2023.       Allergies:  Review of patient's allergies indicates:  No Known Allergies    Health Maintenance:  Immunization History   Administered Date(s) Administered    COVID-19, MRNA, LN-S, PF (MODERNA FULL 0.5 ML DOSE) 03/10/2021, 03/10/2021, 04/12/2021    Influenza (FLUAD) - Quadrivalent - Adjuvanted - PF *Preferred* (65+) 09/16/2020    Influenza - High Dose - PF (65 years and older) 09/25/2019, 12/02/2022    Influenza - Quadrivalent - High Dose - PF (65 years and older) 10/21/2021    Pneumococcal Polysaccharide - 23 Valent 09/25/2019    Tdap 10/21/2021    Zoster 01/02/2013    Zoster Recombinant 10/14/2020, 05/10/2021      Health Maintenance   Topic Date Due    Hemoglobin A1c  09/27/2023    Foot Exam  10/06/2023    Eye Exam  12/22/2023    Lipid Panel  03/27/2024    High Dose Statin  05/31/2024    Aspirin/Antiplatelet Therapy  05/31/2024    TETANUS VACCINE  10/21/2031    Hepatitis C Screening  Completed    Abdominal Aortic Aneurysm Screening   "Completed        Physical Exam      Vital Signs  Pulse: 62  SpO2: 99 %  BP: (!) 110/54  BP Location: Right arm  Patient Position: Sitting  Pain Score:   2  Pain Loc: Knee  Height and Weight  Height: 5' 10" (177.8 cm)  Weight: 77.9 kg (171 lb 11.8 oz)  BSA (Calculated - sq m): 1.96 sq meters  BMI (Calculated): 24.6  Weight in (lb) to have BMI = 25: 173.9]    Physical Exam  Vitals reviewed.   Constitutional:       Appearance: He is well-developed.   HENT:      Head: Normocephalic and atraumatic.      Right Ear: External ear normal.      Left Ear: External ear normal.   Cardiovascular:      Rate and Rhythm: Normal rate and regular rhythm.      Heart sounds: Normal heart sounds. No murmur heard.  Pulmonary:      Effort: Pulmonary effort is normal.      Breath sounds: Normal breath sounds. No wheezing or rales.   Abdominal:      General: Bowel sounds are normal.      Palpations: Abdomen is soft.        Laboratory:  CBC:  Recent Labs   Lab 03/13/22  0351 04/27/23  1357 05/19/23  1004   WBC 8.37 8.13 12.91 H   RBC 4.54 L 4.28 L 3.19 L   Hemoglobin 13.6 L 12.8 L 9.6 L   Hematocrit 39.5 L 37.5 L 28.0 L   Platelets 184 179 153   MCV 87 88 88   MCH 30.0 29.9 30.1   MCHC 34.4 34.1 34.3     CMP:  Recent Labs   Lab 09/26/22  0744 03/27/23  0719 05/19/23  0801   Glucose 126 H 132 H 158 H   Calcium 8.9 8.8 8.7   Albumin 4.1 3.9 3.8   Total Protein 6.4 6.5 6.6   Sodium 140 143 141   Potassium 4.5 4.4 4.6   CO2 27 27 24   Chloride 107 110 110   BUN 19 15 15   Alkaline Phosphatase 61 59 62   ALT 29 22 18   AST 19 18 20   Total Bilirubin 0.6 0.6 0.9     URINALYSIS:  Recent Labs   Lab 05/19/23  1013   Color, UA Yellow   Specific Gravity, UA 1.020   pH, UA 7.0   Protein, UA Trace A   Nitrite, UA Negative   Leukocytes, UA Negative   Urobilinogen, UA Negative      LIPIDS:  Recent Labs   Lab 09/14/21  0752 09/26/22  0744 03/27/23  0719   HDL 28 L 25 L 33 L   Cholesterol 99 L 103 L 102 L   Triglycerides 103 143 85   LDL Cholesterol 50.4 L " 49.4 L 52.0 L   HDL/Cholesterol Ratio 28.3 24.3 32.4   Non-HDL Cholesterol 71 78 69   Total Cholesterol/HDL Ratio 3.5 4.1 3.1     TSH:      A1C:  Recent Labs   Lab 08/26/20  0921 02/26/21  0954 09/14/21  0752 03/16/22  1000 09/26/22  0744 03/27/23  0719   Hemoglobin A1C 6.1 H 5.8 H 6.2 H 6.3 H 5.8 H  5.8 H 6.1 H       Assessment/Plan     Bandar French is a 72 y.o.male with:    1. Hypotension, unspecified hypotension type    2. Hypertensive heart disease without heart failure  Continue amlodipine and irbesartan.  If hypotensive symptoms recur, consider reducing amlodipine to 2.5 mg.    Chronic conditions status updated as per HPI.  Other than changes above, cont current medications and maintain follow up with specialists.  No follow-ups on file.    Future Appointments   Date Time Provider Department Center   6/2/2023 12:00 PM Luis Gallegos, PT [facility] [place] [facility]   6/7/2023 10:00 AM Mary Beth S. Stevenson, PTA [facility] [place] [facility]   6/9/2023  8:00 AM Mary Beth S. Stevenson, PTA [facility] [place] [facility]   6/12/2023 10:00 AM Mary Beth S. Stevenson, PTA [facility] [place] [facility]   6/14/2023 10:00 AM Luis Gallegos PT [facility] [place] [facility]   6/16/2023 10:00 AM Luis Gallegos, PT [facility] [place] [facility]   6/19/2023 10:00 AM Mary Beth S. Stevenson, PTA [facility] [place] [facility]   6/21/2023 10:00 AM Mary Beth S. Stevenson, PTA [facility] [place] [facility]   6/23/2023 11:00 AM Mary Beth S. Stevenson, PTA [facility] [place] [facility]   6/26/2023 10:00 AM Mary Beth S. Stevenson, PTA [facility] [place] [facility]   6/27/2023 11:40 AM Stevenson Mcghee III, MD [facility]   6/28/2023 10:00 AM Mary Beth Stevenson, PTA [facility] [place] [facility]   6/30/2023 10:00 AM Mary Beth Stevenson, PTA [facility] [place] [facility]   7/3/2023 10:00 AM Mary Beth Stevenson, PTA [facility] [place] [facility]   7/5/2023  9:00 AM Luis Gallegos, PT [facility] [place] [facility]   7/7/2023 10:00 AM Luis Gallegos, PT [facility] [place] [facility]   7/10/2023  9:00 AM Mary Beth Stevenson, PTA [facility]  [place] [facility]   7/12/2023 10:00 AM Mary Beth Stevenson, PTA [facility] [place] [facility]   7/14/2023 10:00 AM Luis Gallegos, PT [facility] [place] [facility]   8/8/2023 11:40 AM Stevenson Mcghee III, MD [facility] [place]   10/5/2023  7:30 AM LAB, [facility] [place]   10/10/2023  9:45 AM Gunnar Rangel MD [facility]       Gunnar Rangel MD  [facility]

## 2023-06-02 NOTE — PROGRESS NOTES
[facility]   Physical Therapy Treatment Note      Name: Bandar French  Clinic Number: [id number]    Therapy Diagnosis:   Encounter Diagnoses   Name Primary?    Acute postoperative pain of left knee Yes    Decreased strength involving knee joint     Impaired functional mobility, balance, gait, and endurance        Physician: Stevenson Mcghee III, *    Visit Date: 6/2/2023    Physician Orders: PT Eval and Treat   Medical Diagnosis from Referral: M17.12 (ICD-10-CM) - Primary osteoarthritis of left knee  Evaluation Date: 5/17/2023  Authorization Period Expiration: 07/19/2023  Plan of Care Expiration: 5/17/2023 to 06/28/2023  Visit # / Visits authorized: 5/20 (+ eval) FOTO: 6/8 PTA Visit #: 0/5     Time In: 1200  Time Out: 1255  Total Billable Time: 40 minutes (1 NM, 1 MT, 1 TE)  Precautions: post-op state    Subjective     Pt reports: knee feeling better and less tight overall.   He was compliant with home exercise program.  Response to previous treatment: no adverse effects.   Functional change: Ongoing    Pain: 5/10  Location: left knee (globally)    Objective      Knee PROM 0 - 90 degrees  Tightness felt in distal quad tendon.  Noted ecchymosis with tenderness to medial thigh.   Moderate-to-large post-op effusion.     Treatment     Zack received the treatments listed below:      Neuromuscular re-education for proprioception, kinesthetic awareness: total time 10 minutes with PT 1:1, including  Quad sets 20x --> SAQs 2x10 --> Short arc quads into Straight leg raise 2x10  Nu Step 5' lvl 3    Therapeutic exercises to develop strength, endurance, ROM, flexibility, and posture for 20 minutes with PT 1:1, including:   Seated partial heel slides 2x10 with strap   Long sitting heel slides with strap 2x10  Heel prop 4' (no weight)  Leg press for flexion 3 plates dL 3x10    Manual therapy techniques: Joint mobilizations, Myofacial release, and Soft tissue Mobilization were applied to the: Left knee  for 15 minutes, including:  Patellar mobs grade I-II  Physiological knee flexion and extension in both long-sitting and short-sitting.        Patient Education and Home Exercises       Education provided:   - home exercise program: partial heel slides, SAQs, quad sets.    Written Home Exercises Provided: Patient instructed to cont prior HEP. Exercises were reviewed and Zack was able to demonstrate them prior to the end of the session.  Zack demonstrated good  understanding of the education provided. See EMR under Patient Instructions for exercises provided during therapy sessions    Assessment     Bandar is a 72 y.o. male referred to outpatient Physical Therapy with a medical diagnosis of M17.12 (ICD-10-CM) - Primary osteoarthritis of left knee. Patient now s/p left TKA. Surgery date: 05/15/2023.  POD#18.   Able to achieve 90 degrees flexion today.  Moderate effusion.  Good quad set but 3-5 degree lag with Straight leg raise.     Zack Is progressing well towards his goals.   Pt prognosis is Excellent.     Pt will continue to benefit from skilled outpatient physical therapy to address the deficits listed in the problem list box on initial evaluation, provide pt/family education and to maximize pt's level of independence in the home and community environment.   Pt's spiritual, cultural and educational needs considered and pt agreeable to plan of care and goals.     Anticipated barriers to physical therapy: None    Goals: Short Term Goals: 3 weeks  1. Pt will be independent with HEP supplement PT in improving functional mobility.  Progressing towards  2. Pt will improve LLE strength to at least 70% of RLE strength as measured via MicroFet handheld dynamometer in order to improve functional mobility. Progressing towards  3. Pt will improve left knee AROM to at least 0-110 in order to improve gait. Progressing towards     Long Term Goals: 6 weeks  1. Pt will be independent with updated HEP supplement PT in improving  functional mobility. Progressing towards  2. Pt will improve LLE strength to at least 80% of RLE strength as measured via MicroFet handheld dynamometer in order to improve functional mobility. Progressing towards  3. Pt will improve L knee AROM to at least 0 - 115 in order to improve gait and ability to perform ADLs. Progressing towards  4. Pt will improve FOTO knee survey score to </= 35% limited in order to demo improved functional mobility.  5. Pt will perform TUG in < 15 seconds without AD in order to demo improved gait speed. Progressing towards  6. Pt will perform at least 6 sit to stands without UE support on 30 second sit to stand test in order to demo improved ability to perform transfers. Progressing towards    Plan   Quad strengthening and activation  Knee flexion restoration.     Luis Gallegos, PT, DPT, OCS

## 2023-06-05 ENCOUNTER — CLINICAL SUPPORT (OUTPATIENT)
Dept: REHABILITATION | Facility: HOSPITAL | Age: 73
End: 2023-06-05
Payer: MEDICARE

## 2023-06-05 DIAGNOSIS — M25.562 ACUTE POSTOPERATIVE PAIN OF LEFT KNEE: Primary | ICD-10-CM

## 2023-06-05 DIAGNOSIS — Z74.09 IMPAIRED FUNCTIONAL MOBILITY, BALANCE, GAIT, AND ENDURANCE: ICD-10-CM

## 2023-06-05 DIAGNOSIS — G89.18 ACUTE POSTOPERATIVE PAIN OF LEFT KNEE: Primary | ICD-10-CM

## 2023-06-05 DIAGNOSIS — R29.898 DECREASED STRENGTH INVOLVING KNEE JOINT: ICD-10-CM

## 2023-06-05 PROCEDURE — 97110 THERAPEUTIC EXERCISES: CPT | Mod: HCNC,PN

## 2023-06-05 PROCEDURE — 97112 NEUROMUSCULAR REEDUCATION: CPT | Mod: HCNC,PN

## 2023-06-05 PROCEDURE — 97140 MANUAL THERAPY 1/> REGIONS: CPT | Mod: HCNC,PN

## 2023-06-05 NOTE — PROGRESS NOTES
[facility]   Physical Therapy Treatment Note      Name: Bandar French  Clinic Number: [id number]    Therapy Diagnosis:   Encounter Diagnoses   Name Primary?    Acute postoperative pain of left knee Yes    Decreased strength involving knee joint     Impaired functional mobility, balance, gait, and endurance        Physician: Stevenson Mcghee III, *    Visit Date: 6/5/2023    Physician Orders: PT Eval and Treat   Medical Diagnosis from Referral: M17.12 (ICD-10-CM) - Primary osteoarthritis of left knee  Evaluation Date: 5/17/2023  Authorization Period Expiration: 07/19/2023  Plan of Care Expiration: 5/17/2023 to 06/28/2023  Visit # / Visits authorized: 6/20 (+ eval) FOTO: 7/8  PTA Visit #: 0/5     Time In: 1400  Time Out: 1455  Total Billable Time: 55 minutes (1 NM, 1 MT, 2 TE)  Precautions: post-op state    Subjective     Pt reports: knee feeling better and less tight overall.    He was compliant with home exercise program.  Response to previous treatment: no adverse effects.   Functional change: Ongoing    Pain: 5/10  Location: left knee (globally)    Objective      Knee PROM 0 - 95 degrees (very firm)  Tightness felt in distal quad tendon.  Noted ecchymosis with tenderness to medial thigh.   Moderate post-op effusion.     Treatment     Zack received the treatments listed below:      Neuromuscular re-education for proprioception, kinesthetic awareness: total time 15 minutes, including  Quad sets 20x --> SAQs 2x10 --> Short arc quads into Straight leg raise 2x10  Nu Step 5' lvl 3    Therapeutic exercises to develop strength, endurance, ROM, flexibility, and posture for 30 minutes, including:   Seated partial heel slides 2x10 with strap   Long sitting heel slides with strap 2x10  Heel prop 4' (no weight)  Leg press for flexion 5 plates dL 3x10 (flexion bias)    Manual therapy techniques: Joint mobilizations, Myofacial release, and Soft tissue Mobilization were applied to the: Left knee for  10 minutes, including:  Patellar mobs grade I-II  Physiological knee flexion and extension in both long-sitting and short-sitting.        Patient Education and Home Exercises       Education provided:   - home exercise program: partial heel slides, SAQs, quad sets.    Written Home Exercises Provided: Patient instructed to cont prior HEP. Exercises were reviewed and Zack was able to demonstrate them prior to the end of the session.  Zack demonstrated good  understanding of the education provided. See EMR under Patient Instructions for exercises provided during therapy sessions    Assessment     Bandar is a 72 y.o. male referred to outpatient Physical Therapy with a medical diagnosis of M17.12 (ICD-10-CM) - Primary osteoarthritis of left knee. Patient now s/p left TKA. Surgery date: 05/15/2023.  POD#18.  Able to achieve 95 degrees flexion today passively.  Moderate effusion.  Good quad set but 3-5 degree lag with Straight leg raise. Ambulating safely but slowly without assistive device.     Zack Is progressing well towards his goals.   Pt prognosis is Excellent.     Pt will continue to benefit from skilled outpatient physical therapy to address the deficits listed in the problem list box on initial evaluation, provide pt/family education and to maximize pt's level of independence in the home and community environment.   Pt's spiritual, cultural and educational needs considered and pt agreeable to plan of care and goals.     Anticipated barriers to physical therapy: None    Goals: Short Term Goals: 3 weeks  1. Pt will be independent with HEP supplement PT in improving functional mobility.  Progressing towards  2. Pt will improve LLE strength to at least 70% of RLE strength as measured via MicroFet handheld dynamometer in order to improve functional mobility. Progressing towards  3. Pt will improve left knee AROM to at least 0-110 in order to improve gait. Progressing towards     Long Term Goals: 6 weeks  1. Pt will be  independent with updated HEP supplement PT in improving functional mobility. Progressing towards  2. Pt will improve LLE strength to at least 80% of RLE strength as measured via MicroFet handheld dynamometer in order to improve functional mobility. Progressing towards  3. Pt will improve L knee AROM to at least 0 - 115 in order to improve gait and ability to perform ADLs. Progressing towards  4. Pt will improve FOTO knee survey score to </= 35% limited in order to demo improved functional mobility.  5. Pt will perform TUG in < 15 seconds without AD in order to demo improved gait speed. Progressing towards  6. Pt will perform at least 6 sit to stands without UE support on 30 second sit to stand test in order to demo improved ability to perform transfers. Progressing towards    Plan   Quad strengthening and activation  Knee flexion restoration.     Luis Gallegos, PT, DPT, OCS

## 2023-06-07 ENCOUNTER — CLINICAL SUPPORT (OUTPATIENT)
Dept: REHABILITATION | Facility: HOSPITAL | Age: 73
End: 2023-06-07
Payer: MEDICARE

## 2023-06-07 DIAGNOSIS — G89.18 ACUTE POSTOPERATIVE PAIN OF LEFT KNEE: Primary | ICD-10-CM

## 2023-06-07 DIAGNOSIS — R29.898 DECREASED STRENGTH INVOLVING KNEE JOINT: ICD-10-CM

## 2023-06-07 DIAGNOSIS — M25.562 ACUTE POSTOPERATIVE PAIN OF LEFT KNEE: Primary | ICD-10-CM

## 2023-06-07 DIAGNOSIS — Z74.09 IMPAIRED FUNCTIONAL MOBILITY, BALANCE, GAIT, AND ENDURANCE: ICD-10-CM

## 2023-06-07 PROCEDURE — 97112 NEUROMUSCULAR REEDUCATION: CPT | Mod: HCNC,PN,CQ

## 2023-06-07 PROCEDURE — 97140 MANUAL THERAPY 1/> REGIONS: CPT | Mod: HCNC,PN,CQ

## 2023-06-07 NOTE — PROGRESS NOTES
[facility]   Physical Therapy Treatment Note      Name: Bandar French  Clinic Number: [id number]    Therapy Diagnosis:   Encounter Diagnoses   Name Primary?    Acute postoperative pain of left knee Yes    Decreased strength involving knee joint     Impaired functional mobility, balance, gait, and endurance        Physician: Stevenson Mcghee III, *    Visit Date: 6/7/2023    Physician Orders: PT Eval and Treat   Medical Diagnosis from Referral: M17.12 (ICD-10-CM) - Primary osteoarthritis of left knee  Evaluation Date: 5/17/2023  Authorization Period Expiration: 07/19/2023  Plan of Care Expiration: 5/17/2023 to 06/28/2023  Visit # / Visits authorized: 2/20 (9 visits total) FOTO: Done today (6/7/23)  PTA Visit #: 1/5     Time In: 1000  Time Out: 1055  Total Billable Time: 30 minutes (1NM, 1MT)  Precautions: post-op state    Subjective     Pt reports: Mild soreness in knee. Continues to work on bending often at home.   He was compliant with home exercise program.  Response to previous treatment: no adverse effects.   Functional change: Ongoing    Pain: 5/10  Location: left knee (globally)    Objective      Knee PROM 0 - 98 degrees (very firm)      Treatment     Zack received the treatments listed below:      Neuromuscular re-education for proprioception, kinesthetic awareness: total time 15 minutes 1:1, including  Quad sets 20x --> SAQs 2x10 --> Short arc quads into Straight leg raise 2x10  Nu Step 5' lvl 3    Therapeutic exercises to develop strength, endurance, ROM, flexibility, and posture for 5 minutes, including:   Seated heel slides 2x10 with strap   Long sitting heel slides with strap 2x10  Heel prop 5' (no weight)  Leg press for flexion 5 plates dL 3x10 (flexion bias)  +Cybex HS curl 4 plates dL x 20    Manual therapy techniques: Joint mobilizations, Myofacial release, and Soft tissue Mobilization were applied to the: Left knee for 10 minutes, including:  Patellar mobs grade  I-II  Physiological knee flexion and extension in both long-sitting and short-sitting.        Patient Education and Home Exercises       Education provided:   - home exercise program: partial heel slides, SAQs, quad sets.    Written Home Exercises Provided: Patient instructed to cont prior HEP. Exercises were reviewed and Zack was able to demonstrate them prior to the end of the session.  Zack demonstrated good  understanding of the education provided. See EMR under Patient Instructions for exercises provided during therapy sessions    Assessment     Bandar is a 72 y.o. male referred to outpatient Physical Therapy with a medical diagnosis of M17.12 (ICD-10-CM) - Primary osteoarthritis of left knee. Patient now s/p left TKA. Surgery date: 05/15/2023.  POD#20.  Able to achieve 99 degrees flexion today passively although end feel continues to be very firm.  Moderate effusion.  Good quad set. Still slight lag with straight leg raise but improving. Implemented HS curl today with good tolerance. Will continue to focus efforts on improving knee flexion ROM and quad strengthening as appropriate.     Zack Is progressing well towards his goals.   Pt prognosis is Excellent.     Pt will continue to benefit from skilled outpatient physical therapy to address the deficits listed in the problem list box on initial evaluation, provide pt/family education and to maximize pt's level of independence in the home and community environment.   Pt's spiritual, cultural and educational needs considered and pt agreeable to plan of care and goals.     Anticipated barriers to physical therapy: None    Goals: Short Term Goals: 3 weeks  1. Pt will be independent with HEP supplement PT in improving functional mobility.  Progressing towards  2. Pt will improve LLE strength to at least 70% of RLE strength as measured via MicroFet handheld dynamometer in order to improve functional mobility. Progressing towards  3. Pt will improve left knee AROM to at  least 0-110 in order to improve gait. Progressing towards     Long Term Goals: 6 weeks  1. Pt will be independent with updated HEP supplement PT in improving functional mobility. Progressing towards  2. Pt will improve LLE strength to at least 80% of RLE strength as measured via MicroFet handheld dynamometer in order to improve functional mobility. Progressing towards  3. Pt will improve L knee AROM to at least 0 - 115 in order to improve gait and ability to perform ADLs. Progressing towards  4. Pt will improve FOTO knee survey score to </= 35% limited in order to demo improved functional mobility.  5. Pt will perform TUG in < 15 seconds without AD in order to demo improved gait speed. Progressing towards  6. Pt will perform at least 6 sit to stands without UE support on 30 second sit to stand test in order to demo improved ability to perform transfers. Progressing towards    Plan   Quad strengthening and activation  Knee flexion restoration.     Mary Beth Stevenson, PTA

## 2023-06-09 ENCOUNTER — PATIENT MESSAGE (OUTPATIENT)
Dept: ORTHOPEDICS | Facility: CLINIC | Age: 73
End: 2023-06-09
Payer: MEDICARE

## 2023-06-09 ENCOUNTER — CLINICAL SUPPORT (OUTPATIENT)
Dept: REHABILITATION | Facility: HOSPITAL | Age: 73
End: 2023-06-09
Payer: MEDICARE

## 2023-06-09 DIAGNOSIS — M25.562 ACUTE POSTOPERATIVE PAIN OF LEFT KNEE: Primary | ICD-10-CM

## 2023-06-09 DIAGNOSIS — G89.18 ACUTE POSTOPERATIVE PAIN OF LEFT KNEE: Primary | ICD-10-CM

## 2023-06-09 DIAGNOSIS — R29.898 DECREASED STRENGTH INVOLVING KNEE JOINT: ICD-10-CM

## 2023-06-09 DIAGNOSIS — Z96.652 STATUS POST LEFT KNEE REPLACEMENT: Primary | ICD-10-CM

## 2023-06-09 DIAGNOSIS — Z74.09 IMPAIRED FUNCTIONAL MOBILITY, BALANCE, GAIT, AND ENDURANCE: ICD-10-CM

## 2023-06-09 PROCEDURE — 97110 THERAPEUTIC EXERCISES: CPT | Mod: HCNC,PN,CQ

## 2023-06-09 PROCEDURE — 97112 NEUROMUSCULAR REEDUCATION: CPT | Mod: HCNC,PN,CQ

## 2023-06-09 PROCEDURE — 97140 MANUAL THERAPY 1/> REGIONS: CPT | Mod: HCNC,PN,CQ

## 2023-06-09 RX ORDER — OXYCODONE HYDROCHLORIDE 5 MG/1
5 TABLET ORAL EVERY 6 HOURS PRN
Qty: 50 TABLET | Refills: 0 | Status: SHIPPED | OUTPATIENT
Start: 2023-06-09 | End: 2023-06-29

## 2023-06-09 RX ORDER — METHOCARBAMOL 750 MG/1
750 TABLET, FILM COATED ORAL 3 TIMES DAILY PRN
Qty: 21 TABLET | Refills: 0 | Status: SHIPPED | OUTPATIENT
Start: 2023-06-09 | End: 2023-07-09

## 2023-06-12 ENCOUNTER — CLINICAL SUPPORT (OUTPATIENT)
Dept: REHABILITATION | Facility: HOSPITAL | Age: 73
End: 2023-06-12
Payer: MEDICARE

## 2023-06-12 DIAGNOSIS — G89.18 ACUTE POSTOPERATIVE PAIN OF LEFT KNEE: Primary | ICD-10-CM

## 2023-06-12 DIAGNOSIS — R29.898 DECREASED STRENGTH INVOLVING KNEE JOINT: ICD-10-CM

## 2023-06-12 DIAGNOSIS — M25.562 ACUTE POSTOPERATIVE PAIN OF LEFT KNEE: Primary | ICD-10-CM

## 2023-06-12 DIAGNOSIS — Z74.09 IMPAIRED FUNCTIONAL MOBILITY, BALANCE, GAIT, AND ENDURANCE: ICD-10-CM

## 2023-06-12 PROCEDURE — 97112 NEUROMUSCULAR REEDUCATION: CPT | Mod: HCNC,PN

## 2023-06-12 PROCEDURE — 97110 THERAPEUTIC EXERCISES: CPT | Mod: HCNC,PN

## 2023-06-12 NOTE — PROGRESS NOTES
[facility]   Physical Therapy Treatment Note      Name: Bandar French  Clinic Number: [id number]    Therapy Diagnosis:   Encounter Diagnoses   Name Primary?    Acute postoperative pain of left knee Yes    Decreased strength involving knee joint     Impaired functional mobility, balance, gait, and endurance        Physician: Stevenson Mcghee III, *    Visit Date: 6/12/2023    Physician Orders: PT Eval and Treat   Medical Diagnosis from Referral: M17.12 (ICD-10-CM) - Primary osteoarthritis of left knee  Evaluation Date: 5/17/2023  Authorization Period Expiration: 07/19/2023  Plan of Care Expiration: 5/17/2023 to 06/28/2023  Visit # / Visits authorized: 4/20 (10 visits total) FOTO: Done (6/7/23)  PTA Visit #: 0/5     Time In: 1000  Time Out: 1055  Total Billable Time: 30 minutes (1NM, 1 TE)  Precautions: post-op state    Subjective     Pt reports: usually mild soreness 2 mornings after PT sessions.   He was compliant with home exercise program.  Response to previous treatment: no adverse effects.   Functional change: Ongoing    Pain: 5/10  Location: left knee (globally)    Objective      Knee PROM 0 - 102 degrees (very firm)      Treatment     Zack received the treatments listed below:      Neuromuscular re-education for proprioception, kinesthetic awareness: total time 10 minutes 1:1, including  Quad sets 20x --> SAQs 2x10 --> Short arc quads into Straight leg raise 2x10 (not today)  Nu Step 5' lvl 3  Lunges 1st step 2' --> 2nd step 2'    Therapeutic exercises to develop strength, endurance, ROM, flexibility, and posture for 15 minutes 1:1, including:   Seated heel slides 2x10 with strap  Heel slide creep stretch 4x2'   Long sitting heel slides with strap 2x10  Heel prop 5' (no weight) (not today)  Leg press for flexion 5 plates dL 3x10 (flexion bias)  Cybex HS curl 4 plates dL x 20 (not today)    Manual therapy techniques: Joint mobilizations, Myofacial release, and Soft tissue  Mobilization were applied to the: Left knee for 10 minutes, including:  Patellar mobs grade I-II  Physiological knee flexion and extension in both long-sitting and short-sitting.        Patient Education and Home Exercises       Education provided:   - home exercise program: partial heel slides, SAQs, quad sets.    Written Home Exercises Provided: Patient instructed to cont prior HEP. Exercises were reviewed and Zack was able to demonstrate them prior to the end of the session.  Zack demonstrated good  understanding of the education provided. See EMR under Patient Instructions for exercises provided during therapy sessions    Assessment     Bandar is a 72 y.o. male referred to outpatient Physical Therapy with a medical diagnosis of M17.12 (ICD-10-CM) - Primary osteoarthritis of left knee. Patient now s/p left TKA. Surgery date: 05/15/2023.  POD#28. Moderate effusion.  Good quad set.  Knee flexion improvement; see small but incremental gains inter-session.     Zack Is progressing well towards his goals.   Pt prognosis is Excellent.     Pt will continue to benefit from skilled outpatient physical therapy to address the deficits listed in the problem list box on initial evaluation, provide pt/family education and to maximize pt's level of independence in the home and community environment.   Pt's spiritual, cultural and educational needs considered and pt agreeable to plan of care and goals.     Anticipated barriers to physical therapy: None    Goals: Short Term Goals: 3 weeks  1. Pt will be independent with HEP supplement PT in improving functional mobility.  Progressing towards  2. Pt will improve LLE strength to at least 70% of RLE strength as measured via MicroFet handheld dynamometer in order to improve functional mobility. Progressing towards  3. Pt will improve left knee AROM to at least 0-110 in order to improve gait. Progressing towards     Long Term Goals: 6 weeks  1. Pt will be independent with updated HEP  supplement PT in improving functional mobility. Progressing towards  2. Pt will improve LLE strength to at least 80% of RLE strength as measured via MicroFet handheld dynamometer in order to improve functional mobility. Progressing towards  3. Pt will improve L knee AROM to at least 0 - 115 in order to improve gait and ability to perform ADLs. Progressing towards  4. Pt will improve FOTO knee survey score to </= 35% limited in order to demo improved functional mobility.  5. Pt will perform TUG in < 15 seconds without AD in order to demo improved gait speed. Progressing towards  6. Pt will perform at least 6 sit to stands without UE support on 30 second sit to stand test in order to demo improved ability to perform transfers. Progressing towards    Plan   Quad strengthening and activation  Knee flexion restoration.     Luis Gallegos, PT, DPT, OCS

## 2023-06-13 ENCOUNTER — PATIENT MESSAGE (OUTPATIENT)
Dept: OPTOMETRY | Facility: CLINIC | Age: 73
End: 2023-06-13
Payer: MEDICARE

## 2023-06-14 ENCOUNTER — PATIENT MESSAGE (OUTPATIENT)
Dept: OPTOMETRY | Facility: CLINIC | Age: 73
End: 2023-06-14
Payer: MEDICARE

## 2023-06-14 ENCOUNTER — CLINICAL SUPPORT (OUTPATIENT)
Dept: REHABILITATION | Facility: HOSPITAL | Age: 73
End: 2023-06-14
Payer: MEDICARE

## 2023-06-14 DIAGNOSIS — R29.898 DECREASED STRENGTH INVOLVING KNEE JOINT: ICD-10-CM

## 2023-06-14 DIAGNOSIS — G89.18 ACUTE POSTOPERATIVE PAIN OF LEFT KNEE: Primary | ICD-10-CM

## 2023-06-14 DIAGNOSIS — Z74.09 IMPAIRED FUNCTIONAL MOBILITY, BALANCE, GAIT, AND ENDURANCE: ICD-10-CM

## 2023-06-14 DIAGNOSIS — M25.562 ACUTE POSTOPERATIVE PAIN OF LEFT KNEE: Primary | ICD-10-CM

## 2023-06-14 PROCEDURE — 97140 MANUAL THERAPY 1/> REGIONS: CPT | Mod: HCNC,PN

## 2023-06-14 PROCEDURE — 97112 NEUROMUSCULAR REEDUCATION: CPT | Mod: HCNC,PN

## 2023-06-14 NOTE — PROGRESS NOTES
[facility]   Physical Therapy Treatment Note      Name: Bandar French  Clinic Number: [id number]    Therapy Diagnosis:   Encounter Diagnoses   Name Primary?    Acute postoperative pain of left knee Yes    Decreased strength involving knee joint     Impaired functional mobility, balance, gait, and endurance        Physician: Stevenson Mcghee III, *    Visit Date: 6/14/2023    Physician Orders: PT Eval and Treat   Medical Diagnosis from Referral: M17.12 (ICD-10-CM) - Primary osteoarthritis of left knee  Evaluation Date: 5/17/2023  Authorization Period Expiration: 07/19/2023  Plan of Care Expiration: 5/17/2023 to 06/28/2023  Visit # / Visits authorized: 5/20 (11 visits total) FOTO: at [place] PTA Visit #: 0/5     Time In: 1000  Time Out: 1055  Total Billable Time: 30 minutes (1NM, 1 MT)  Precautions: post-op state    Subjective     Pt reports: no new complaints.   He was compliant with home exercise program.  Response to previous treatment: no adverse effects.   Functional change: Ongoing    Pain: 5/10  Location: left knee (globally)    Objective      Knee PROM 0 - 110 degrees (very firm)      Treatment     Zack received the treatments listed below:      Neuromuscular re-education for proprioception, kinesthetic awareness: total time 10 minutes 1:1, including  Nu Step 5' lvl 3  Lunges 1st step 2' --> 2nd step 2'    Therapeutic exercises to develop strength, endurance, ROM, flexibility, and posture for 10 minutes 1:1, including:   Seated heel slides 2x10 with strap  Short sitting heel slides with strap 2x10  Long sitting heel slides with strap 2x10  Heel prop 5' ice pack  Leg press for flexion 4 plates dL 2x20 (flexion bias)  Bike x 10' at L3    Manual therapy techniques: Joint mobilizations, Myofacial release, and Soft tissue Mobilization were applied to the: Left knee for 10 minutes, including:  Patellar mobs grade I-II  Physiological knee flexion and extension in both long-sitting and  short-sitting.   Flexion 90/90 creep stretch 3'       Patient Education and Home Exercises       Education provided:   - home exercise program: partial heel slides, SAQs, quad sets.    Written Home Exercises Provided: Patient instructed to cont prior HEP. Exercises were reviewed and Zack was able to demonstrate them prior to the end of the session.  Zack demonstrated good  understanding of the education provided. See EMR under Patient Instructions for exercises provided during therapy sessions    Assessment     Bandar is a 72 y.o. male referred to outpatient Physical Therapy with a medical diagnosis of M17.12 (ICD-10-CM) - Primary osteoarthritis of left knee. Patient now s/p left TKA. Surgery date: 05/15/2023.  POD#30. Moderate effusion.  Good quad set.  Knee flexion to 110-115 today; see small but incremental gains inter-session.     Zack Is progressing well towards his goals.   Pt prognosis is Excellent.     Pt will continue to benefit from skilled outpatient physical therapy to address the deficits listed in the problem list box on initial evaluation, provide pt/family education and to maximize pt's level of independence in the home and community environment.   Pt's spiritual, cultural and educational needs considered and pt agreeable to plan of care and goals.     Anticipated barriers to physical therapy: None    Goals: Short Term Goals: 3 weeks  1. Pt will be independent with HEP supplement PT in improving functional mobility.  Progressing towards  2. Pt will improve LLE strength to at least 70% of RLE strength as measured via MicroFet handheld dynamometer in order to improve functional mobility. Progressing towards  3. Pt will improve left knee AROM to at least 0-110 in order to improve gait. Progressing towards     Long Term Goals: 6 weeks  1. Pt will be independent with updated HEP supplement PT in improving functional mobility. Progressing towards  2. Pt will improve LLE strength to at least 80% of RLE  strength as measured via MicroFet handheld dynamometer in order to improve functional mobility. Progressing towards  3. Pt will improve L knee AROM to at least 0 - 115 in order to improve gait and ability to perform ADLs. Progressing towards  4. Pt will improve FOTO knee survey score to </= 35% limited in order to demo improved functional mobility.  5. Pt will perform TUG in < 15 seconds without AD in order to demo improved gait speed. Progressing towards  6. Pt will perform at least 6 sit to stands without UE support on 30 second sit to stand test in order to demo improved ability to perform transfers. Progressing towards    Plan   Quad strengthening and activation  Knee flexion restoration.     Luis Gallegos, PT, DPT, OCS

## 2023-06-16 ENCOUNTER — CLINICAL SUPPORT (OUTPATIENT)
Dept: REHABILITATION | Facility: HOSPITAL | Age: 73
End: 2023-06-16
Payer: MEDICARE

## 2023-06-16 DIAGNOSIS — G89.18 ACUTE POSTOPERATIVE PAIN OF LEFT KNEE: Primary | ICD-10-CM

## 2023-06-16 DIAGNOSIS — M25.562 ACUTE POSTOPERATIVE PAIN OF LEFT KNEE: Primary | ICD-10-CM

## 2023-06-16 DIAGNOSIS — Z74.09 IMPAIRED FUNCTIONAL MOBILITY, BALANCE, GAIT, AND ENDURANCE: ICD-10-CM

## 2023-06-16 DIAGNOSIS — R29.898 DECREASED STRENGTH INVOLVING KNEE JOINT: ICD-10-CM

## 2023-06-16 PROCEDURE — 97112 NEUROMUSCULAR REEDUCATION: CPT | Mod: HCNC,PN

## 2023-06-16 PROCEDURE — 97140 MANUAL THERAPY 1/> REGIONS: CPT | Mod: HCNC,PN

## 2023-06-16 NOTE — PROGRESS NOTES
[facility]   Physical Therapy Treatment Note      Name: Bandar French  Clinic Number: [id number]    Therapy Diagnosis:   Encounter Diagnoses   Name Primary?    Acute postoperative pain of left knee Yes    Decreased strength involving knee joint     Impaired functional mobility, balance, gait, and endurance        Physician: Stevenson Mcghee III, *    Visit Date: 6/16/2023    Physician Orders: PT Eval and Treat   Medical Diagnosis from Referral: M17.12 (ICD-10-CM) - Primary osteoarthritis of left knee  Evaluation Date: 5/17/2023  Authorization Period Expiration: 07/19/2023  Plan of Care Expiration: 5/17/2023 to 06/28/2023  Visit # / Visits authorized: 6/20 (12 visits total) FOTO: at [place] PTA Visit #: 0/5     Time In: 1000  Time Out: 1055  Total Billable Time: 30 minutes (1NM, 1 MT)  Precautions: post-op state    Subjective     Pt reports: went to the gym yesterday - performed the bike and knee extension machine.  More sore this morning as a result.   He was compliant with home exercise program.  Response to previous treatment: no adverse effects.   Functional change: Ongoing    Pain: 5/10  Location: left knee (globally)    Objective      Knee PROM 0 - 108 degrees (very firm)  Quad set: Good  Straight leg raise: 3 degree lag      Treatment     Zack received the treatments listed below:      Neuromuscular re-education for proprioception, kinesthetic awareness: total time 10 minutes 1:1, including  Nu Step 5' lvl 3  Lunges 1st step 2' --> 2nd step 2'    Therapeutic exercises to develop strength, endurance, ROM, flexibility, and posture for 10 minutes 1:1, including:   Seated heel slides 2x10 with strap  Short sitting heel slides with strap 2x10  Long sitting heel slides with strap 2x10  Heel prop 5' ice pack  Leg press for flexion 4 plates dL 2x20 (flexion bias)  Bike x 10' at L3 (not today)    Manual therapy techniques: Joint mobilizations, Myofacial release, and Soft tissue Mobilization were  applied to the: Left knee for 10 minutes, including:  Patellar mobs grade I-II  Physiological knee flexion and extension in both long-sitting and short-sitting.   Flexion 90/90 creep stretch 3'       Patient Education and Home Exercises       Education provided:   - home exercise program: partial heel slides, SAQs, quad sets.    Written Home Exercises Provided: Patient instructed to cont prior HEP. Exercises were reviewed and Zack was able to demonstrate them prior to the end of the session.  Zack demonstrated good  understanding of the education provided. See EMR under Patient Instructions for exercises provided during therapy sessions    Assessment     Bandar is a 72 y.o. male referred to outpatient Physical Therapy with a medical diagnosis of M17.12 (ICD-10-CM) - Primary osteoarthritis of left knee. Patient now s/p left TKA. Surgery date: 05/15/2023.  POD#32. Moderate effusion.  Good quad set.  Knee flexion to 100-105 today.     Zack Is progressing well towards his goals.   Pt prognosis is Excellent.     Pt will continue to benefit from skilled outpatient physical therapy to address the deficits listed in the problem list box on initial evaluation, provide pt/family education and to maximize pt's level of independence in the home and community environment.   Pt's spiritual, cultural and educational needs considered and pt agreeable to plan of care and goals.     Anticipated barriers to physical therapy: None    Goals: Short Term Goals: 3 weeks  1. Pt will be independent with HEP supplement PT in improving functional mobility.  Progressing towards  2. Pt will improve LLE strength to at least 70% of RLE strength as measured via MicroFet handheld dynamometer in order to improve functional mobility. Progressing towards  3. Pt will improve left knee AROM to at least 0-110 in order to improve gait. Progressing towards     Long Term Goals: 6 weeks  1. Pt will be independent with updated HEP supplement PT in improving  functional mobility. Progressing towards  2. Pt will improve LLE strength to at least 80% of RLE strength as measured via MicroFet handheld dynamometer in order to improve functional mobility. Progressing towards  3. Pt will improve L knee AROM to at least 0 - 115 in order to improve gait and ability to perform ADLs. Progressing towards  4. Pt will improve FOTO knee survey score to </= 35% limited in order to demo improved functional mobility.  5. Pt will perform TUG in < 15 seconds without AD in order to demo improved gait speed. Progressing towards  6. Pt will perform at least 6 sit to stands without UE support on 30 second sit to stand test in order to demo improved ability to perform transfers. Progressing towards    Plan   Quad strengthening and activation  Knee flexion restoration.     Luis Gallegos, PT, DPT, OCS

## 2023-06-19 ENCOUNTER — CLINICAL SUPPORT (OUTPATIENT)
Dept: REHABILITATION | Facility: HOSPITAL | Age: 73
End: 2023-06-19
Payer: MEDICARE

## 2023-06-19 DIAGNOSIS — G89.18 ACUTE POSTOPERATIVE PAIN OF LEFT KNEE: Primary | ICD-10-CM

## 2023-06-19 DIAGNOSIS — Z74.09 IMPAIRED FUNCTIONAL MOBILITY, BALANCE, GAIT, AND ENDURANCE: ICD-10-CM

## 2023-06-19 DIAGNOSIS — M25.562 ACUTE POSTOPERATIVE PAIN OF LEFT KNEE: Primary | ICD-10-CM

## 2023-06-19 DIAGNOSIS — R29.898 DECREASED STRENGTH INVOLVING KNEE JOINT: ICD-10-CM

## 2023-06-19 PROCEDURE — 97112 NEUROMUSCULAR REEDUCATION: CPT | Mod: HCNC,PN,CQ

## 2023-06-19 PROCEDURE — 97110 THERAPEUTIC EXERCISES: CPT | Mod: HCNC,PN,CQ

## 2023-06-19 PROCEDURE — 97140 MANUAL THERAPY 1/> REGIONS: CPT | Mod: HCNC,PN,CQ

## 2023-06-19 NOTE — PROGRESS NOTES
"[facility]   Physical Therapy Treatment Note      Name: Bandar French  Clinic Number: [id number]    Therapy Diagnosis:   Encounter Diagnoses   Name Primary?    Acute postoperative pain of left knee Yes    Decreased strength involving knee joint     Impaired functional mobility, balance, gait, and endurance        Physician: Stevenson Mcghee III, *    Visit Date: 6/19/2023    Physician Orders: PT Eval and Treat   Medical Diagnosis from Referral: M17.12 (ICD-10-CM) - Primary osteoarthritis of left knee  Evaluation Date: 5/17/2023  Authorization Period Expiration: 07/19/2023  Plan of Care Expiration: 5/17/2023 to 06/28/2023  Visit # / Visits authorized: 6/20 (12 visits total) FOTO: at CT PTA Visit #: 1/5     Time In: 355  Time Out: 450  Total Billable Time: 55 minutes (1NM, 1 MT, 2TE)  Precautions: post-op state    Subjective     Pt reports: went to the gym yesterday - performed the bike and knee extension machine.  More sore this morning as a result.   He was compliant with home exercise program.  Response to previous treatment: no adverse effects.   Functional change: Ongoing    Pain: 5/10  Location: left knee (globally)    Objective      Knee PROM 0 - 108 degrees (very firm)  Quad set: Good  Straight leg raise: 2-3 degree lag      Treatment     Zack received the treatments listed below:      Neuromuscular re-education for proprioception, kinesthetic awareness: total time 10 minutes 1:1, including  Nu Step 5' lvl 3  Lunges 1st step 2' --> 2nd step 2'    Therapeutic exercises to develop strength, endurance, ROM, flexibility, and posture for 35 minutes 1:1, including:   Seated heel slides 2x10 with strap  Short sitting heel slides with strap 2x10  Long sitting heel slides with strap 2x10  Heel prop 5' ice pack  Leg press for flexion 4 plates dL 2x20 (flexion bias); sL 2 plates 2 x 10  HS curl 2 plates 2 x 10 5" hold  Bike x 10' at L3 (not today)    Manual therapy techniques: Joint " mobilizations, Myofacial release, and Soft tissue Mobilization were applied to the: Left knee for 10 minutes, including:  Patellar mobs grade I-II  Physiological knee flexion and extension in both long-sitting and short-sitting.   Flexion 90/90 creep stretch 3'       Patient Education and Home Exercises       Education provided:   - home exercise program: partial heel slides, SAQs, quad sets.    Written Home Exercises Provided: Patient instructed to cont prior HEP. Exercises were reviewed and Zack was able to demonstrate them prior to the end of the session.  Zack demonstrated good  understanding of the education provided. See EMR under Patient Instructions for exercises provided during therapy sessions    Assessment     Bandar is a 72 y.o. male referred to outpatient Physical Therapy with a medical diagnosis of M17.12 (ICD-10-CM) - Primary osteoarthritis of left knee. Patient now s/p left TKA. Surgery date: 05/15/2023.  POD#35. Mild to moderate effusion. Good quad set. Mild quad lag. Knee flexion to 103-107 today. Making slow but gradual improvements in knee flexion. Still very firm end feel.     Zack Is progressing well towards his goals.   Pt prognosis is Excellent.     Pt will continue to benefit from skilled outpatient physical therapy to address the deficits listed in the problem list box on initial evaluation, provide pt/family education and to maximize pt's level of independence in the home and community environment.   Pt's spiritual, cultural and educational needs considered and pt agreeable to plan of care and goals.     Anticipated barriers to physical therapy: None    Goals: Short Term Goals: 3 weeks  1. Pt will be independent with HEP supplement PT in improving functional mobility.  Progressing towards  2. Pt will improve LLE strength to at least 70% of RLE strength as measured via MicroFet handheld dynamometer in order to improve functional mobility. Progressing towards  3. Pt will improve left knee AROM  to at least 0-110 in order to improve gait. Progressing towards     Long Term Goals: 6 weeks  1. Pt will be independent with updated HEP supplement PT in improving functional mobility. Progressing towards  2. Pt will improve LLE strength to at least 80% of RLE strength as measured via MicroFet handheld dynamometer in order to improve functional mobility. Progressing towards  3. Pt will improve L knee AROM to at least 0 - 115 in order to improve gait and ability to perform ADLs. Progressing towards  4. Pt will improve FOTO knee survey score to </= 35% limited in order to demo improved functional mobility.  5. Pt will perform TUG in < 15 seconds without AD in order to demo improved gait speed. Progressing towards  6. Pt will perform at least 6 sit to stands without UE support on 30 second sit to stand test in order to demo improved ability to perform transfers. Progressing towards    Plan   Quad strengthening and activation  Knee flexion restoration.     Mary Beth Stevenson, PTA

## 2023-06-21 ENCOUNTER — CLINICAL SUPPORT (OUTPATIENT)
Dept: REHABILITATION | Facility: HOSPITAL | Age: 73
End: 2023-06-21
Payer: MEDICARE

## 2023-06-21 DIAGNOSIS — R29.898 DECREASED STRENGTH INVOLVING KNEE JOINT: ICD-10-CM

## 2023-06-21 DIAGNOSIS — M25.562 ACUTE POSTOPERATIVE PAIN OF LEFT KNEE: Primary | ICD-10-CM

## 2023-06-21 DIAGNOSIS — G89.18 ACUTE POSTOPERATIVE PAIN OF LEFT KNEE: Primary | ICD-10-CM

## 2023-06-21 DIAGNOSIS — Z74.09 IMPAIRED FUNCTIONAL MOBILITY, BALANCE, GAIT, AND ENDURANCE: ICD-10-CM

## 2023-06-21 PROCEDURE — 97140 MANUAL THERAPY 1/> REGIONS: CPT | Mod: HCNC,PN,CQ

## 2023-06-21 PROCEDURE — 97112 NEUROMUSCULAR REEDUCATION: CPT | Mod: HCNC,PN,CQ

## 2023-06-21 PROCEDURE — 97110 THERAPEUTIC EXERCISES: CPT | Mod: HCNC,PN,CQ

## 2023-06-21 NOTE — PROGRESS NOTES
"[facility]   Physical Therapy Treatment Note      Name: Bandar French  Clinic Number: [id number]    Therapy Diagnosis:   Encounter Diagnoses   Name Primary?    Acute postoperative pain of left knee Yes    Decreased strength involving knee joint     Impaired functional mobility, balance, gait, and endurance        Physician: Stevenson Mcghee III, *    Visit Date: 6/21/2023    Physician Orders: PT Eval and Treat   Medical Diagnosis from Referral: M17.12 (ICD-10-CM) - Primary osteoarthritis of left knee  Evaluation Date: 5/17/2023  Authorization Period Expiration: 07/19/2023  Plan of Care Expiration: 5/17/2023 to 06/28/2023  Visit # / Visits authorized: 10/20 (12 visits total) FOTO: at MD PTA Visit #: 2/5     Time In: 1000  Time Out: 1055  Total Billable Time: 30 minutes 1:1 (1 MT, 1TE)  Precautions: post-op state    Subjective     Pt reports: Feeling good today; agreeable to PT session.   He was compliant with home exercise program.  Response to previous treatment: no adverse effects.   Functional change: Ongoing    Pain: 5/10  Location: left knee (globally)    Objective      Knee PROM 0 - 114 degrees (very firm)  Quad set: Good  Straight leg raise: 2-3 degree lag      Treatment     Zack received the treatments listed below:      Neuromuscular re-education for proprioception, kinesthetic awareness: total time 00 minutes 1:1, including  Nu Step 5' lvl 3 not today  Lunges 1st step 2' --> 2nd step 2'    Therapeutic exercises to develop strength, endurance, ROM, flexibility, and posture for 20 minutes 1:1, including:   Seated heel slides 2x10 with strap  Short sitting heel slides with strap 2x10  Long sitting heel slides with strap 2x10  DKTC with over pressure 2'  Leg press for flexion 4 plates dL 2x20 (flexion bias); sL 2 plates 2 x 10  HS curl 2 plates 2 x 10 5" hold  Bike x 6' at L3   Heel prop 5' ice pack    Manual therapy techniques: Joint mobilizations, Myofacial release, and Soft tissue " Mobilization were applied to the: Left knee for 10 minutes, including:  Patellar mobs grade I-II  Physiological knee flexion and extension in both long-sitting and short-sitting.   Flexion 90/90 creep stretch 3'       Patient Education and Home Exercises       Education provided:   - home exercise program: partial heel slides, SAQs, quad sets.    Written Home Exercises Provided: Patient instructed to cont prior HEP. Exercises were reviewed and Zack was able to demonstrate them prior to the end of the session.  Zack demonstrated good  understanding of the education provided. See EMR under Patient Instructions for exercises provided during therapy sessions    Assessment     Bandar is a 72 y.o. male referred to outpatient Physical Therapy with a medical diagnosis of M17.12 (ICD-10-CM) - Primary osteoarthritis of left knee. Patient now s/p left TKA. Surgery date: 05/15/2023.  POD#37. Mild to moderate effusion. Good quad set. Knee flexion to 110-114 today. Making slow but gradual improvements in knee flexion. Still very firm end feel.     Zack Is progressing well towards his goals.   Pt prognosis is Excellent.     Pt will continue to benefit from skilled outpatient physical therapy to address the deficits listed in the problem list box on initial evaluation, provide pt/family education and to maximize pt's level of independence in the home and community environment.   Pt's spiritual, cultural and educational needs considered and pt agreeable to plan of care and goals.     Anticipated barriers to physical therapy: None    Goals: Short Term Goals: 3 weeks  1. Pt will be independent with HEP supplement PT in improving functional mobility.  Progressing towards  2. Pt will improve LLE strength to at least 70% of RLE strength as measured via MicroFet handheld dynamometer in order to improve functional mobility. Progressing towards  3. Pt will improve left knee AROM to at least 0-110 in order to improve gait. Progressing towards      Long Term Goals: 6 weeks  1. Pt will be independent with updated HEP supplement PT in improving functional mobility. Progressing towards  2. Pt will improve LLE strength to at least 80% of RLE strength as measured via MicroFet handheld dynamometer in order to improve functional mobility. Progressing towards  3. Pt will improve L knee AROM to at least 0 - 115 in order to improve gait and ability to perform ADLs. Progressing towards  4. Pt will improve FOTO knee survey score to </= 35% limited in order to demo improved functional mobility.  5. Pt will perform TUG in < 15 seconds without AD in order to demo improved gait speed. Progressing towards  6. Pt will perform at least 6 sit to stands without UE support on 30 second sit to stand test in order to demo improved ability to perform transfers. Progressing towards    Plan   Quad strengthening and activation  Knee flexion restoration.     Mary Beth Stevenson, PTA

## 2023-06-23 ENCOUNTER — CLINICAL SUPPORT (OUTPATIENT)
Dept: REHABILITATION | Facility: HOSPITAL | Age: 73
End: 2023-06-23
Payer: MEDICARE

## 2023-06-23 DIAGNOSIS — M25.562 ACUTE POSTOPERATIVE PAIN OF LEFT KNEE: Primary | ICD-10-CM

## 2023-06-23 DIAGNOSIS — Z74.09 IMPAIRED FUNCTIONAL MOBILITY, BALANCE, GAIT, AND ENDURANCE: ICD-10-CM

## 2023-06-23 DIAGNOSIS — R29.898 DECREASED STRENGTH INVOLVING KNEE JOINT: ICD-10-CM

## 2023-06-23 DIAGNOSIS — G89.18 ACUTE POSTOPERATIVE PAIN OF LEFT KNEE: Primary | ICD-10-CM

## 2023-06-23 PROCEDURE — 97112 NEUROMUSCULAR REEDUCATION: CPT | Mod: HCNC,PN

## 2023-06-23 PROCEDURE — 97110 THERAPEUTIC EXERCISES: CPT | Mod: HCNC,PN

## 2023-06-23 NOTE — PROGRESS NOTES
"[facility]   Physical Therapy Treatment/Progress Note      Name: Bandar French  Clinic Number: [id number]    Therapy Diagnosis:   Encounter Diagnoses   Name Primary?    Acute postoperative pain of left knee Yes    Decreased strength involving knee joint     Impaired functional mobility, balance, gait, and endurance        Physician: Stevenson Mcghee III, *    Visit Date: 2023    Physician Orders: PT Eval and Treat   Medical Diagnosis from Referral: M17.12 (ICD-10-CM) - Primary osteoarthritis of left knee  Evaluation Date: 2023  Authorization Period Expiration: 2023  Plan of Care Expiration: 2023  Visit # / Visits authorized:  (13 visits total) FOTO: Today PTA Visit #: 0/5     Time In: 11:05  Time Out: 1155  Total Billable Time: 50 minutes 1:1 (2TE, 1NMR)  Precautions: post-op state    Subjective     Pt reports: Pt reports his knee is feeling pretty good today. States he has returned to working out at [employer]. States he really feels like he is ready to discharge from therapy and continue managing his knee at home.   He was compliant with home exercise program.  Response to previous treatment: no adverse effects.   Functional change: Ongoing    Pain: 5/10 -> "not pain, it just feels aggravated"  Location: left knee anterior jt line    Objective      Observation:  Well-healing, clean incision with 2 areas of ongoing healing  Posture:         Decreased left quad girth as compared to the right.   Effusion:        Moderate post-op knee effusion     Range of motion:      Right knee:(+) 4 - 0- 122 degrees  Left knee:  LACKING 4* - 0 - 114 degrees     Quad:              Good quad activation.      L/E Strength w/ MicroFET Muscle Pattie Dynamometer Right Left Pain/Dysfunction with Movement   (approx 4 sec hold w/ max contraction)   Quadriceps  4+/5 4/5   MicroFET not available at time of testing   Hamstrings  4+/5 4/5        TU.5  30 second sit-to-stand test " "(without U/E support): 7  Gait Analysis: Lacks full knee extension at heel strike     Limitation/Restriction for FOTO Knee Survey     Therapist reviewed FOTO scores for Bandar French on 5/17/2023.   FOTO documents entered into [employer] - see Media section.     Limitation Score: 41%            Treatment     Zack received the treatments listed below:      Neuromuscular re-education for proprioception, kinesthetic awareness: total time 10 minutes 1:1, including  Nu Step 5' lvl 3   Lunges 1st step 2' --> 2nd step 2'    Therapeutic exercises to develop strength, endurance, ROM, flexibility, and posture for 28 minutes 1:1, including:   Seated heel slides 2x10 with strap  Short sitting heel slides with strap 2x10  Long sitting heel slides with strap 2x10  DKTC with over pressure 2'  Leg press for flexion 4 plates dL 2x20 (flexion bias); sL 2 plates 2 x 10  HS curl 2 plates 2 x 10 5" hold  Bike x 6' at L3 - NT  Heel prop 5' ice pack - OOT  Objective measurement taking    Manual therapy techniques: Joint mobilizations, Myofacial release, and Soft tissue Mobilization were applied to the: Left knee for 5 minutes, including:  Patellar mobs grade I-II  Physiological knee flexion and extension in both long-sitting and short-sitting. - NT  Flexion 90/90 creep stretch 3' - NT     Therapeutic activities for functional mobility for 5 minutes:  Practicing Golf Swing with  outside on grassy surface    Patient Education and Home Exercises       Education provided:   - home exercise program: partial heel slides, SAQs, quad sets.    Written Home Exercises Provided: Patient instructed to cont prior HEP. Exercises were reviewed and Zack was able to demonstrate them prior to the end of the session.  Zack demonstrated good  understanding of the education provided. See EMR under Patient Instructions for exercises provided during therapy sessions    Assessment     Bandar is a 72 y.o. male referred to outpatient Physical Therapy with a medical " diagnosis of M17.12 (ICD-10-CM) - Primary osteoarthritis of left knee. Patient now s/p left TKA. Surgery date: 05/15/2023.  POD#39. Mild to moderate effusion. Discussed PoC with CONSTANCE Escobedo who has seen patient previously. After discussion, I spoke with pt that I felt he was appropriate for discharge as long as he continued to perform the exercises consistently at home and in the gym. Practice golf swing with club and pt stated it felt fine, though he wasn't swinging with full force. Advised gradual return to prior level of activity. At end of session, pt stated he would like to come back for 1 more session with his primary therapist, Luis.     Zack Is progressing well towards his goals.   Pt prognosis is Excellent.     Pt will continue to benefit from skilled outpatient physical therapy to address the deficits listed in the problem list box on initial evaluation, provide pt/family education and to maximize pt's level of independence in the home and community environment.   Pt's spiritual, cultural and educational needs considered and pt agreeable to plan of care and goals.     Anticipated barriers to physical therapy: None    Goals: Short Term Goals: 3 weeks  1. Pt will be independent with HEP supplement PT in improving functional mobility.  Met, 6/23/2023  2. Pt will improve LLE strength to at least 70% of RLE strength as measured via MicroFet handheld dynamometer in order to improve functional mobility. Progressing towards  3. Pt will improve left knee AROM to at least 0-110 in order to improve gait. Progressing towards, still lacking extension     Long Term Goals: 6 weeks  1. Pt will be independent with updated HEP supplement PT in improving functional mobility. Progressing towards  2. Pt will improve LLE strength to at least 80% of RLE strength as measured via MicroFet handheld dynamometer in order to improve functional mobility. Progressing towards  3. Pt will improve L knee AROM to at least 0 - 115 in  order to improve gait and ability to perform ADLs. Progressing towards, still lacking extension  4. Pt will improve FOTO knee survey score to </= 35% limited in order to demo improved functional mobility. Progressing towards,  5. Pt will perform TUG in < 15 seconds without AD in order to demo improved gait speed. Met, 6/23/2023  6. Pt will perform at least 6 sit to stands without UE support on 30 second sit to stand test in order to demo improved ability to perform transfers. Met, 6/23/2023    Plan   Quad strengthening and activation  Knee flexion restoration.   D/c at next session if pt still appropriate    Plan of care certification: 6/23/2023 - 07/07/2023, 1 additional visit  James Medina, PT

## 2023-06-25 RX ORDER — LATANOPROST 50 UG/ML
SOLUTION/ DROPS OPHTHALMIC
Qty: 2.5 ML | Refills: 11 | Status: SHIPPED | OUTPATIENT
Start: 2023-06-25 | End: 2023-07-19 | Stop reason: SDUPTHER

## 2023-06-26 ENCOUNTER — TELEPHONE (OUTPATIENT)
Dept: ORTHOPEDICS | Facility: CLINIC | Age: 73
End: 2023-06-26
Payer: MEDICARE

## 2023-06-27 ENCOUNTER — HOSPITAL ENCOUNTER (OUTPATIENT)
Dept: RADIOLOGY | Facility: HOSPITAL | Age: 73
Discharge: HOME | End: 2023-06-27
Attending: ORTHOPAEDIC SURGERY
Payer: MEDICARE

## 2023-06-27 ENCOUNTER — OFFICE VISIT (OUTPATIENT)
Dept: ORTHOPEDICS | Facility: CLINIC | Age: 73
End: 2023-06-27
Payer: MEDICARE

## 2023-06-27 VITALS — HEIGHT: 70 IN | WEIGHT: 171.94 LBS | BODY MASS INDEX: 24.61 KG/M2

## 2023-06-27 DIAGNOSIS — Z96.652 STATUS POST LEFT KNEE REPLACEMENT: Primary | ICD-10-CM

## 2023-06-27 DIAGNOSIS — Z96.652 STATUS POST LEFT KNEE REPLACEMENT: ICD-10-CM

## 2023-06-27 PROCEDURE — 1125F AMNT PAIN NOTED PAIN PRSNT: CPT | Mod: HCNC,CPTII,S$GLB, | Performed by: ORTHOPAEDIC SURGERY

## 2023-06-27 PROCEDURE — 99024 PR POST-OP FOLLOW-UP VISIT: ICD-10-PCS | Mod: HCNC,S$GLB,, | Performed by: ORTHOPAEDIC SURGERY

## 2023-06-27 PROCEDURE — 3044F PR MOST RECENT HEMOGLOBIN A1C LEVEL <7.0%: ICD-10-PCS | Mod: HCNC,CPTII,S$GLB, | Performed by: ORTHOPAEDIC SURGERY

## 2023-06-27 PROCEDURE — 4010F PR ACE/ARB THEARPY RXD/TAKEN: ICD-10-PCS | Mod: HCNC,CPTII,S$GLB, | Performed by: ORTHOPAEDIC SURGERY

## 2023-06-27 PROCEDURE — 3072F LOW RISK FOR RETINOPATHY: CPT | Mod: HCNC,CPTII,S$GLB, | Performed by: ORTHOPAEDIC SURGERY

## 2023-06-27 PROCEDURE — 1157F PR ADVANCE CARE PLAN OR EQUIV PRESENT IN MEDICAL RECORD: ICD-10-PCS | Mod: HCNC,CPTII,S$GLB, | Performed by: ORTHOPAEDIC SURGERY

## 2023-06-27 PROCEDURE — 3008F BODY MASS INDEX DOCD: CPT | Mod: HCNC,CPTII,S$GLB, | Performed by: ORTHOPAEDIC SURGERY

## 2023-06-27 PROCEDURE — 99024 POSTOP FOLLOW-UP VISIT: CPT | Mod: HCNC,S$GLB,, | Performed by: ORTHOPAEDIC SURGERY

## 2023-06-27 PROCEDURE — 3008F PR BODY MASS INDEX (BMI) DOCUMENTED: ICD-10-PCS | Mod: HCNC,CPTII,S$GLB, | Performed by: ORTHOPAEDIC SURGERY

## 2023-06-27 PROCEDURE — 3044F HG A1C LEVEL LT 7.0%: CPT | Mod: HCNC,CPTII,S$GLB, | Performed by: ORTHOPAEDIC SURGERY

## 2023-06-27 PROCEDURE — 99999 PR PBB SHADOW E&M-EST. PATIENT-LVL IV: CPT | Mod: PBBFAC,HCNC,, | Performed by: ORTHOPAEDIC SURGERY

## 2023-06-27 PROCEDURE — 1125F PR PAIN SEVERITY QUANTIFIED, PAIN PRESENT: ICD-10-PCS | Mod: HCNC,CPTII,S$GLB, | Performed by: ORTHOPAEDIC SURGERY

## 2023-06-27 PROCEDURE — 3288F PR FALLS RISK ASSESSMENT DOCUMENTED: ICD-10-PCS | Mod: HCNC,CPTII,S$GLB, | Performed by: ORTHOPAEDIC SURGERY

## 2023-06-27 PROCEDURE — 73562 X-RAY EXAM OF KNEE 3: CPT | Mod: TC,HCNC,LT

## 2023-06-27 PROCEDURE — 1157F ADVNC CARE PLAN IN RCRD: CPT | Mod: HCNC,CPTII,S$GLB, | Performed by: ORTHOPAEDIC SURGERY

## 2023-06-27 PROCEDURE — 73562 X-RAY EXAM OF KNEE 3: CPT | Mod: 26,HCNC,LT, | Performed by: RADIOLOGY

## 2023-06-27 PROCEDURE — 1159F MED LIST DOCD IN RCRD: CPT | Mod: HCNC,CPTII,S$GLB, | Performed by: ORTHOPAEDIC SURGERY

## 2023-06-27 PROCEDURE — 99999 PR PBB SHADOW E&M-EST. PATIENT-LVL IV: ICD-10-PCS | Mod: PBBFAC,HCNC,, | Performed by: ORTHOPAEDIC SURGERY

## 2023-06-27 PROCEDURE — 1101F PT FALLS ASSESS-DOCD LE1/YR: CPT | Mod: HCNC,CPTII,S$GLB, | Performed by: ORTHOPAEDIC SURGERY

## 2023-06-27 PROCEDURE — 3288F FALL RISK ASSESSMENT DOCD: CPT | Mod: HCNC,CPTII,S$GLB, | Performed by: ORTHOPAEDIC SURGERY

## 2023-06-27 PROCEDURE — 4010F ACE/ARB THERAPY RXD/TAKEN: CPT | Mod: HCNC,CPTII,S$GLB, | Performed by: ORTHOPAEDIC SURGERY

## 2023-06-27 PROCEDURE — 1159F PR MEDICATION LIST DOCUMENTED IN MEDICAL RECORD: ICD-10-PCS | Mod: HCNC,CPTII,S$GLB, | Performed by: ORTHOPAEDIC SURGERY

## 2023-06-27 PROCEDURE — 3072F PR LOW RISK FOR RETINOPATHY: ICD-10-PCS | Mod: HCNC,CPTII,S$GLB, | Performed by: ORTHOPAEDIC SURGERY

## 2023-06-27 PROCEDURE — 73562 XR KNEE 3 VIEW LEFT: ICD-10-PCS | Mod: 26,HCNC,LT, | Performed by: RADIOLOGY

## 2023-06-27 PROCEDURE — 1101F PR PT FALLS ASSESS DOC 0-1 FALLS W/OUT INJ PAST YR: ICD-10-PCS | Mod: HCNC,CPTII,S$GLB, | Performed by: ORTHOPAEDIC SURGERY

## 2023-06-27 RX ORDER — CELECOXIB 200 MG/1
200 CAPSULE ORAL DAILY
Qty: 30 CAPSULE | Refills: 0 | Status: SHIPPED | OUTPATIENT
Start: 2023-06-27 | End: 2023-07-24

## 2023-06-27 RX ORDER — METHYLPREDNISOLONE 4 MG/1
TABLET ORAL
Qty: 1 EACH | Refills: 0 | Status: SHIPPED | OUTPATIENT
Start: 2023-06-27 | End: 2024-02-29
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
## 2023-06-29 ENCOUNTER — OFFICE VISIT (OUTPATIENT)
Dept: OPTOMETRY | Facility: CLINIC | Age: 73
End: 2023-06-29
Payer: MEDICARE

## 2023-06-29 DIAGNOSIS — H43.393 VITREOUS FLOATERS OF BOTH EYES: ICD-10-CM

## 2023-06-29 DIAGNOSIS — E11.9 TYPE 2 DIABETES MELLITUS WITHOUT RETINOPATHY: Primary | ICD-10-CM

## 2023-06-29 DIAGNOSIS — Z97.3 WEARS CONTACT LENSES: ICD-10-CM

## 2023-06-29 DIAGNOSIS — H52.13 MYOPIA WITH ASTIGMATISM AND PRESBYOPIA, BILATERAL: ICD-10-CM

## 2023-06-29 DIAGNOSIS — H25.813 COMBINED FORMS OF AGE-RELATED CATARACT, BILATERAL: ICD-10-CM

## 2023-06-29 DIAGNOSIS — H40.1132 PRIMARY OPEN ANGLE GLAUCOMA (POAG) OF BOTH EYES, MODERATE STAGE: ICD-10-CM

## 2023-06-29 DIAGNOSIS — Z46.0 FITTING AND ADJUSTMENT OF SPECTACLES AND CONTACT LENSES: Primary | ICD-10-CM

## 2023-06-29 DIAGNOSIS — Z98.890 HISTORY OF REPAIR OF RETINAL DEFECT BY LASER PHOTOCOAGULATION: ICD-10-CM

## 2023-06-29 DIAGNOSIS — H52.203 MYOPIA WITH ASTIGMATISM AND PRESBYOPIA, BILATERAL: ICD-10-CM

## 2023-06-29 DIAGNOSIS — H52.4 MYOPIA WITH ASTIGMATISM AND PRESBYOPIA, BILATERAL: ICD-10-CM

## 2023-06-29 PROCEDURE — 92310 CONTACT LENS FITTING OU: CPT | Mod: CSM,HCNC,S$GLB,

## 2023-06-29 PROCEDURE — 3288F PR FALLS RISK ASSESSMENT DOCUMENTED: ICD-10-PCS | Mod: HCNC,CPTII,S$GLB,

## 2023-06-29 PROCEDURE — 1101F PT FALLS ASSESS-DOCD LE1/YR: CPT | Mod: HCNC,CPTII,S$GLB,

## 2023-06-29 PROCEDURE — 1101F PR PT FALLS ASSESS DOC 0-1 FALLS W/OUT INJ PAST YR: ICD-10-PCS | Mod: HCNC,CPTII,S$GLB,

## 2023-06-29 PROCEDURE — 99499 NO LOS: ICD-10-PCS | Mod: HCNC,,,

## 2023-06-29 PROCEDURE — 4010F PR ACE/ARB THEARPY RXD/TAKEN: ICD-10-PCS | Mod: HCNC,CPTII,S$GLB,

## 2023-06-29 PROCEDURE — 1157F ADVNC CARE PLAN IN RCRD: CPT | Mod: HCNC,CPTII,S$GLB,

## 2023-06-29 PROCEDURE — 99499 UNLISTED E&M SERVICE: CPT | Mod: HCNC,,,

## 2023-06-29 PROCEDURE — 1159F PR MEDICATION LIST DOCUMENTED IN MEDICAL RECORD: ICD-10-PCS | Mod: HCNC,CPTII,S$GLB,

## 2023-06-29 PROCEDURE — 1157F PR ADVANCE CARE PLAN OR EQUIV PRESENT IN MEDICAL RECORD: ICD-10-PCS | Mod: HCNC,CPTII,S$GLB,

## 2023-06-29 PROCEDURE — 99999 PR PBB SHADOW E&M-EST. PATIENT-LVL III: ICD-10-PCS | Mod: PBBFAC,HCNC,,

## 2023-06-29 PROCEDURE — 92014 COMPRE OPH EXAM EST PT 1/>: CPT | Mod: HCNC,S$GLB,,

## 2023-06-29 PROCEDURE — 92014 PR EYE EXAM, EST PATIENT,COMPREHESV: ICD-10-PCS | Mod: HCNC,S$GLB,,

## 2023-06-29 PROCEDURE — 4010F ACE/ARB THERAPY RXD/TAKEN: CPT | Mod: HCNC,CPTII,S$GLB,

## 2023-06-29 PROCEDURE — 1159F MED LIST DOCD IN RCRD: CPT | Mod: HCNC,CPTII,S$GLB,

## 2023-06-29 PROCEDURE — 2023F PR DILATED RETINAL EXAM W/O EVID OF RETINOPATHY: ICD-10-PCS | Mod: HCNC,CPTII,S$GLB,

## 2023-06-29 PROCEDURE — 92015 DETERMINE REFRACTIVE STATE: CPT | Mod: HCNC,S$GLB,,

## 2023-06-29 PROCEDURE — 3044F PR MOST RECENT HEMOGLOBIN A1C LEVEL <7.0%: ICD-10-PCS | Mod: HCNC,CPTII,S$GLB,

## 2023-06-29 PROCEDURE — 92310 PR CONTACT LENS FITTING (NO CHANGE): ICD-10-PCS | Mod: CSM,HCNC,S$GLB,

## 2023-06-29 PROCEDURE — 1126F PR PAIN SEVERITY QUANTIFIED, NO PAIN PRESENT: ICD-10-PCS | Mod: HCNC,CPTII,S$GLB,

## 2023-06-29 PROCEDURE — 3044F HG A1C LEVEL LT 7.0%: CPT | Mod: HCNC,CPTII,S$GLB,

## 2023-06-29 PROCEDURE — 99999 PR PBB SHADOW E&M-EST. PATIENT-LVL III: CPT | Mod: PBBFAC,HCNC,,

## 2023-06-29 PROCEDURE — 2023F DILAT RTA XM W/O RTNOPTHY: CPT | Mod: HCNC,CPTII,S$GLB,

## 2023-06-29 PROCEDURE — 92015 PR REFRACTION: ICD-10-PCS | Mod: HCNC,S$GLB,,

## 2023-06-29 PROCEDURE — 1126F AMNT PAIN NOTED NONE PRSNT: CPT | Mod: HCNC,CPTII,S$GLB,

## 2023-06-29 PROCEDURE — 3288F FALL RISK ASSESSMENT DOCD: CPT | Mod: HCNC,CPTII,S$GLB,

## 2023-06-29 NOTE — Clinical Note
Hi!  I saw this patient today in [place] to update specs and contact lenses. He is monitored by Dr. You for glaucoma and looks like he was lost to follow-up. I'm just reaching out to get him re-scheduled/back on track.   Thank you! Dr. Pierre

## 2023-06-29 NOTE — PROGRESS NOTES
HPI     Diabetic Eye Exam     Additional comments: DM eye exam with clfu           Comments    DLS: 12/22/22    Pt states would like full exam today. Feels he needs his glasses more over   past year. No floaters or flashes. + h/o retina tear OS x 3/19    Gtts: Latanoprost QHS, Cosopt BID OU, good compliance    LBSL: 185 this am  Hemoglobin A1C       Date                     Value               Ref Range             Status                03/27/2023               6.1 (H)             4.0 - 5.6 %           Final                 09/26/2022               5.8 (H)             4.0 - 5.6 %           Final                 09/26/2022               5.8 (H)             4.0 - 5.6 %           Final                      Last edited by Fredis Pierre, OD on 6/29/2023  1:07 PM.            Assessment /Plan     For exam results, see Encounter Report.    Type 2 diabetes mellitus without retinopathy    Primary open angle glaucoma (POAG) of both eyes, moderate stage    Combined forms of age-related cataract, bilateral    History of repair of retinal defect by laser photocoagulation    Vitreous floaters of both eyes    Myopia with astigmatism and presbyopia, bilateral    Wears contact lenses      No diabetic retinopathy or macular edema evident on today's exam OD, OS. Ed pt on importance of maintaining strict BS control due to potential for visual fluctuations and/or vision loss. Monitor with yearly dilated exam.  Pt reports good compliance with Latanoprost QHS OU and Cosopt BID OU. Good IOP in-office today, pt denied needing refills. Pt followed by Dr. You, lost to follow-up. Sent message to staff to get pt re-established.   Mild, not yet visually significant. Surgery not recommended at this time. Ed pt on symptoms of worsening cataracts including reduced BCVA and glare. Monitor yearly for changes.  Stable. Reviewed signs and symptoms of RD with pt and ed pt to RTC asap if experienced.  Ed pt on floaters and their etiology. Reviewed  signs and symptoms of a RD with pt and ed to RTC asap if experienced.  Discussed spectacle options with pt and released final spec rx. Ed pt on change in rx OD>OS and adaptation.  Pt wears CL intermittently, mostly wears specs. Pt currently wears Ultra for Astigmatism, monovision - OD distance. States contact lenses are not comfortable for more than a couple of weeks. Recommended pt use artificial tears or re-wetting drops to help increase comfort and help with dry eye. Sent pt home with Biofinity trials with updated Rx to try. Good initial comfort, fit, and vision with lenses. Reviewed importance of good CL hygiene, routine monthly replacement of lenses, and not to sleep in lenses.ed pt to call or message with update on whether he prefers Ultra or Biofinity and will finalize whichever pt prefers. In-office CL follow-up prn if neither providing acceptable vision/comfort (suggest dailies if so).  Pt messaged 06/20/23 stating that he liked the Biofinity Toric lenses. Finalized.    RTC: 1 year for comprehensive exam or sooner prn

## 2023-06-30 ENCOUNTER — CLINICAL SUPPORT (OUTPATIENT)
Dept: REHABILITATION | Facility: HOSPITAL | Age: 73
End: 2023-06-30
Payer: MEDICARE

## 2023-06-30 ENCOUNTER — PATIENT MESSAGE (OUTPATIENT)
Dept: OPTOMETRY | Facility: CLINIC | Age: 73
End: 2023-06-30
Payer: MEDICARE

## 2023-06-30 DIAGNOSIS — M25.562 ACUTE POSTOPERATIVE PAIN OF LEFT KNEE: Primary | ICD-10-CM

## 2023-06-30 DIAGNOSIS — G89.18 ACUTE POSTOPERATIVE PAIN OF LEFT KNEE: Primary | ICD-10-CM

## 2023-06-30 DIAGNOSIS — R29.898 DECREASED STRENGTH INVOLVING KNEE JOINT: ICD-10-CM

## 2023-06-30 DIAGNOSIS — Z74.09 IMPAIRED FUNCTIONAL MOBILITY, BALANCE, GAIT, AND ENDURANCE: ICD-10-CM

## 2023-06-30 PROCEDURE — 97110 THERAPEUTIC EXERCISES: CPT | Mod: HCNC,PN

## 2023-06-30 PROCEDURE — 97530 THERAPEUTIC ACTIVITIES: CPT | Mod: HCNC,PN

## 2023-06-30 PROCEDURE — 97112 NEUROMUSCULAR REEDUCATION: CPT | Mod: HCNC,PN

## 2023-06-30 NOTE — PROGRESS NOTES
"[facility]   Physical Therapy Treatment/Progress Note      Name: Bandar French  Clinic Number: [id number]    Therapy Diagnosis:   No diagnosis found.      Physician: Stevenson Mcghee III, *    Visit Date: 2023    Physician Orders: PT Eval and Treat   Medical Diagnosis from Referral: M17.12 (ICD-10-CM) - Primary osteoarthritis of left knee  Evaluation Date: 2023  Authorization Period Expiration: 2023  Plan of Care Expiration: 2023  Visit # / Visits authorized:  (13 visits total) FOTO: Today PTA Visit #: 0/5     Time In: 11:05  Time Out: 1155  Total Billable Time: 50 minutes 1:1 (2TE, 1NMR)  Precautions: post-op state    Subjective     Pt reports: Pt reports his knee is feeling pretty good today. States he has returned to working out at [employer]. States he really feels like he is ready to discharge from therapy and continue managing his knee at home.   He was compliant with home exercise program.  Response to previous treatment: no adverse effects.   Functional change: Ongoing    Pain: 10 -> "not pain, it just feels aggravated"  Location: left knee anterior jt line    Objective      Observation:  Well-healing, clean incision with 2 areas of ongoing healing  Posture:         Decreased left quad girth as compared to the right.   Effusion:        Moderate post-op knee effusion     Range of motion:      Right knee:(+) 4 - 0- 122 degrees  Left knee:  LACKING 4* - 0 - 114 degrees     Quad:              Good quad activation.      L/E Strength w/ MicroFET Muscle Pattie Dynamometer Right Left Pain/Dysfunction with Movement   (approx 4 sec hold w/ max contraction)   Quadriceps  4+/5 4/5   MicroFET not available at time of testing   Hamstrings  4+/5 4/5        TU.5  30 second sit-to-stand test (without U/E support): 7  Gait Analysis: Lacks full knee extension at heel strike     Limitation/Restriction for FOTO Knee Survey     Therapist reviewed FOTO scores for " "Bandar French on 5/17/2023.   FOTO documents entered into [employer] - see Media section.     Limitation Score: 41%            Treatment     Zack received the treatments listed below:      Neuromuscular re-education for proprioception, kinesthetic awareness: total time 10 minutes 1:1, including  Nu Step 5' lvl 3   Lunges 1st step 2' --> 2nd step 2'    Therapeutic exercises to develop strength, endurance, ROM, flexibility, and posture for 28 minutes 1:1, including:   Seated heel slides 2x10 with strap  Short sitting heel slides with strap 2x10  Long sitting heel slides with strap 2x10  DKTC with over pressure 2'  Leg press for flexion 4 plates dL 2x20 (flexion bias); sL 2 plates 2 x 10  HS curl 2 plates 2 x 10 5" hold  Bike x 6' at L3 - NT  Heel prop 5' ice pack - OOT  Objective measurement taking    Manual therapy techniques: Joint mobilizations, Myofacial release, and Soft tissue Mobilization were applied to the: Left knee for 5 minutes, including:  Patellar mobs grade I-II  Physiological knee flexion and extension in both long-sitting and short-sitting. - NT  Flexion 90/90 creep stretch 3' - NT     Therapeutic activities for functional mobility for 5 minutes:  Practicing Golf Swing with  outside on grassy surface    Patient Education and Home Exercises       Education provided:   - home exercise program: partial heel slides, SAQs, quad sets.    Written Home Exercises Provided: Patient instructed to cont prior HEP. Exercises were reviewed and Zack was able to demonstrate them prior to the end of the session.  Zack demonstrated good  understanding of the education provided. See EMR under Patient Instructions for exercises provided during therapy sessions    Assessment     Bandar is a 72 y.o. male referred to outpatient Physical Therapy with a medical diagnosis of M17.12 (ICD-10-CM) - Primary osteoarthritis of left knee. Patient now s/p left TKA. Surgery date: 05/15/2023.  POD#39. Mild to moderate effusion. Discussed " PoC with CONSTANCE Escobedo who has seen patient previously. After discussion, I spoke with pt that I felt he was appropriate for discharge as long as he continued to perform the exercises consistently at home and in the gym. Practice golf swing with club and pt stated it felt fine, though he wasn't swinging with full force. Advised gradual return to prior level of activity. At end of session, pt stated he would like to come back for 1 more session with his primary therapist, Luis.     Zack Is progressing well towards his goals.   Pt prognosis is Excellent.     Pt will continue to benefit from skilled outpatient physical therapy to address the deficits listed in the problem list box on initial evaluation, provide pt/family education and to maximize pt's level of independence in the home and community environment.   Pt's spiritual, cultural and educational needs considered and pt agreeable to plan of care and goals.     Anticipated barriers to physical therapy: None    Goals: Short Term Goals: 3 weeks  1. Pt will be independent with HEP supplement PT in improving functional mobility.  Met, 6/23/2023  2. Pt will improve LLE strength to at least 70% of RLE strength as measured via MicroFet handheld dynamometer in order to improve functional mobility. Progressing towards  3. Pt will improve left knee AROM to at least 0-110 in order to improve gait. Progressing towards, still lacking extension     Long Term Goals: 6 weeks  1. Pt will be independent with updated HEP supplement PT in improving functional mobility. Progressing towards  2. Pt will improve LLE strength to at least 80% of RLE strength as measured via MicroFet handheld dynamometer in order to improve functional mobility. Progressing towards  3. Pt will improve L knee AROM to at least 0 - 115 in order to improve gait and ability to perform ADLs. Progressing towards, still lacking extension  4. Pt will improve FOTO knee survey score to </= 35% limited in order to  demo improved functional mobility. Progressing towards,  5. Pt will perform TUG in < 15 seconds without AD in order to demo improved gait speed. Met, 6/23/2023  6. Pt will perform at least 6 sit to stands without UE support on 30 second sit to stand test in order to demo improved ability to perform transfers. Met, 6/23/2023    Plan   Quad strengthening and activation  Knee flexion restoration.   D/c at next session if pt still appropriate    Plan of care certification: 6/23/2023 - 07/07/2023, 1 additional visit  Himanshu Barr PTA

## 2023-06-30 NOTE — PROGRESS NOTES
[facility]   Physical Therapy Treatment/ / Discharge Note     Name: Bandar French  Clinic Number: [id number]    Therapy Diagnosis:   Encounter Diagnoses   Name Primary?    Acute postoperative pain of left knee Yes    Decreased strength involving knee joint     Impaired functional mobility, balance, gait, and endurance        Physician: Stevenson Mcghee III, *    Visit Date: 2023    Physician Orders: PT Eval and Treat   Medical Diagnosis from Referral: M17.12 (ICD-10-CM) - Primary osteoarthritis of left knee  Evaluation Date: 2023  Authorization Period Expiration: 2023  Plan of Care Expiration: 2023  Visit # / Visits authorized:  (+eval) FOTO: Today PTA Visit #: 0/5     Time In: 1000  Time Out: 1055  Total Billable Time: 55 minutes (2 MARIANA, 1 TE, 1 NM)  Precautions: post-op state    Subjective     Pt reports: feeling good.  Knee is sore but he is being more active.  Starting to work back into playing golf.  Has returned to the gym.  He is pleased with his progress.   He was compliant with home exercise program.  Response to previous treatment: no adverse effects.   Functional change: Ongoing    Pain: 3/10  Location: left knee anterior jt line    Objective      Observation:  Well-healing, clean incision with 2 areas of ongoing healing  Posture:         Decreased left quad girth as compared to the right.   Effusion:        Moderate post-op knee effusion     Range of motion:      Right knee:(+) 4 - 0- 122 degrees  Left knee:  0 - 118     Quad:              Good quad activation.      L/E Strength w/ MicroFET Muscle Pattie Dynamometer Right Left Pain/Dysfunction with Movement   (approx 4 sec hold w/ max contraction)   Quadriceps  28.9 kg 17.2 kg 60%   Hamstrings 21.9 kg 14.9 kg 68%      TU.5  30 second sit-to-stand test (without U/E support): 7  Gait Analysis: Lacks full knee extension at heel strike     Limitation/Restriction for FOTO Knee Survey     Therapist  reviewed FOTO scores for Bandar French on 5/17/2023.   FOTO documents entered into [employer] - see Media section.     Limitation Score: 33%            Treatment     Zack received the treatments listed below:      Neuromuscular re-education for proprioception, kinesthetic awareness: total time 10 minutes, including  Nu Step 5' lvl 3   Lunges 1st step 2' --> 2nd step 2'    Therapeutic exercises to develop strength, endurance, ROM, flexibility, and posture for 20 minutes, including:   Bike x 6' at L3   Objective measurement taking  Knee extension machine 2x10 dL 30# --> sL 15# LLE 2x8     Therapeutic activities for functional mobility for 25 minutes:  Modified dead lift 20# kettlebell 2x8 (moderate cueing for technique)  Goblet squat 20# kettle bell 2x8 ( moderate cueing for technique)  Gym routine education    Patient Education and Home Exercises       Education provided:   - home exercise program: partial heel slides, SAQs, quad sets.    Written Home Exercises Provided: Patient instructed to cont prior HEP. Exercises were reviewed and Zack was able to demonstrate them prior to the end of the session.  Zack demonstrated good  understanding of the education provided. See EMR under Patient Instructions for exercises provided during therapy sessions    Assessment     Bandar is a 72 y.o. male referred to outpatient Physical Therapy with a medical diagnosis of M17.12 (ICD-10-CM) - Primary osteoarthritis of left knee. Patient now s/p left TKA. Surgery date: 05/15/2023.  Post-op 6 weeks. Moderate effusion.  Good quad activation.  Straight leg raise without lag.  Range of motion equals 0 - 120 with very firm flexion end-feel.  Has returned to gym.  Back to practicing golf swing.     Zack Is progressing well towards his goals.   Pt prognosis is Excellent.     Pt will continue to benefit from skilled outpatient physical therapy to address the deficits listed in the problem list box on initial evaluation, provide pt/family education  and to maximize pt's level of independence in the home and community environment.   Pt's spiritual, cultural and educational needs considered and pt agreeable to plan of care and goals.     Anticipated barriers to physical therapy: None    Goals: Short Term Goals: 3 weeks  1. Pt will be independent with HEP supplement PT in improving functional mobility.  Met, 6/23/2023  2. Pt will improve LLE strength to at least 70% of RLE strength as measured via MicroFet handheld dynamometer in order to improve functional mobility. Progressing towards  3. Pt will improve left knee AROM to at least 0-110 in order to improve gait. Met 6/30/2023     Long Term Goals: 6 weeks  1. Pt will be independent with updated HEP supplement PT in improving functional mobility. Met 6/30/2023  2. Pt will improve LLE strength to at least 80% of RLE strength as measured via MicroFet handheld dynamometer in order to improve functional mobility. Progressing towards  3. Pt will improve L knee AROM to at least 0 - 115 in order to improve gait and ability to perform ADLs. Met 6/30/2023  4. Pt will improve FOTO knee survey score to </= 35% limited in order to demo improved functional mobility.Met 6/30/2023  5. Pt will perform TUG in < 15 seconds without AD in order to demo improved gait speed. Met, 6/23/2023  6. Pt will perform at least 6 sit to stands without UE support on 30 second sit to stand test in order to demo improved ability to perform transfers. Met, 6/23/2023    Plan   DC today with updated home exercise program.     Luis Gallegos, PT, DPT, OCS [facility]   Discharge Note    Name: Bandar WOODY Manolo  Clinic Number: [id number]    Therapy Diagnosis:   Encounter Diagnoses   Name Primary?    Acute postoperative pain of left knee Yes    Decreased strength involving knee joint     Impaired functional mobility, balance, gait, and endurance      Physician: Stevenson Mcghee III, *    Physician Orders: PT Eval and Treat    Medical Diagnosis from Referral: M17.12 (ICD-10-CM) - Primary osteoarthritis of left knee  Evaluation Date: 5/17/2023  Date of Last visit: 6/30/2023  Total Visits Received: 16    ASSESSMENT      Discharge reason: Patient has completed the physician's prescription and Patient has met all of his/her goals  Discharge FOTO Score: see Media  Goals: see above    PLAN   This patient is discharged from Physical Therapy    Luis Gallegos, PT, DPT, OCS

## 2023-07-07 ENCOUNTER — PES CALL (OUTPATIENT)
Dept: ADMINISTRATIVE | Facility: CLINIC | Age: 73
End: 2023-07-07
Payer: MEDICARE

## 2023-07-19 ENCOUNTER — OFFICE VISIT (OUTPATIENT)
Dept: OPHTHALMOLOGY | Facility: CLINIC | Age: 73
End: 2023-07-19
Payer: MEDICARE

## 2023-07-19 DIAGNOSIS — H40.1430 PSEUDOEXFOLIATION (PXF) GLAUCOMA OF BOTH EYES: ICD-10-CM

## 2023-07-19 DIAGNOSIS — H02.402 PTOSIS OF LEFT EYELID: ICD-10-CM

## 2023-07-19 DIAGNOSIS — H25.813 COMBINED FORMS OF AGE-RELATED CATARACT OF BOTH EYES: ICD-10-CM

## 2023-07-19 DIAGNOSIS — H40.1132 PRIMARY OPEN ANGLE GLAUCOMA (POAG) OF BOTH EYES, MODERATE STAGE: Primary | ICD-10-CM

## 2023-07-19 DIAGNOSIS — H04.129 DRY EYE: ICD-10-CM

## 2023-07-19 PROCEDURE — 3044F HG A1C LEVEL LT 7.0%: CPT | Mod: HCNC,CPTII,S$GLB, | Performed by: STUDENT IN AN ORGANIZED HEALTH CARE EDUCATION/TRAINING PROGRAM

## 2023-07-19 PROCEDURE — 1159F PR MEDICATION LIST DOCUMENTED IN MEDICAL RECORD: ICD-10-PCS | Mod: HCNC,CPTII,S$GLB, | Performed by: STUDENT IN AN ORGANIZED HEALTH CARE EDUCATION/TRAINING PROGRAM

## 2023-07-19 PROCEDURE — 99999 PR PBB SHADOW E&M-EST. PATIENT-LVL III: CPT | Mod: PBBFAC,HCNC,, | Performed by: STUDENT IN AN ORGANIZED HEALTH CARE EDUCATION/TRAINING PROGRAM

## 2023-07-19 PROCEDURE — 2023F DILAT RTA XM W/O RTNOPTHY: CPT | Mod: HCNC,CPTII,S$GLB, | Performed by: STUDENT IN AN ORGANIZED HEALTH CARE EDUCATION/TRAINING PROGRAM

## 2023-07-19 PROCEDURE — 99999 PR PBB SHADOW E&M-EST. PATIENT-LVL III: ICD-10-PCS | Mod: PBBFAC,HCNC,, | Performed by: STUDENT IN AN ORGANIZED HEALTH CARE EDUCATION/TRAINING PROGRAM

## 2023-07-19 PROCEDURE — 3044F PR MOST RECENT HEMOGLOBIN A1C LEVEL <7.0%: ICD-10-PCS | Mod: HCNC,CPTII,S$GLB, | Performed by: STUDENT IN AN ORGANIZED HEALTH CARE EDUCATION/TRAINING PROGRAM

## 2023-07-19 PROCEDURE — 2023F PR DILATED RETINAL EXAM W/O EVID OF RETINOPATHY: ICD-10-PCS | Mod: HCNC,CPTII,S$GLB, | Performed by: STUDENT IN AN ORGANIZED HEALTH CARE EDUCATION/TRAINING PROGRAM

## 2023-07-19 PROCEDURE — 92133 POSTERIOR SEGMENT OCT OPTIC NERVE(OCULAR COHERENCE TOMOGRAPHY) - OU - BOTH EYES: ICD-10-PCS | Mod: HCNC,S$GLB,, | Performed by: STUDENT IN AN ORGANIZED HEALTH CARE EDUCATION/TRAINING PROGRAM

## 2023-07-19 PROCEDURE — 4010F PR ACE/ARB THEARPY RXD/TAKEN: ICD-10-PCS | Mod: HCNC,CPTII,S$GLB, | Performed by: STUDENT IN AN ORGANIZED HEALTH CARE EDUCATION/TRAINING PROGRAM

## 2023-07-19 PROCEDURE — 4010F ACE/ARB THERAPY RXD/TAKEN: CPT | Mod: HCNC,CPTII,S$GLB, | Performed by: STUDENT IN AN ORGANIZED HEALTH CARE EDUCATION/TRAINING PROGRAM

## 2023-07-19 PROCEDURE — 1126F PR PAIN SEVERITY QUANTIFIED, NO PAIN PRESENT: ICD-10-PCS | Mod: HCNC,CPTII,S$GLB, | Performed by: STUDENT IN AN ORGANIZED HEALTH CARE EDUCATION/TRAINING PROGRAM

## 2023-07-19 PROCEDURE — 3288F FALL RISK ASSESSMENT DOCD: CPT | Mod: HCNC,CPTII,S$GLB, | Performed by: STUDENT IN AN ORGANIZED HEALTH CARE EDUCATION/TRAINING PROGRAM

## 2023-07-19 PROCEDURE — 1126F AMNT PAIN NOTED NONE PRSNT: CPT | Mod: HCNC,CPTII,S$GLB, | Performed by: STUDENT IN AN ORGANIZED HEALTH CARE EDUCATION/TRAINING PROGRAM

## 2023-07-19 PROCEDURE — 3288F PR FALLS RISK ASSESSMENT DOCUMENTED: ICD-10-PCS | Mod: HCNC,CPTII,S$GLB, | Performed by: STUDENT IN AN ORGANIZED HEALTH CARE EDUCATION/TRAINING PROGRAM

## 2023-07-19 PROCEDURE — 92133 CPTRZD OPH DX IMG PST SGM ON: CPT | Mod: HCNC,S$GLB,, | Performed by: STUDENT IN AN ORGANIZED HEALTH CARE EDUCATION/TRAINING PROGRAM

## 2023-07-19 PROCEDURE — 99214 OFFICE O/P EST MOD 30 MIN: CPT | Mod: HCNC,S$GLB,, | Performed by: STUDENT IN AN ORGANIZED HEALTH CARE EDUCATION/TRAINING PROGRAM

## 2023-07-19 PROCEDURE — 1160F PR REVIEW ALL MEDS BY PRESCRIBER/CLIN PHARMACIST DOCUMENTED: ICD-10-PCS | Mod: HCNC,CPTII,S$GLB, | Performed by: STUDENT IN AN ORGANIZED HEALTH CARE EDUCATION/TRAINING PROGRAM

## 2023-07-19 PROCEDURE — 1159F MED LIST DOCD IN RCRD: CPT | Mod: HCNC,CPTII,S$GLB, | Performed by: STUDENT IN AN ORGANIZED HEALTH CARE EDUCATION/TRAINING PROGRAM

## 2023-07-19 PROCEDURE — 1160F RVW MEDS BY RX/DR IN RCRD: CPT | Mod: HCNC,CPTII,S$GLB, | Performed by: STUDENT IN AN ORGANIZED HEALTH CARE EDUCATION/TRAINING PROGRAM

## 2023-07-19 PROCEDURE — 1157F ADVNC CARE PLAN IN RCRD: CPT | Mod: HCNC,CPTII,S$GLB, | Performed by: STUDENT IN AN ORGANIZED HEALTH CARE EDUCATION/TRAINING PROGRAM

## 2023-07-19 PROCEDURE — 1157F PR ADVANCE CARE PLAN OR EQUIV PRESENT IN MEDICAL RECORD: ICD-10-PCS | Mod: HCNC,CPTII,S$GLB, | Performed by: STUDENT IN AN ORGANIZED HEALTH CARE EDUCATION/TRAINING PROGRAM

## 2023-07-19 PROCEDURE — 99214 PR OFFICE/OUTPT VISIT, EST, LEVL IV, 30-39 MIN: ICD-10-PCS | Mod: HCNC,S$GLB,, | Performed by: STUDENT IN AN ORGANIZED HEALTH CARE EDUCATION/TRAINING PROGRAM

## 2023-07-19 PROCEDURE — 1101F PR PT FALLS ASSESS DOC 0-1 FALLS W/OUT INJ PAST YR: ICD-10-PCS | Mod: HCNC,CPTII,S$GLB, | Performed by: STUDENT IN AN ORGANIZED HEALTH CARE EDUCATION/TRAINING PROGRAM

## 2023-07-19 PROCEDURE — 1101F PT FALLS ASSESS-DOCD LE1/YR: CPT | Mod: HCNC,CPTII,S$GLB, | Performed by: STUDENT IN AN ORGANIZED HEALTH CARE EDUCATION/TRAINING PROGRAM

## 2023-07-19 RX ORDER — DORZOLAMIDE HYDROCHLORIDE AND TIMOLOL MALEATE 20; 5 MG/ML; MG/ML
1 SOLUTION/ DROPS OPHTHALMIC 2 TIMES DAILY
Qty: 20 ML | Refills: 3 | Status: SHIPPED | OUTPATIENT
Start: 2023-07-19 | End: 2023-11-08 | Stop reason: SDUPTHER

## 2023-07-19 RX ORDER — LATANOPROST 50 UG/ML
SOLUTION/ DROPS OPHTHALMIC
Qty: 2.5 ML | Refills: 11 | Status: SHIPPED | OUTPATIENT
Start: 2023-07-19 | End: 2023-11-08 | Stop reason: SDUPTHER
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
## 2023-07-24 DIAGNOSIS — Z96.652 STATUS POST LEFT KNEE REPLACEMENT: ICD-10-CM

## 2023-07-24 RX ORDER — CELECOXIB 200 MG/1
CAPSULE ORAL
Qty: 30 CAPSULE | Refills: 0 | Status: SHIPPED | OUTPATIENT
Start: 2023-07-24

## 2023-07-28 ENCOUNTER — PATIENT MESSAGE (OUTPATIENT)
Dept: ADMINISTRATIVE | Facility: OTHER | Age: 73
End: 2023-07-28
Payer: MEDICARE

## 2023-07-29 ENCOUNTER — PATIENT MESSAGE (OUTPATIENT)
Dept: ADMINISTRATIVE | Facility: OTHER | Age: 73
End: 2023-07-29
Payer: MEDICARE

## 2023-08-08 ENCOUNTER — OFFICE VISIT (OUTPATIENT)
Dept: ORTHOPEDICS | Facility: CLINIC | Age: 73
End: 2023-08-08
Payer: MEDICARE

## 2023-08-08 VITALS — WEIGHT: 173.75 LBS | HEIGHT: 70 IN | BODY MASS INDEX: 24.87 KG/M2

## 2023-08-08 DIAGNOSIS — Z96.652 STATUS POST LEFT KNEE REPLACEMENT: Primary | ICD-10-CM

## 2023-08-08 PROCEDURE — 1159F PR MEDICATION LIST DOCUMENTED IN MEDICAL RECORD: ICD-10-PCS | Mod: HCNC,CPTII,S$GLB, | Performed by: ORTHOPAEDIC SURGERY

## 2023-08-08 PROCEDURE — 1157F PR ADVANCE CARE PLAN OR EQUIV PRESENT IN MEDICAL RECORD: ICD-10-PCS | Mod: HCNC,CPTII,S$GLB, | Performed by: ORTHOPAEDIC SURGERY

## 2023-08-08 PROCEDURE — 99024 POSTOP FOLLOW-UP VISIT: CPT | Mod: HCNC,S$GLB,, | Performed by: ORTHOPAEDIC SURGERY

## 2023-08-08 PROCEDURE — 1125F AMNT PAIN NOTED PAIN PRSNT: CPT | Mod: HCNC,CPTII,S$GLB, | Performed by: ORTHOPAEDIC SURGERY

## 2023-08-08 PROCEDURE — 3288F FALL RISK ASSESSMENT DOCD: CPT | Mod: HCNC,CPTII,S$GLB, | Performed by: ORTHOPAEDIC SURGERY

## 2023-08-08 PROCEDURE — 1125F PR PAIN SEVERITY QUANTIFIED, PAIN PRESENT: ICD-10-PCS | Mod: HCNC,CPTII,S$GLB, | Performed by: ORTHOPAEDIC SURGERY

## 2023-08-08 PROCEDURE — 3044F PR MOST RECENT HEMOGLOBIN A1C LEVEL <7.0%: ICD-10-PCS | Mod: HCNC,CPTII,S$GLB, | Performed by: ORTHOPAEDIC SURGERY

## 2023-08-08 PROCEDURE — 4010F ACE/ARB THERAPY RXD/TAKEN: CPT | Mod: HCNC,CPTII,S$GLB, | Performed by: ORTHOPAEDIC SURGERY

## 2023-08-08 PROCEDURE — 1101F PR PT FALLS ASSESS DOC 0-1 FALLS W/OUT INJ PAST YR: ICD-10-PCS | Mod: HCNC,CPTII,S$GLB, | Performed by: ORTHOPAEDIC SURGERY

## 2023-08-08 PROCEDURE — 99999 PR PBB SHADOW E&M-EST. PATIENT-LVL III: CPT | Mod: PBBFAC,HCNC,, | Performed by: ORTHOPAEDIC SURGERY

## 2023-08-08 PROCEDURE — 3288F PR FALLS RISK ASSESSMENT DOCUMENTED: ICD-10-PCS | Mod: HCNC,CPTII,S$GLB, | Performed by: ORTHOPAEDIC SURGERY

## 2023-08-08 PROCEDURE — 3072F LOW RISK FOR RETINOPATHY: CPT | Mod: HCNC,CPTII,S$GLB, | Performed by: ORTHOPAEDIC SURGERY

## 2023-08-08 PROCEDURE — 1157F ADVNC CARE PLAN IN RCRD: CPT | Mod: HCNC,CPTII,S$GLB, | Performed by: ORTHOPAEDIC SURGERY

## 2023-08-08 PROCEDURE — 1159F MED LIST DOCD IN RCRD: CPT | Mod: HCNC,CPTII,S$GLB, | Performed by: ORTHOPAEDIC SURGERY

## 2023-08-08 PROCEDURE — 3044F HG A1C LEVEL LT 7.0%: CPT | Mod: HCNC,CPTII,S$GLB, | Performed by: ORTHOPAEDIC SURGERY

## 2023-08-08 PROCEDURE — 99024 PR POST-OP FOLLOW-UP VISIT: ICD-10-PCS | Mod: HCNC,S$GLB,, | Performed by: ORTHOPAEDIC SURGERY

## 2023-08-08 PROCEDURE — 3008F PR BODY MASS INDEX (BMI) DOCUMENTED: ICD-10-PCS | Mod: HCNC,CPTII,S$GLB, | Performed by: ORTHOPAEDIC SURGERY

## 2023-08-08 PROCEDURE — 4010F PR ACE/ARB THEARPY RXD/TAKEN: ICD-10-PCS | Mod: HCNC,CPTII,S$GLB, | Performed by: ORTHOPAEDIC SURGERY

## 2023-08-08 PROCEDURE — 99999 PR PBB SHADOW E&M-EST. PATIENT-LVL III: ICD-10-PCS | Mod: PBBFAC,HCNC,, | Performed by: ORTHOPAEDIC SURGERY

## 2023-08-08 PROCEDURE — 1101F PT FALLS ASSESS-DOCD LE1/YR: CPT | Mod: HCNC,CPTII,S$GLB, | Performed by: ORTHOPAEDIC SURGERY

## 2023-08-08 PROCEDURE — 3008F BODY MASS INDEX DOCD: CPT | Mod: HCNC,CPTII,S$GLB, | Performed by: ORTHOPAEDIC SURGERY

## 2023-08-08 PROCEDURE — 3072F PR LOW RISK FOR RETINOPATHY: ICD-10-PCS | Mod: HCNC,CPTII,S$GLB, | Performed by: ORTHOPAEDIC SURGERY
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
## 2023-08-14 ENCOUNTER — PATIENT MESSAGE (OUTPATIENT)
Dept: ORTHOPEDICS | Facility: CLINIC | Age: 73
End: 2023-08-14
Payer: MEDICARE

## 2023-08-14 ENCOUNTER — PATIENT MESSAGE (OUTPATIENT)
Dept: ADMINISTRATIVE | Facility: OTHER | Age: 73
End: 2023-08-14
Payer: MEDICARE

## 2023-08-21 PROBLEM — G89.18 ACUTE POSTOPERATIVE PAIN OF LEFT KNEE: Status: RESOLVED | Noted: 2023-05-18 | Resolved: 2023-08-21

## 2023-08-21 PROBLEM — M25.562 ACUTE POSTOPERATIVE PAIN OF LEFT KNEE: Status: RESOLVED | Noted: 2023-05-18 | Resolved: 2023-08-21

## 2023-09-07 ENCOUNTER — PATIENT MESSAGE (OUTPATIENT)
Dept: OTHER | Facility: OTHER | Age: 73
End: 2023-09-07
Payer: MEDICARE

## 2023-09-26 ENCOUNTER — PATIENT MESSAGE (OUTPATIENT)
Dept: PRIMARY CARE CLINIC | Facility: CLINIC | Age: 73
End: 2023-09-26
Payer: MEDICARE

## 2023-09-28 ENCOUNTER — OFFICE VISIT (OUTPATIENT)
Dept: ORTHOPEDICS | Facility: CLINIC | Age: 73
End: 2023-09-28
Payer: MEDICARE

## 2023-09-28 ENCOUNTER — PATIENT MESSAGE (OUTPATIENT)
Dept: PRIMARY CARE CLINIC | Facility: CLINIC | Age: 73
End: 2023-09-28
Payer: MEDICARE

## 2023-09-28 DIAGNOSIS — M76.32 ILIOTIBIAL BAND SYNDROME AFFECTING LEFT LOWER LEG: ICD-10-CM

## 2023-09-28 DIAGNOSIS — Z96.652 STATUS POST LEFT KNEE REPLACEMENT: Primary | ICD-10-CM

## 2023-09-28 PROCEDURE — 1159F PR MEDICATION LIST DOCUMENTED IN MEDICAL RECORD: ICD-10-PCS | Mod: HCNC,CPTII,S$GLB, | Performed by: ORTHOPAEDIC SURGERY

## 2023-09-28 PROCEDURE — 4010F ACE/ARB THERAPY RXD/TAKEN: CPT | Mod: HCNC,CPTII,S$GLB, | Performed by: ORTHOPAEDIC SURGERY

## 2023-09-28 PROCEDURE — 3288F PR FALLS RISK ASSESSMENT DOCUMENTED: ICD-10-PCS | Mod: HCNC,CPTII,S$GLB, | Performed by: ORTHOPAEDIC SURGERY

## 2023-09-28 PROCEDURE — 1125F PR PAIN SEVERITY QUANTIFIED, PAIN PRESENT: ICD-10-PCS | Mod: HCNC,CPTII,S$GLB, | Performed by: ORTHOPAEDIC SURGERY

## 2023-09-28 PROCEDURE — 99213 PR OFFICE/OUTPT VISIT, EST, LEVL III, 20-29 MIN: ICD-10-PCS | Mod: HCNC,S$GLB,, | Performed by: ORTHOPAEDIC SURGERY

## 2023-09-28 PROCEDURE — 3288F FALL RISK ASSESSMENT DOCD: CPT | Mod: HCNC,CPTII,S$GLB, | Performed by: ORTHOPAEDIC SURGERY

## 2023-09-28 PROCEDURE — 4010F PR ACE/ARB THEARPY RXD/TAKEN: ICD-10-PCS | Mod: HCNC,CPTII,S$GLB, | Performed by: ORTHOPAEDIC SURGERY

## 2023-09-28 PROCEDURE — 3044F HG A1C LEVEL LT 7.0%: CPT | Mod: HCNC,CPTII,S$GLB, | Performed by: ORTHOPAEDIC SURGERY

## 2023-09-28 PROCEDURE — 1157F ADVNC CARE PLAN IN RCRD: CPT | Mod: HCNC,CPTII,S$GLB, | Performed by: ORTHOPAEDIC SURGERY

## 2023-09-28 PROCEDURE — 1125F AMNT PAIN NOTED PAIN PRSNT: CPT | Mod: HCNC,CPTII,S$GLB, | Performed by: ORTHOPAEDIC SURGERY

## 2023-09-28 PROCEDURE — 99999 PR PBB SHADOW E&M-EST. PATIENT-LVL III: CPT | Mod: PBBFAC,HCNC,, | Performed by: ORTHOPAEDIC SURGERY

## 2023-09-28 PROCEDURE — 99213 OFFICE O/P EST LOW 20 MIN: CPT | Mod: HCNC,S$GLB,, | Performed by: ORTHOPAEDIC SURGERY

## 2023-09-28 PROCEDURE — 1159F MED LIST DOCD IN RCRD: CPT | Mod: HCNC,CPTII,S$GLB, | Performed by: ORTHOPAEDIC SURGERY

## 2023-09-28 PROCEDURE — 1157F PR ADVANCE CARE PLAN OR EQUIV PRESENT IN MEDICAL RECORD: ICD-10-PCS | Mod: HCNC,CPTII,S$GLB, | Performed by: ORTHOPAEDIC SURGERY

## 2023-09-28 PROCEDURE — 99999 PR PBB SHADOW E&M-EST. PATIENT-LVL III: ICD-10-PCS | Mod: PBBFAC,HCNC,, | Performed by: ORTHOPAEDIC SURGERY

## 2023-09-28 PROCEDURE — 1101F PR PT FALLS ASSESS DOC 0-1 FALLS W/OUT INJ PAST YR: ICD-10-PCS | Mod: HCNC,CPTII,S$GLB, | Performed by: ORTHOPAEDIC SURGERY

## 2023-09-28 PROCEDURE — 3044F PR MOST RECENT HEMOGLOBIN A1C LEVEL <7.0%: ICD-10-PCS | Mod: HCNC,CPTII,S$GLB, | Performed by: ORTHOPAEDIC SURGERY

## 2023-09-28 PROCEDURE — 1101F PT FALLS ASSESS-DOCD LE1/YR: CPT | Mod: HCNC,CPTII,S$GLB, | Performed by: ORTHOPAEDIC SURGERY
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
## 2023-10-05 ENCOUNTER — LAB VISIT (OUTPATIENT)
Dept: LAB | Facility: HOSPITAL | Age: 73
End: 2023-10-05
Attending: INTERNAL MEDICINE
Payer: MEDICARE

## 2023-10-05 DIAGNOSIS — E11.36 TYPE 2 DIABETES MELLITUS WITH DIABETIC CATARACT, WITHOUT LONG-TERM CURRENT USE OF INSULIN: ICD-10-CM

## 2023-10-05 DIAGNOSIS — I70.0 AORTIC ATHEROSCLEROSIS: ICD-10-CM

## 2023-10-05 DIAGNOSIS — Z12.5 SCREENING FOR MALIGNANT NEOPLASM OF PROSTATE: ICD-10-CM

## 2023-10-05 DIAGNOSIS — E78.2 HYPERLIPIDEMIA, MIXED: Chronic | ICD-10-CM

## 2023-10-05 LAB
ALBUMIN SERPL BCP-MCNC: 4.2 G/DL (ref 3.5–5.2)
ALP SERPL-CCNC: 68 U/L (ref 55–135)
ALT SERPL W/O P-5'-P-CCNC: 21 U/L (ref 10–44)
ANION GAP SERPL CALC-SCNC: 6 MMOL/L (ref 8–16)
AST SERPL-CCNC: 17 U/L (ref 10–40)
BILIRUB SERPL-MCNC: 0.9 MG/DL (ref 0.1–1)
BUN SERPL-MCNC: 15 MG/DL (ref 8–23)
CALCIUM SERPL-MCNC: 9.1 MG/DL (ref 8.7–10.5)
CHLORIDE SERPL-SCNC: 106 MMOL/L (ref 95–110)
CHOLEST SERPL-MCNC: 124 MG/DL (ref 120–199)
CHOLEST/HDLC SERPL: 3.9 {RATIO} (ref 2–5)
CO2 SERPL-SCNC: 28 MMOL/L (ref 23–29)
COMPLEXED PSA SERPL-MCNC: 0.96 NG/ML (ref 0–4)
CREAT SERPL-MCNC: 0.8 MG/DL (ref 0.5–1.4)
EST. GFR  (NO RACE VARIABLE): >60 ML/MIN/1.73 M^2
ESTIMATED AVG GLUCOSE: 128 MG/DL (ref 68–131)
GLUCOSE SERPL-MCNC: 118 MG/DL (ref 70–110)
HBA1C MFR BLD: 6.1 % (ref 4–5.6)
HDLC SERPL-MCNC: 32 MG/DL (ref 40–75)
HDLC SERPL: 25.8 % (ref 20–50)
LDLC SERPL CALC-MCNC: 69.6 MG/DL (ref 63–159)
NONHDLC SERPL-MCNC: 92 MG/DL
POTASSIUM SERPL-SCNC: 4.9 MMOL/L (ref 3.5–5.1)
PROT SERPL-MCNC: 7 G/DL (ref 6–8.4)
SODIUM SERPL-SCNC: 140 MMOL/L (ref 136–145)
TRIGL SERPL-MCNC: 112 MG/DL (ref 30–150)

## 2023-10-05 PROCEDURE — 83036 HEMOGLOBIN GLYCOSYLATED A1C: CPT | Mod: HCNC | Performed by: INTERNAL MEDICINE

## 2023-10-05 PROCEDURE — 84153 ASSAY OF PSA TOTAL: CPT | Mod: HCNC | Performed by: INTERNAL MEDICINE

## 2023-10-05 PROCEDURE — 80061 LIPID PANEL: CPT | Mod: HCNC | Performed by: INTERNAL MEDICINE

## 2023-10-05 PROCEDURE — 80053 COMPREHEN METABOLIC PANEL: CPT | Mod: HCNC | Performed by: INTERNAL MEDICINE

## 2023-10-05 PROCEDURE — 36415 COLL VENOUS BLD VENIPUNCTURE: CPT | Mod: HCNC,PO | Performed by: INTERNAL MEDICINE

## 2023-10-11 ENCOUNTER — PATIENT MESSAGE (OUTPATIENT)
Dept: CARDIOLOGY | Facility: CLINIC | Age: 73
End: 2023-10-11
Payer: MEDICARE

## 2023-10-14 DIAGNOSIS — E78.2 HYPERLIPIDEMIA, MIXED: ICD-10-CM

## 2023-10-14 DIAGNOSIS — I10 ESSENTIAL HYPERTENSION: ICD-10-CM

## 2023-10-14 NOTE — TELEPHONE ENCOUNTER
No care due was identified.  [facility] Embedded Care Due Messages. Reference number: [id number].   10/14/2023 10:24:00 AM CDT

## 2023-10-15 RX ORDER — ROSUVASTATIN CALCIUM 40 MG/1
40 TABLET, COATED ORAL NIGHTLY
Qty: 90 TABLET | Refills: 3 | Status: SHIPPED | OUTPATIENT
Start: 2023-10-15

## 2023-10-15 RX ORDER — IRBESARTAN 300 MG/1
300 TABLET ORAL NIGHTLY
Qty: 90 TABLET | Refills: 2 | Status: SHIPPED | OUTPATIENT
Start: 2023-10-15

## 2023-10-15 NOTE — TELEPHONE ENCOUNTER
Refill Decision Note   Bandar Manolo  is requesting a refill authorization.  Brief Assessment and Rationale for Refill:  Approve     Medication Therapy Plan:         Comments:     Note composed:6:49 PM 10/15/2023

## 2023-10-20 ENCOUNTER — OFFICE VISIT (OUTPATIENT)
Dept: PRIMARY CARE CLINIC | Facility: CLINIC | Age: 73
End: 2023-10-20
Payer: MEDICARE

## 2023-10-20 VITALS
BODY MASS INDEX: 25.54 KG/M2 | DIASTOLIC BLOOD PRESSURE: 70 MMHG | HEIGHT: 70 IN | SYSTOLIC BLOOD PRESSURE: 110 MMHG | OXYGEN SATURATION: 98 % | WEIGHT: 178.38 LBS | HEART RATE: 56 BPM

## 2023-10-20 DIAGNOSIS — E78.2 HYPERLIPIDEMIA, MIXED: Chronic | ICD-10-CM

## 2023-10-20 DIAGNOSIS — H01.009 BLEPHARITIS, UNSPECIFIED LATERALITY, UNSPECIFIED TYPE: ICD-10-CM

## 2023-10-20 DIAGNOSIS — I11.9 HYPERTENSIVE HEART DISEASE WITHOUT HEART FAILURE: Primary | ICD-10-CM

## 2023-10-20 DIAGNOSIS — R73.03 PREDIABETES: ICD-10-CM

## 2023-10-20 DIAGNOSIS — I25.119 ATHEROSCLEROSIS OF NATIVE CORONARY ARTERY OF NATIVE HEART WITH ANGINA PECTORIS: ICD-10-CM

## 2023-10-20 PROCEDURE — 3074F PR MOST RECENT SYSTOLIC BLOOD PRESSURE < 130 MM HG: ICD-10-PCS | Mod: CPTII,S$GLB,, | Performed by: INTERNAL MEDICINE

## 2023-10-20 PROCEDURE — 99214 PR OFFICE/OUTPT VISIT, EST, LEVL IV, 30-39 MIN: ICD-10-PCS | Mod: S$GLB,,, | Performed by: INTERNAL MEDICINE

## 2023-10-20 PROCEDURE — 3008F PR BODY MASS INDEX (BMI) DOCUMENTED: ICD-10-PCS | Mod: CPTII,S$GLB,, | Performed by: INTERNAL MEDICINE

## 2023-10-20 PROCEDURE — 3078F DIAST BP <80 MM HG: CPT | Mod: CPTII,S$GLB,, | Performed by: INTERNAL MEDICINE

## 2023-10-20 PROCEDURE — 3044F PR MOST RECENT HEMOGLOBIN A1C LEVEL <7.0%: ICD-10-PCS | Mod: CPTII,S$GLB,, | Performed by: INTERNAL MEDICINE

## 2023-10-20 PROCEDURE — 4010F ACE/ARB THERAPY RXD/TAKEN: CPT | Mod: CPTII,S$GLB,, | Performed by: INTERNAL MEDICINE

## 2023-10-20 PROCEDURE — 99999 PR PBB SHADOW E&M-EST. PATIENT-LVL IV: CPT | Mod: PBBFAC,,, | Performed by: INTERNAL MEDICINE

## 2023-10-20 PROCEDURE — 3078F PR MOST RECENT DIASTOLIC BLOOD PRESSURE < 80 MM HG: ICD-10-PCS | Mod: CPTII,S$GLB,, | Performed by: INTERNAL MEDICINE

## 2023-10-20 PROCEDURE — 1126F PR PAIN SEVERITY QUANTIFIED, NO PAIN PRESENT: ICD-10-PCS | Mod: CPTII,S$GLB,, | Performed by: INTERNAL MEDICINE

## 2023-10-20 PROCEDURE — 99999 PR PBB SHADOW E&M-EST. PATIENT-LVL IV: ICD-10-PCS | Mod: PBBFAC,,, | Performed by: INTERNAL MEDICINE

## 2023-10-20 PROCEDURE — 1159F PR MEDICATION LIST DOCUMENTED IN MEDICAL RECORD: ICD-10-PCS | Mod: CPTII,S$GLB,, | Performed by: INTERNAL MEDICINE

## 2023-10-20 PROCEDURE — 3008F BODY MASS INDEX DOCD: CPT | Mod: CPTII,S$GLB,, | Performed by: INTERNAL MEDICINE

## 2023-10-20 PROCEDURE — 99214 OFFICE O/P EST MOD 30 MIN: CPT | Mod: S$GLB,,, | Performed by: INTERNAL MEDICINE

## 2023-10-20 PROCEDURE — 1157F PR ADVANCE CARE PLAN OR EQUIV PRESENT IN MEDICAL RECORD: ICD-10-PCS | Mod: CPTII,S$GLB,, | Performed by: INTERNAL MEDICINE

## 2023-10-20 PROCEDURE — 1159F MED LIST DOCD IN RCRD: CPT | Mod: CPTII,S$GLB,, | Performed by: INTERNAL MEDICINE

## 2023-10-20 PROCEDURE — 1126F AMNT PAIN NOTED NONE PRSNT: CPT | Mod: CPTII,S$GLB,, | Performed by: INTERNAL MEDICINE

## 2023-10-20 PROCEDURE — 3074F SYST BP LT 130 MM HG: CPT | Mod: CPTII,S$GLB,, | Performed by: INTERNAL MEDICINE

## 2023-10-20 PROCEDURE — 1157F ADVNC CARE PLAN IN RCRD: CPT | Mod: CPTII,S$GLB,, | Performed by: INTERNAL MEDICINE

## 2023-10-20 PROCEDURE — 4010F PR ACE/ARB THEARPY RXD/TAKEN: ICD-10-PCS | Mod: CPTII,S$GLB,, | Performed by: INTERNAL MEDICINE

## 2023-10-20 PROCEDURE — 3044F HG A1C LEVEL LT 7.0%: CPT | Mod: CPTII,S$GLB,, | Performed by: INTERNAL MEDICINE

## 2023-10-20 RX ORDER — DOXYCYCLINE HYCLATE 100 MG
100 TABLET ORAL 2 TIMES DAILY
Qty: 14 TABLET | Refills: 1 | Status: SHIPPED | OUTPATIENT
Start: 2023-10-20

## 2023-10-20 NOTE — PROGRESS NOTES
[facility] Note    Chief Complaint      Chief Complaint   Patient presents with    Diabetes     6 month        History of Present Illness      Bandar French is a 73 y.o. male with chronic conditions of CAD, HTN, HLD, DM2, osteoarthritis who presents today for: follow up chronic conditions.  DM2: A1C 6.1. Home monitoring has been at goal. Diet controlled.  Eye exam UTD with Dr. Shannon.  HTN: BP at goal on irbesartan, amlodipine.   CAD: Sees Dr. Tompkins, cardiology  On ASA, plavix, irbesartan, crestor.  S/P PCI x 2 1/2022.  Denies CP, SOB.    HLD: Controlled on crestor.  LDL 69.     Flu shot due. Prevnar 2015.  Pneumovax UTD.  Zostavax 2013.  Shingrix UTD.  COVID UTD.  Cscope Dr. Copeland, 2016, no polyps, 10 yr interval.     Past Medical History:  Past Medical History:   Diagnosis Date    Arthritis     Cataract     Coronary artery disease     Depression     Diabetes mellitus     Glaucoma     Hyperlipidemia     Hypertension     Retinal tear of left eye 2019       Past Surgical History:   has a past surgical history that includes Knee arthroscopy (Left, 1967); Knee arthroscopy (Right, 2012); Hand surgery (Right, 12/2004); Coronary stent placement (2014); Tonsillectomy; Total knee arthroplasty (Right, 07/22/2019); Adenoidectomy; Cosmetic surgery (Bilateral, 06/2019); Eye surgery (Left, 03/2019); Joint replacement (Right, 07/22/2019); Left heart catheterization (Left, 01/20/2022); Coronary angiography (N/A, 01/20/2022); and arthroplasty, knee, total, using computer-assisted navigation (Left, 5/15/2023).    Family History:  family history includes Aneurysm in his father; Dementia in his mother; Diabetes in his mother; Glaucoma in his brother; Heart disease in his brother; Migraines in his sister; No Known Problems in his daughter, son, and son; Other in his brother and son; Pneumonia in his brother.     Social History:  Social History     Tobacco Use    Smoking status: Former     Current packs/day: 0.00      Average packs/day: 2.0 packs/day for 20.0 years (40.0 ttl pk-yrs)     Types: Cigarettes, Cigars     Start date: 1968     Quit date: 1986     Years since quittin.8    Smokeless tobacco: Never   Substance Use Topics    Alcohol use: Not Currently     Comment: Discontinued     Drug use: Never       I personally reviewed all past medical, surgical, social and family history.    Review of Systems   Constitutional:  Negative for chills, fever and malaise/fatigue.   Respiratory:  Negative for shortness of breath.    Cardiovascular:  Negative for chest pain.   Gastrointestinal:  Negative for constipation, diarrhea, nausea and vomiting.   Skin:  Negative for rash.   Neurological:  Negative for weakness.   All other systems reviewed and are negative.       Medications:  Outpatient Encounter Medications as of 10/20/2023   Medication Sig Note Dispense Refill    acetaminophen (TYLENOL) 650 MG TbSR Take 1 tablet (650 mg total) by mouth every 8 (eight) hours.  120 tablet 0    amLODIPine (NORVASC) 5 MG tablet Take 1 tablet (5 mg total) by mouth once daily. 2023: Take AM of surgery. 90 tablet 3    ascorbic acid, vitamin C, (VITAMIN C) 500 MG tablet Take 500 mg by mouth once daily. 2023: Hold 7 days prior to surgery.      aspirin (ECOTRIN) 81 MG EC tablet Aspirin 5/15/2023: Resume 7 days after surgery      blood sugar diagnostic (BLOOD GLUCOSE TEST) Strp 1 each by Misc.(Non-Drug; Combo Route) route once daily.  30 each 11    celecoxib (CELEBREX) 200 MG capsule TAKE 1 CAPSULE(200 MG) BY MOUTH EVERY DAY  30 capsule 0    chlorpheniramine (CHLOR-TRIMETON) 4 mg tablet Take 4 mg by mouth as needed.  2023: Take as needed.       cholecalciferol, vitamin D3, 125 mcg (5,000 unit) Tab Take 5,000 Units by mouth once daily. 2023: Hold 7 days prior to surgery.      clopidogreL (PLAVIX) 75 mg tablet Take 1 tablet (75 mg total) by mouth once daily. 5/15/2023: Resume 7 days after surgery 90 tablet 3     dorzolamide-timolol 2-0.5% (COSOPT) 22.3-6.8 mg/mL ophthalmic solution Place 1 drop into both eyes 2 (two) times daily.  20 mL 3    folic acid/multivit-min/lutein (CENTRUM SILVER ORAL) Take 1 tablet by mouth once daily. 4/27/2023: Hold 7 days prior to surgery.      irbesartan (AVAPRO) 300 MG tablet TAKE 1 TABLET (300 MG TOTAL) BY MOUTH EVERY EVENING.  90 tablet 2    latanoprost 0.005 % ophthalmic solution INSTILL 1 DROP INTO BOTH EYES EVERY EVENING.  2.5 mL 11    magnesium oxide (MAG-OX) 400 mg (241.3 mg magnesium) tablet Take 250 mg by mouth once daily. 4/27/2023: Hold AM of surgery.      methylPREDNISolone (MEDROL DOSEPACK) 4 mg tablet use as directed  1 each 0    MICRO THIN LANCETS 33 gauge Misc        nitroGLYCERIN (NITROSTAT) 0.4 MG SL tablet Place 1 tablet (0.4 mg total) under the tongue every 5 (five) minutes as needed for Chest pain. 4/27/2023: Take as needed 25 tablet 11    rosuvastatin (CRESTOR) 40 MG Tab TAKE 1 TABLET EVERY EVENING  90 tablet 3    vit C-E-zinc ox-dayron-lut-zeax (ICAPS AREDS2) 250 mg-200 unit -12.5 mg-1 mg Cap Take 2 capsules by mouth once daily. 4/27/2023: Hold 7 days prior to surgery.      [DISCONTINUED] blood-glucose meter,continuous (DEXCOM G6 ) Misc Use as directed  1 each 11    [DISCONTINUED] blood-glucose sensor (DEXCOM G6 SENSOR) Joanne Use as directed  10 each 11    [DISCONTINUED] blood-glucose transmitter (DEXCOM G6 TRANSMITTER) Joanne Use as directed  1 each 3    [DISCONTINUED] irbesartan (AVAPRO) 300 MG tablet TAKE 1 TABLET (300 MG TOTAL) BY MOUTH EVERY EVENING. 4/27/2023: Hold AM of surgery. 90 tablet 2    [DISCONTINUED] rosuvastatin (CRESTOR) 40 MG Tab Take 1 tablet (40 mg total) by mouth every evening. 4/27/2023: Take PM before surgery. 90 tablet 3     No facility-administered encounter medications on file as of 10/20/2023.       Allergies:  Review of patient's allergies indicates:  No Known Allergies    Health Maintenance:  Immunization History   Administered Date(s)  "Administered    COVID-19, MRNA, LN-S, PF (MODERNA FULL 0.5 ML DOSE) 03/10/2021, 03/10/2021, 04/12/2021    Influenza (FLUAD) - Quadrivalent - Adjuvanted - PF *Preferred* (65+) 09/16/2020    Influenza (FLUAD) - Trivalent - Adjuvanted - PF (65+) 01/03/2019    Influenza - High Dose - PF (65 years and older) 11/23/2015, 11/18/2016, 10/04/2017, 09/25/2019, 12/02/2022    Influenza - Quadrivalent - High Dose - PF (65 years and older) 10/21/2021    Pneumococcal Conjugate - 13 Valent 11/23/2015    Pneumococcal Polysaccharide - 23 Valent 09/25/2019    Tdap 10/21/2021    Zoster 01/02/2013    Zoster Recombinant 10/14/2020, 05/10/2021      Health Maintenance   Topic Date Due    Foot Exam  10/06/2023    Hemoglobin A1c  04/05/2024    Eye Exam  07/19/2024    Aspirin/Antiplatelet Therapy  09/28/2024    Lipid Panel  10/05/2024    High Dose Statin  10/15/2024    Colorectal Cancer Screening  09/09/2026    TETANUS VACCINE  10/21/2031    Hepatitis C Screening  Completed    Shingles Vaccine  Completed    Abdominal Aortic Aneurysm Screening  Completed        Physical Exam      Vital Signs  Pulse: (!) 56  SpO2: 98 %  BP: 110/70  BP Location: Right arm  Patient Position: Sitting  Pain Score: 0-No pain  Height and Weight  Height: 5' 10" (177.8 cm)  Weight: 80.9 kg (178 lb 5.6 oz)  BSA (Calculated - sq m): 2 sq meters  BMI (Calculated): 25.6  Weight in (lb) to have BMI = 25: 173.9]    Physical Exam  Vitals reviewed.   Constitutional:       Appearance: He is well-developed.   HENT:      Head: Normocephalic and atraumatic.      Right Ear: External ear normal.      Left Ear: External ear normal.   Cardiovascular:      Rate and Rhythm: Normal rate and regular rhythm.      Heart sounds: Normal heart sounds. No murmur heard.  Pulmonary:      Effort: Pulmonary effort is normal.      Breath sounds: Normal breath sounds. No wheezing or rales.   Abdominal:      General: Bowel sounds are normal.      Palpations: Abdomen is soft.      "     Laboratory:  CBC:  Recent Labs   Lab 03/13/22  0351 04/27/23  1357 05/19/23  1004   WBC 8.37 8.13 12.91 H   RBC 4.54 L 4.28 L 3.19 L   Hemoglobin 13.6 L 12.8 L 9.6 L   Hematocrit 39.5 L 37.5 L 28.0 L   Platelets 184 179 153   MCV 87 88 88   MCH 30.0 29.9 30.1   MCHC 34.4 34.1 34.3     CMP:  Recent Labs   Lab 03/27/23  0719 05/19/23  0801 10/05/23  0726   Glucose 132 H 158 H 118 H   Calcium 8.8 8.7 9.1   Albumin 3.9 3.8 4.2   Total Protein 6.5 6.6 7.0   Sodium 143 141 140   Potassium 4.4 4.6 4.9   CO2 27 24 28   Chloride 110 110 106   BUN 15 15 15   Alkaline Phosphatase 59 62 68   ALT 22 18 21   AST 18 20 17   Total Bilirubin 0.6 0.9 0.9     URINALYSIS:  Recent Labs   Lab 05/19/23  1013   Color, UA Yellow   Specific Gravity, UA 1.020   pH, UA 7.0   Protein, UA Trace A   Nitrite, UA Negative   Leukocytes, UA Negative   Urobilinogen, UA Negative      LIPIDS:  Recent Labs   Lab 09/26/22  0744 03/27/23  0719 10/05/23  0726   HDL 25 L 33 L 32 L   Cholesterol 103 L 102 L 124   Triglycerides 143 85 112   LDL Cholesterol 49.4 L 52.0 L 69.6   HDL/Cholesterol Ratio 24.3 32.4 25.8   Non-HDL Cholesterol 78 69 92   Total Cholesterol/HDL Ratio 4.1 3.1 3.9     TSH:      A1C:  Recent Labs   Lab 02/26/21  0954 09/14/21  0752 03/16/22  1000 09/26/22  0744 03/27/23  0719 10/05/23  0726   Hemoglobin A1C 5.8 H 6.2 H 6.3 H 5.8 H  5.8 H 6.1 H 6.1 H       Assessment/Plan     Bandar French is a 73 y.o.male with:    1. Hypertensive heart disease without heart failure  Continue current meds.    2. Atherosclerosis of native coronary artery of native heart with angina pectoris  Continue current meds.  F/U with cardiology.   3. Hyperlipidemia, mixed  Continue current meds.    4. Prediabetes  Counseled on diet and exercise  5. Blepharitis, unspecified laterality, unspecified type  Sending in doxycycline which was prescribed by ophtho for this situation previously.  F/U with eye lid specialist if not resolving.    Chronic conditions status  updated as per HPI.  Other than changes above, cont current medications and maintain follow up with specialists.  Follow up in about 6 months (around 4/20/2024) for Follow up visit.    Future Appointments   Date Time Provider Department Center   10/25/2023  7:45 AM VELEZ, VISUAL FIELDS [facility] OPHTHAL [facility]   10/25/2023  8:30 AM Heather You MD [facility]   12/21/2023  9:20 AM Stevenson Mcghee III, MD [facility] ORTHO [facility]   4/23/2024 10:45 AM Gunnar Rangel MD [facility]       Gnunar Rangel MD  [facility]

## 2023-10-25 ENCOUNTER — CLINICAL SUPPORT (OUTPATIENT)
Dept: OPHTHALMOLOGY | Facility: CLINIC | Age: 73
End: 2023-10-25
Payer: MEDICARE

## 2023-10-25 ENCOUNTER — OFFICE VISIT (OUTPATIENT)
Dept: OPHTHALMOLOGY | Facility: CLINIC | Age: 73
End: 2023-10-25
Payer: MEDICARE

## 2023-10-25 DIAGNOSIS — Z97.3 WEARS CONTACT LENSES: ICD-10-CM

## 2023-10-25 DIAGNOSIS — H40.1430 PSEUDOEXFOLIATION (PXF) GLAUCOMA OF BOTH EYES: ICD-10-CM

## 2023-10-25 DIAGNOSIS — M35.01 KERATOCONJUNCTIVITIS SICCA: ICD-10-CM

## 2023-10-25 DIAGNOSIS — H25.813 COMBINED FORMS OF AGE-RELATED CATARACT OF BOTH EYES: ICD-10-CM

## 2023-10-25 DIAGNOSIS — H02.883 MEIBOMIAN GLAND DYSFUNCTION (MGD) OF BOTH EYES: ICD-10-CM

## 2023-10-25 DIAGNOSIS — H04.129 DRY EYE: Primary | ICD-10-CM

## 2023-10-25 DIAGNOSIS — H02.402 PTOSIS OF LEFT EYELID: ICD-10-CM

## 2023-10-25 DIAGNOSIS — H02.886 MEIBOMIAN GLAND DYSFUNCTION (MGD) OF BOTH EYES: ICD-10-CM

## 2023-10-25 DIAGNOSIS — H40.1132 PRIMARY OPEN ANGLE GLAUCOMA (POAG) OF BOTH EYES, MODERATE STAGE: ICD-10-CM

## 2023-10-25 PROCEDURE — 4010F ACE/ARB THERAPY RXD/TAKEN: CPT | Mod: HCNC,CPTII,S$GLB, | Performed by: STUDENT IN AN ORGANIZED HEALTH CARE EDUCATION/TRAINING PROGRAM

## 2023-10-25 PROCEDURE — 3288F PR FALLS RISK ASSESSMENT DOCUMENTED: ICD-10-PCS | Mod: HCNC,CPTII,S$GLB, | Performed by: STUDENT IN AN ORGANIZED HEALTH CARE EDUCATION/TRAINING PROGRAM

## 2023-10-25 PROCEDURE — 1160F RVW MEDS BY RX/DR IN RCRD: CPT | Mod: HCNC,CPTII,S$GLB, | Performed by: STUDENT IN AN ORGANIZED HEALTH CARE EDUCATION/TRAINING PROGRAM

## 2023-10-25 PROCEDURE — 1101F PT FALLS ASSESS-DOCD LE1/YR: CPT | Mod: HCNC,CPTII,S$GLB, | Performed by: STUDENT IN AN ORGANIZED HEALTH CARE EDUCATION/TRAINING PROGRAM

## 2023-10-25 PROCEDURE — 3288F FALL RISK ASSESSMENT DOCD: CPT | Mod: HCNC,CPTII,S$GLB, | Performed by: STUDENT IN AN ORGANIZED HEALTH CARE EDUCATION/TRAINING PROGRAM

## 2023-10-25 PROCEDURE — 99214 PR OFFICE/OUTPT VISIT, EST, LEVL IV, 30-39 MIN: ICD-10-PCS | Mod: HCNC,S$GLB,, | Performed by: STUDENT IN AN ORGANIZED HEALTH CARE EDUCATION/TRAINING PROGRAM

## 2023-10-25 PROCEDURE — 1159F PR MEDICATION LIST DOCUMENTED IN MEDICAL RECORD: ICD-10-PCS | Mod: HCNC,CPTII,S$GLB, | Performed by: STUDENT IN AN ORGANIZED HEALTH CARE EDUCATION/TRAINING PROGRAM

## 2023-10-25 PROCEDURE — 3044F HG A1C LEVEL LT 7.0%: CPT | Mod: HCNC,CPTII,S$GLB, | Performed by: STUDENT IN AN ORGANIZED HEALTH CARE EDUCATION/TRAINING PROGRAM

## 2023-10-25 PROCEDURE — 92083 EXTENDED VISUAL FIELD XM: CPT | Mod: HCNC,S$GLB,, | Performed by: STUDENT IN AN ORGANIZED HEALTH CARE EDUCATION/TRAINING PROGRAM

## 2023-10-25 PROCEDURE — 92083 HUMPHREY VISUAL FIELD - OU - BOTH EYES: ICD-10-PCS | Mod: HCNC,S$GLB,, | Performed by: STUDENT IN AN ORGANIZED HEALTH CARE EDUCATION/TRAINING PROGRAM

## 2023-10-25 PROCEDURE — 1157F PR ADVANCE CARE PLAN OR EQUIV PRESENT IN MEDICAL RECORD: ICD-10-PCS | Mod: HCNC,CPTII,S$GLB, | Performed by: STUDENT IN AN ORGANIZED HEALTH CARE EDUCATION/TRAINING PROGRAM

## 2023-10-25 PROCEDURE — 1126F PR PAIN SEVERITY QUANTIFIED, NO PAIN PRESENT: ICD-10-PCS | Mod: HCNC,CPTII,S$GLB, | Performed by: STUDENT IN AN ORGANIZED HEALTH CARE EDUCATION/TRAINING PROGRAM

## 2023-10-25 PROCEDURE — 3044F PR MOST RECENT HEMOGLOBIN A1C LEVEL <7.0%: ICD-10-PCS | Mod: HCNC,CPTII,S$GLB, | Performed by: STUDENT IN AN ORGANIZED HEALTH CARE EDUCATION/TRAINING PROGRAM

## 2023-10-25 PROCEDURE — 1157F ADVNC CARE PLAN IN RCRD: CPT | Mod: HCNC,CPTII,S$GLB, | Performed by: STUDENT IN AN ORGANIZED HEALTH CARE EDUCATION/TRAINING PROGRAM

## 2023-10-25 PROCEDURE — 99999 PR PBB SHADOW E&M-EST. PATIENT-LVL III: CPT | Mod: PBBFAC,HCNC,, | Performed by: STUDENT IN AN ORGANIZED HEALTH CARE EDUCATION/TRAINING PROGRAM

## 2023-10-25 PROCEDURE — 1101F PR PT FALLS ASSESS DOC 0-1 FALLS W/OUT INJ PAST YR: ICD-10-PCS | Mod: HCNC,CPTII,S$GLB, | Performed by: STUDENT IN AN ORGANIZED HEALTH CARE EDUCATION/TRAINING PROGRAM

## 2023-10-25 PROCEDURE — 1160F PR REVIEW ALL MEDS BY PRESCRIBER/CLIN PHARMACIST DOCUMENTED: ICD-10-PCS | Mod: HCNC,CPTII,S$GLB, | Performed by: STUDENT IN AN ORGANIZED HEALTH CARE EDUCATION/TRAINING PROGRAM

## 2023-10-25 PROCEDURE — 4010F PR ACE/ARB THEARPY RXD/TAKEN: ICD-10-PCS | Mod: HCNC,CPTII,S$GLB, | Performed by: STUDENT IN AN ORGANIZED HEALTH CARE EDUCATION/TRAINING PROGRAM

## 2023-10-25 PROCEDURE — 1159F MED LIST DOCD IN RCRD: CPT | Mod: HCNC,CPTII,S$GLB, | Performed by: STUDENT IN AN ORGANIZED HEALTH CARE EDUCATION/TRAINING PROGRAM

## 2023-10-25 PROCEDURE — 99999 PR PBB SHADOW E&M-EST. PATIENT-LVL III: ICD-10-PCS | Mod: PBBFAC,HCNC,, | Performed by: STUDENT IN AN ORGANIZED HEALTH CARE EDUCATION/TRAINING PROGRAM

## 2023-10-25 PROCEDURE — 99214 OFFICE O/P EST MOD 30 MIN: CPT | Mod: HCNC,S$GLB,, | Performed by: STUDENT IN AN ORGANIZED HEALTH CARE EDUCATION/TRAINING PROGRAM

## 2023-10-25 PROCEDURE — 1126F AMNT PAIN NOTED NONE PRSNT: CPT | Mod: HCNC,CPTII,S$GLB, | Performed by: STUDENT IN AN ORGANIZED HEALTH CARE EDUCATION/TRAINING PROGRAM

## 2023-10-25 RX ORDER — NEOMYCIN SULFATE, POLYMYXIN B SULFATE, AND DEXAMETHASONE 3.5; 10000; 1 MG/G; [USP'U]/G; MG/G
OINTMENT OPHTHALMIC 2 TIMES DAILY
Qty: 3.5 G | Refills: 1 | Status: SHIPPED | OUTPATIENT
Start: 2023-10-25 | End: 2023-11-04

## 2023-10-25 RX ORDER — CYCLOSPORINE 0.5 MG/ML
1 EMULSION OPHTHALMIC EVERY 12 HOURS
Qty: 5.5 ML | Refills: 12 | Status: SHIPPED | OUTPATIENT
Start: 2023-10-25 | End: 2023-11-09 | Stop reason: SDUPTHER

## 2023-10-25 NOTE — PROGRESS NOTES
HVF rel/fix/coop good OU/chart checked for latex allergy/ -.75 + 1.50 x 002 OD 0 + 1.75 x 180 - BJ
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
## 2023-10-29 ENCOUNTER — PATIENT MESSAGE (OUTPATIENT)
Dept: OPHTHALMOLOGY | Facility: CLINIC | Age: 73
End: 2023-10-29
Payer: MEDICARE

## 2023-11-06 ENCOUNTER — PATIENT MESSAGE (OUTPATIENT)
Dept: PRIMARY CARE CLINIC | Facility: CLINIC | Age: 73
End: 2023-11-06
Payer: MEDICARE

## 2023-11-06 ENCOUNTER — PATIENT MESSAGE (OUTPATIENT)
Dept: OPHTHALMOLOGY | Facility: CLINIC | Age: 73
End: 2023-11-06
Payer: MEDICARE

## 2023-11-08 DIAGNOSIS — H40.1132 PRIMARY OPEN ANGLE GLAUCOMA (POAG) OF BOTH EYES, MODERATE STAGE: ICD-10-CM

## 2023-11-09 DIAGNOSIS — H02.886 MEIBOMIAN GLAND DYSFUNCTION (MGD) OF BOTH EYES: ICD-10-CM

## 2023-11-09 DIAGNOSIS — M35.01 KERATOCONJUNCTIVITIS SICCA: ICD-10-CM

## 2023-11-09 DIAGNOSIS — H04.129 DRY EYE: ICD-10-CM

## 2023-11-09 DIAGNOSIS — H40.1132 PRIMARY OPEN ANGLE GLAUCOMA (POAG) OF BOTH EYES, MODERATE STAGE: ICD-10-CM

## 2023-11-09 DIAGNOSIS — H02.883 MEIBOMIAN GLAND DYSFUNCTION (MGD) OF BOTH EYES: ICD-10-CM

## 2023-11-09 RX ORDER — LATANOPROST 50 UG/ML
1 SOLUTION/ DROPS OPHTHALMIC NIGHTLY
Qty: 7.5 ML | Refills: 4 | Status: SHIPPED | OUTPATIENT
Start: 2023-11-09 | End: 2023-11-09 | Stop reason: SDUPTHER

## 2023-11-09 RX ORDER — DORZOLAMIDE HYDROCHLORIDE AND TIMOLOL MALEATE 20; 5 MG/ML; MG/ML
1 SOLUTION/ DROPS OPHTHALMIC 2 TIMES DAILY
Qty: 30 ML | Refills: 4 | Status: SHIPPED | OUTPATIENT
Start: 2023-11-09 | End: 2023-11-09 | Stop reason: SDUPTHER

## 2023-11-10 RX ORDER — LATANOPROST 50 UG/ML
1 SOLUTION/ DROPS OPHTHALMIC NIGHTLY
Qty: 7.5 ML | Refills: 4 | Status: SHIPPED | OUTPATIENT
Start: 2023-11-10

## 2023-11-10 RX ORDER — DORZOLAMIDE HYDROCHLORIDE AND TIMOLOL MALEATE 20; 5 MG/ML; MG/ML
1 SOLUTION/ DROPS OPHTHALMIC 2 TIMES DAILY
Qty: 30 ML | Refills: 4 | Status: SHIPPED | OUTPATIENT
Start: 2023-11-10

## 2023-11-10 RX ORDER — CYCLOSPORINE 0.5 MG/ML
1 EMULSION OPHTHALMIC EVERY 12 HOURS
Qty: 165 ML | Refills: 4 | Status: SHIPPED | OUTPATIENT
Start: 2023-11-10 | End: 2024-11-09

## 2023-12-01 RX ORDER — AMLODIPINE BESYLATE 5 MG/1
TABLET ORAL
Qty: 90 TABLET | Refills: 3 | Status: SHIPPED | OUTPATIENT
Start: 2023-12-01

## 2023-12-01 NOTE — TELEPHONE ENCOUNTER
Refill Routing Note   Medication(s) are not appropriate for processing by [facility] for the following reason(s):        No active prescription written by provider    [facility] action(s):  Defer               Appointments  past 12m or future 3m with PCP    Date Provider   Last Visit   10/20/2023 Gunnar Rangel MD   Next Visit   4/23/2024 Gunnar Rangel MD   ED visits in past 90 days: 0        Note composed:5:41 AM 12/01/2023

## 2023-12-01 NOTE — TELEPHONE ENCOUNTER
No care due was identified.  [facility] Embedded Care Due Messages. Reference number: [id number].   11/30/2023 11:55:05 PM CST

## 2023-12-11 ENCOUNTER — PATIENT MESSAGE (OUTPATIENT)
Dept: ADMINISTRATIVE | Facility: HOSPITAL | Age: 73
End: 2023-12-11
Payer: MEDICARE

## 2023-12-21 ENCOUNTER — OFFICE VISIT (OUTPATIENT)
Dept: ORTHOPEDICS | Facility: CLINIC | Age: 73
End: 2023-12-21
Payer: MEDICARE

## 2023-12-21 VITALS — WEIGHT: 184 LBS | HEIGHT: 70 IN | BODY MASS INDEX: 26.34 KG/M2

## 2023-12-21 DIAGNOSIS — Z96.652 STATUS POST LEFT KNEE REPLACEMENT: Primary | ICD-10-CM

## 2023-12-21 PROCEDURE — 99999 PR PBB SHADOW E&M-EST. PATIENT-LVL III: CPT | Mod: PBBFAC,,, | Performed by: ORTHOPAEDIC SURGERY

## 2023-12-21 PROCEDURE — 1125F PR PAIN SEVERITY QUANTIFIED, PAIN PRESENT: ICD-10-PCS | Mod: CPTII,S$GLB,, | Performed by: ORTHOPAEDIC SURGERY

## 2023-12-21 PROCEDURE — 99212 OFFICE O/P EST SF 10 MIN: CPT | Mod: S$GLB,,, | Performed by: ORTHOPAEDIC SURGERY

## 2023-12-21 PROCEDURE — 3044F HG A1C LEVEL LT 7.0%: CPT | Mod: CPTII,S$GLB,, | Performed by: ORTHOPAEDIC SURGERY

## 2023-12-21 PROCEDURE — 4010F PR ACE/ARB THEARPY RXD/TAKEN: ICD-10-PCS | Mod: CPTII,S$GLB,, | Performed by: ORTHOPAEDIC SURGERY

## 2023-12-21 PROCEDURE — 3008F PR BODY MASS INDEX (BMI) DOCUMENTED: ICD-10-PCS | Mod: CPTII,S$GLB,, | Performed by: ORTHOPAEDIC SURGERY

## 2023-12-21 PROCEDURE — 3008F BODY MASS INDEX DOCD: CPT | Mod: CPTII,S$GLB,, | Performed by: ORTHOPAEDIC SURGERY

## 2023-12-21 PROCEDURE — 1157F ADVNC CARE PLAN IN RCRD: CPT | Mod: CPTII,S$GLB,, | Performed by: ORTHOPAEDIC SURGERY

## 2023-12-21 PROCEDURE — 1101F PR PT FALLS ASSESS DOC 0-1 FALLS W/OUT INJ PAST YR: ICD-10-PCS | Mod: CPTII,S$GLB,, | Performed by: ORTHOPAEDIC SURGERY

## 2023-12-21 PROCEDURE — 3288F PR FALLS RISK ASSESSMENT DOCUMENTED: ICD-10-PCS | Mod: CPTII,S$GLB,, | Performed by: ORTHOPAEDIC SURGERY

## 2023-12-21 PROCEDURE — 1125F AMNT PAIN NOTED PAIN PRSNT: CPT | Mod: CPTII,S$GLB,, | Performed by: ORTHOPAEDIC SURGERY

## 2023-12-21 PROCEDURE — 1159F PR MEDICATION LIST DOCUMENTED IN MEDICAL RECORD: ICD-10-PCS | Mod: CPTII,S$GLB,, | Performed by: ORTHOPAEDIC SURGERY

## 2023-12-21 PROCEDURE — 1157F PR ADVANCE CARE PLAN OR EQUIV PRESENT IN MEDICAL RECORD: ICD-10-PCS | Mod: CPTII,S$GLB,, | Performed by: ORTHOPAEDIC SURGERY

## 2023-12-21 PROCEDURE — 99212 PR OFFICE/OUTPT VISIT, EST, LEVL II, 10-19 MIN: ICD-10-PCS | Mod: S$GLB,,, | Performed by: ORTHOPAEDIC SURGERY

## 2023-12-21 PROCEDURE — 3044F PR MOST RECENT HEMOGLOBIN A1C LEVEL <7.0%: ICD-10-PCS | Mod: CPTII,S$GLB,, | Performed by: ORTHOPAEDIC SURGERY

## 2023-12-21 PROCEDURE — 99999 PR PBB SHADOW E&M-EST. PATIENT-LVL III: ICD-10-PCS | Mod: PBBFAC,,, | Performed by: ORTHOPAEDIC SURGERY

## 2023-12-21 PROCEDURE — 1101F PT FALLS ASSESS-DOCD LE1/YR: CPT | Mod: CPTII,S$GLB,, | Performed by: ORTHOPAEDIC SURGERY

## 2023-12-21 PROCEDURE — 3288F FALL RISK ASSESSMENT DOCD: CPT | Mod: CPTII,S$GLB,, | Performed by: ORTHOPAEDIC SURGERY

## 2023-12-21 PROCEDURE — 4010F ACE/ARB THERAPY RXD/TAKEN: CPT | Mod: CPTII,S$GLB,, | Performed by: ORTHOPAEDIC SURGERY

## 2023-12-21 PROCEDURE — 1159F MED LIST DOCD IN RCRD: CPT | Mod: CPTII,S$GLB,, | Performed by: ORTHOPAEDIC SURGERY
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
## 2024-01-22 ENCOUNTER — TELEPHONE (OUTPATIENT)
Dept: CARDIOLOGY | Facility: CLINIC | Age: 74
End: 2024-01-22
Payer: MEDICARE

## 2024-01-22 NOTE — TELEPHONE ENCOUNTER
I  reached out  and spoke to patient ,    And we schedule him an appointment to See .    Appointment also mailed to patient home.    Patient confirmed appointment too.      Estrella Figueroa (rita).                              ----- Message from Estrella Figueroa MA sent at 1/16/2024  4:33 PM CST -----  Colleen Frankel Staff  Caller: pt (Today, 12:46 PM)  Type:  Sooner Appointment Request    Caller is requesting a sooner appointment.  Caller declined first available appointment listed below.  Caller will not accept being placed on the waitlist and is requesting a message be sent to doctor.  Name of Caller:pt  When is the first available appointment?01/18  Symptoms:GY lov 1/25/2023 / f/u  Would the patient rather a call back or a response via [facility]? call  Best Call Back Number:[phone]  Additional Information:  Pt have an appt pn 01/18 but stated it would not work for him  Pt also stated he would like to be seen today, tomorrow or friday
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
## 2024-01-24 ENCOUNTER — OFFICE VISIT (OUTPATIENT)
Dept: OPHTHALMOLOGY | Facility: CLINIC | Age: 74
End: 2024-01-24
Payer: MEDICARE

## 2024-01-24 DIAGNOSIS — H02.883 MEIBOMIAN GLAND DYSFUNCTION (MGD) OF BOTH EYES: ICD-10-CM

## 2024-01-24 DIAGNOSIS — H25.813 COMBINED FORMS OF AGE-RELATED CATARACT OF BOTH EYES: ICD-10-CM

## 2024-01-24 DIAGNOSIS — E11.9 DIABETES MELLITUS TYPE 2 WITHOUT RETINOPATHY: ICD-10-CM

## 2024-01-24 DIAGNOSIS — M35.01 KERATOCONJUNCTIVITIS SICCA: ICD-10-CM

## 2024-01-24 DIAGNOSIS — Z97.3 WEARS CONTACT LENSES: ICD-10-CM

## 2024-01-24 DIAGNOSIS — H04.129 DRY EYE: Primary | ICD-10-CM

## 2024-01-24 DIAGNOSIS — H02.402 PTOSIS OF LEFT EYELID: ICD-10-CM

## 2024-01-24 DIAGNOSIS — H40.1132 PRIMARY OPEN ANGLE GLAUCOMA (POAG) OF BOTH EYES, MODERATE STAGE: ICD-10-CM

## 2024-01-24 DIAGNOSIS — H40.1430 PSEUDOEXFOLIATION (PXF) GLAUCOMA OF BOTH EYES: ICD-10-CM

## 2024-01-24 DIAGNOSIS — H02.886 MEIBOMIAN GLAND DYSFUNCTION (MGD) OF BOTH EYES: ICD-10-CM

## 2024-01-24 PROCEDURE — 1160F RVW MEDS BY RX/DR IN RCRD: CPT | Mod: CPTII,S$GLB,, | Performed by: STUDENT IN AN ORGANIZED HEALTH CARE EDUCATION/TRAINING PROGRAM

## 2024-01-24 PROCEDURE — 99999 PR PBB SHADOW E&M-EST. PATIENT-LVL III: CPT | Mod: PBBFAC,,, | Performed by: STUDENT IN AN ORGANIZED HEALTH CARE EDUCATION/TRAINING PROGRAM

## 2024-01-24 PROCEDURE — 1101F PT FALLS ASSESS-DOCD LE1/YR: CPT | Mod: CPTII,S$GLB,, | Performed by: STUDENT IN AN ORGANIZED HEALTH CARE EDUCATION/TRAINING PROGRAM

## 2024-01-24 PROCEDURE — 3288F FALL RISK ASSESSMENT DOCD: CPT | Mod: CPTII,S$GLB,, | Performed by: STUDENT IN AN ORGANIZED HEALTH CARE EDUCATION/TRAINING PROGRAM

## 2024-01-24 PROCEDURE — 1157F ADVNC CARE PLAN IN RCRD: CPT | Mod: CPTII,S$GLB,, | Performed by: STUDENT IN AN ORGANIZED HEALTH CARE EDUCATION/TRAINING PROGRAM

## 2024-01-24 PROCEDURE — 1159F MED LIST DOCD IN RCRD: CPT | Mod: CPTII,S$GLB,, | Performed by: STUDENT IN AN ORGANIZED HEALTH CARE EDUCATION/TRAINING PROGRAM

## 2024-01-24 PROCEDURE — 99214 OFFICE O/P EST MOD 30 MIN: CPT | Mod: S$GLB,,, | Performed by: STUDENT IN AN ORGANIZED HEALTH CARE EDUCATION/TRAINING PROGRAM

## 2024-01-24 PROCEDURE — 1126F AMNT PAIN NOTED NONE PRSNT: CPT | Mod: CPTII,S$GLB,, | Performed by: STUDENT IN AN ORGANIZED HEALTH CARE EDUCATION/TRAINING PROGRAM

## 2024-02-04 ENCOUNTER — PATIENT MESSAGE (OUTPATIENT)
Dept: ADMINISTRATIVE | Facility: OTHER | Age: 74
End: 2024-02-04
Payer: MEDICARE

## 2024-02-19 ENCOUNTER — PATIENT MESSAGE (OUTPATIENT)
Dept: PRIMARY CARE CLINIC | Facility: CLINIC | Age: 74
End: 2024-02-19
Payer: MEDICARE

## 2024-02-29 ENCOUNTER — OFFICE VISIT (OUTPATIENT)
Dept: DERMATOLOGY | Facility: CLINIC | Age: 74
End: 2024-02-29
Payer: MEDICARE

## 2024-02-29 DIAGNOSIS — L57.0 AK (ACTINIC KERATOSIS): Primary | ICD-10-CM

## 2024-02-29 DIAGNOSIS — L81.4 LENTIGO: ICD-10-CM

## 2024-02-29 DIAGNOSIS — L82.1 SK (SEBORRHEIC KERATOSIS): ICD-10-CM

## 2024-02-29 DIAGNOSIS — D22.9 NEVUS: ICD-10-CM

## 2024-02-29 DIAGNOSIS — D18.01 CHERRY ANGIOMA: ICD-10-CM

## 2024-02-29 PROCEDURE — 1126F AMNT PAIN NOTED NONE PRSNT: CPT | Mod: CPTII,S$GLB,, | Performed by: DERMATOLOGY

## 2024-02-29 PROCEDURE — 1157F ADVNC CARE PLAN IN RCRD: CPT | Mod: CPTII,S$GLB,, | Performed by: DERMATOLOGY

## 2024-02-29 PROCEDURE — 17000 DESTRUCT PREMALG LESION: CPT | Mod: S$GLB,,, | Performed by: DERMATOLOGY

## 2024-02-29 PROCEDURE — 3072F LOW RISK FOR RETINOPATHY: CPT | Mod: CPTII,S$GLB,, | Performed by: DERMATOLOGY

## 2024-02-29 PROCEDURE — 99203 OFFICE O/P NEW LOW 30 MIN: CPT | Mod: 25,S$GLB,, | Performed by: DERMATOLOGY

## 2024-02-29 PROCEDURE — 99999 PR PBB SHADOW E&M-EST. PATIENT-LVL III: CPT | Mod: PBBFAC,,, | Performed by: DERMATOLOGY

## 2024-02-29 PROCEDURE — 4010F ACE/ARB THERAPY RXD/TAKEN: CPT | Mod: CPTII,S$GLB,, | Performed by: DERMATOLOGY

## 2024-02-29 PROCEDURE — 1101F PT FALLS ASSESS-DOCD LE1/YR: CPT | Mod: CPTII,S$GLB,, | Performed by: DERMATOLOGY

## 2024-02-29 PROCEDURE — 17003 DESTRUCT PREMALG LES 2-14: CPT | Mod: S$GLB,,, | Performed by: DERMATOLOGY

## 2024-02-29 PROCEDURE — 1160F RVW MEDS BY RX/DR IN RCRD: CPT | Mod: CPTII,S$GLB,, | Performed by: DERMATOLOGY

## 2024-02-29 PROCEDURE — 3288F FALL RISK ASSESSMENT DOCD: CPT | Mod: CPTII,S$GLB,, | Performed by: DERMATOLOGY

## 2024-02-29 PROCEDURE — 1159F MED LIST DOCD IN RCRD: CPT | Mod: CPTII,S$GLB,, | Performed by: DERMATOLOGY

## 2024-02-29 RX ORDER — AMOXICILLIN 500 MG/1
500 TABLET, FILM COATED ORAL EVERY 8 HOURS
COMMUNITY
Start: 2024-02-23 | End: 2024-04-17 | Stop reason: ALTCHOICE

## 2024-02-29 NOTE — PROGRESS NOTES
Subjective:      Patient ID:  Bandar French is a 73 y.o. male who presents for   Chief Complaint   Patient presents with    Skin Check     TBSE     Spot     Scalp      Patient is here today for a skin check.   Pt has a history of  moderate  sun exposure in the past.   Pt recalls several blistering sunburns in the past- yes  Pt has history of tanning bed use- no  Pt has  had moles removed in the past- no  Pt has history of melanoma in first degree relatives-  yes -brother     Patient with new complaint of lesion(s)  Location: scalp  Duration: 1 yr   Symptoms: crusty, flaky   Relieving factors/Previous treatments: no tx         Spot      Review of Systems   Skin:  Positive for wears hat. Negative for itching, rash, dry skin, sun sensitivity, daily sunscreen use and recent sunburn.   Hematologic/Lymphatic: Bruises/bleeds easily.       Objective:   Physical Exam   Constitutional: He appears well-developed and well-nourished. No distress.   Neurological: He is alert and oriented to person, place, and time. He is not disoriented.   Psychiatric: He has a normal mood and affect.   Skin:   Areas Examined (abnormalities noted in diagram):   Scalp / Hair Palpated and Inspected  Head / Face Inspection Performed  Neck Inspection Performed  Chest / Axilla Inspection Performed  Abdomen Inspection Performed  Genitals / Buttocks / Groin Inspection Performed  Back Inspection Performed  RUE Inspected  LUE Inspection Performed  RLE Inspected  LLE Inspection Performed  Nails and Digits Inspection Performed                 Diagram Legend     Erythematous scaling macule/papule c/w actinic keratosis       Vascular papule c/w angioma      Pigmented verrucoid papule/plaque c/w seborrheic keratosis      Yellow umbilicated papule c/w sebaceous hyperplasia      Irregularly shaped tan macule c/w lentigo     1-2 mm smooth white papules consistent with Milia      Movable subcutaneous cyst with punctum c/w epidermal inclusion cyst       Subcutaneous movable cyst c/w pilar cyst      Firm pink to brown papule c/w dermatofibroma      Pedunculated fleshy papule(s) c/w skin tag(s)      Evenly pigmented macule c/w junctional nevus     Mildly variegated pigmented, slightly irregular-bordered macule c/w mildly atypical nevus      Flesh colored to evenly pigmented papule c/w intradermal nevus       Pink pearly papule/plaque c/w basal cell carcinoma      Erythematous hyperkeratotic cursted plaque c/w SCC      Surgical scar with no sign of skin cancer recurrence      Open and closed comedones      Inflammatory papules and pustules      Verrucoid papule consistent consistent with wart     Erythematous eczematous patches and plaques     Dystrophic onycholytic nail with subungual debris c/w onychomycosis     Umbilicated papule    Erythematous-base heme-crusted tan verrucoid plaque consistent with inflamed seborrheic keratosis     Erythematous Silvery Scaling Plaque c/w Psoriasis     See annotation      Assessment / Plan:        AK (actinic keratosis)  Cryosurgery Procedure Note    Verbal consent from the patient is obtained including, but not limited to, risk of hypopigmentation/hyperpigmentation, scar, recurrence of lesion. The patient is aware of the precancerous quality and need for treatment of these lesions. Liquid nitrogen cryosurgery is applied to the 6 actinic keratoses, as detailed in the physical exam, to produce a freeze injury. The patient is aware that blisters may form and is instructed on wound care with gentle cleansing and use of vaseline ointment to keep moist until healed. The patient is supplied a handout on cryosurgery and is instructed to call if lesions do not completely resolve.    Lentigo  This is a benign hyperpigmented sun induced lesion. Recommend daily sun protection/avoidance and use of at least SPF 30, broad spectrum sunscreen (OTC drug) will reduce the number of new lesions. Treatment of these benign lesions are considered  cosmetic.  The nature of sun-induced photo-aging and skin cancers is discussed.  Sun avoidance, protective clothing, and the use of 30-SPF sunscreens is advised. Observe closely for skin damage/changes, and call if such occurs.      Nevus  Discussed ABCDE's of nevi.  Monitor for new mole or moles that are becoming bigger, darker, irritated, or developing irregular borders. Brochure provided. Instructed patient to observe lesion(s) for changes and follow up in clinic if changes are noted. Patient to monitor skin at home for new or changing lesions.     Cherry angioma  These are benign vascular lesions that are inherited.  Treatment is not necessary.    SK (seborrheic keratosis)    These are benign inherited growths without a malignant potential. Reassurance given to patient. No treatment is necessary.     Total body skin examination performed today including at least 12 points as noted in physical examination. No suspicious lesions noted.Monitor for new or changing lesions as discussed such as pink scaly patches that are occasionally tender or not healing, 'pimples' that never go away, lesions that are not healing or bleeding.            Follow up in about 6 months (around 8/29/2024) for UBSE.

## 2024-03-01 ENCOUNTER — PATIENT MESSAGE (OUTPATIENT)
Dept: ADMINISTRATIVE | Facility: OTHER | Age: 74
End: 2024-03-01
Payer: MEDICARE

## 2024-03-06 ENCOUNTER — PATIENT MESSAGE (OUTPATIENT)
Dept: OTHER | Facility: OTHER | Age: 74
End: 2024-03-06
Payer: MEDICARE

## 2024-03-13 ENCOUNTER — LAB VISIT (OUTPATIENT)
Dept: LAB | Facility: HOSPITAL | Age: 74
End: 2024-03-13
Attending: INTERNAL MEDICINE
Payer: MEDICARE

## 2024-03-13 ENCOUNTER — OFFICE VISIT (OUTPATIENT)
Dept: CARDIOLOGY | Facility: CLINIC | Age: 74
End: 2024-03-13
Payer: MEDICARE

## 2024-03-13 VITALS
WEIGHT: 189 LBS | OXYGEN SATURATION: 96 % | HEART RATE: 64 BPM | SYSTOLIC BLOOD PRESSURE: 135 MMHG | DIASTOLIC BLOOD PRESSURE: 71 MMHG | HEIGHT: 70 IN | BODY MASS INDEX: 27.06 KG/M2

## 2024-03-13 DIAGNOSIS — I10 ESSENTIAL HYPERTENSION: Primary | Chronic | ICD-10-CM

## 2024-03-13 DIAGNOSIS — I25.119 ATHEROSCLEROSIS OF NATIVE CORONARY ARTERY OF NATIVE HEART WITH ANGINA PECTORIS: ICD-10-CM

## 2024-03-13 DIAGNOSIS — Z95.5 PRESENCE OF STENT IN CORONARY ARTERY: Chronic | ICD-10-CM

## 2024-03-13 DIAGNOSIS — E78.2 HYPERLIPIDEMIA, MIXED: Chronic | ICD-10-CM

## 2024-03-13 DIAGNOSIS — I70.0 ATHEROSCLEROSIS OF AORTA: ICD-10-CM

## 2024-03-13 DIAGNOSIS — I11.9 HYPERTENSIVE HEART DISEASE WITHOUT HEART FAILURE: ICD-10-CM

## 2024-03-13 DIAGNOSIS — I25.10 CORONARY ARTERY DISEASE INVOLVING NATIVE CORONARY ARTERY OF NATIVE HEART WITHOUT ANGINA PECTORIS: ICD-10-CM

## 2024-03-13 DIAGNOSIS — R04.0 EPISTAXIS: ICD-10-CM

## 2024-03-13 LAB
BASOPHILS # BLD AUTO: 0.08 K/UL (ref 0–0.2)
BASOPHILS NFR BLD: 1 % (ref 0–1.9)
DIFFERENTIAL METHOD BLD: ABNORMAL
EOSINOPHIL # BLD AUTO: 0.2 K/UL (ref 0–0.5)
EOSINOPHIL NFR BLD: 3 % (ref 0–8)
ERYTHROCYTE [DISTWIDTH] IN BLOOD BY AUTOMATED COUNT: 13.2 % (ref 11.5–14.5)
HCT VFR BLD AUTO: 37.2 % (ref 40–54)
HGB BLD-MCNC: 12.8 G/DL (ref 14–18)
IMM GRANULOCYTES # BLD AUTO: 0.07 K/UL (ref 0–0.04)
IMM GRANULOCYTES NFR BLD AUTO: 0.9 % (ref 0–0.5)
LYMPHOCYTES # BLD AUTO: 1.5 K/UL (ref 1–4.8)
LYMPHOCYTES NFR BLD: 17.9 % (ref 18–48)
MCH RBC QN AUTO: 29.6 PG (ref 27–31)
MCHC RBC AUTO-ENTMCNC: 34.4 G/DL (ref 32–36)
MCV RBC AUTO: 86 FL (ref 82–98)
MONOCYTES # BLD AUTO: 0.9 K/UL (ref 0.3–1)
MONOCYTES NFR BLD: 11.3 % (ref 4–15)
NEUTROPHILS # BLD AUTO: 5.4 K/UL (ref 1.8–7.7)
NEUTROPHILS NFR BLD: 65.9 % (ref 38–73)
NRBC BLD-RTO: 0 /100 WBC
PLATELET # BLD AUTO: 181 K/UL (ref 150–450)
PMV BLD AUTO: 11.6 FL (ref 9.2–12.9)
RBC # BLD AUTO: 4.32 M/UL (ref 4.6–6.2)
WBC # BLD AUTO: 8.11 K/UL (ref 3.9–12.7)

## 2024-03-13 PROCEDURE — 3075F SYST BP GE 130 - 139MM HG: CPT | Mod: CPTII,S$GLB,, | Performed by: INTERNAL MEDICINE

## 2024-03-13 PROCEDURE — 99214 OFFICE O/P EST MOD 30 MIN: CPT | Mod: S$GLB,,, | Performed by: INTERNAL MEDICINE

## 2024-03-13 PROCEDURE — 85025 COMPLETE CBC W/AUTO DIFF WBC: CPT | Performed by: INTERNAL MEDICINE

## 2024-03-13 PROCEDURE — 3072F LOW RISK FOR RETINOPATHY: CPT | Mod: CPTII,S$GLB,, | Performed by: INTERNAL MEDICINE

## 2024-03-13 PROCEDURE — 3288F FALL RISK ASSESSMENT DOCD: CPT | Mod: CPTII,S$GLB,, | Performed by: INTERNAL MEDICINE

## 2024-03-13 PROCEDURE — 4010F ACE/ARB THERAPY RXD/TAKEN: CPT | Mod: CPTII,S$GLB,, | Performed by: INTERNAL MEDICINE

## 2024-03-13 PROCEDURE — 36415 COLL VENOUS BLD VENIPUNCTURE: CPT | Performed by: INTERNAL MEDICINE

## 2024-03-13 PROCEDURE — 99999 PR PBB SHADOW E&M-EST. PATIENT-LVL IV: CPT | Mod: PBBFAC,,, | Performed by: INTERNAL MEDICINE

## 2024-03-13 PROCEDURE — 3008F BODY MASS INDEX DOCD: CPT | Mod: CPTII,S$GLB,, | Performed by: INTERNAL MEDICINE

## 2024-03-13 PROCEDURE — 1159F MED LIST DOCD IN RCRD: CPT | Mod: CPTII,S$GLB,, | Performed by: INTERNAL MEDICINE

## 2024-03-13 PROCEDURE — 1157F ADVNC CARE PLAN IN RCRD: CPT | Mod: CPTII,S$GLB,, | Performed by: INTERNAL MEDICINE

## 2024-03-13 PROCEDURE — 3078F DIAST BP <80 MM HG: CPT | Mod: CPTII,S$GLB,, | Performed by: INTERNAL MEDICINE

## 2024-03-13 PROCEDURE — 93000 ELECTROCARDIOGRAM COMPLETE: CPT | Mod: S$GLB,,, | Performed by: INTERNAL MEDICINE

## 2024-03-13 PROCEDURE — 1126F AMNT PAIN NOTED NONE PRSNT: CPT | Mod: CPTII,S$GLB,, | Performed by: INTERNAL MEDICINE

## 2024-03-13 PROCEDURE — 1101F PT FALLS ASSESS-DOCD LE1/YR: CPT | Mod: CPTII,S$GLB,, | Performed by: INTERNAL MEDICINE

## 2024-03-13 NOTE — PROGRESS NOTES
Subjective:   @Patient ID:  Bandar French is a 73 y.o. male who presents for follow-up of CAD       HPI:   3/13/2024: Here for f/u. He is doing well.   Rare chest pains.  Since last visit took nitro 3 or 4 times.  He has not exercising much as he used to and gained weight.  No clear reason.     Compliant with DPAT with no issues.     LDL at goal       Historically:    Patient admitted 1/2022 with [facility] with PCI to LCX and RCA. Symptoms were mainly jaw pain.  Residual moderate LAD. Readmitted 3/2022 with headaches, chest pressure and had epistaxis episodes after sneezing. Stress MPI with no ischemia. Norvasc dose increased to 5 mg daily.       Unclear etiology for the headaches. He has glaucoma. CT head with no acute findings.           Prior cardiovascular  Hx  --------------------------------  - Stress MPI 3/13/2022 -ve for ischemia     - Heart Catheterization  1/20/2022  There was two vessel coronary artery disease.  Procedure done for unstable angina  The PCI was successful.  The 1st Mrg lesion was 99% stenosed with 0% stenosis post-intervention.  A STENT RESOLUTE VICKY [id number] stent was successfully placed. Post dilated with 3.0 NC balloon at high pressure. IVUS guided  The Prox RCA lesion was 70% stenosed with 0% stenosis post-intervention.  A STENT RESOLUTE VICKY [id number] stent was successfully placed . Post dilated with 3.5 NC balloon. IVUS guided.  The Prox LAD to Mid LAD lesion was 50% stenosed. Medical treatment at this time.  The estimated blood loss was none.  The pre-procedure left ventricular end diastolic pressure was 17.  No LV gram done. Just pressure.     - ASA/Plavix  - High intense statin   - Avoid BB due to bradycardia  - Medical tx for residual LAD disease. If symptomatic as outpatient consider non invasive evaluation versus physiologic evaluation.   - F/U with Dr. Alvarado                   Patient Active Problem List    Diagnosis Date Noted    Wears contact lenses 06/29/2023    Decreased  strength involving knee joint 05/18/2023    Impaired functional mobility, balance, gait, and endurance 05/18/2023    Primary osteoarthritis of left knee 05/15/2023    Anemia 04/29/2023    Pre-op evaluation 04/27/2023    Chronic pain of left knee 04/25/2023    Aortic atherosclerosis 04/04/2023    Hypertensive heart disease without heart failure 03/28/2022    Presence of stent in coronary artery 11/17/2021    Steatosis of liver 11/17/2021    Benign paroxysmal positional vertigo 09/22/2021    Essential hypertension 08/26/2020    Prediabetes     Hyperlipidemia, mixed 09/25/2019    Osteoarthritis 09/25/2019    Atherosclerosis of native coronary artery of native heart with angina pectoris      Review of old records showed the presence of coronary artery disease with a stent of the circumflex in October of 2014. Cardiac catheterization in 2017 showed a 20% lesion in the right coronary artery, but the left coronary artery was free of disease.  Echocardiography in January 2019 showed left ventricular systolic function be normal, ejection fraction 60-65%        Osteoarthritis of right knee 07/22/2019                    LAST HbA1c  Lab Results   Component Value Date    HGBA1C 6.1 (H) 10/05/2023       Lipid panel  Lab Results   Component Value Date    CHOL 124 10/05/2023    CHOL 102 (L) 03/27/2023    CHOL 103 (L) 09/26/2022     Lab Results   Component Value Date    HDL 32 (L) 10/05/2023    HDL 33 (L) 03/27/2023    HDL 25 (L) 09/26/2022     Lab Results   Component Value Date    LDLCALC 69.6 10/05/2023    LDLCALC 52.0 (L) 03/27/2023    LDLCALC 49.4 (L) 09/26/2022     Lab Results   Component Value Date    TRIG 112 10/05/2023    TRIG 85 03/27/2023    TRIG 143 09/26/2022     Lab Results   Component Value Date    CHOLHDL 25.8 10/05/2023    CHOLHDL 32.4 03/27/2023    CHOLHDL 24.3 09/26/2022            Review of Systems   Constitutional: Negative for chills and fever.   HENT:  Negative for hearing loss and nosebleeds.         As in HPI     Eyes:  Negative for blurred vision.   Cardiovascular:         As in HPI    Respiratory:  Negative for hemoptysis and shortness of breath.    Hematologic/Lymphatic: Negative for bleeding problem.   Skin:  Negative for itching.   Musculoskeletal:  Negative for falls.   Gastrointestinal:  Negative for abdominal pain and hematochezia.   Genitourinary:  Negative for hematuria.   Neurological:  Negative for dizziness and loss of balance.   Psychiatric/Behavioral:  Negative for altered mental status and depression.        Objective:   Physical Exam  Constitutional:       Appearance: He is well-developed.   HENT:      Head: Normocephalic and atraumatic.   Eyes:      Conjunctiva/sclera: Conjunctivae normal.   Neck:      Vascular: No carotid bruit or JVD.   Cardiovascular:      Rate and Rhythm: Normal rate and regular rhythm.      Pulses:           Carotid pulses are 2+ on the right side and 2+ on the left side.       Radial pulses are 2+ on the right side and 2+ on the left side.      Heart sounds: Normal heart sounds. No murmur heard.     No friction rub. No gallop.   Pulmonary:      Effort: Pulmonary effort is normal. No respiratory distress.      Breath sounds: Normal breath sounds. No stridor. No wheezing.   Musculoskeletal:      Cervical back: Neck supple.   Skin:     General: Skin is warm and dry.   Neurological:      Mental Status: He is alert and oriented to person, place, and time.   Psychiatric:         Behavior: Behavior normal.         Assessment:     No diagnosis found.      Plan:     - Stable CAD. Residual non obstructive CAD.    -  We will continue medical therapy  - Continue High intense statin   - LDL at goal  - ASA/Plavix  - Continue Norvasc   - Continue ARB   -  weight loss encouraged.   -  blood pressure management by digital hypertension     I spent 5-10 minutes asking, assessing, assisting, arranging and advising heart healthy diet improvements. This included low-salt meals, portion control and  health food alternatives. I also encourage 30 minutes of moderate exercise 3-4x a week.       6 months f/u     Pertinent cardiac images and EKG reviewed independently.    Continue with current medical plan and lifestyle changes.  Return sooner for concerns or questions. If symptoms persist go to the ED  I have reviewed all pertinent data including patient's medical history in detail and updated the computerized patient record.     No orders of the defined types were placed in this encounter.        Follow up as scheduled.     He expressed verbal understanding and agreed with the plan    Patient's Medications   New Prescriptions    No medications on file   Previous Medications    ACETAMINOPHEN (TYLENOL) 650 MG TBSR    Take 1 tablet (650 mg total) by mouth every 8 (eight) hours.    AMLODIPINE (NORVASC) 5 MG TABLET    TAKE 1 TABLET ONE TIME DAILY AT NIGHT    AMOXICILLIN (AMOXIL) 500 MG TAB    Take 500 mg by mouth every 8 (eight) hours.    ASCORBIC ACID, VITAMIN C, (VITAMIN C) 500 MG TABLET    Take 500 mg by mouth once daily.    ASPIRIN (ECOTRIN) 81 MG EC TABLET    Aspirin    BLOOD SUGAR DIAGNOSTIC (BLOOD GLUCOSE TEST) STRP    1 each by Misc.(Non-Drug; Combo Route) route once daily.    CELECOXIB (CELEBREX) 200 MG CAPSULE    TAKE 1 CAPSULE(200 MG) BY MOUTH EVERY DAY    CHLORPHENIRAMINE (CHLOR-TRIMETON) 4 MG TABLET    Take 4 mg by mouth as needed.     CHOLECALCIFEROL, VITAMIN D3, 125 MCG (5,000 UNIT) TAB    Take 5,000 Units by mouth once daily.    CLOPIDOGREL (PLAVIX) 75 MG TABLET    Take 1 tablet (75 mg total) by mouth once daily.    CYCLOSPORINE (RESTASIS MULTIDOSE) 0.05 % DROP    Place 1 drop into both eyes every 12 (twelve) hours.    DORZOLAMIDE-TIMOLOL 2-0.5% (COSOPT) 22.3-6.8 MG/ML OPHTHALMIC SOLUTION    Place 1 drop into both eyes 2 (two) times daily.    DOXYCYCLINE (VIBRA-TABS) 100 MG TABLET    Take 1 tablet (100 mg total) by mouth 2 (two) times daily.    FOLIC ACID/MULTIVIT-MIN/LUTEIN (CENTRUM SILVER ORAL)     Take 1 tablet by mouth once daily.    IRBESARTAN (AVAPRO) 300 MG TABLET    TAKE 1 TABLET (300 MG TOTAL) BY MOUTH EVERY EVENING.    LATANOPROST 0.005 % OPHTHALMIC SOLUTION    Place 1 drop into both eyes every evening. INSTILL 1 DROP INTO BOTH EYES EVERY EVENING.    MAGNESIUM OXIDE (MAG-OX) 400 MG (241.3 MG MAGNESIUM) TABLET    Take 250 mg by mouth once daily.    MICRO THIN LANCETS 33 GAUGE MISC        NITROGLYCERIN (NITROSTAT) 0.4 MG SL TABLET    Place 1 tablet (0.4 mg total) under the tongue every 5 (five) minutes as needed for Chest pain.    ROSUVASTATIN (CRESTOR) 40 MG TAB    TAKE 1 TABLET EVERY EVENING    VIT C-E-ZINC OX-KAREN-LUT-ZEAX (ICAPS AREDS2) 250 MG-200 UNIT -12.5 MG-1 MG CAP    Take 2 capsules by mouth once daily.   Modified Medications    No medications on file   Discontinued Medications    No medications on file

## 2024-03-14 ENCOUNTER — PATIENT MESSAGE (OUTPATIENT)
Dept: CARDIOLOGY | Facility: CLINIC | Age: 74
End: 2024-03-14
Payer: MEDICARE

## 2024-03-15 DIAGNOSIS — I25.10 CORONARY ARTERY DISEASE INVOLVING NATIVE CORONARY ARTERY OF NATIVE HEART WITHOUT ANGINA PECTORIS: Primary | ICD-10-CM

## 2024-03-15 DIAGNOSIS — I10 ESSENTIAL HYPERTENSION: Primary | ICD-10-CM

## 2024-03-16 LAB
OHS QRS DURATION: 90 MS
OHS QTC CALCULATION: 398 MS

## 2024-04-04 ENCOUNTER — PATIENT MESSAGE (OUTPATIENT)
Dept: PRIMARY CARE CLINIC | Facility: CLINIC | Age: 74
End: 2024-04-04
Payer: MEDICARE

## 2024-04-05 ENCOUNTER — PATIENT MESSAGE (OUTPATIENT)
Dept: PRIMARY CARE CLINIC | Facility: CLINIC | Age: 74
End: 2024-04-05
Payer: MEDICARE

## 2024-04-05 ENCOUNTER — PATIENT MESSAGE (OUTPATIENT)
Dept: DERMATOLOGY | Facility: CLINIC | Age: 74
End: 2024-04-05
Payer: MEDICARE

## 2024-04-05 DIAGNOSIS — R21 RASH: Primary | ICD-10-CM

## 2024-04-05 RX ORDER — CLOTRIMAZOLE AND BETAMETHASONE DIPROPIONATE 10; .64 MG/G; MG/G
CREAM TOPICAL 2 TIMES DAILY
Qty: 45 G | Refills: 0 | Status: SHIPPED | OUTPATIENT
Start: 2024-04-05

## 2024-04-05 NOTE — TELEPHONE ENCOUNTER
It definitely needs to be looked at, probably most effectively by dermatology    Please schedule with priority access dermatologist.      Sending in a stronger antifungal cream to use in the meantime

## 2024-04-16 NOTE — PROGRESS NOTES
Subjective:       Patient ID: Bandar French is a 73 y.o. male.    Chief Complaint: Glaucoma    HPI    DLS: 1/24/24 w/ Dr. You    73 y.o. male presents for 3 mo IOP Check. Patient complains of worsening   VA OD, temporal periphery, over the last 2 months. Denies eye pain,   flashes, and floaters. Compliant with gtts per patient. States he has   occasional headaches on the right side of head.     1. POAG OU   2. NS OU   3. KAMI   4. Refractive Error   5. Hx Ptosis Repair OU   6. Ptosis OS   7. Type 2 DM no DR     MEDS:   Cosopt BID OU   Latanoprost QHS OU  Restasis BID OU    Last edited by sAhlyn Guzman on 4/17/2024  8:58 AM.               Assessment & Plan   Subjective visual disturbance of right eye    Dry eye    Keratoconjunctivitis sicca    Meibomian gland dysfunction (MGD) of both eyes    Primary open angle glaucoma (POAG) of both eyes, moderate stage    Pseudoexfoliation (PXF) glaucoma of both eyes    Ptosis of left eyelid    POAG OU - mild-moderate OU  PXG  -Fhx (+brother), Steroids (-),Trauma(-)  -Drops: Latanoprost qHS OU // Dorzolamide-timolol BID OU  -Drop intolerance/contraindication: -  -Laser: -  -Surgeries: laser retina tear OS // ptosis repair OS  -CCT: 593 // 541  -Gonio: CBB OU  Tm 20's    7/23 RNFL dec G/NS/TS B TI/T/N/NI OD // dec G/TI B T/NS/NI OS --> very  mild + prog OU  10/23 HVF 24-2 Nonspecific defects ?early IAD VFI 97% OD // Nonspecific ?early SAD VFI 98% OS --> stable / improved    Subjective visual disturbance OD  DFE no changes  Maybe he is noticing the HVF loss  RNFL next visit     MGD / dry eye / chalazion -- improved  Maxitrol BID x 10 d -- OK to use PRN   Cont Warm compresses  Cont Restasis BID          Combined forms of age-related cataract OU  Not visually significant. Monitor.    Refractive error  Wears contact lenses and happy   Wearing glasses more - OK      Hx ptosis repair OU  Ptosis OS  Dr. 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 Shahram You M.D., M.S.  Department of Ophthalmology   Division of Glaucoma Surgery  [facility]

## 2024-04-17 ENCOUNTER — OFFICE VISIT (OUTPATIENT)
Dept: DERMATOLOGY | Facility: CLINIC | Age: 74
End: 2024-04-17
Payer: MEDICARE

## 2024-04-17 ENCOUNTER — OFFICE VISIT (OUTPATIENT)
Dept: OPHTHALMOLOGY | Facility: CLINIC | Age: 74
End: 2024-04-17
Payer: MEDICARE

## 2024-04-17 DIAGNOSIS — H40.1132 PRIMARY OPEN ANGLE GLAUCOMA (POAG) OF BOTH EYES, MODERATE STAGE: ICD-10-CM

## 2024-04-17 DIAGNOSIS — H02.886 MEIBOMIAN GLAND DYSFUNCTION (MGD) OF BOTH EYES: ICD-10-CM

## 2024-04-17 DIAGNOSIS — H02.402 PTOSIS OF LEFT EYELID: ICD-10-CM

## 2024-04-17 DIAGNOSIS — L30.4 INTERTRIGO: Primary | ICD-10-CM

## 2024-04-17 DIAGNOSIS — H53.10 SUBJECTIVE VISUAL DISTURBANCE OF RIGHT EYE: Primary | ICD-10-CM

## 2024-04-17 DIAGNOSIS — H02.883 MEIBOMIAN GLAND DYSFUNCTION (MGD) OF BOTH EYES: ICD-10-CM

## 2024-04-17 DIAGNOSIS — R21 RASH: ICD-10-CM

## 2024-04-17 DIAGNOSIS — H04.129 DRY EYE: ICD-10-CM

## 2024-04-17 DIAGNOSIS — M35.01 KERATOCONJUNCTIVITIS SICCA: ICD-10-CM

## 2024-04-17 DIAGNOSIS — H40.1430 PSEUDOEXFOLIATION (PXF) GLAUCOMA OF BOTH EYES: ICD-10-CM

## 2024-04-17 PROCEDURE — 1101F PT FALLS ASSESS-DOCD LE1/YR: CPT | Mod: CPTII,S$GLB,, | Performed by: PHYSICIAN ASSISTANT

## 2024-04-17 PROCEDURE — 1157F ADVNC CARE PLAN IN RCRD: CPT | Mod: CPTII,S$GLB,, | Performed by: PHYSICIAN ASSISTANT

## 2024-04-17 PROCEDURE — 99999 PR PBB SHADOW E&M-EST. PATIENT-LVL III: CPT | Mod: PBBFAC,,, | Performed by: PHYSICIAN ASSISTANT

## 2024-04-17 PROCEDURE — 1126F AMNT PAIN NOTED NONE PRSNT: CPT | Mod: CPTII,S$GLB,, | Performed by: STUDENT IN AN ORGANIZED HEALTH CARE EDUCATION/TRAINING PROGRAM

## 2024-04-17 PROCEDURE — 1157F ADVNC CARE PLAN IN RCRD: CPT | Mod: CPTII,S$GLB,, | Performed by: STUDENT IN AN ORGANIZED HEALTH CARE EDUCATION/TRAINING PROGRAM

## 2024-04-17 PROCEDURE — 1126F AMNT PAIN NOTED NONE PRSNT: CPT | Mod: CPTII,S$GLB,, | Performed by: PHYSICIAN ASSISTANT

## 2024-04-17 PROCEDURE — 4010F ACE/ARB THERAPY RXD/TAKEN: CPT | Mod: CPTII,S$GLB,, | Performed by: PHYSICIAN ASSISTANT

## 2024-04-17 PROCEDURE — 99213 OFFICE O/P EST LOW 20 MIN: CPT | Mod: S$GLB,,, | Performed by: PHYSICIAN ASSISTANT

## 2024-04-17 PROCEDURE — 3288F FALL RISK ASSESSMENT DOCD: CPT | Mod: CPTII,S$GLB,, | Performed by: PHYSICIAN ASSISTANT

## 2024-04-17 PROCEDURE — 4010F ACE/ARB THERAPY RXD/TAKEN: CPT | Mod: CPTII,S$GLB,, | Performed by: STUDENT IN AN ORGANIZED HEALTH CARE EDUCATION/TRAINING PROGRAM

## 2024-04-17 PROCEDURE — 1159F MED LIST DOCD IN RCRD: CPT | Mod: CPTII,S$GLB,, | Performed by: PHYSICIAN ASSISTANT

## 2024-04-17 PROCEDURE — 99999 PR PBB SHADOW E&M-EST. PATIENT-LVL III: CPT | Mod: PBBFAC,,, | Performed by: STUDENT IN AN ORGANIZED HEALTH CARE EDUCATION/TRAINING PROGRAM

## 2024-04-17 PROCEDURE — 99214 OFFICE O/P EST MOD 30 MIN: CPT | Mod: S$GLB,,, | Performed by: STUDENT IN AN ORGANIZED HEALTH CARE EDUCATION/TRAINING PROGRAM

## 2024-04-17 PROCEDURE — 3288F FALL RISK ASSESSMENT DOCD: CPT | Mod: CPTII,S$GLB,, | Performed by: STUDENT IN AN ORGANIZED HEALTH CARE EDUCATION/TRAINING PROGRAM

## 2024-04-17 PROCEDURE — 1101F PT FALLS ASSESS-DOCD LE1/YR: CPT | Mod: CPTII,S$GLB,, | Performed by: STUDENT IN AN ORGANIZED HEALTH CARE EDUCATION/TRAINING PROGRAM

## 2024-04-17 PROCEDURE — 1160F RVW MEDS BY RX/DR IN RCRD: CPT | Mod: CPTII,S$GLB,, | Performed by: PHYSICIAN ASSISTANT

## 2024-04-17 RX ORDER — KETOCONAZOLE 20 MG/G
CREAM TOPICAL
Qty: 60 G | Refills: 1 | Status: SHIPPED | OUTPATIENT
Start: 2024-04-17 | End: 2024-04-17

## 2024-04-17 RX ORDER — KETOCONAZOLE 20 MG/G
CREAM TOPICAL
Qty: 60 G | Refills: 1 | Status: SHIPPED | OUTPATIENT
Start: 2024-04-17

## 2024-04-17 RX ORDER — METHYLPREDNISOLONE 4 MG/1
TABLET ORAL
COMMUNITY
Start: 2024-04-01

## 2024-04-17 NOTE — PROGRESS NOTES
"  Subjective:      Patient ID:  Bandar French is a 73 y.o. male who presents for   Chief Complaint   Patient presents with    Rash     Genital area     Patient with new complaint of rash  Location: genitals  Duration: Feb 2024  Symptoms: flaky  Relieving factors/Previous treatments: OTC "jock itch cream", pt called PCP and he called in lotrisone cream BID x2wks per pt    Pt states rash is resolving now, but still present.        Review of Systems   Skin:  Positive for rash and dry skin.       Objective:   Physical Exam   Constitutional: He appears well-developed and well-nourished. No distress.   Genitourinary:             Neurological: He is alert and oriented to person, place, and time. He is not disoriented.   Psychiatric: He has a normal mood and affect.   Skin:   Areas Examined (abnormalities noted in diagram):   Genitals / Buttocks / Groin Inspection Performed       Diagram Legend     Erythematous scaling macule/papule c/w actinic keratosis       Vascular papule c/w angioma      Pigmented verrucoid papule/plaque c/w seborrheic keratosis      Yellow umbilicated papule c/w sebaceous hyperplasia      Irregularly shaped tan macule c/w lentigo     1-2 mm smooth white papules consistent with Milia      Movable subcutaneous cyst with punctum c/w epidermal inclusion cyst      Subcutaneous movable cyst c/w pilar cyst      Firm pink to brown papule c/w dermatofibroma      Pedunculated fleshy papule(s) c/w skin tag(s)      Evenly pigmented macule c/w junctional nevus     Mildly variegated pigmented, slightly irregular-bordered macule c/w mildly atypical nevus      Flesh colored to evenly pigmented papule c/w intradermal nevus       Pink pearly papule/plaque c/w basal cell carcinoma      Erythematous hyperkeratotic cursted plaque c/w SCC      Surgical scar with no sign of skin cancer recurrence      Open and closed comedones      Inflammatory papules and pustules      Verrucoid papule consistent consistent with wart    " " Erythematous eczematous patches and plaques     Dystrophic onycholytic nail with subungual debris c/w onychomycosis     Umbilicated papule    Erythematous-base heme-crusted tan verrucoid plaque consistent with inflamed seborrheic keratosis     Erythematous Silvery Scaling Plaque c/w Psoriasis     See annotation      Assessment / Plan:        Bandar "Zack" was seen today for rash.    Diagnoses and all orders for this visit:    Intertrigo  -     ketoconazole (NIZORAL) 2 % cream; AAA in groin once a day at night for 2-4 weeks until flare resolves.    Just for flares:  Recommend white vinegar: water 1:1 compresses 2x/day followed by cool blow dry and then application of prescription medication.     Maintenance:  Make sure to cool blow dry after showering to ensure the area is dry and then apply (powder or prescription medication depending on whether it is morning or night)  AM:  Apply Zeasorb AF powder (get this over-the-counter).    PM: Apply ketoconazole 2% cream     Keep the area free of moisture.            Follow up if symptoms worsen or fail to improve.  "

## 2024-04-17 NOTE — PATIENT INSTRUCTIONS
Intertrigo:  Just for flares:  Recommend white vinegar: water 1:1 compresses 2x/day followed by cool blow dry and then application of prescription medication.     Maintenance:  Make sure to cool blow dry after showering to ensure the area is dry and then apply (powder or prescription medication depending on whether it is morning or night)  AM:  Apply Zeasorb AF powder (get this over-the-counter).    PM: Apply ketoconazole 2% cream     Keep the area free of moisture.

## 2024-04-19 ENCOUNTER — LAB VISIT (OUTPATIENT)
Dept: LAB | Facility: HOSPITAL | Age: 74
End: 2024-04-19
Attending: INTERNAL MEDICINE
Payer: MEDICARE

## 2024-04-19 DIAGNOSIS — R73.03 PREDIABETES: ICD-10-CM

## 2024-04-19 LAB
ALBUMIN SERPL BCP-MCNC: 3.9 G/DL (ref 3.5–5.2)
ALP SERPL-CCNC: 72 U/L (ref 55–135)
ALT SERPL W/O P-5'-P-CCNC: 30 U/L (ref 10–44)
ANION GAP SERPL CALC-SCNC: 5 MMOL/L (ref 8–16)
AST SERPL-CCNC: 18 U/L (ref 10–40)
BILIRUB SERPL-MCNC: 0.7 MG/DL (ref 0.1–1)
BUN SERPL-MCNC: 15 MG/DL (ref 8–23)
CALCIUM SERPL-MCNC: 9.3 MG/DL (ref 8.7–10.5)
CHLORIDE SERPL-SCNC: 106 MMOL/L (ref 95–110)
CO2 SERPL-SCNC: 30 MMOL/L (ref 23–29)
CREAT SERPL-MCNC: 1 MG/DL (ref 0.5–1.4)
EST. GFR  (NO RACE VARIABLE): >60 ML/MIN/1.73 M^2
ESTIMATED AVG GLUCOSE: 143 MG/DL (ref 68–131)
GLUCOSE SERPL-MCNC: 161 MG/DL (ref 70–110)
HBA1C MFR BLD: 6.6 % (ref 4–5.6)
POTASSIUM SERPL-SCNC: 4.4 MMOL/L (ref 3.5–5.1)
PROT SERPL-MCNC: 6.5 G/DL (ref 6–8.4)
SODIUM SERPL-SCNC: 141 MMOL/L (ref 136–145)

## 2024-04-19 PROCEDURE — 83036 HEMOGLOBIN GLYCOSYLATED A1C: CPT | Performed by: INTERNAL MEDICINE

## 2024-04-19 PROCEDURE — 36415 COLL VENOUS BLD VENIPUNCTURE: CPT | Mod: PO | Performed by: INTERNAL MEDICINE

## 2024-04-19 PROCEDURE — 80053 COMPREHEN METABOLIC PANEL: CPT | Performed by: INTERNAL MEDICINE

## 2024-04-23 ENCOUNTER — OFFICE VISIT (OUTPATIENT)
Dept: PRIMARY CARE CLINIC | Facility: CLINIC | Age: 74
End: 2024-04-23
Payer: MEDICARE

## 2024-04-23 VITALS
BODY MASS INDEX: 26.57 KG/M2 | DIASTOLIC BLOOD PRESSURE: 70 MMHG | SYSTOLIC BLOOD PRESSURE: 110 MMHG | HEART RATE: 57 BPM | OXYGEN SATURATION: 96 % | HEIGHT: 70 IN | WEIGHT: 185.63 LBS

## 2024-04-23 DIAGNOSIS — Z12.12 ENCOUNTER FOR COLORECTAL CANCER SCREENING: ICD-10-CM

## 2024-04-23 DIAGNOSIS — Z12.5 SCREENING FOR MALIGNANT NEOPLASM OF PROSTATE: ICD-10-CM

## 2024-04-23 DIAGNOSIS — I25.119 ATHEROSCLEROSIS OF NATIVE CORONARY ARTERY OF NATIVE HEART WITH ANGINA PECTORIS: Primary | ICD-10-CM

## 2024-04-23 DIAGNOSIS — I70.0 AORTIC ATHEROSCLEROSIS: ICD-10-CM

## 2024-04-23 DIAGNOSIS — E78.2 HYPERLIPIDEMIA, MIXED: ICD-10-CM

## 2024-04-23 DIAGNOSIS — E11.36 TYPE 2 DIABETES MELLITUS WITH DIABETIC CATARACT, WITHOUT LONG-TERM CURRENT USE OF INSULIN: ICD-10-CM

## 2024-04-23 DIAGNOSIS — I11.9 HYPERTENSIVE HEART DISEASE WITHOUT HEART FAILURE: ICD-10-CM

## 2024-04-23 DIAGNOSIS — Z12.11 ENCOUNTER FOR COLORECTAL CANCER SCREENING: ICD-10-CM

## 2024-04-23 PROCEDURE — 1157F ADVNC CARE PLAN IN RCRD: CPT | Mod: CPTII,S$GLB,, | Performed by: INTERNAL MEDICINE

## 2024-04-23 PROCEDURE — 3078F DIAST BP <80 MM HG: CPT | Mod: CPTII,S$GLB,, | Performed by: INTERNAL MEDICINE

## 2024-04-23 PROCEDURE — 99214 OFFICE O/P EST MOD 30 MIN: CPT | Mod: S$GLB,,, | Performed by: INTERNAL MEDICINE

## 2024-04-23 PROCEDURE — 1159F MED LIST DOCD IN RCRD: CPT | Mod: CPTII,S$GLB,, | Performed by: INTERNAL MEDICINE

## 2024-04-23 PROCEDURE — 3008F BODY MASS INDEX DOCD: CPT | Mod: CPTII,S$GLB,, | Performed by: INTERNAL MEDICINE

## 2024-04-23 PROCEDURE — 4010F ACE/ARB THERAPY RXD/TAKEN: CPT | Mod: CPTII,S$GLB,, | Performed by: INTERNAL MEDICINE

## 2024-04-23 PROCEDURE — 99999 PR PBB SHADOW E&M-EST. PATIENT-LVL V: CPT | Mod: PBBFAC,,, | Performed by: INTERNAL MEDICINE

## 2024-04-23 PROCEDURE — 3044F HG A1C LEVEL LT 7.0%: CPT | Mod: CPTII,S$GLB,, | Performed by: INTERNAL MEDICINE

## 2024-04-23 PROCEDURE — 1126F AMNT PAIN NOTED NONE PRSNT: CPT | Mod: CPTII,S$GLB,, | Performed by: INTERNAL MEDICINE

## 2024-04-23 PROCEDURE — 3074F SYST BP LT 130 MM HG: CPT | Mod: CPTII,S$GLB,, | Performed by: INTERNAL MEDICINE

## 2024-04-23 NOTE — PROGRESS NOTES
[facility] Note    Chief Complaint      Chief Complaint   Patient presents with    Follow-up     6 month        History of Present Illness      Bandar French is a 73 y.o. male with chronic conditions of CAD, HTN, HLD, DM2, osteoarthritis who presents today for: follow up chronic conditions.  Had intertrigo rash treated with clotrimazole-betamethasone, then ketoconazole by derm.  Has been having more left lower extremity edema.  DM2: A1C 6.6, up from 6.1. Home monitoring has been at goal. Diet controlled.  Eye exam UTD with Dr. Shannon.  HTN: BP at goal on irbesartan, amlodipine.   CAD: Sees Dr. Tompkins, cardiology  On ASA, plavix, irbesartan, crestor.  S/P PCI x 2 1/2022.  Denies CP, SOB.    HLD: Controlled on crestor.  LDL 69.     Flu shot due. Prevnar 2015.  Pneumovax UTD.  Zostavax 2013.  Shingrix UTD.  COVID UTD.  Cscope Dr. Copeland, 2016, no polyps, 10 yr interval.     Past Medical History:  Past Medical History:   Diagnosis Date    Arthritis     Cataract     Coronary artery disease     Depression     Diabetes mellitus     Glaucoma     Hyperlipidemia     Hypertension     Retinal tear of left eye 2019       Past Surgical History:   has a past surgical history that includes Knee arthroscopy (Left, 1967); Knee arthroscopy (Right, 2012); Hand surgery (Right, 12/2004); Coronary stent placement (2014); Tonsillectomy; Total knee arthroplasty (Right, 07/22/2019); Adenoidectomy; Cosmetic surgery (Bilateral, 06/2019); Eye surgery (Left, 03/2019); Joint replacement (Right, 07/22/2019); Left heart catheterization (Left, 01/20/2022); Coronary angiography (N/A, 01/20/2022); and arthroplasty, knee, total, using computer-assisted navigation (Left, 5/15/2023).    Family History:  family history includes Aneurysm in his father; Dementia in his mother; Diabetes in his mother; Glaucoma in his brother; Heart disease in his brother; Migraines in his sister; No Known Problems in his daughter, son, and son; Other in his  brother and son; Pneumonia in his brother.     Social History:  Social History     Tobacco Use    Smoking status: Former     Current packs/day: 0.00     Average packs/day: 2.0 packs/day for 20.0 years (40.0 ttl pk-yrs)     Types: Cigarettes, Cigars     Start date: 1968     Quit date: 1986     Years since quittin.3    Smokeless tobacco: Never   Substance Use Topics    Alcohol use: Not Currently     Comment: Discontinued     Drug use: Never       I personally reviewed all past medical, surgical, social and family history.    Review of Systems   Constitutional:  Negative for chills, fever and malaise/fatigue.   Respiratory:  Negative for shortness of breath.    Cardiovascular:  Negative for chest pain.   Gastrointestinal:  Negative for constipation, diarrhea, nausea and vomiting.   Skin:  Negative for rash.   Neurological:  Negative for weakness.   All other systems reviewed and are negative.       Medications:  Outpatient Encounter Medications as of 2024   Medication Sig Note Dispense Refill    acetaminophen (TYLENOL) 650 MG TbSR Take 1 tablet (650 mg total) by mouth every 8 (eight) hours.  120 tablet 0    amLODIPine (NORVASC) 5 MG tablet TAKE 1 TABLET ONE TIME DAILY AT NIGHT  90 tablet 3    ascorbic acid, vitamin C, (VITAMIN C) 500 MG tablet Take 500 mg by mouth once daily.       aspirin (ECOTRIN) 81 MG EC tablet Aspirin       blood sugar diagnostic (BLOOD GLUCOSE TEST) Strp 1 each by Misc.(Non-Drug; Combo Route) route once daily.  30 each 11    chlorpheniramine (CHLOR-TRIMETON) 4 mg tablet Take 4 mg by mouth as needed.  2023: Take as needed.       cholecalciferol, vitamin D3, 125 mcg (5,000 unit) Tab Take 5,000 Units by mouth once daily.       clopidogreL (PLAVIX) 75 mg tablet Take 1 tablet (75 mg total) by mouth once daily.  90 tablet 3    clotrimazole-betamethasone 1-0.05% (LOTRISONE) cream Apply topically 2 (two) times daily.  45 g 0    cycloSPORINE (RESTASIS MULTIDOSE) 0.05 % Drop  Place 1 drop into both eyes every 12 (twelve) hours.  165 mL 4    dorzolamide-timolol 2-0.5% (COSOPT) 22.3-6.8 mg/mL ophthalmic solution Place 1 drop into both eyes 2 (two) times daily.  30 mL 4    doxycycline (VIBRA-TABS) 100 MG tablet Take 1 tablet (100 mg total) by mouth 2 (two) times daily.  14 tablet 1    folic acid/multivit-min/lutein (CENTRUM SILVER ORAL) Take 1 tablet by mouth once daily.       irbesartan (AVAPRO) 300 MG tablet TAKE 1 TABLET (300 MG TOTAL) BY MOUTH EVERY EVENING.  90 tablet 2    ketoconazole (NIZORAL) 2 % cream AAA in groin once a day at night for 2-4 weeks until flare resolves.  60 g 1    latanoprost 0.005 % ophthalmic solution Place 1 drop into both eyes every evening. INSTILL 1 DROP INTO BOTH EYES EVERY EVENING.  7.5 mL 4    magnesium oxide (MAG-OX) 400 mg (241.3 mg magnesium) tablet Take 250 mg by mouth once daily.       methylPREDNISolone (MEDROL DOSEPACK) 4 mg tablet FOLLOW PACKAGE DIRECTIONS (Patient not taking: Reported on 4/23/2024)       MICRO THIN LANCETS 33 gauge Misc        nitroGLYCERIN (NITROSTAT) 0.4 MG SL tablet Place 1 tablet (0.4 mg total) under the tongue every 5 (five) minutes as needed for Chest pain. 4/27/2023: Take as needed 25 tablet 11    rosuvastatin (CRESTOR) 40 MG Tab TAKE 1 TABLET EVERY EVENING  90 tablet 3    vit C-E-zinc ox-dayron-lut-zeax (ICAPS AREDS2) 250 mg-200 unit -12.5 mg-1 mg Cap Take 2 capsules by mouth once daily.       [DISCONTINUED] amoxicillin (AMOXIL) 500 MG Tab Take 500 mg by mouth every 8 (eight) hours.       [DISCONTINUED] celecoxib (CELEBREX) 200 MG capsule TAKE 1 CAPSULE(200 MG) BY MOUTH EVERY DAY  30 capsule 0     No facility-administered encounter medications on file as of 4/23/2024.       Allergies:  Review of patient's allergies indicates:  No Known Allergies    Health Maintenance:  Immunization History   Administered Date(s) Administered    COVID-19, MRNA, LN-S, PF (MODERNA FULL 0.5 ML DOSE) 03/10/2021, 03/10/2021, 04/12/2021    Influenza  "(FLUAD) - Quadrivalent - Adjuvanted - PF *Preferred* (65+) 09/16/2020    Influenza (FLUAD) - Trivalent - Adjuvanted - PF (65+) 01/03/2019    Influenza - High Dose - PF (65 years and older) 11/23/2015, 11/18/2016, 10/04/2017, 09/25/2019, 12/02/2022    Influenza - Quadrivalent - High Dose - PF (65 years and older) 10/21/2021    Pneumococcal Conjugate - 13 Valent 11/23/2015    Pneumococcal Polysaccharide - 23 Valent 09/25/2019    Tdap 10/21/2021    Zoster 01/02/2013    Zoster Recombinant 10/14/2020, 05/10/2021      Health Maintenance   Topic Date Due    Foot Exam  10/06/2023    Lipid Panel  10/05/2024    Hemoglobin A1c  10/19/2024    High Dose Statin  04/17/2025    Aspirin/Antiplatelet Therapy  04/17/2025    Eye Exam  04/17/2025    Colorectal Cancer Screening  09/09/2026    TETANUS VACCINE  10/21/2031    Hepatitis C Screening  Completed    Shingles Vaccine  Completed    Abdominal Aortic Aneurysm Screening  Completed        Physical Exam      Vital Signs  Pulse: (!) 57  SpO2: 96 %  BP: 110/70  BP Location: Left arm  Patient Position: Sitting  Pain Score: 0-No pain  Height and Weight  Height: 5' 10" (177.8 cm)  Weight: 84.2 kg (185 lb 10 oz)  BSA (Calculated - sq m): 2.04 sq meters  BMI (Calculated): 26.6  Weight in (lb) to have BMI = 25: 173.9]    Physical Exam  Vitals reviewed.   Constitutional:       Appearance: He is well-developed.   HENT:      Head: Normocephalic and atraumatic.      Right Ear: External ear normal.      Left Ear: External ear normal.   Cardiovascular:      Rate and Rhythm: Normal rate and regular rhythm.      Heart sounds: Normal heart sounds. No murmur heard.  Pulmonary:      Effort: Pulmonary effort is normal.      Breath sounds: Normal breath sounds. No wheezing or rales.   Abdominal:      General: Bowel sounds are normal.      Palpations: Abdomen is soft.          Laboratory:  CBC:  Recent Labs   Lab 04/27/23  1357 05/19/23  1004 03/13/24  1556   WBC 8.13 12.91 H 8.11   RBC 4.28 L 3.19 L 4.32 L "   Hemoglobin 12.8 L 9.6 L 12.8 L   Hematocrit 37.5 L 28.0 L 37.2 L   Platelets 179 153 181   MCV 88 88 86   MCH 29.9 30.1 29.6   MCHC 34.1 34.3 34.4     CMP:  Recent Labs   Lab 05/19/23  0801 10/05/23  0726 04/19/24  0839   Glucose 158 H 118 H 161 H   Calcium 8.7 9.1 9.3   Albumin 3.8 4.2 3.9   Total Protein 6.6 7.0 6.5   Sodium 141 140 141   Potassium 4.6 4.9 4.4   CO2 24 28 30 H   Chloride 110 106 106   BUN 15 15 15   Alkaline Phosphatase 62 68 72   ALT 18 21 30   AST 20 17 18   Total Bilirubin 0.9 0.9 0.7     URINALYSIS:  Recent Labs   Lab 05/19/23  1013   Color, UA Yellow   Specific Gravity, UA 1.020   pH, UA 7.0   Protein, UA Trace A   Nitrite, UA Negative   Leukocytes, UA Negative   Urobilinogen, UA Negative      LIPIDS:  Recent Labs   Lab 09/26/22  0744 03/27/23  0719 10/05/23  0726   HDL 25 L 33 L 32 L   Cholesterol 103 L 102 L 124   Triglycerides 143 85 112   LDL Cholesterol 49.4 L 52.0 L 69.6   HDL/Cholesterol Ratio 24.3 32.4 25.8   Non-HDL Cholesterol 78 69 92   Total Cholesterol/HDL Ratio 4.1 3.1 3.9     TSH:      A1C:  Recent Labs   Lab 09/14/21  0752 03/16/22  1000 09/26/22  0744 03/27/23  0719 10/05/23  0726 04/19/24  0839   Hemoglobin A1C 6.2 H 6.3 H 5.8 H  5.8 H 6.1 H 6.1 H 6.6 H       Assessment/Plan     Bandar French is a 73 y.o.male with:    1. Atherosclerosis of native coronary artery of native heart with angina pectoris  2. Aortic atherosclerosis  Continue current meds.    3. Hypertensive heart disease without heart failure  Continue current meds.    4. Hyperlipidemia, mixed  - Comprehensive Metabolic Panel; Future  - Lipid Panel; Future  Continue current meds.    5. Type 2 diabetes mellitus with diabetic cataract, without long-term current use of insulin  - Comprehensive Metabolic Panel; Future  - Hemoglobin A1C; Future  - Microalbumin/Creatinine Ratio, Urine; Future  Continue current meds.  Eye exam UTD.  6. Encounter for colorectal cancer screening    7. Screening for malignant neoplasm  of prostate  - PSA, Screening; Future       Chronic conditions status updated as per HPI.  Other than changes above, cont current medications and maintain follow up with specialists.  No follow-ups on file.    Future Appointments   Date Time Provider Department Center   8/14/2024 10:00 AM Alejandra Omalley MD [facility]   10/30/2024  8:15 AM Gunnar Rangel MD [facility]       Gunnar Rangel MD  [facility]

## 2024-04-25 ENCOUNTER — PATIENT MESSAGE (OUTPATIENT)
Dept: PRIMARY CARE CLINIC | Facility: CLINIC | Age: 74
End: 2024-04-25
Payer: MEDICARE

## 2024-04-25 DIAGNOSIS — I25.119 ATHEROSCLEROSIS OF NATIVE CORONARY ARTERY OF NATIVE HEART WITH ANGINA PECTORIS: Primary | ICD-10-CM

## 2024-04-25 RX ORDER — CLOPIDOGREL BISULFATE 75 MG/1
75 TABLET ORAL
Qty: 90 TABLET | Refills: 3 | Status: SHIPPED | OUTPATIENT
Start: 2024-04-25 | End: 2024-04-26 | Stop reason: SDUPTHER

## 2024-04-26 RX ORDER — CLOPIDOGREL BISULFATE 75 MG/1
75 TABLET ORAL DAILY
Qty: 90 TABLET | Refills: 3 | Status: SHIPPED | OUTPATIENT
Start: 2024-04-26

## 2024-04-26 RX ORDER — CLOPIDOGREL BISULFATE 75 MG/1
75 TABLET ORAL DAILY
Qty: 90 TABLET | Refills: 3 | Status: SHIPPED | OUTPATIENT
Start: 2024-04-26 | End: 2024-04-26 | Stop reason: SDUPTHER

## 2024-04-29 ENCOUNTER — PATIENT MESSAGE (OUTPATIENT)
Dept: CARDIOLOGY | Facility: CLINIC | Age: 74
End: 2024-04-29
Payer: MEDICARE

## 2024-05-01 ENCOUNTER — TELEPHONE (OUTPATIENT)
Dept: DERMATOLOGY | Facility: CLINIC | Age: 74
End: 2024-05-01
Payer: MEDICARE

## 2024-05-01 ENCOUNTER — PATIENT MESSAGE (OUTPATIENT)
Dept: DERMATOLOGY | Facility: CLINIC | Age: 74
End: 2024-05-01
Payer: MEDICARE

## 2024-05-01 NOTE — TELEPHONE ENCOUNTER
I would like him to stay on Plavix if he does not have any major issues with it.  If he would like to stop he can stop the aspirin 81 mg and continue the Plavix

## 2024-05-10 ENCOUNTER — PATIENT MESSAGE (OUTPATIENT)
Dept: OTHER | Facility: OTHER | Age: 74
End: 2024-05-10
Payer: MEDICARE

## 2024-05-13 ENCOUNTER — PATIENT MESSAGE (OUTPATIENT)
Dept: ADMINISTRATIVE | Facility: OTHER | Age: 74
End: 2024-05-13
Payer: MEDICARE

## 2024-07-10 ENCOUNTER — PATIENT MESSAGE (OUTPATIENT)
Dept: PRIMARY CARE CLINIC | Facility: CLINIC | Age: 74
End: 2024-07-10
Payer: MEDICARE

## 2024-07-10 NOTE — TELEPHONE ENCOUNTER
Pt reports decreasing prescribed Crestor 40 mg daily, only takes Mon, Wed, & Fri    Would like to know if he can switch to ezetimibe as he has read that it has the least side effects

## 2024-07-10 NOTE — TELEPHONE ENCOUNTER
Despite the potential effects of statins on the body, they are still the most heavily studied and first-line recommend cholesterol reducing medication in patients with known coronary artery disease.  I'd recommend continuing on the crestor if possible.

## 2024-07-17 ENCOUNTER — PATIENT MESSAGE (OUTPATIENT)
Dept: OTHER | Facility: OTHER | Age: 74
End: 2024-07-17
Payer: MEDICARE

## 2024-08-02 ENCOUNTER — PATIENT MESSAGE (OUTPATIENT)
Dept: ADMINISTRATIVE | Facility: OTHER | Age: 74
End: 2024-08-02
Payer: MEDICARE

## 2024-08-02 ENCOUNTER — TELEPHONE (OUTPATIENT)
Dept: PRIMARY CARE CLINIC | Facility: CLINIC | Age: 74
End: 2024-08-02
Payer: MEDICARE

## 2024-08-05 ENCOUNTER — OFFICE VISIT (OUTPATIENT)
Dept: DERMATOLOGY | Facility: CLINIC | Age: 74
End: 2024-08-05
Payer: MEDICARE

## 2024-08-05 DIAGNOSIS — L82.1 SK (SEBORRHEIC KERATOSIS): ICD-10-CM

## 2024-08-05 DIAGNOSIS — D18.01 CHERRY ANGIOMA: ICD-10-CM

## 2024-08-05 DIAGNOSIS — Z80.8 FAMILY HX OF MELANOMA: ICD-10-CM

## 2024-08-05 DIAGNOSIS — L81.4 LENTIGO: Primary | ICD-10-CM

## 2024-08-05 DIAGNOSIS — D22.9 NEVUS: ICD-10-CM

## 2024-08-05 DIAGNOSIS — L57.0 AK (ACTINIC KERATOSIS): ICD-10-CM

## 2024-08-05 PROCEDURE — 99999 PR PBB SHADOW E&M-EST. PATIENT-LVL III: CPT | Mod: PBBFAC,HCNC,, | Performed by: DERMATOLOGY

## 2024-08-05 PROCEDURE — 1101F PT FALLS ASSESS-DOCD LE1/YR: CPT | Mod: HCNC,CPTII,S$GLB, | Performed by: DERMATOLOGY

## 2024-08-05 PROCEDURE — 1159F MED LIST DOCD IN RCRD: CPT | Mod: HCNC,CPTII,S$GLB, | Performed by: DERMATOLOGY

## 2024-08-05 PROCEDURE — 1160F RVW MEDS BY RX/DR IN RCRD: CPT | Mod: HCNC,CPTII,S$GLB, | Performed by: DERMATOLOGY

## 2024-08-05 PROCEDURE — 17003 DESTRUCT PREMALG LES 2-14: CPT | Mod: HCNC,S$GLB,, | Performed by: DERMATOLOGY

## 2024-08-05 PROCEDURE — 3288F FALL RISK ASSESSMENT DOCD: CPT | Mod: HCNC,CPTII,S$GLB, | Performed by: DERMATOLOGY

## 2024-08-05 PROCEDURE — 1126F AMNT PAIN NOTED NONE PRSNT: CPT | Mod: HCNC,CPTII,S$GLB, | Performed by: DERMATOLOGY

## 2024-08-05 PROCEDURE — 4010F ACE/ARB THERAPY RXD/TAKEN: CPT | Mod: HCNC,CPTII,S$GLB, | Performed by: DERMATOLOGY

## 2024-08-05 PROCEDURE — 3044F HG A1C LEVEL LT 7.0%: CPT | Mod: HCNC,CPTII,S$GLB, | Performed by: DERMATOLOGY

## 2024-08-05 PROCEDURE — 1157F ADVNC CARE PLAN IN RCRD: CPT | Mod: HCNC,CPTII,S$GLB, | Performed by: DERMATOLOGY

## 2024-08-05 PROCEDURE — 99213 OFFICE O/P EST LOW 20 MIN: CPT | Mod: 25,HCNC,S$GLB, | Performed by: DERMATOLOGY

## 2024-08-05 PROCEDURE — 17000 DESTRUCT PREMALG LESION: CPT | Mod: HCNC,S$GLB,, | Performed by: DERMATOLOGY

## 2024-08-26 ENCOUNTER — PATIENT MESSAGE (OUTPATIENT)
Dept: PRIMARY CARE CLINIC | Facility: CLINIC | Age: 74
End: 2024-08-26
Payer: MEDICARE

## 2024-08-26 DIAGNOSIS — H04.129 DRY EYE: ICD-10-CM

## 2024-08-26 DIAGNOSIS — M35.01 KERATOCONJUNCTIVITIS SICCA: ICD-10-CM

## 2024-08-26 DIAGNOSIS — H02.886 MEIBOMIAN GLAND DYSFUNCTION (MGD) OF BOTH EYES: ICD-10-CM

## 2024-08-26 DIAGNOSIS — H02.883 MEIBOMIAN GLAND DYSFUNCTION (MGD) OF BOTH EYES: ICD-10-CM

## 2024-08-27 RX ORDER — NEOMYCIN SULFATE, POLYMYXIN B SULFATE, AND DEXAMETHASONE 3.5; 10000; 1 MG/G; [USP'U]/G; MG/G
OINTMENT OPHTHALMIC 2 TIMES DAILY
Qty: 3.5 G | Refills: 1 | Status: SHIPPED | OUTPATIENT
Start: 2024-08-27 | End: 2024-09-06

## 2024-08-27 NOTE — TELEPHONE ENCOUNTER
Pt is requesting for an  script to be refilled, states he is experiencing blepharitis around his eyes. Pt has an upcoming appt on  with a new eye doctor. Ointment pended for review

## 2024-08-29 ENCOUNTER — OFFICE VISIT (OUTPATIENT)
Dept: INTERNAL MEDICINE | Facility: CLINIC | Age: 74
End: 2024-08-29
Payer: MEDICARE

## 2024-08-29 VITALS
OXYGEN SATURATION: 97 % | SYSTOLIC BLOOD PRESSURE: 112 MMHG | HEIGHT: 70 IN | DIASTOLIC BLOOD PRESSURE: 60 MMHG | BODY MASS INDEX: 27.15 KG/M2 | WEIGHT: 189.63 LBS | HEART RATE: 60 BPM

## 2024-08-29 DIAGNOSIS — I11.9 HYPERTENSIVE HEART DISEASE WITHOUT HEART FAILURE: ICD-10-CM

## 2024-08-29 DIAGNOSIS — Z95.5 PRESENCE OF STENT IN CORONARY ARTERY: Chronic | ICD-10-CM

## 2024-08-29 DIAGNOSIS — I10 ESSENTIAL HYPERTENSION: Chronic | ICD-10-CM

## 2024-08-29 DIAGNOSIS — Z00.00 ENCOUNTER FOR MEDICARE ANNUAL WELLNESS EXAM: ICD-10-CM

## 2024-08-29 DIAGNOSIS — Z00.00 ENCOUNTER FOR PREVENTIVE HEALTH EXAMINATION: Primary | ICD-10-CM

## 2024-08-29 DIAGNOSIS — E78.2 HYPERLIPIDEMIA, MIXED: Chronic | ICD-10-CM

## 2024-08-29 DIAGNOSIS — E11.36 TYPE 2 DIABETES MELLITUS WITH DIABETIC CATARACT, WITHOUT LONG-TERM CURRENT USE OF INSULIN: ICD-10-CM

## 2024-08-29 DIAGNOSIS — H81.10 BENIGN PAROXYSMAL POSITIONAL VERTIGO, UNSPECIFIED LATERALITY: ICD-10-CM

## 2024-08-29 DIAGNOSIS — I25.119 ATHEROSCLEROSIS OF NATIVE CORONARY ARTERY OF NATIVE HEART WITH ANGINA PECTORIS: ICD-10-CM

## 2024-08-29 DIAGNOSIS — M35.01 KERATOCONJUNCTIVITIS SICCA: ICD-10-CM

## 2024-08-29 DIAGNOSIS — K76.0 STEATOSIS OF LIVER: ICD-10-CM

## 2024-08-29 DIAGNOSIS — I70.0 AORTIC ATHEROSCLEROSIS: ICD-10-CM

## 2024-08-29 PROBLEM — M25.562 CHRONIC PAIN OF LEFT KNEE: Status: RESOLVED | Noted: 2023-04-25 | Resolved: 2024-08-29

## 2024-08-29 PROBLEM — E11.9 TYPE 2 DIABETES MELLITUS: Status: ACTIVE | Noted: 2024-08-29

## 2024-08-29 PROBLEM — E11.65 TYPE 2 DIABETES MELLITUS WITH HYPERGLYCEMIA: Status: ACTIVE | Noted: 2024-08-29

## 2024-08-29 PROBLEM — Z01.818 PRE-OP EVALUATION: Status: RESOLVED | Noted: 2023-04-27 | Resolved: 2024-08-29

## 2024-08-29 PROBLEM — R29.898 DECREASED STRENGTH INVOLVING KNEE JOINT: Status: RESOLVED | Noted: 2023-05-18 | Resolved: 2024-08-29

## 2024-08-29 PROBLEM — G89.29 CHRONIC PAIN OF LEFT KNEE: Status: RESOLVED | Noted: 2023-04-25 | Resolved: 2024-08-29

## 2024-08-29 PROBLEM — H16.229 KERATOCONJUNCTIVITIS SICCA: Status: ACTIVE | Noted: 2024-08-29

## 2024-08-29 PROBLEM — E11.9 TYPE 2 DIABETES MELLITUS: Status: RESOLVED | Noted: 2024-08-29 | Resolved: 2024-08-29

## 2024-08-29 PROCEDURE — 99999 PR PBB SHADOW E&M-EST. PATIENT-LVL IV: CPT | Mod: PBBFAC,HCNC,, | Performed by: NURSE PRACTITIONER

## 2024-08-29 NOTE — PATIENT INSTRUCTIONS
Counseling and Referral of Other Preventative  (Italic type indicates deductible and co-insurance are waived)    Patient Name: Bandar French  Today's Date: 8/29/2024    Health Maintenance       Date Due Completion Date    RSV Vaccine (Age 60+ and Pregnant patients) (1 - 1-dose 60+ series) Never done ---    COVID-19 Vaccine (4 - 2023-24 season) 09/01/2023 4/12/2021    Diabetes Urine Screening 09/26/2023 9/26/2022    Foot Exam 10/06/2023 10/6/2022 (Done)    Override on 10/6/2022: Done    Override on 9/16/2020: Done    Influenza Vaccine (1) 09/01/2024 12/2/2022    Lipid Panel 10/05/2024 10/5/2023    Hemoglobin A1c 10/19/2024 4/19/2024    High Dose Statin 04/17/2025 4/17/2024    Eye Exam 04/17/2025 4/17/2024    Aspirin/Antiplatelet Therapy 04/26/2025 4/26/2024    Colorectal Cancer Screening 09/09/2026 9/9/2016    TETANUS VACCINE 10/21/2031 10/21/2021        No orders of the defined types were placed in this encounter.      The following information is provided to all patients.  This information is to help you find resources for any of the problems found today that may be affecting your health:                  Living healthy guide: www.[facility].[place].gov      Understanding Diabetes: www.diabetes.org      Eating healthy: www.cdc.gov/healthyweight      CDC home safety checklist: www.cdc.gov/steadi/patient.html      Agency on Aging: www.goea.[place].[facility]      Alcoholics anonymous (AA): www.aa.org      Physical Activity: www.antoinette.nih.gov/pj2tazp      Tobacco use: www.quitwithusla.org

## 2024-08-29 NOTE — PROGRESS NOTES
"  Bandar French presented for a follow-up Medicare AWV today. The following components were reviewed and updated:    Medical history  Family History  Social history  Allergies and Current Medications  Health Risk Assessment  Health Maintenance  Care Team    **See Completed Assessments for Annual Wellness visit with in the encounter summary    The following assessments were completed:  Depression Screening  Cognitive function Screening    Timed Get Up Test  Whisper Test      Opioid documentation:      Patient does not have a current opioid prescription.          Vitals:    08/29/24 1305   BP: 112/60   BP Location: Left arm   Pulse: 60   SpO2: 97%   Weight: 86 kg (189 lb 9.5 oz)   Height: 5' 10" (1.778 m)     Body mass index is 27.2 kg/m².       Physical Exam  Vitals and nursing note reviewed.   Constitutional:       Appearance: He is well-developed.   HENT:      Head: Normocephalic.   Cardiovascular:      Rate and Rhythm: Normal rate and regular rhythm.      Heart sounds: Normal heart sounds. No murmur heard.  Pulmonary:      Effort: Pulmonary effort is normal.      Breath sounds: Normal breath sounds.   Abdominal:      General: Bowel sounds are normal.      Palpations: Abdomen is soft.   Musculoskeletal:         General: Normal range of motion.   Skin:     General: Skin is warm and dry.   Neurological:      Mental Status: He is alert and oriented to person, place, and time.      Motor: No abnormal muscle tone.   Psychiatric:         Mood and Affect: Mood normal.            Diagnoses and health risks identified today and associated recommendations/orders:    1. Encounter for preventive health examination  Here for Health Risk Assessment/Annual Wellness Visit.  Health maintenance reviewed and updated. Follow up in one year.     2. Essential hypertension  Chronic, at goal on current medications. Followed by PCP, Cardiology, Digital Medicine.     3. Aortic atherosclerosis  Chronic, stable on current medications. Noted " CXR 1/20/22. Followed by PCP, Cardiology.     4. Atherosclerosis of native coronary artery of native heart with angina pectoris  Chronic, stable on current medications. Reports episode of CP relieved with NTG approximately 3 months ago. Followed by PCP, Cardiology.     5. Hypertensive heart disease without heart failure  Chronic, stable on current medications. No C/O CP/Dyspnea. Followed by PCP, Cardiology.     6. Presence of stent in coronary artery  Stable on current medications. Followed by PCP, Cardiology.     7. Hyperlipidemia, mixed  Chronic, at goal on current medications. Followed by PCP, Cardiology.     8. Type 2 diabetes mellitus with diabetic cataract, without long-term current use of insulin  New onset. A1c 6.6, previous 6.1. Reinforced diet. Reports he is exercising 4 times weekly. Followed by PCP.    9. Steatosis of liver  Chronic, LFT WNL. Followed by PCP.    10. Benign paroxysmal positional vertigo, unspecified laterality  Chronic, stable. Reports no recent episodes. Followed by PCP, Cardiology.    11. Keratoconjunctivitis sicca  Chronic, stable. Followed by Ophthalmology/Optometry.    12. Encounter for Medicare annual wellness exam  - Ambulatory Referral/Consult to Enhanced Annual Wellness Visit (eAWV)      Provided Bandar with a 5-10 year written screening schedule and personal prevention plan. Recommendations were developed using the USPSTF age appropriate recommendations. Education, counseling, and referrals were provided as needed.  After Visit Summary printed and given to patient which includes a list of additional screenings\tests needed.    Follow up in 2 months (on 10/30/2024).with PCP      Argentina Patel NP

## 2024-09-04 ENCOUNTER — OFFICE VISIT (OUTPATIENT)
Dept: OPHTHALMOLOGY | Facility: CLINIC | Age: 74
End: 2024-09-04
Payer: MEDICARE

## 2024-09-04 DIAGNOSIS — H02.402 PTOSIS OF LEFT EYELID: ICD-10-CM

## 2024-09-04 DIAGNOSIS — H25.813 COMBINED FORMS OF AGE-RELATED CATARACT OF BOTH EYES: ICD-10-CM

## 2024-09-04 DIAGNOSIS — E11.9 DIABETES MELLITUS TYPE 2 WITHOUT RETINOPATHY: ICD-10-CM

## 2024-09-04 DIAGNOSIS — H04.129 DRY EYE: ICD-10-CM

## 2024-09-04 DIAGNOSIS — H40.1431 PSEUDOEXFOLIATION (PXF) OPEN-ANGLE GLAUCOMA OF BOTH EYES, MILD STAGE: Primary | ICD-10-CM

## 2024-09-04 PROCEDURE — G2211 COMPLEX E/M VISIT ADD ON: HCPCS | Mod: HCNC,S$GLB,, | Performed by: STUDENT IN AN ORGANIZED HEALTH CARE EDUCATION/TRAINING PROGRAM

## 2024-09-04 PROCEDURE — 3288F FALL RISK ASSESSMENT DOCD: CPT | Mod: HCNC,CPTII,S$GLB, | Performed by: STUDENT IN AN ORGANIZED HEALTH CARE EDUCATION/TRAINING PROGRAM

## 2024-09-04 PROCEDURE — 4010F ACE/ARB THERAPY RXD/TAKEN: CPT | Mod: HCNC,CPTII,S$GLB, | Performed by: STUDENT IN AN ORGANIZED HEALTH CARE EDUCATION/TRAINING PROGRAM

## 2024-09-04 PROCEDURE — 92020 GONIOSCOPY: CPT | Mod: HCNC,S$GLB,, | Performed by: STUDENT IN AN ORGANIZED HEALTH CARE EDUCATION/TRAINING PROGRAM

## 2024-09-04 PROCEDURE — 1157F ADVNC CARE PLAN IN RCRD: CPT | Mod: HCNC,CPTII,S$GLB, | Performed by: STUDENT IN AN ORGANIZED HEALTH CARE EDUCATION/TRAINING PROGRAM

## 2024-09-04 PROCEDURE — 3044F HG A1C LEVEL LT 7.0%: CPT | Mod: HCNC,CPTII,S$GLB, | Performed by: STUDENT IN AN ORGANIZED HEALTH CARE EDUCATION/TRAINING PROGRAM

## 2024-09-04 PROCEDURE — 1101F PT FALLS ASSESS-DOCD LE1/YR: CPT | Mod: HCNC,CPTII,S$GLB, | Performed by: STUDENT IN AN ORGANIZED HEALTH CARE EDUCATION/TRAINING PROGRAM

## 2024-09-04 PROCEDURE — 1159F MED LIST DOCD IN RCRD: CPT | Mod: HCNC,CPTII,S$GLB, | Performed by: STUDENT IN AN ORGANIZED HEALTH CARE EDUCATION/TRAINING PROGRAM

## 2024-09-04 PROCEDURE — 92133 CPTRZD OPH DX IMG PST SGM ON: CPT | Mod: HCNC,S$GLB,, | Performed by: STUDENT IN AN ORGANIZED HEALTH CARE EDUCATION/TRAINING PROGRAM

## 2024-09-04 PROCEDURE — 99214 OFFICE O/P EST MOD 30 MIN: CPT | Mod: HCNC,S$GLB,, | Performed by: STUDENT IN AN ORGANIZED HEALTH CARE EDUCATION/TRAINING PROGRAM

## 2024-09-04 PROCEDURE — 99999 PR PBB SHADOW E&M-EST. PATIENT-LVL III: CPT | Mod: PBBFAC,HCNC,, | Performed by: STUDENT IN AN ORGANIZED HEALTH CARE EDUCATION/TRAINING PROGRAM

## 2024-09-04 PROCEDURE — 1126F AMNT PAIN NOTED NONE PRSNT: CPT | Mod: HCNC,CPTII,S$GLB, | Performed by: STUDENT IN AN ORGANIZED HEALTH CARE EDUCATION/TRAINING PROGRAM
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
## 2024-09-24 ENCOUNTER — PATIENT MESSAGE (OUTPATIENT)
Dept: PRIMARY CARE CLINIC | Facility: CLINIC | Age: 74
End: 2024-09-24
Payer: MEDICARE

## 2024-09-25 ENCOUNTER — OFFICE VISIT (OUTPATIENT)
Dept: URGENT CARE | Facility: CLINIC | Age: 74
End: 2024-09-25
Payer: MEDICARE

## 2024-09-25 VITALS
HEART RATE: 66 BPM | HEIGHT: 70 IN | RESPIRATION RATE: 20 BRPM | OXYGEN SATURATION: 95 % | TEMPERATURE: 99 F | BODY MASS INDEX: 26.35 KG/M2 | WEIGHT: 184.06 LBS | DIASTOLIC BLOOD PRESSURE: 68 MMHG | SYSTOLIC BLOOD PRESSURE: 118 MMHG

## 2024-09-25 DIAGNOSIS — U07.1 COVID-19: Primary | ICD-10-CM

## 2024-09-25 DIAGNOSIS — U07.1 COVID-19 VIRUS DETECTED: ICD-10-CM

## 2024-09-25 PROBLEM — I48.91 UNSPECIFIED ATRIAL FIBRILLATION: Status: ACTIVE | Noted: 2024-09-25

## 2024-09-25 PROBLEM — M46.90 INFLAMMATION AND STIFFENING OF SPINE: Status: ACTIVE | Noted: 2024-09-25

## 2024-09-25 PROBLEM — M35.01 SJOGREN SYNDROME WITH KERATOCONJUNCTIVITIS: Status: ACTIVE | Noted: 2024-09-25

## 2024-09-25 PROBLEM — E11.9 TYPE 2 DIABETES MELLITUS WITHOUT COMPLICATIONS: Status: ACTIVE | Noted: 2024-09-25

## 2024-09-25 LAB
CTP QC/QA: YES
CTP QC/QA: YES
POC MOLECULAR INFLUENZA A AGN: NEGATIVE
POC MOLECULAR INFLUENZA B AGN: NEGATIVE
SARS-COV-2 AG RESP QL IA.RAPID: POSITIVE

## 2024-09-25 PROCEDURE — 87811 SARS-COV-2 COVID19 W/OPTIC: CPT | Mod: QW,S$GLB,, | Performed by: PHYSICIAN ASSISTANT

## 2024-09-25 PROCEDURE — 99213 OFFICE O/P EST LOW 20 MIN: CPT | Mod: S$GLB,,, | Performed by: PHYSICIAN ASSISTANT

## 2024-09-25 PROCEDURE — 87502 INFLUENZA DNA AMP PROBE: CPT | Mod: QW,S$GLB,, | Performed by: PHYSICIAN ASSISTANT

## 2024-09-25 RX ORDER — BENZONATATE 200 MG/1
200 CAPSULE ORAL 3 TIMES DAILY PRN
Qty: 30 CAPSULE | Refills: 0 | Status: SHIPPED | OUTPATIENT
Start: 2024-09-25 | End: 2024-10-05

## 2024-09-25 RX ORDER — FLUTICASONE PROPIONATE 50 MCG
1 SPRAY, SUSPENSION (ML) NASAL DAILY
Qty: 16 G | Refills: 0 | Status: SHIPPED | OUTPATIENT
Start: 2024-09-25 | End: 2024-10-02

## 2024-09-25 RX ORDER — PROMETHAZINE HYDROCHLORIDE AND DEXTROMETHORPHAN HYDROBROMIDE 6.25; 15 MG/5ML; MG/5ML
5 SYRUP ORAL NIGHTLY PRN
Qty: 50 ML | Refills: 0 | Status: SHIPPED | OUTPATIENT
Start: 2024-09-25 | End: 2024-10-05

## 2024-09-25 NOTE — PROGRESS NOTES
"Subjective:      Patient ID: Bandar French is a 74 y.o. male.    Vitals:  height is 5' 10" (1.778 m) and weight is 83.5 kg (184 lb 1.4 oz). His oral temperature is 98.5 °F (36.9 °C). His blood pressure is 118/68 and his pulse is 66. His respiration is 20 and oxygen saturation is 95%.     Chief Complaint: Cough    Pt present with symptoms of- Coughing, Runny Nose, Congestion, Headache and Fatigued X 3 weeks, but has been getting worse.     Cough  This is a new problem. The current episode started 1 to 4 weeks ago. The problem has been gradually worsening. The problem occurs every few minutes. The cough is Non-productive. Associated symptoms include headaches and nasal congestion. Pertinent negatives include no sore throat. Nothing aggravates the symptoms. Treatments tried: Mucinex. The treatment provided mild relief. There is no history of asthma, bronchitis or COPD.     HENT:  Negative for sore throat.    Respiratory:  Positive for cough.    Neurological:  Positive for headaches.    Objective:     Physical Exam  Results for orders placed or performed in visit on 09/25/24   SARS Coronavirus 2 Antigen, POCT Manual Read   Result Value Ref Range    SARS Coronavirus 2 Antigen Positive (A) Negative     Acceptable Yes    POCT Influenza A/B MOLECULAR   Result Value Ref Range    POC Molecular Influenza A Ag Negative Negative    POC Molecular Influenza B Ag Negative Negative     Acceptable Yes        Assessment:     No diagnosis found.    Plan:       There are no diagnoses linked to this encounter.    Results reviewed  I have reviewed the patient chart and pertinent past imaging/labs.  Pa student radha fontana              "
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
## 2024-09-26 NOTE — PATIENT INSTRUCTIONS
You have tested positive for COVID-19 today.  You declined COVID medication.  Use Tessalon as needed for cough during the day.  Use cough syrup at night this can be sedating please do not drink alcohol or drive with this medication.  Take tylenol/advil with food as needed for fever/pain. Flonase for nasal congestion. Stay hydrated, drink plenty of water.  ISOLATION  New CDC guidelines state that you no longer have to quarantine as long as you were fever free for 24 hours without the use of ibuprofen or Tylenol.  It is recommended that you wear a mask for 5 days after you are 24 hours fever free to prevent spreading.

## 2024-10-04 ENCOUNTER — PATIENT OUTREACH (OUTPATIENT)
Dept: ADMINISTRATIVE | Facility: HOSPITAL | Age: 74
End: 2024-10-04
Payer: MEDICARE

## 2024-10-04 NOTE — PROGRESS NOTES
Health Maintenance Due   Topic Date Due    RSV Vaccine (Age 60+ and Pregnant patients) (1 - Risk 60-74 years 1-dose series) Never done    Diabetes Urine Screening  09/26/2023    Foot Exam  10/06/2023    Influenza Vaccine (1) 09/01/2024    COVID-19 Vaccine (4 - 2024-25 season) 09/01/2024    Lipid Panel  10/05/2024    Hemoglobin A1c  10/19/2024     Chart review completed. HM Updated. Triggered Links. Immunizations reviewed and updated. Care Everywhere Updated. Care Team Updated.  MA Gap Report chart review.

## 2024-10-25 ENCOUNTER — LAB VISIT (OUTPATIENT)
Dept: LAB | Facility: HOSPITAL | Age: 74
End: 2024-10-25
Attending: INTERNAL MEDICINE
Payer: MEDICARE

## 2024-10-25 DIAGNOSIS — E11.36 TYPE 2 DIABETES MELLITUS WITH DIABETIC CATARACT, WITHOUT LONG-TERM CURRENT USE OF INSULIN: ICD-10-CM

## 2024-10-25 DIAGNOSIS — E78.2 HYPERLIPIDEMIA, MIXED: ICD-10-CM

## 2024-10-25 DIAGNOSIS — Z12.5 SCREENING FOR MALIGNANT NEOPLASM OF PROSTATE: ICD-10-CM

## 2024-10-25 LAB
ALBUMIN SERPL BCP-MCNC: 4 G/DL (ref 3.5–5.2)
ALBUMIN/CREAT UR: NORMAL UG/MG (ref 0–30)
ALP SERPL-CCNC: 70 U/L (ref 40–150)
ALT SERPL W/O P-5'-P-CCNC: 19 U/L (ref 10–44)
ANION GAP SERPL CALC-SCNC: 8 MMOL/L (ref 8–16)
AST SERPL-CCNC: 18 U/L (ref 10–40)
BILIRUB SERPL-MCNC: 0.5 MG/DL (ref 0.1–1)
BUN SERPL-MCNC: 18 MG/DL (ref 8–23)
CALCIUM SERPL-MCNC: 9.1 MG/DL (ref 8.7–10.5)
CHLORIDE SERPL-SCNC: 108 MMOL/L (ref 95–110)
CHOLEST SERPL-MCNC: 120 MG/DL (ref 120–199)
CHOLEST/HDLC SERPL: 4.3 {RATIO} (ref 2–5)
CO2 SERPL-SCNC: 26 MMOL/L (ref 23–29)
COMPLEXED PSA SERPL-MCNC: 0.91 NG/ML (ref 0–4)
CREAT SERPL-MCNC: 0.9 MG/DL (ref 0.5–1.4)
CREAT UR-MCNC: 64 MG/DL (ref 23–375)
EST. GFR  (NO RACE VARIABLE): >60 ML/MIN/1.73 M^2
ESTIMATED AVG GLUCOSE: 126 MG/DL (ref 68–131)
GLUCOSE SERPL-MCNC: 133 MG/DL (ref 70–110)
HBA1C MFR BLD: 6 % (ref 4–5.6)
HDLC SERPL-MCNC: 28 MG/DL (ref 40–75)
HDLC SERPL: 23.3 % (ref 20–50)
LDLC SERPL CALC-MCNC: 70.4 MG/DL (ref 63–159)
MICROALBUMIN UR DL<=1MG/L-MCNC: <5 UG/ML
NONHDLC SERPL-MCNC: 92 MG/DL
POTASSIUM SERPL-SCNC: 4.7 MMOL/L (ref 3.5–5.1)
PROT SERPL-MCNC: 6.7 G/DL (ref 6–8.4)
SODIUM SERPL-SCNC: 142 MMOL/L (ref 136–145)
TRIGL SERPL-MCNC: 108 MG/DL (ref 30–150)

## 2024-10-25 PROCEDURE — 82043 UR ALBUMIN QUANTITATIVE: CPT | Mod: HCNC | Performed by: INTERNAL MEDICINE

## 2024-10-25 PROCEDURE — 80053 COMPREHEN METABOLIC PANEL: CPT | Mod: HCNC | Performed by: INTERNAL MEDICINE

## 2024-10-25 PROCEDURE — 82570 ASSAY OF URINE CREATININE: CPT | Mod: HCNC | Performed by: INTERNAL MEDICINE

## 2024-10-25 PROCEDURE — 80061 LIPID PANEL: CPT | Mod: HCNC | Performed by: INTERNAL MEDICINE

## 2024-10-25 PROCEDURE — 84153 ASSAY OF PSA TOTAL: CPT | Mod: HCNC | Performed by: INTERNAL MEDICINE

## 2024-10-25 PROCEDURE — 83036 HEMOGLOBIN GLYCOSYLATED A1C: CPT | Mod: HCNC | Performed by: INTERNAL MEDICINE

## 2024-10-30 ENCOUNTER — TELEPHONE (OUTPATIENT)
Dept: PRIMARY CARE CLINIC | Facility: CLINIC | Age: 74
End: 2024-10-30
Payer: MEDICARE

## 2024-10-30 ENCOUNTER — OFFICE VISIT (OUTPATIENT)
Dept: PRIMARY CARE CLINIC | Facility: CLINIC | Age: 74
End: 2024-10-30
Payer: MEDICARE

## 2024-10-30 ENCOUNTER — HOSPITAL ENCOUNTER (OUTPATIENT)
Dept: RADIOLOGY | Facility: HOSPITAL | Age: 74
Discharge: HOME | End: 2024-10-30
Attending: INTERNAL MEDICINE
Payer: MEDICARE

## 2024-10-30 VITALS
BODY MASS INDEX: 26.1 KG/M2 | DIASTOLIC BLOOD PRESSURE: 70 MMHG | WEIGHT: 182.31 LBS | HEART RATE: 57 BPM | OXYGEN SATURATION: 99 % | SYSTOLIC BLOOD PRESSURE: 120 MMHG | HEIGHT: 70 IN

## 2024-10-30 DIAGNOSIS — I25.119 ATHEROSCLEROSIS OF NATIVE CORONARY ARTERY OF NATIVE HEART WITH ANGINA PECTORIS: ICD-10-CM

## 2024-10-30 DIAGNOSIS — M79.671 RIGHT FOOT PAIN: ICD-10-CM

## 2024-10-30 DIAGNOSIS — E78.2 HYPERLIPIDEMIA, MIXED: Chronic | ICD-10-CM

## 2024-10-30 DIAGNOSIS — E11.36 TYPE 2 DIABETES MELLITUS WITH DIABETIC CATARACT, WITHOUT LONG-TERM CURRENT USE OF INSULIN: ICD-10-CM

## 2024-10-30 DIAGNOSIS — T46.6X5A MYALGIA DUE TO STATIN: ICD-10-CM

## 2024-10-30 DIAGNOSIS — I70.0 AORTIC ATHEROSCLEROSIS: ICD-10-CM

## 2024-10-30 DIAGNOSIS — Z00.00 ANNUAL PHYSICAL EXAM: Primary | ICD-10-CM

## 2024-10-30 DIAGNOSIS — T46.6X5A STATIN MYOPATHY: ICD-10-CM

## 2024-10-30 DIAGNOSIS — M79.10 MYALGIA DUE TO STATIN: ICD-10-CM

## 2024-10-30 DIAGNOSIS — I11.9 HYPERTENSIVE HEART DISEASE WITHOUT HEART FAILURE: ICD-10-CM

## 2024-10-30 DIAGNOSIS — G72.0 STATIN MYOPATHY: ICD-10-CM

## 2024-10-30 PROBLEM — I48.91 UNSPECIFIED ATRIAL FIBRILLATION: Status: RESOLVED | Noted: 2024-09-25 | Resolved: 2024-10-30

## 2024-10-30 PROCEDURE — 3078F DIAST BP <80 MM HG: CPT | Mod: CPTII,S$GLB,, | Performed by: INTERNAL MEDICINE

## 2024-10-30 PROCEDURE — 1159F MED LIST DOCD IN RCRD: CPT | Mod: CPTII,S$GLB,, | Performed by: INTERNAL MEDICINE

## 2024-10-30 PROCEDURE — 1157F ADVNC CARE PLAN IN RCRD: CPT | Mod: CPTII,S$GLB,, | Performed by: INTERNAL MEDICINE

## 2024-10-30 PROCEDURE — 3074F SYST BP LT 130 MM HG: CPT | Mod: CPTII,S$GLB,, | Performed by: INTERNAL MEDICINE

## 2024-10-30 PROCEDURE — 99999 PR PBB SHADOW E&M-EST. PATIENT-LVL IV: CPT | Mod: PBBFAC,,, | Performed by: INTERNAL MEDICINE

## 2024-10-30 PROCEDURE — 3066F NEPHROPATHY DOC TX: CPT | Mod: CPTII,S$GLB,, | Performed by: INTERNAL MEDICINE

## 2024-10-30 PROCEDURE — 1126F AMNT PAIN NOTED NONE PRSNT: CPT | Mod: CPTII,S$GLB,, | Performed by: INTERNAL MEDICINE

## 2024-10-30 PROCEDURE — 73630 X-RAY EXAM OF FOOT: CPT | Mod: TC,RT

## 2024-10-30 PROCEDURE — 73630 X-RAY EXAM OF FOOT: CPT | Mod: 26,RT,, | Performed by: RADIOLOGY

## 2024-10-30 PROCEDURE — 3061F NEG MICROALBUMINURIA REV: CPT | Mod: CPTII,S$GLB,, | Performed by: INTERNAL MEDICINE

## 2024-10-30 PROCEDURE — 4010F ACE/ARB THERAPY RXD/TAKEN: CPT | Mod: CPTII,S$GLB,, | Performed by: INTERNAL MEDICINE

## 2024-10-30 PROCEDURE — 99397 PER PM REEVAL EST PAT 65+ YR: CPT | Mod: S$GLB,,, | Performed by: INTERNAL MEDICINE

## 2024-10-30 PROCEDURE — 3044F HG A1C LEVEL LT 7.0%: CPT | Mod: CPTII,S$GLB,, | Performed by: INTERNAL MEDICINE

## 2024-10-30 PROCEDURE — 3008F BODY MASS INDEX DOCD: CPT | Mod: CPTII,S$GLB,, | Performed by: INTERNAL MEDICINE

## 2024-10-30 RX ORDER — PITAVASTATIN CALCIUM 4.18 MG/1
1 TABLET, FILM COATED ORAL DAILY
Qty: 30 TABLET | Refills: 2 | Status: SHIPPED | OUTPATIENT
Start: 2024-10-30 | End: 2025-10-30

## 2024-10-30 RX ORDER — FLUTICASONE PROPIONATE 50 MCG
SPRAY, SUSPENSION (ML) NASAL
COMMUNITY
Start: 2024-09-25

## 2024-10-31 DIAGNOSIS — M79.671 RIGHT FOOT PAIN: Primary | ICD-10-CM

## 2024-11-06 ENCOUNTER — PATIENT MESSAGE (OUTPATIENT)
Dept: PRIMARY CARE CLINIC | Facility: CLINIC | Age: 74
End: 2024-11-06
Payer: MEDICARE

## 2024-11-06 DIAGNOSIS — E78.2 HYPERLIPIDEMIA, MIXED: ICD-10-CM

## 2024-11-07 RX ORDER — ROSUVASTATIN CALCIUM 40 MG/1
40 TABLET, COATED ORAL NIGHTLY
Qty: 90 TABLET | Refills: 3 | Status: CANCELLED | OUTPATIENT
Start: 2024-11-07

## 2024-11-15 ENCOUNTER — OFFICE VISIT (OUTPATIENT)
Dept: PODIATRY | Facility: CLINIC | Age: 74
End: 2024-11-15
Payer: MEDICARE

## 2024-11-15 VITALS
SYSTOLIC BLOOD PRESSURE: 152 MMHG | HEART RATE: 51 BPM | WEIGHT: 184.31 LBS | DIASTOLIC BLOOD PRESSURE: 79 MMHG | HEIGHT: 70 IN | BODY MASS INDEX: 26.39 KG/M2

## 2024-11-15 DIAGNOSIS — G57.81 SURAL NEURITIS, RIGHT: ICD-10-CM

## 2024-11-15 DIAGNOSIS — M54.17 LUMBOSACRAL RADICULOPATHY: ICD-10-CM

## 2024-11-15 DIAGNOSIS — G89.29 CHRONIC PAIN IN RIGHT FOOT: ICD-10-CM

## 2024-11-15 DIAGNOSIS — M79.671 CHRONIC PAIN IN RIGHT FOOT: ICD-10-CM

## 2024-11-15 DIAGNOSIS — E11.9 TYPE 2 DIABETES MELLITUS WITHOUT COMPLICATION, WITHOUT LONG-TERM CURRENT USE OF INSULIN: Primary | ICD-10-CM

## 2024-11-15 PROCEDURE — 99999 PR PBB SHADOW E&M-EST. PATIENT-LVL IV: CPT | Mod: PBBFAC,HCNC,, | Performed by: STUDENT IN AN ORGANIZED HEALTH CARE EDUCATION/TRAINING PROGRAM

## 2024-11-15 RX ORDER — DEXAMETHASONE 4 MG/1
4 TABLET ORAL DAILY
Qty: 7 TABLET | Refills: 0 | Status: SHIPPED | OUTPATIENT
Start: 2024-11-15
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
## 2024-11-16 DIAGNOSIS — E78.2 HYPERLIPIDEMIA, MIXED: ICD-10-CM

## 2024-11-17 RX ORDER — ROSUVASTATIN CALCIUM 40 MG/1
40 TABLET, COATED ORAL NIGHTLY
Qty: 90 TABLET | Refills: 3 | Status: SHIPPED | OUTPATIENT
Start: 2024-11-17

## 2024-11-17 RX ORDER — AMLODIPINE BESYLATE 5 MG/1
TABLET ORAL
Qty: 90 TABLET | Refills: 1 | Status: SHIPPED | OUTPATIENT
Start: 2024-11-17

## 2024-11-17 NOTE — TELEPHONE ENCOUNTER
No care due was identified.  [facility] Embedded Care Due Messages. Reference number: [id number].   11/16/2024 8:54:32 PM CST

## 2024-11-17 NOTE — TELEPHONE ENCOUNTER
Refill Routing Note   Medication(s) are not appropriate for processing by [facility] for the following reason(s):      Required vitals abnormal    [facility] action(s):  Approve  Defer Care Due:  None identified            Appointments  past 12m or future 3m with PCP    Date Provider   Last Visit   10/30/2024 Gunnar Rangel MD   Next Visit   4/30/2025 Gunnar Rangel MD   ED visits in past 90 days: 0        Note composed:10:19 AM 11/17/2024

## 2024-11-18 ENCOUNTER — PATIENT MESSAGE (OUTPATIENT)
Dept: PODIATRY | Facility: CLINIC | Age: 74
End: 2024-11-18
Payer: MEDICARE

## 2024-12-09 ENCOUNTER — PATIENT MESSAGE (OUTPATIENT)
Dept: PODIATRY | Facility: CLINIC | Age: 74
End: 2024-12-09
Payer: MEDICARE

## 2024-12-09 ENCOUNTER — TELEPHONE (OUTPATIENT)
Dept: PODIATRY | Facility: CLINIC | Age: 74
End: 2024-12-09
Payer: MEDICARE

## 2024-12-09 NOTE — TELEPHONE ENCOUNTER
Left vm message for patient in regards to cancelling his appointment on 12/30/2024 with Dr. Pate(Podiatry) and to contact office at [phone] or send a message through [employer] to reschedule

## 2024-12-10 RX ORDER — NITROGLYCERIN 0.4 MG/1
0.4 TABLET SUBLINGUAL EVERY 5 MIN PRN
Qty: 25 TABLET | Refills: 11 | Status: SHIPPED | OUTPATIENT
Start: 2024-12-10 | End: 2025-12-10

## 2024-12-16 ENCOUNTER — OFFICE VISIT (OUTPATIENT)
Dept: OPTOMETRY | Facility: CLINIC | Age: 74
End: 2024-12-16
Payer: MEDICARE

## 2024-12-16 DIAGNOSIS — H52.203 MYOPIA WITH ASTIGMATISM AND PRESBYOPIA, BILATERAL: ICD-10-CM

## 2024-12-16 DIAGNOSIS — E11.9 TYPE 2 DIABETES MELLITUS WITHOUT RETINOPATHY: Primary | ICD-10-CM

## 2024-12-16 DIAGNOSIS — Z98.890 HISTORY OF REPAIR OF RETINAL DEFECT BY LASER PHOTOCOAGULATION: ICD-10-CM

## 2024-12-16 DIAGNOSIS — H25.813 COMBINED FORMS OF AGE-RELATED CATARACT, BILATERAL: ICD-10-CM

## 2024-12-16 DIAGNOSIS — H40.1132 PRIMARY OPEN ANGLE GLAUCOMA (POAG) OF BOTH EYES, MODERATE STAGE: ICD-10-CM

## 2024-12-16 DIAGNOSIS — H43.393 VITREOUS FLOATERS OF BOTH EYES: ICD-10-CM

## 2024-12-16 DIAGNOSIS — Z97.3 WEARS CONTACT LENSES: ICD-10-CM

## 2024-12-16 DIAGNOSIS — Z46.0 FITTING AND ADJUSTMENT OF SPECTACLES AND CONTACT LENSES: ICD-10-CM

## 2024-12-16 DIAGNOSIS — H52.4 MYOPIA WITH ASTIGMATISM AND PRESBYOPIA, BILATERAL: ICD-10-CM

## 2024-12-16 DIAGNOSIS — H52.13 MYOPIA WITH ASTIGMATISM AND PRESBYOPIA, BILATERAL: ICD-10-CM

## 2024-12-16 PROCEDURE — 3066F NEPHROPATHY DOC TX: CPT | Mod: HCNC,CPTII,S$GLB, | Performed by: OPTOMETRIST

## 2024-12-16 PROCEDURE — 3044F HG A1C LEVEL LT 7.0%: CPT | Mod: HCNC,CPTII,S$GLB, | Performed by: OPTOMETRIST

## 2024-12-16 PROCEDURE — 99999 PR PBB SHADOW E&M-EST. PATIENT-LVL III: CPT | Mod: PBBFAC,HCNC,, | Performed by: OPTOMETRIST

## 2024-12-16 PROCEDURE — 1126F AMNT PAIN NOTED NONE PRSNT: CPT | Mod: HCNC,CPTII,S$GLB, | Performed by: OPTOMETRIST

## 2024-12-16 PROCEDURE — 3288F FALL RISK ASSESSMENT DOCD: CPT | Mod: HCNC,CPTII,S$GLB, | Performed by: OPTOMETRIST

## 2024-12-16 PROCEDURE — 3061F NEG MICROALBUMINURIA REV: CPT | Mod: HCNC,CPTII,S$GLB, | Performed by: OPTOMETRIST

## 2024-12-16 PROCEDURE — 99214 OFFICE O/P EST MOD 30 MIN: CPT | Mod: HCNC,S$GLB,, | Performed by: OPTOMETRIST

## 2024-12-16 PROCEDURE — 1157F ADVNC CARE PLAN IN RCRD: CPT | Mod: HCNC,CPTII,S$GLB, | Performed by: OPTOMETRIST

## 2024-12-16 PROCEDURE — 1101F PT FALLS ASSESS-DOCD LE1/YR: CPT | Mod: HCNC,CPTII,S$GLB, | Performed by: OPTOMETRIST

## 2024-12-16 PROCEDURE — 1159F MED LIST DOCD IN RCRD: CPT | Mod: HCNC,CPTII,S$GLB, | Performed by: OPTOMETRIST

## 2024-12-16 PROCEDURE — 4010F ACE/ARB THERAPY RXD/TAKEN: CPT | Mod: HCNC,CPTII,S$GLB, | Performed by: OPTOMETRIST

## 2024-12-16 NOTE — PROGRESS NOTES
HPI    MERON: 6/29/2023, Dr. Pierre  Chief complaint (CC): 75 yo M presents today for routine eye exam. Pt   reports no new vision complaints. Pt states a few months ago he started   experiencing headaches that occasionally causes eye pain.  Glasses? +  Contacts? Have worn in several months  H/o eye surgery, injections or laser:    History of repair of retinal defect by laser photocoagulation  H/o eye injury: -  Known eye conditions?    Primary open angle glaucoma (POAG) of both eyes, moderate stage   Combined forms of age-related cataract, bilateral   Vitreous floaters of both eyes   Myopia with astigmatism and presbyopia, bilateral  Family h/o eye conditions? -  Eye gtts?    Latanoprost qHS OU   Cosopt BID OU   OTC gtts      (-) Flashes (+)  Floaters, occasionally  (-) Mucous   (-)  Tearing (-) Itching (-) Burning   (+) Headaches (-) Eye Pain/discomfort (+) Irritation, dry eyes  (-)  Redness (-) Double vision (-) Blurry vision    Diabetic? +  A1c? Hemoglobin A1C       Date                     Value               Ref Range             Status                10/25/2024               6.0 (H)             4.0 - 5.6 %           Final                  04/19/2024               6.6 (H)             4.0 - 5.6 %           Final                   10/05/2023               6.1 (H)             4.0 - 5.6 %           Final                Last edited by Roxie Jauregui MA on 12/16/2024  1:25 PM.            Assessment /Plan     For exam results, see Encounter Report.    Type 2 diabetes mellitus without retinopathy    Combined forms of age-related cataract, bilateral    Myopia with astigmatism and presbyopia, bilateral    Primary open angle glaucoma (POAG) of both eyes, moderate stage    Vitreous floaters of both eyes    Wears contact lenses    Fitting and adjustment of spectacles and contact lenses    History of repair of retinal defect by laser photocoagulation      MONITOR. ED PT ON ALL EXAM FINDINGS  RX FINAL SPECS; TRIAL DAILY  CLS; MONITOR.   TYPE 2 DM W/O RETINOPATHY OU; CONTINUE W/ PCP FOR GLYCEMIC CONTROL; MONITOR  H/O POAG OU; MODERATE STAGE OU; CONTINUE WITH OPHTH FOR MGMT/CARE; NORMOTENSIVE IOP OU; MONITOR  RTC 1 YR//PRN FOR REE/DFE

## 2024-12-20 DIAGNOSIS — I10 ESSENTIAL HYPERTENSION: ICD-10-CM

## 2024-12-20 RX ORDER — IRBESARTAN 300 MG/1
300 TABLET ORAL NIGHTLY
Qty: 90 TABLET | Refills: 3 | Status: SHIPPED | OUTPATIENT
Start: 2024-12-20

## 2024-12-20 NOTE — TELEPHONE ENCOUNTER
Care Due:                  Date            Visit Type   Department     Provider  --------------------------------------------------------------------------------                                EP -                              PRIMARY      [facility] PRIMARY  Last Visit: 10-      CARE ([facility])   CARE           Gunnar Briones                              EP -                              PRIMARY      [facility] PRIMARY  Next Visit: 04-      CARE ([facility])   CARE           Gunnar Briones                                                            Last  Test          Frequency    Reason                     Performed    Due Date  --------------------------------------------------------------------------------    CBC.........  12 months..  clopidogreL..............  03- 03-    [employer] Embedded Care Due Messages. Reference number: [id number].   12/20/2024 8:02:05 AM CST

## 2024-12-20 NOTE — TELEPHONE ENCOUNTER
Refill Routing Note   Medication(s) are not appropriate for processing by [facility] for the following reason(s):        Required vitals abnormal    [facility] action(s):  Defer     Requires labs : Yes             Appointments  past 12m or future 3m with PCP    Date Provider   Last Visit   10/30/2024 Gunnar Rangel MD   Next Visit   4/30/2025 Gunnar Rangel MD   ED visits in past 90 days: 0        Note composed:10:48 AM 12/20/2024

## 2025-01-02 ENCOUNTER — PATIENT MESSAGE (OUTPATIENT)
Dept: OPTOMETRY | Facility: CLINIC | Age: 75
End: 2025-01-02
Payer: MEDICARE

## 2025-01-07 ENCOUNTER — CLINICAL SUPPORT (OUTPATIENT)
Dept: OPHTHALMOLOGY | Facility: CLINIC | Age: 75
End: 2025-01-07
Payer: MEDICARE

## 2025-01-07 ENCOUNTER — OFFICE VISIT (OUTPATIENT)
Dept: OPHTHALMOLOGY | Facility: CLINIC | Age: 75
End: 2025-01-07
Payer: MEDICARE

## 2025-01-07 DIAGNOSIS — H40.1431 PSEUDOEXFOLIATION (PXF) OPEN-ANGLE GLAUCOMA OF BOTH EYES, MILD STAGE: Primary | ICD-10-CM

## 2025-01-07 DIAGNOSIS — E11.9 DIABETES MELLITUS TYPE 2 WITHOUT RETINOPATHY: ICD-10-CM

## 2025-01-07 DIAGNOSIS — H02.402 PTOSIS OF LEFT EYELID: ICD-10-CM

## 2025-01-07 DIAGNOSIS — H25.813 COMBINED FORMS OF AGE-RELATED CATARACT OF BOTH EYES: ICD-10-CM

## 2025-01-07 DIAGNOSIS — H04.129 DRY EYE: ICD-10-CM

## 2025-01-07 PROCEDURE — 99999 PR PBB SHADOW E&M-EST. PATIENT-LVL III: CPT | Mod: PBBFAC,HCNC,, | Performed by: STUDENT IN AN ORGANIZED HEALTH CARE EDUCATION/TRAINING PROGRAM

## 2025-01-07 PROCEDURE — G2211 COMPLEX E/M VISIT ADD ON: HCPCS | Mod: HCNC,S$GLB,, | Performed by: STUDENT IN AN ORGANIZED HEALTH CARE EDUCATION/TRAINING PROGRAM

## 2025-01-07 PROCEDURE — 1159F MED LIST DOCD IN RCRD: CPT | Mod: HCNC,CPTII,S$GLB, | Performed by: STUDENT IN AN ORGANIZED HEALTH CARE EDUCATION/TRAINING PROGRAM

## 2025-01-07 PROCEDURE — 99214 OFFICE O/P EST MOD 30 MIN: CPT | Mod: HCNC,S$GLB,, | Performed by: STUDENT IN AN ORGANIZED HEALTH CARE EDUCATION/TRAINING PROGRAM

## 2025-01-07 PROCEDURE — 92083 EXTENDED VISUAL FIELD XM: CPT | Mod: HCNC,S$GLB,, | Performed by: STUDENT IN AN ORGANIZED HEALTH CARE EDUCATION/TRAINING PROGRAM

## 2025-01-07 PROCEDURE — 1157F ADVNC CARE PLAN IN RCRD: CPT | Mod: HCNC,CPTII,S$GLB, | Performed by: STUDENT IN AN ORGANIZED HEALTH CARE EDUCATION/TRAINING PROGRAM

## 2025-01-07 NOTE — PROGRESS NOTES
HVF done ou./ rel/fix/coop. Good ou./ chart checked for latex allergy./ -.75 + 1.50 x 2/od plano + 1.75 x 180/os-h
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
## 2025-01-23 ENCOUNTER — PATIENT MESSAGE (OUTPATIENT)
Dept: DERMATOLOGY | Facility: CLINIC | Age: 75
End: 2025-01-23
Payer: MEDICARE

## 2025-01-27 RX ORDER — NEOMYCIN SULFATE, POLYMYXIN B SULFATE, AND DEXAMETHASONE 3.5; 10000; 1 MG/G; [USP'U]/G; MG/G
OINTMENT OPHTHALMIC
Qty: 1 EACH | Refills: 11 | Status: SHIPPED | OUTPATIENT
Start: 2025-01-27

## 2025-01-29 ENCOUNTER — PATIENT MESSAGE (OUTPATIENT)
Dept: ADMINISTRATIVE | Facility: OTHER | Age: 75
End: 2025-01-29
Payer: MEDICARE

## 2025-02-07 ENCOUNTER — TELEPHONE (OUTPATIENT)
Dept: CARDIOLOGY | Facility: CLINIC | Age: 75
End: 2025-02-07
Payer: MEDICARE

## 2025-02-07 DIAGNOSIS — I25.10 CORONARY ARTERY DISEASE INVOLVING NATIVE CORONARY ARTERY OF NATIVE HEART WITHOUT ANGINA PECTORIS: ICD-10-CM

## 2025-02-07 DIAGNOSIS — I11.9 HYPERTENSIVE HEART DISEASE WITHOUT HEART FAILURE: Primary | ICD-10-CM

## 2025-02-07 NOTE — TELEPHONE ENCOUNTER
----- Message from Karen sent at 2/7/2025  4:21 PM CST -----  Regarding: self  Type:  Patient Returning Call     Who Called:self     Who Left Message for Patient:unknown     Does the patient know what this is regarding?:pt rony he got a call requesting he do an echo on Monday at 11am. He is stating he is good with that     Would the patient rather a call back or a response via [facility]?  Call back     Best Call Back Number: [phone]       Additional Information:     Thank you.

## 2025-02-07 NOTE — TELEPHONE ENCOUNTER
----- Message from Karen sent at 2/7/2025  4:21 PM CST -----  Regarding: self  Type:  Patient Returning Call     Who Called:self     Who Left Message for Patient:unknown     Does the patient know what this is regarding?:pt rony he got a call requesting he do an echo on Monday at 11am. He is stating he is good with that     Would the patient rather a call back or a response via [facility]?  Call back     Best Call Back Number: [phone]       Additional Information:     Thank you.

## 2025-02-10 ENCOUNTER — HOSPITAL ENCOUNTER (OUTPATIENT)
Dept: CARDIOLOGY | Facility: HOSPITAL | Age: 75
Discharge: HOME | End: 2025-02-10
Attending: INTERNAL MEDICINE
Payer: MEDICARE

## 2025-02-10 VITALS — HEIGHT: 70 IN | BODY MASS INDEX: 26.34 KG/M2 | WEIGHT: 184 LBS

## 2025-02-10 DIAGNOSIS — I25.10 CORONARY ARTERY DISEASE INVOLVING NATIVE CORONARY ARTERY OF NATIVE HEART WITHOUT ANGINA PECTORIS: ICD-10-CM

## 2025-02-10 DIAGNOSIS — I11.9 HYPERTENSIVE HEART DISEASE WITHOUT HEART FAILURE: ICD-10-CM

## 2025-02-10 PROCEDURE — 93306 TTE W/DOPPLER COMPLETE: CPT | Mod: PN

## 2025-02-10 PROCEDURE — 93306 TTE W/DOPPLER COMPLETE: CPT | Mod: 26,,, | Performed by: INTERNAL MEDICINE

## 2025-02-11 ENCOUNTER — TELEPHONE (OUTPATIENT)
Dept: CARDIOLOGY | Facility: CLINIC | Age: 75
End: 2025-02-11
Payer: MEDICARE

## 2025-02-11 ENCOUNTER — LAB VISIT (OUTPATIENT)
Dept: LAB | Facility: HOSPITAL | Age: 75
End: 2025-02-11
Attending: INTERNAL MEDICINE
Payer: MEDICARE

## 2025-02-11 ENCOUNTER — OFFICE VISIT (OUTPATIENT)
Dept: CARDIOLOGY | Facility: CLINIC | Age: 75
End: 2025-02-11
Payer: MEDICARE

## 2025-02-11 VITALS
WEIGHT: 185 LBS | DIASTOLIC BLOOD PRESSURE: 73 MMHG | SYSTOLIC BLOOD PRESSURE: 133 MMHG | HEART RATE: 51 BPM | HEIGHT: 70 IN | BODY MASS INDEX: 26.48 KG/M2 | OXYGEN SATURATION: 97 %

## 2025-02-11 DIAGNOSIS — Z95.5 PRESENCE OF STENT IN CORONARY ARTERY: Chronic | ICD-10-CM

## 2025-02-11 DIAGNOSIS — E78.2 HYPERLIPIDEMIA, MIXED: Chronic | ICD-10-CM

## 2025-02-11 DIAGNOSIS — E78.2 HYPERLIPIDEMIA, MIXED: Primary | Chronic | ICD-10-CM

## 2025-02-11 DIAGNOSIS — I70.0 AORTIC ATHEROSCLEROSIS: Primary | ICD-10-CM

## 2025-02-11 DIAGNOSIS — I25.118 ATHEROSCLEROSIS OF NATIVE CORONARY ARTERY OF NATIVE HEART WITH STABLE ANGINA PECTORIS: ICD-10-CM

## 2025-02-11 DIAGNOSIS — I10 ESSENTIAL HYPERTENSION: Chronic | ICD-10-CM

## 2025-02-11 LAB
APICAL FOUR CHAMBER EJECTION FRACTION: 72 %
APICAL TWO CHAMBER EJECTION FRACTION: 69 %
ASCENDING AORTA: 2.79 CM
AV INDEX (PROSTH): 0.91
AV MEAN GRADIENT: 4 MMHG
AV PEAK GRADIENT: 9 MMHG
AV VALVE AREA BY VELOCITY RATIO: 2.5 CM²
AV VALVE AREA: 3.2 CM²
AV VELOCITY RATIO: 0.73
BSA FOR ECHO PROCEDURE: 2.03 M2
CHOLEST SERPL-MCNC: 148 MG/DL (ref 120–199)
CHOLEST/HDLC SERPL: 4.6 {RATIO} (ref 2–5)
CV ECHO LV RWT: 0.35 CM
DOP CALC AO PEAK VEL: 1.5 M/S
DOP CALC AO VTI: 30 CM
DOP CALC LVOT AREA: 3.5 CM2
DOP CALC LVOT DIAMETER: 2.1 CM
DOP CALC LVOT PEAK VEL: 1.1 M/S
DOP CALC LVOT STROKE VOLUME: 94.9 CM3
DOP CALC MV VTI: 37.9 CM
DOP CALCLVOT PEAK VEL VTI: 27.4 CM
E WAVE DECELERATION TIME: 206 MSEC
E/A RATIO: 0.72
E/E' RATIO: 10 M/S
ECHO LV POSTERIOR WALL: 0.9 CM (ref 0.6–1.1)
FRACTIONAL SHORTENING: 54.9 % (ref 28–44)
HDLC SERPL-MCNC: 32 MG/DL (ref 40–75)
HDLC SERPL: 21.6 % (ref 20–50)
INTERVENTRICULAR SEPTUM: 0.8 CM (ref 0.6–1.1)
IVC DIAMETER: 1.81 CM
IVRT: 84 MSEC
LDLC SERPL CALC-MCNC: 84.2 MG/DL (ref 63–159)
LEFT ATRIUM AREA SYSTOLIC (APICAL 2 CHAMBER): 15.91 CM2
LEFT ATRIUM AREA SYSTOLIC (APICAL 4 CHAMBER): 17.37 CM2
LEFT ATRIUM VOLUME INDEX MOD: 23 ML/M2
LEFT ATRIUM VOLUME MOD: 46 ML
LEFT INTERNAL DIMENSION IN SYSTOLE: 2.3 CM (ref 2.1–4)
LEFT VENTRICLE DIASTOLIC VOLUME INDEX: 61.24 ML/M2
LEFT VENTRICLE DIASTOLIC VOLUME: 123.1 ML
LEFT VENTRICLE END DIASTOLIC VOLUME APICAL 2 CHAMBER: 101.06 ML
LEFT VENTRICLE END DIASTOLIC VOLUME APICAL 4 CHAMBER: 77.24 ML
LEFT VENTRICLE END SYSTOLIC VOLUME APICAL 2 CHAMBER: 42.82 ML
LEFT VENTRICLE END SYSTOLIC VOLUME APICAL 4 CHAMBER: 44.33 ML
LEFT VENTRICLE MASS INDEX: 75.5 G/M2
LEFT VENTRICLE SYSTOLIC VOLUME INDEX: 9 ML/M2
LEFT VENTRICLE SYSTOLIC VOLUME: 18.07 ML
LEFT VENTRICULAR INTERNAL DIMENSION IN DIASTOLE: 5.1 CM (ref 3.5–6)
LEFT VENTRICULAR MASS: 151.8 G
LV LATERAL E/E' RATIO: 10.9 M/S
LV SEPTAL E/E' RATIO: 9.5 M/S
LVED V (TEICH): 123.1 ML
LVES V (TEICH): 18.07 ML
LVOT MG: 2.31 MMHG
LVOT MV: 0.71 CM/S
MV A" WAVE DURATION": 121.79 MSEC
MV MEAN GRADIENT: 2 MMHG
MV PEAK A VEL: 1.06 M/S
MV PEAK E VEL: 0.76 M/S
MV PEAK GRADIENT: 5 MMHG
MV STENOSIS PRESSURE HALF TIME: 59.8 MS
MV VALVE AREA BY CONTINUITY EQUATION: 2.5 CM2
MV VALVE AREA P 1/2 METHOD: 3.68 CM2
NONHDLC SERPL-MCNC: 116 MG/DL
OHS CV RV/LV RATIO: 0.67 CM
OHS LV EJECTION FRACTION SIMPSONS BIPLANE MOD: 71 %
PISA MRMAX VEL: 6.22 M/S
PISA TR MAX VEL: 2.2 M/S
PULM VEIN S/D RATIO: 1.16
PV PEAK D VEL: 0.45 M/S
PV PEAK GRADIENT: 6 MMHG
PV PEAK S VEL: 0.52 M/S
PV PEAK VELOCITY: 1.19 M/S
RA PRESSURE ESTIMATED: 3 MMHG
RA VOL SYS: 47.11 ML
RIGHT ATRIAL AREA: 16.6 CM2
RIGHT ATRIUM VOLUME AREA LENGTH APICAL 4 CHAMBER: 45.14 ML
RIGHT VENTRICLE DIASTOLIC BASEL DIMENSION: 3.4 CM
RV TB RVSP: 5 MMHG
RV TISSUE DOPPLER FREE WALL SYSTOLIC VELOCITY 1 (APICAL 4 CHAMBER VIEW): 16.28 CM/S
SINUS: 2.76 CM
STJ: 2.51 CM
TDI LATERAL: 0.07 M/S
TDI SEPTAL: 0.08 M/S
TDI: 0.08 M/S
TR MAX PG: 19 MMHG
TRICUSPID ANNULAR PLANE SYSTOLIC EXCURSION: 2.46 CM
TRIGL SERPL-MCNC: 159 MG/DL (ref 30–150)
TV REST PULMONARY ARTERY PRESSURE: 22 MMHG
Z-SCORE OF LEFT VENTRICULAR DIMENSION IN END DIASTOLE: -1.44
Z-SCORE OF LEFT VENTRICULAR DIMENSION IN END SYSTOLE: -3.57

## 2025-02-11 PROCEDURE — 1101F PT FALLS ASSESS-DOCD LE1/YR: CPT | Mod: CPTII,S$GLB,, | Performed by: INTERNAL MEDICINE

## 2025-02-11 PROCEDURE — 3078F DIAST BP <80 MM HG: CPT | Mod: CPTII,S$GLB,, | Performed by: INTERNAL MEDICINE

## 2025-02-11 PROCEDURE — 99999 PR PBB SHADOW E&M-EST. PATIENT-LVL IV: CPT | Mod: PBBFAC,,, | Performed by: INTERNAL MEDICINE

## 2025-02-11 PROCEDURE — 3075F SYST BP GE 130 - 139MM HG: CPT | Mod: CPTII,S$GLB,, | Performed by: INTERNAL MEDICINE

## 2025-02-11 PROCEDURE — G2211 COMPLEX E/M VISIT ADD ON: HCPCS | Mod: S$GLB,,, | Performed by: INTERNAL MEDICINE

## 2025-02-11 PROCEDURE — 1157F ADVNC CARE PLAN IN RCRD: CPT | Mod: CPTII,S$GLB,, | Performed by: INTERNAL MEDICINE

## 2025-02-11 PROCEDURE — 99214 OFFICE O/P EST MOD 30 MIN: CPT | Mod: S$GLB,,, | Performed by: INTERNAL MEDICINE

## 2025-02-11 PROCEDURE — 80061 LIPID PANEL: CPT | Performed by: INTERNAL MEDICINE

## 2025-02-11 PROCEDURE — 36415 COLL VENOUS BLD VENIPUNCTURE: CPT | Performed by: INTERNAL MEDICINE

## 2025-02-11 PROCEDURE — 1159F MED LIST DOCD IN RCRD: CPT | Mod: CPTII,S$GLB,, | Performed by: INTERNAL MEDICINE

## 2025-02-11 PROCEDURE — 3008F BODY MASS INDEX DOCD: CPT | Mod: CPTII,S$GLB,, | Performed by: INTERNAL MEDICINE

## 2025-02-11 PROCEDURE — 3288F FALL RISK ASSESSMENT DOCD: CPT | Mod: CPTII,S$GLB,, | Performed by: INTERNAL MEDICINE

## 2025-02-11 RX ORDER — EZETIMIBE 10 MG/1
10 TABLET ORAL DAILY
Qty: 90 TABLET | Refills: 3 | Status: CANCELLED | OUTPATIENT
Start: 2025-02-11 | End: 2026-02-11

## 2025-02-11 NOTE — PROGRESS NOTES
Subjective:   @Patient ID:  Bandar French is a 74 y.o. male who presents for follow-up of CAD       HPI:   February 11, 2025:  F/U.  Recently started reporting left shoulder pain that radiates to the chest.  Not as as intense as prior cardiac event but has some similarity.  For the last few weeks seems has been getting more frequent.  It responds to nitro.     3/13/2024: Here for f/u. He is doing well.   Rare chest pains.  Since last visit took nitro 3 or 4 times.  He has not exercising much as he used to and gained weight.  No clear reason.     Compliant with DPAT with no issues.     LDL at goal       Historically:    Patient admitted 1/2022 with [facility] with PCI to LCX and RCA. Symptoms were mainly jaw pain.  Residual moderate LAD. Readmitted 3/2022 with headaches, chest pressure and had epistaxis episodes after sneezing. Stress MPI with no ischemia. Norvasc dose increased to 5 mg daily.       Unclear etiology for the headaches. He has glaucoma. CT head with no acute findings.           Prior cardiovascular  Hx  --------------------------------  - Stress MPI 3/13/2022 -ve for ischemia     - Heart Catheterization  1/20/2022  There was two vessel coronary artery disease.  Procedure done for unstable angina  The PCI was successful.  The 1st Mrg lesion was 99% stenosed with 0% stenosis post-intervention.  A STENT RESOLUTE VICKY [id number] stent was successfully placed. Post dilated with 3.0 NC balloon at high pressure. IVUS guided  The Prox RCA lesion was 70% stenosed with 0% stenosis post-intervention.  A STENT RESOLUTE VICKY 3.5X22MM stent was successfully placed . Post dilated with 3.5 NC balloon. IVUS guided.  The Prox LAD to Mid LAD lesion was 50% stenosed. Medical treatment at this time.  The estimated blood loss was none.  The pre-procedure left ventricular end diastolic pressure was 17.  No LV gram done. Just pressure.     - ASA/Plavix  - High intense statin   - Avoid BB due to bradycardia  - Medical tx for  residual LAD disease. If symptomatic as outpatient consider non invasive evaluation versus physiologic evaluation.   - F/U with Dr. Alvarado                   Patient Active Problem List    Diagnosis Date Noted    Inflammation and stiffening of spine 09/25/2024    Type 2 diabetes mellitus without complications 09/25/2024     Noted by NGHIA RICKETTS MD  last documented on [id number]      Sjogren syndrome with keratoconjunctivitis 09/25/2024     Noted by NGHIA RICKETTS MD  last documented on [id number]      Type 2 diabetes mellitus with diabetic cataract 08/29/2024     Noted by ORION BAKER MD  last documented on [id number]      Keratoconjunctivitis sicca 08/29/2024    Wears contact lenses 06/29/2023    Impaired functional mobility, balance, gait, and endurance 05/18/2023    Primary osteoarthritis of left knee 05/15/2023    Anemia 04/29/2023    Aortic atherosclerosis 04/04/2023    Hypertensive heart disease without heart failure 03/28/2022    Presence of stent in coronary artery 11/17/2021    Steatosis of liver 11/17/2021    Benign paroxysmal positional vertigo 09/22/2021    Essential hypertension 08/26/2020    Prediabetes     Hyperlipidemia, mixed 09/25/2019    Osteoarthritis 09/25/2019    Atherosclerosis of native coronary artery of native heart with angina pectoris      Review of old records showed the presence of coronary artery disease with a stent of the circumflex in October of 2014. Cardiac catheterization in 2017 showed a 20% lesion in the right coronary artery, but the left coronary artery was free of disease.  Echocardiography in January 2019 showed left ventricular systolic function be normal, ejection fraction 60-65%        Osteoarthritis of right knee 07/22/2019                    LAST HbA1c  Lab Results   Component Value Date    HGBA1C 6.0 (H) 10/25/2024       Lipid panel  Lab Results   Component Value Date    CHOL 120 10/25/2024    CHOL 124 10/05/2023    CHOL 102 (L) 03/27/2023     Lab Results    Component Value Date    HDL 28 (L) 10/25/2024    HDL 32 (L) 10/05/2023    HDL 33 (L) 03/27/2023     Lab Results   Component Value Date    LDLCALC 70.4 10/25/2024    LDLCALC 69.6 10/05/2023    LDLCALC 52.0 (L) 03/27/2023     Lab Results   Component Value Date    TRIG 108 10/25/2024    TRIG 112 10/05/2023    TRIG 85 03/27/2023     Lab Results   Component Value Date    CHOLHDL 23.3 10/25/2024    CHOLHDL 25.8 10/05/2023    CHOLHDL 32.4 03/27/2023            Review of Systems   Constitutional: Negative for chills and fever.   HENT:  Negative for hearing loss and nosebleeds.         As in HPI    Eyes:  Negative for blurred vision.   Cardiovascular:         As in HPI    Respiratory:  Negative for hemoptysis and shortness of breath.    Hematologic/Lymphatic: Negative for bleeding problem.   Skin:  Negative for itching.   Musculoskeletal:  Negative for falls.   Gastrointestinal:  Negative for abdominal pain and hematochezia.   Genitourinary:  Negative for hematuria.   Neurological:  Negative for dizziness and loss of balance.   Psychiatric/Behavioral:  Negative for altered mental status and depression.        Objective:   Physical Exam  Constitutional:       Appearance: He is well-developed.   HENT:      Head: Normocephalic and atraumatic.   Eyes:      Conjunctiva/sclera: Conjunctivae normal.   Neck:      Vascular: No carotid bruit or JVD.   Cardiovascular:      Rate and Rhythm: Normal rate and regular rhythm.      Pulses:           Carotid pulses are 2+ on the right side and 2+ on the left side.       Radial pulses are 2+ on the right side and 2+ on the left side.      Heart sounds: Normal heart sounds. No murmur heard.     No friction rub. No gallop.   Pulmonary:      Effort: Pulmonary effort is normal. No respiratory distress.      Breath sounds: Normal breath sounds. No stridor. No wheezing.   Musculoskeletal:      Cervical back: Neck supple.   Skin:     General: Skin is warm and dry.   Neurological:      Mental  Status: He is alert and oriented to person, place, and time.   Psychiatric:         Behavior: Behavior normal.         Assessment:     1. Aortic atherosclerosis    2. Atherosclerosis of native coronary artery of native heart with angina pectoris    3. Essential hypertension    4. Hyperlipidemia, mixed    5. Presence of stent in coronary artery          Plan:   1. Coronary artery disease  - Stable CAD. Residual non obstructive CAD.  However recently started getting more angina.    We will proceed with PET stress test for further evaluation  - Continue High intense statin.  LDL above goal.  He has been taking Crestor twice a week due to concerns about side effects ( memory issues) interested in switching to non statin medications    - ASA/Plavix    2. Hypertension  - Continue Norvasc   - Continue ARB   -  weight loss encouraged.   -  blood pressure management by digital hypertension    3. Hyperlipidemia  - LDL above goal  He has been taking Crestor twice a week due to concerns about side effects ( memory issues) interested in switching to non statin medications    Repeat lipid panel if above goal we will add PCSK9 inhibitors and consider stopping statins in the future ( patient's preference)       I spent 5-10 minutes asking, assessing, assisting, arranging and advising heart healthy diet improvements. This included low-salt meals, portion control and health food alternatives. I also encourage 30 minutes of moderate exercise 3-4x a week.     Visit today included increased complexity associated with the care of the episodic problem CAD, hypertension, hyperlipidemia addressed and managing the longitudinal care of the patient due to the serious and/or complex managed problems    6 months f/u     Pertinent cardiac images and EKG reviewed independently.    Continue with current medical plan and lifestyle changes.  Return sooner for concerns or questions. If symptoms persist go to the ED  I have reviewed all pertinent data  including patient's medical history in detail and updated the computerized patient record.     No orders of the defined types were placed in this encounter.        Follow up as scheduled.     He expressed verbal understanding and agreed with the plan    Patient's Medications   New Prescriptions    No medications on file   Previous Medications    AMLODIPINE (NORVASC) 5 MG TABLET    TAKE 1 TABLET ONE TIME DAILY AT NIGHT    ASCORBIC ACID, VITAMIN C, (VITAMIN C) 500 MG TABLET    Take 500 mg by mouth once daily.    ASPIRIN (ECOTRIN) 81 MG EC TABLET    81 mg once daily.    BLOOD SUGAR DIAGNOSTIC (BLOOD GLUCOSE TEST) STRP    1 each by Misc.(Non-Drug; Combo Route) route once daily.    CHLORPHENIRAMINE (CHLOR-TRIMETON) 4 MG TABLET    Take 4 mg by mouth as needed.     CHOLECALCIFEROL, VITAMIN D3, 125 MCG (5,000 UNIT) TAB    Take 5,000 Units by mouth once daily.    CLOPIDOGREL (PLAVIX) 75 MG TABLET    Take 1 tablet (75 mg total) by mouth once daily.    DEXAMETHASONE (DECADRON) 4 MG TAB    Take 1 tablet (4 mg total) by mouth once daily.    DORZOLAMIDE-TIMOLOL 2-0.5% (COSOPT) 22.3-6.8 MG/ML OPHTHALMIC SOLUTION    Place 1 drop into both eyes 2 (two) times daily.    FLONASE ALLERGY RELIEF 50 MCG/ACTUATION NASAL SPRAY        FOLIC ACID/MULTIVIT-MIN/LUTEIN (CENTRUM SILVER ORAL)    Take 1 tablet by mouth once daily.    IRBESARTAN (AVAPRO) 300 MG TABLET    TAKE 1 TABLET (300 MG TOTAL) BY MOUTH EVERY EVENING.    LATANOPROST 0.005 % OPHTHALMIC SOLUTION    Place 1 drop into both eyes every evening. INSTILL 1 DROP INTO BOTH EYES EVERY EVENING.    MAGNESIUM OXIDE (MAG-OX) 400 MG (241.3 MG MAGNESIUM) TABLET    Take 250 mg by mouth once daily.    MICRO THIN LANCETS 33 GAUGE MISC        NEOMYCIN-POLYMYXIN-DEXAMETHASONE (DEXACINE) 3.5 MG/G-10,000 UNIT/G-0.1 % OINT    PLACE INTO THE RIGHT EYE 2 (TWO) TIMES A DAY FOR 10 DAYS    NITROGLYCERIN (NITROSTAT) 0.4 MG SL TABLET    Place 1 tablet (0.4 mg total) under the tongue every 5 (five) minutes as  needed for Chest pain.    ROSUVASTATIN (CRESTOR) 40 MG TAB    Take 1 tablet (40 mg) every Monday and Friday    VIT C-E-ZINC OX-KAREN-LUT-ZEAX (ICAPS AREDS2) 250 MG-200 UNIT -12.5 MG-1 MG CAP    Take 2 capsules by mouth once daily.   Modified Medications    No medications on file   Discontinued Medications    No medications on file

## 2025-02-11 NOTE — PROGRESS NOTES
Please let Mr. French knows that I have reviewed his cholesterol results.  It is above our goal and has gotten worse than it was.  I would like to start him on the injectable cholesterol medicine we discussed in the office.  I have sent the prescription to [facility] to get it approved by his insurance and they will mail it to him.     Sincerely,  Mega Tompkins MD.   Interventional Cardiologist  [facility] [place]

## 2025-02-11 NOTE — TELEPHONE ENCOUNTER
Called pt regarding his test results to let him know the results.    ad        ----- Message from Mega Tompkins MD sent at 2/11/2025 10:54 AM CST -----  Please let Mr. French knows that I have reviewed his cholesterol results.  It is above our goal and has gotten worse than it was.  I would like to start him on the injectable cholesterol medicine we discussed in the office.  I have sent the prescript  ion to [facility] to get it approved by his insurance and they will mail it to him.     Sincerely,  Mega Tompkins MD.   Interventional Cardiologist  [facility] [place]

## 2025-02-20 ENCOUNTER — OFFICE VISIT (OUTPATIENT)
Dept: URGENT CARE | Facility: CLINIC | Age: 75
End: 2025-02-20
Payer: MEDICARE

## 2025-02-20 VITALS
HEIGHT: 70 IN | DIASTOLIC BLOOD PRESSURE: 76 MMHG | RESPIRATION RATE: 20 BRPM | OXYGEN SATURATION: 99 % | SYSTOLIC BLOOD PRESSURE: 138 MMHG | TEMPERATURE: 98 F | WEIGHT: 183 LBS | HEART RATE: 52 BPM | BODY MASS INDEX: 26.2 KG/M2

## 2025-02-20 DIAGNOSIS — R73.9 ELEVATED BLOOD SUGAR: ICD-10-CM

## 2025-02-20 DIAGNOSIS — S01.319A LACERATION OF EAR CANAL, INITIAL ENCOUNTER: Primary | ICD-10-CM

## 2025-02-20 DIAGNOSIS — H61.20 IMPACTED CERUMEN, UNSPECIFIED LATERALITY: ICD-10-CM

## 2025-02-20 DIAGNOSIS — S16.1XXA NECK STRAIN, INITIAL ENCOUNTER: ICD-10-CM

## 2025-02-20 DIAGNOSIS — H65.199 ACUTE MIDDLE EAR EFFUSION, UNSPECIFIED LATERALITY: ICD-10-CM

## 2025-02-20 LAB — GLUCOSE SERPL-MCNC: 183 MG/DL (ref 70–110)

## 2025-02-20 RX ORDER — CIPROFLOXACIN HYDROCHLORIDE AND HYDROCORTISONE 2; 10 MG/ML; MG/ML
3 SUSPENSION AURICULAR (OTIC) 2 TIMES DAILY
Qty: 10 ML | Refills: 0 | Status: SHIPPED | OUTPATIENT
Start: 2025-02-20 | End: 2025-02-21

## 2025-02-20 RX ORDER — PREDNISONE 20 MG/1
20 TABLET ORAL 2 TIMES DAILY
Qty: 10 TABLET | Refills: 0 | Status: SHIPPED | OUTPATIENT
Start: 2025-02-20 | End: 2025-02-25

## 2025-02-20 NOTE — PATIENT INSTRUCTIONS
Please drink plenty of fluids.  Please get plenty of rest.  Please return here or go to the Emergency Department for any concerns or worsening of condition.  If you were prescribed a narcotic medication, do not drive or operate heavy equipment or machinery while taking these medications.  Take prednisone as prescribed.  Recommend heating pad to the area.  Use topical antibiotic drops for the ears both sides as directed for 7 days.  Recommend daily use of Flonase as well as an antihistamine such as Zyrtec or Allegra daily for 5-10 days.  If you were not prescribed an anti-inflammatory medication, and if you do not have any history of stomach/intestinal ulcers, or kidney disease, or are not taking a blood thinner such as Coumadin, Plavix, Pradaxa, Eloquis, or Xaralta for example, it is OK to take over the counter Ibuprofen or Advil or Motrin or Aleve as directed.  Do not take these medications on an empty stomach.  Rest, ice, compression and elevation to the affected joint or limb as needed.  Please follow up with your primary care doctor or specialist as needed.    If you  smoke, please stop smoking.

## 2025-02-20 NOTE — PROGRESS NOTES
"Subjective:      Patient ID: Bandar French is a 74 y.o. male.    Vitals:  height is 5' 10" (1.778 m) and weight is 83 kg (182 lb 15.7 oz). His oral temperature is 98 °F (36.7 °C). His blood pressure is 138/76 and his pulse is 52 (abnormal). His respiration is 20 and oxygen saturation is 99%.     Chief Complaint: ear bleed    74-year-old male with PMH of HTN, HLD, OA, DM2 presents to the clinic today with chief complaint of bilateral ear bleeding and mild otalgia. Symptoms started one day ago.  He states his left ear is worse than his right, which is causing the pain to radiate to his neck slightly. Patient has not done any medical treatment at home for sx relief.  He is currently plavix daily. Denies any other URI sx.  Denies history of seasonal allergies.  Denies fever, chills, body aches, chest pain, shortness of breath, wheezing, abdominal pain, nausea, vomiting, diarrhea, or rashes.      Other  This is a new problem. The current episode started today. The problem occurs constantly. The problem has been unchanged. Pertinent negatives include no abdominal pain, chest pain, chills, congestion, coughing, diaphoresis, fatigue, fever, myalgias, nausea, rash, sore throat or vomiting. Nothing aggravates the symptoms. He has tried nothing for the symptoms.     Constitution: Negative for activity change, appetite change, chills, sweating, fatigue, fever, generalized weakness and international travel in last 60 days.   HENT:  Positive for ear pain and ear discharge. Negative for congestion, postnasal drip, sinus pain, sinus pressure, sore throat and trouble swallowing.    Cardiovascular:  Negative for chest pain.   Respiratory:  Negative for chest tightness, cough, sputum production, shortness of breath, wheezing and asthma.    Gastrointestinal:  Negative for abdominal pain, nausea, vomiting and diarrhea.   Musculoskeletal:  Negative for muscle ache.   Skin:  Negative for rash and erythema.   Allergic/Immunologic: " Message  Patients  called office, I spoke to him and he was asking if I could please transfer him to our manager. Mr. Jovel would like to discuss scheduling and canceling issues with her. At the same time the apt for from today was rescheduled for 11/28 @ 1230 to see Dr. Nieves. Patient stated he feels its very unprofessional that our providers have schedules open and once they are booked they decided to cancel that same day. He asked how are doctors supposed to build relationships with patients if they are not making themselves available? I apologized for this situation and informed him that an email has been send to management and they will contact him. Patient and  appreciate all we do for them but if they want to see a provider they should be able to book with the doctor and not see a nurse only. Call was transferred to our  in [place], he will leave a message in her VM.     No further questions or concerns.      Signatures   Electronically signed by : Kriss Atkinson CMA; Nov 19 2018 11:44AM CST     Negative for environmental allergies, seasonal allergies and asthma.      Objective:     Physical Exam   Constitutional: He is oriented to person, place, and time. He appears well-developed.  Non-toxic appearance. He does not appear ill. No distress.   HENT:   Head: Normocephalic and atraumatic. Head is without abrasion, without contusion and without laceration.   Ears:   Right Ear: External ear normal. Right ear exhibits lacerations. There is drainage (bleeding from irritated and scracthed canal). No swelling or tenderness. Tympanic membrane is not injected, not scarred, not perforated, not erythematous, not retracted and not bulging. A middle ear effusion is present.   Left Ear: External ear normal. Left ear exhibits lacerations. There is drainage (bleeding from irritated and scracthed canal). No swelling or tenderness. Tympanic membrane is bulging. Tympanic membrane is not injected, not scarred, not perforated, not erythematous and not retracted. A middle ear effusion is present.   Nose: Nose normal.   Mouth/Throat: Oropharynx is clear and moist and mucous membranes are normal.   Eyes: Conjunctivae, EOM and lids are normal. Pupils are equal, round, and reactive to light.   Neck: Trachea normal and phonation normal. Neck supple.   Cardiovascular: Normal rate.   Pulmonary/Chest: Effort normal. No respiratory distress.   Musculoskeletal: Normal range of motion.         General: Normal range of motion.   Lymphadenopathy:     He has no cervical adenopathy.   Neurological: He is alert and oriented to person, place, and time.   Skin: Skin is warm, dry, intact, not diaphoretic and no rash. Capillary refill takes less than 2 seconds. No abrasion, No burn, No bruising, No erythema and No ecchymosis   Psychiatric: His speech is normal and behavior is normal. Judgment and thought content normal.   Nursing note and vitals reviewed.      Assessment:     1. Laceration of ear canal, initial encounter    2. Elevated blood sugar     3. Impacted cerumen, unspecified laterality    4. Neck strain, initial encounter    5. Acute middle ear effusion, unspecified laterality      Results for orders placed or performed in visit on 02/20/25   POCT Glucose, Hand-Held Device    Collection Time: 02/20/25 10:01 AM   Result Value Ref Range    POC Glucose 183 (A) 70 - 110 MG/DL       Plan:       Laceration of ear canal, initial encounter  -     ciprofloxacin-hydrocortisone 0.2-1% (CIPRO HC) otic suspension; Place 3 drops into the right ear 2 (two) times daily. for 7 days  Dispense: 10 mL; Refill: 0    Elevated blood sugar  -     POCT Glucose, Hand-Held Device    Impacted cerumen, unspecified laterality  -     Ear wax removal    Neck strain, initial encounter  -     predniSONE (DELTASONE) 20 MG tablet; Take 1 tablet (20 mg total) by mouth 2 (two) times daily. for 5 days  Dispense: 10 tablet; Refill: 0    Acute middle ear effusion, unspecified laterality  -     predniSONE (DELTASONE) 20 MG tablet; Take 1 tablet (20 mg total) by mouth 2 (two) times daily. for 5 days  Dispense: 10 tablet; Refill: 0                     Bilobed Transposition Flap Text: The defect edges were debeveled with a #15 scalpel blade.  Given the location of the defect and the proximity to free margins a bilobed transposition flap was deemed most appropriate.  Using a sterile surgical marker, an appropriate bilobe flap drawn around the defect.    The area thus outlined was incised deep to adipose tissue with a #15 scalpel blade.  The skin margins were undermined to an appropriate distance in all directions utilizing iris scissors.

## 2025-02-21 ENCOUNTER — PATIENT MESSAGE (OUTPATIENT)
Dept: CARDIOLOGY | Facility: CLINIC | Age: 75
End: 2025-02-21
Payer: MEDICARE

## 2025-02-21 ENCOUNTER — TELEPHONE (OUTPATIENT)
Dept: CARDIOLOGY | Facility: CLINIC | Age: 75
End: 2025-02-21
Payer: MEDICARE

## 2025-02-21 ENCOUNTER — TELEPHONE (OUTPATIENT)
Dept: URGENT CARE | Facility: CLINIC | Age: 75
End: 2025-02-21
Payer: MEDICARE

## 2025-02-21 DIAGNOSIS — S01.319D: Primary | ICD-10-CM

## 2025-02-21 RX ORDER — OFLOXACIN 3 MG/ML
5 SOLUTION AURICULAR (OTIC) DAILY
Qty: 10 ML | Refills: 0 | Status: SHIPPED | OUTPATIENT
Start: 2025-02-21 | End: 2025-02-22

## 2025-02-21 NOTE — TELEPHONE ENCOUNTER
----- Message from Mega Tompkins MD sent at 2/21/2025 11:30 AM CST -----  Regarding: RE: Repatha  When starting ok to discontinue statin. Thanks  ----- Message -----  From: Daphne Olivarez MA  Sent: 2/21/2025  11:15 AM CST  To: Mega Tompkins MD  Subject: FW: Repatha                                        ----- Message -----  From: Ana Kearns, Damien  Sent: 2/17/2025  12:58 PM CST  To: Mega Tompkins MD; Leda Ayoub Staff  Subject: Repatha                                          Hello, We've been working on coverage for Mr. French's Repatha here at [facility]. Mr. French had questions regarding his statin therapy once starting Repatha. Please advise on if he is to continue statin therapy on Repatha or discontinue when Repatha is started.Ana Kearns, PharmDClinical PharmacistOchsner Speciality Pharmacy Phone: [phone]: [phone]

## 2025-02-21 NOTE — TELEPHONE ENCOUNTER
Pt c/o low BP and low O2 sats. Pt is currently being treated for lung ca with mets to adrenal glands. Sent ofloxacin due to insurance not covering cipro-dex

## 2025-02-21 NOTE — TELEPHONE ENCOUNTER
I am sorry to hear  No, it shouldn't impact your PET stress test.     Sincerely,  Mega Tompkins MD.   Interventional Cardiologist  [facility] [place]

## 2025-02-22 RX ORDER — OFLOXACIN 3 MG/ML
5 SOLUTION AURICULAR (OTIC) 2 TIMES DAILY
Qty: 10 ML | Refills: 0 | Status: SHIPPED | OUTPATIENT
Start: 2025-02-22 | End: 2025-03-04

## 2025-02-25 ENCOUNTER — HOSPITAL ENCOUNTER (OUTPATIENT)
Dept: CARDIOLOGY | Facility: HOSPITAL | Age: 75
Discharge: HOME | End: 2025-02-25
Attending: INTERNAL MEDICINE
Payer: MEDICARE

## 2025-02-25 ENCOUNTER — RESULTS FOLLOW-UP (OUTPATIENT)
Dept: CARDIOLOGY | Facility: CLINIC | Age: 75
End: 2025-02-25

## 2025-02-25 VITALS
SYSTOLIC BLOOD PRESSURE: 111 MMHG | WEIGHT: 183 LBS | DIASTOLIC BLOOD PRESSURE: 56 MMHG | HEART RATE: 54 BPM | HEIGHT: 70 IN | BODY MASS INDEX: 26.2 KG/M2

## 2025-02-25 DIAGNOSIS — Z95.5 PRESENCE OF STENT IN CORONARY ARTERY: Chronic | ICD-10-CM

## 2025-02-25 LAB
CFR FLOW - ANTERIOR: 2.06
CFR FLOW - INFERIOR: 2.03
CFR FLOW - LATERAL: 2.17
CFR FLOW - MAX: 2.49
CFR FLOW - MIN: 1.66
CFR FLOW - SEPTAL: 2
CFR FLOW - WHOLE HEART: 2.07
CV STRESS BASE HR: 48 BPM
DIASTOLIC BLOOD PRESSURE: 64 MMHG
EJECTION FRACTION- HIGH: 59 %
END DIASTOLIC INDEX-HIGH: 155 ML/M2
END DIASTOLIC INDEX-LOW: 91 ML/M2
END SYSTOLIC INDEX-HIGH: 78 ML/M2
END SYSTOLIC INDEX-LOW: 40 ML/M2
NUC REST DIASTOLIC VOLUME INDEX: 86
NUC REST EJECTION FRACTION: 63
NUC REST SYSTOLIC VOLUME INDEX: 31
NUC STRESS DIASTOLIC VOLUME INDEX: 103
NUC STRESS EJECTION FRACTION: 67 %
NUC STRESS SYSTOLIC VOLUME INDEX: 34
OHS CV CPX 1 MINUTE RECOVERY HEART RATE: 75 BPM
OHS CV CPX 85 PERCENT MAX PREDICTED HEART RATE MALE: 124
OHS CV CPX MAX PREDICTED HEART RATE: 146
OHS CV CPX PATIENT IS FEMALE: 0
OHS CV CPX PATIENT IS MALE: 1
OHS CV CPX PEAK DIASTOLIC BLOOD PRESSURE: 57 MMHG
OHS CV CPX PEAK HEAR RATE: 51 BPM
OHS CV CPX PEAK RATE PRESSURE PRODUCT: 5865
OHS CV CPX PEAK SYSTOLIC BLOOD PRESSURE: 115 MMHG
OHS CV CPX PERCENT MAX PREDICTED HEART RATE ACHIEVED: 35
OHS CV CPX RATE PRESSURE PRODUCT PRESENTING: 5424
OHS CV INITIAL DOSE: 24.9 MCG/KG/MIN
OHS CV MODERATELY REDUCED FLOW CAPACITY: 0 %
OHS CV MYOCARDIAL STEAL: 0 %
OHS CV NO ISCHEMIA MILDLY REDUCED FLOW CAPACTY: 95 %
OHS CV NO ISCHEMIA MINIMALLY REDUCED FLOW CAPACITY: 5 %
OHS CV NORMAL FLOW CAPACITY COMPARABLE TO HEALTHY YOUNG VOLUNTEERS: 0 %
OHS CV PEAK DOSE: 24.8 MCG/KG/MIN
OHS CV PET ID: 8251
OHS CV SEVERELY REDUCED FLOW CAPACITY: 0 %
OHS CV TOTAL EXAM DLP: 435.22 MGY-CM
REST FLOW - ANTERIOR: 0.45 CC/MIN/G
REST FLOW - INFERIOR: 0.41 CC/MIN/G
REST FLOW - LATERAL: 0.52 CC/MIN/G
REST FLOW - MAX: 0.69 CC/MIN/G
REST FLOW - MIN: 0.31 CC/MIN/G
REST FLOW - SEPTAL: 0.4 CC/MIN/G
REST FLOW - WHOLE HEART: 0.45 CC/MIN/G
RETIRED EF AND QEF - SEE NOTES: 47 %
STRESS FLOW - ANTERIOR: 0.93 CC/MIN/G
STRESS FLOW - INFERIOR: 0.85 CC/MIN/G
STRESS FLOW - LATERAL: 1.12 CC/MIN/G
STRESS FLOW - MAX: 1.35 CC/MIN/G
STRESS FLOW - MIN: 0.59 CC/MIN/G
STRESS FLOW - SEPTAL: 0.8 CC/MIN/G
STRESS FLOW - WHOLE HEART: 0.93 CC/MIN/G
SYSTOLIC BLOOD PRESSURE: 113 MMHG

## 2025-02-25 PROCEDURE — 93018 CV STRESS TEST I&R ONLY: CPT | Mod: ,,, | Performed by: INTERNAL MEDICINE

## 2025-02-25 PROCEDURE — 93016 CV STRESS TEST SUPVJ ONLY: CPT | Mod: ,,, | Performed by: INTERNAL MEDICINE

## 2025-02-25 PROCEDURE — 63600175 PHARM REV CODE 636 W HCPCS: Performed by: INTERNAL MEDICINE

## 2025-02-25 PROCEDURE — 78434 AQMBF PET REST & RX STRESS: CPT

## 2025-02-25 PROCEDURE — 78431 MYOCRD IMG PET RST&STRS CT: CPT | Mod: 26,,, | Performed by: INTERNAL MEDICINE

## 2025-02-25 PROCEDURE — A9555 RB82 RUBIDIUM: HCPCS | Performed by: INTERNAL MEDICINE

## 2025-02-25 PROCEDURE — 78434 AQMBF PET REST & RX STRESS: CPT | Mod: 26,,, | Performed by: INTERNAL MEDICINE

## 2025-02-25 RX ORDER — REGADENOSON 0.08 MG/ML
0.4 INJECTION, SOLUTION INTRAVENOUS ONCE
Status: COMPLETED | OUTPATIENT
Start: 2025-02-25 | End: 2025-02-25

## 2025-02-25 RX ORDER — AMINOPHYLLINE 25 MG/ML
75 INJECTION, SOLUTION INTRAVENOUS ONCE
Status: COMPLETED | OUTPATIENT
Start: 2025-02-25 | End: 2025-02-25

## 2025-02-25 RX ADMIN — REGADENOSON 0.4 MG: 0.08 INJECTION, SOLUTION INTRAVENOUS at 01:02

## 2025-02-25 RX ADMIN — RUBIDIUM CHLORIDE RB-82 24.9 MILLICURIE: 150 INJECTION, SOLUTION INTRAVENOUS at 01:02

## 2025-02-25 RX ADMIN — RUBIDIUM CHLORIDE RB-82 24.8 MILLICURIE: 150 INJECTION, SOLUTION INTRAVENOUS at 01:02

## 2025-02-25 RX ADMIN — AMINOPHYLLINE 75 MG: 25 INJECTION, SOLUTION INTRAVENOUS at 01:02

## 2025-02-26 ENCOUNTER — PATIENT MESSAGE (OUTPATIENT)
Dept: CARDIOLOGY | Facility: CLINIC | Age: 75
End: 2025-02-26
Payer: MEDICARE

## 2025-03-12 ENCOUNTER — PATIENT MESSAGE (OUTPATIENT)
Dept: OPHTHALMOLOGY | Facility: CLINIC | Age: 75
End: 2025-03-12
Payer: MEDICARE

## 2025-03-12 DIAGNOSIS — H40.1132 PRIMARY OPEN ANGLE GLAUCOMA (POAG) OF BOTH EYES, MODERATE STAGE: ICD-10-CM

## 2025-03-12 RX ORDER — DORZOLAMIDE HYDROCHLORIDE AND TIMOLOL MALEATE 20; 5 MG/ML; MG/ML
1 SOLUTION/ DROPS OPHTHALMIC 2 TIMES DAILY
Qty: 30 ML | Refills: 4 | Status: SHIPPED | OUTPATIENT
Start: 2025-03-12

## 2025-03-12 RX ORDER — LATANOPROST 50 UG/ML
1 SOLUTION/ DROPS OPHTHALMIC NIGHTLY
Qty: 7.5 ML | Refills: 4 | Status: SHIPPED | OUTPATIENT
Start: 2025-03-12

## 2025-04-08 NOTE — PROGRESS NOTES
What Type Of Note Output Would You Prefer (Optional)?: Standard Output Zoom session: Joint education provided, teaching and education individualized for patient and needs.  Physical and occupational Therapy teaching  Caregiver and home support assessed and confirmed  DME delivered, pt will bring walker to hospital.  Total knee precautions and exercises discussed and reviewed. Elevate leg above the heart, polar ice, dressing and Nimbus pump discussed.   Total hip precautions discussed and reviewed  Discussed home safety, area rugs, cords, tripping hazards.   Discussed pain management, multimodel methods and additional therapies, such as relaxation, guided imagery.   Pt will be seen by pre-op center for clearance.  Medications delivered to bedside prior to DC.   Post-op call or Virtual day after discharge.  Joint Hotline number discussed   OP PT and HH PT  discussed  Answered questions/concerns,  pt verbalized understanding, pt will call with questions concerns.        Hpi Title: Evaluation of Skin Lesions

## 2025-04-21 DIAGNOSIS — I25.119 ATHEROSCLEROSIS OF NATIVE CORONARY ARTERY OF NATIVE HEART WITH ANGINA PECTORIS: ICD-10-CM

## 2025-04-21 RX ORDER — CLOPIDOGREL BISULFATE 75 MG/1
75 TABLET ORAL
Qty: 90 TABLET | Refills: 3 | Status: SHIPPED | OUTPATIENT
Start: 2025-04-21

## 2025-04-22 ENCOUNTER — TELEPHONE (OUTPATIENT)
Dept: CARDIOLOGY | Facility: CLINIC | Age: 75
End: 2025-04-22
Payer: MEDICARE

## 2025-04-22 ENCOUNTER — OFFICE VISIT (OUTPATIENT)
Dept: URGENT CARE | Facility: CLINIC | Age: 75
End: 2025-04-22
Payer: MEDICARE

## 2025-04-22 ENCOUNTER — PATIENT MESSAGE (OUTPATIENT)
Dept: ADMINISTRATIVE | Facility: OTHER | Age: 75
End: 2025-04-22
Payer: MEDICARE

## 2025-04-22 ENCOUNTER — PATIENT MESSAGE (OUTPATIENT)
Dept: PRIMARY CARE CLINIC | Facility: CLINIC | Age: 75
End: 2025-04-22
Payer: MEDICARE

## 2025-04-22 ENCOUNTER — PATIENT MESSAGE (OUTPATIENT)
Dept: CARDIOLOGY | Facility: CLINIC | Age: 75
End: 2025-04-22
Payer: MEDICARE

## 2025-04-22 VITALS
SYSTOLIC BLOOD PRESSURE: 150 MMHG | HEART RATE: 68 BPM | DIASTOLIC BLOOD PRESSURE: 88 MMHG | WEIGHT: 182 LBS | BODY MASS INDEX: 26.05 KG/M2 | RESPIRATION RATE: 18 BRPM | OXYGEN SATURATION: 99 % | HEIGHT: 70 IN | TEMPERATURE: 97 F

## 2025-04-22 DIAGNOSIS — R53.83 FATIGUE, UNSPECIFIED TYPE: Primary | ICD-10-CM

## 2025-04-22 DIAGNOSIS — T50.905A ADVERSE EFFECT OF DRUG, INITIAL ENCOUNTER: ICD-10-CM

## 2025-04-22 LAB
CTP QC/QA: YES
CTP QC/QA: YES
POC MOLECULAR INFLUENZA A AGN: NEGATIVE
POC MOLECULAR INFLUENZA B AGN: NEGATIVE
SARS CORONAVIRUS 2 ANTIGEN: NEGATIVE

## 2025-04-22 PROCEDURE — 99213 OFFICE O/P EST LOW 20 MIN: CPT | Mod: S$GLB,,, | Performed by: FAMILY MEDICINE

## 2025-04-22 PROCEDURE — 87502 INFLUENZA DNA AMP PROBE: CPT | Mod: QW,S$GLB,, | Performed by: FAMILY MEDICINE

## 2025-04-22 PROCEDURE — 87811 SARS-COV-2 COVID19 W/OPTIC: CPT | Mod: QW,S$GLB,, | Performed by: FAMILY MEDICINE

## 2025-04-22 NOTE — TELEPHONE ENCOUNTER
"----- Message from Pharmacist Ana sent at 4/22/2025 12:11 PM CDT -----  Regarding: Repatha  Hello,We fill Repatha for Mr. French here at [facility]. He has reported some side effects he believes to be related to the medication. He is reporting fatigue and slight body aches. Reports no issues after his 1st and 2nd dose of Repatha. But after his 3rd dose [4/3] he reported feeling like he had "the flu or covid" stating fatigue, increased sleep, and body aches. States it got a little better and then after his 4th dose [4/19] he again experienced similar symptoms. At this time, he has declined to set up refill as he is waiting to hear from you in regards to this. I informed him flu-like symptoms have been reported after starting Repatha and typically resolve after a few doses. Please let us know how you wish to proceed.Thanks,Ana Kearns, PharmDClinical PharmacistO[facility] Special[facility] Phone: [phone]  "

## 2025-04-22 NOTE — TELEPHONE ENCOUNTER
Message sent to incorrect provider. Patient sees Dr Tompkins. Message was already sent to Dr Tompkins to address earlier this morning.

## 2025-04-22 NOTE — PROGRESS NOTES
"Subjective:      Patient ID: Bandar French is a 74 y.o. male.    Vitals:  height is 5' 10" (1.778 m) and weight is 82.6 kg (182 lb). His oral temperature is 97 °F (36.1 °C). His blood pressure is 150/88 (abnormal) and his pulse is 68. His respiration is 18 and oxygen saturation is 99%.     Chief Complaint: Fatigue (/)    Pt states on Sunday he began with fatigue, body ache,   he is taking a new injection  for his cholesterol med he is unsure if the med is giving him the side effects.  Med states side effects of " flu like sx". States 2 beginning doses he did fine.     Fatigue  This is a new problem. The current episode started in the past 7 days. Associated symptoms include fatigue and myalgias.       Constitution: Positive for fatigue.   Musculoskeletal:  Positive for muscle ache.      Objective:     Physical Exam   Constitutional: He is oriented to person, place, and time. He appears well-developed. He is cooperative.  Non-toxic appearance. He does not appear ill. No distress.   HENT:   Head: Normocephalic and atraumatic.   Ears:   Right Ear: Hearing, tympanic membrane, external ear and ear canal normal.   Left Ear: Hearing, tympanic membrane, external ear and ear canal normal.   Nose: Nose normal. No mucosal edema, rhinorrhea or nasal deformity. No epistaxis. Right sinus exhibits no maxillary sinus tenderness and no frontal sinus tenderness. Left sinus exhibits no maxillary sinus tenderness and no frontal sinus tenderness.   Mouth/Throat: Uvula is midline, oropharynx is clear and moist and mucous membranes are normal. No trismus in the jaw. Normal dentition. No uvula swelling. No oropharyngeal exudate, posterior oropharyngeal edema or posterior oropharyngeal erythema.   Eyes: Conjunctivae and lids are normal. No scleral icterus.   Neck: Trachea normal and phonation normal. Neck supple. No edema present. No erythema present. No neck rigidity present.   Cardiovascular: Normal rate, regular rhythm, normal heart " sounds and normal pulses.   Pulmonary/Chest: Effort normal and breath sounds normal. No respiratory distress. He has no decreased breath sounds. He has no rhonchi.   Abdominal: Normal appearance.   Musculoskeletal: Normal range of motion.         General: No deformity. Normal range of motion.   Neurological: He is alert and oriented to person, place, and time. He exhibits normal muscle tone. Coordination normal.   Skin: Skin is warm, dry, intact, not diaphoretic and not pale.   Psychiatric: His speech is normal and behavior is normal. Judgment and thought content normal.   Nursing note and vitals reviewed.    Results for orders placed or performed in visit on 04/22/25   SARS Coronavirus 2 Antigen, POCT Manual Read    Collection Time: 04/22/25  5:43 PM   Result Value Ref Range    SARS Coronavirus 2 Antigen Negative Negative, Presumptive Negative     Acceptable Yes    POCT Influenza A/B MOLECULAR    Collection Time: 04/22/25  5:45 PM   Result Value Ref Range    POC Molecular Influenza A Ag Negative Negative    POC Molecular Influenza B Ag Negative Negative     Acceptable Yes          Assessment:     1. Fatigue, unspecified type    2. Adverse effect of drug, initial encounter        Plan:       Fatigue, unspecified type  -     SARS Coronavirus 2 Antigen, POCT Manual Read  -     POCT Influenza A/B MOLECULAR    Adverse effect of drug, initial encounter          Medical Decision Making:   Initial Assessment:   Pt just started repatha which in a monoclonal antibody which should increase the immune responsiveness and make the patient more lethargic at times.          Thank you for choosing [facility]!     Our goal in the Urgent Care is to always provide outstanding medical care. You may receive a survey by mail or e-mail in the next week regarding your experience today. We would greatly appreciate you completing and returning the survey. Your feedback provides us with a way to  recognize our staff who provide very good care, and it helps us learn how to improve when your experience was below our aspiration of excellence.       We appreciate you trusting us with your medical care. We hope you feel better soon. We will be happy to take care of you for all of your future medical needs.  You must understand that you've received an Urgent Care treatment only and that you may be released before all your medical problems are known or treated. You, the patient, will arrange for follow up care as instructed.  Follow up with your PCP or specialty clinic as directed in the next 1-2 weeks if not improved or as needed.  You can call [phone] to schedule an appointment with the appropriate provider.  Another option is to follow up with [facility] ([email or web address]) virtually for quick simple medical advice.  If your condition worsens we recommend that you receive another evaluation at the emergency room immediately or contact your primary medical clinics after hours call service to discuss your concerns.  Please return here or go to the Emergency Department for any concerns or worsening of condition.      *If you were prescribed a narcotic or controlled medication, do not drive or operate heavy equipment or machinery while taking these medications.

## 2025-04-23 ENCOUNTER — TELEPHONE (OUTPATIENT)
Dept: CARDIOLOGY | Facility: CLINIC | Age: 75
End: 2025-04-23
Payer: MEDICARE

## 2025-04-23 NOTE — TELEPHONE ENCOUNTER
"----- Message from Pharmacist Ana sent at 4/22/2025 12:11 PM CDT -----  Regarding: Repatha  Hello,We fill Repatha for Mr. French here at [facility]. He has reported some side effects he believes to be related to the medication. He is reporting fatigue and slight body aches. Reports no issues after his 1st and 2nd dose of Repatha. But after his 3rd dose [4/3] he reported feeling like he had "the flu or covid" stating fatigue, increased sleep, and body aches. States it got a little better and then after his 4th dose [4/19] he again experienced similar symptoms. At this time, he has declined to set up refill as he is waiting to hear from you in regards to this. I informed him flu-like symptoms have been reported after starting Repatha and typically resolve after a few doses. Please let us know how you wish to proceed.Thanks,Ana Kearns, PharmDClinical PharmacistOchsner Speciality [facility] Phone: [phone]  "

## 2025-04-24 ENCOUNTER — LAB VISIT (OUTPATIENT)
Dept: LAB | Facility: HOSPITAL | Age: 75
End: 2025-04-24
Attending: INTERNAL MEDICINE
Payer: MEDICARE

## 2025-04-24 DIAGNOSIS — E78.2 HYPERLIPIDEMIA, MIXED: Chronic | ICD-10-CM

## 2025-04-24 DIAGNOSIS — E11.36 TYPE 2 DIABETES MELLITUS WITH DIABETIC CATARACT, WITHOUT LONG-TERM CURRENT USE OF INSULIN: ICD-10-CM

## 2025-04-24 LAB
ALBUMIN SERPL BCP-MCNC: 3.8 G/DL (ref 3.5–5.2)
ALP SERPL-CCNC: 74 UNIT/L (ref 40–150)
ALT SERPL W/O P-5'-P-CCNC: 19 UNIT/L (ref 10–44)
ANION GAP (OHS): 7 MMOL/L (ref 8–16)
AST SERPL-CCNC: 17 UNIT/L (ref 11–45)
BILIRUB SERPL-MCNC: 0.6 MG/DL (ref 0.1–1)
BUN SERPL-MCNC: 13 MG/DL (ref 8–23)
CALCIUM SERPL-MCNC: 8.7 MG/DL (ref 8.7–10.5)
CHLORIDE SERPL-SCNC: 109 MMOL/L (ref 95–110)
CHOLEST SERPL-MCNC: 114 MG/DL (ref 120–199)
CHOLEST/HDLC SERPL: 3.7 {RATIO} (ref 2–5)
CO2 SERPL-SCNC: 27 MMOL/L (ref 23–29)
CREAT SERPL-MCNC: 0.8 MG/DL (ref 0.5–1.4)
EAG (OHS): 131 MG/DL (ref 68–131)
GFR SERPLBLD CREATININE-BSD FMLA CKD-EPI: >60 ML/MIN/1.73/M2
GLUCOSE SERPL-MCNC: 130 MG/DL (ref 70–110)
HBA1C MFR BLD: 6.2 % (ref 4–5.6)
HDLC SERPL-MCNC: 31 MG/DL (ref 40–75)
HDLC SERPL: 27.2 % (ref 20–50)
LDLC SERPL CALC-MCNC: 35.6 MG/DL (ref 63–159)
NONHDLC SERPL-MCNC: 83 MG/DL
POTASSIUM SERPL-SCNC: 4.2 MMOL/L (ref 3.5–5.1)
PROT SERPL-MCNC: 6.6 GM/DL (ref 6–8.4)
SODIUM SERPL-SCNC: 143 MMOL/L (ref 136–145)
TRIGL SERPL-MCNC: 237 MG/DL (ref 30–150)

## 2025-04-24 PROCEDURE — 36415 COLL VENOUS BLD VENIPUNCTURE: CPT | Mod: HCNC,PO

## 2025-04-24 PROCEDURE — 80053 COMPREHEN METABOLIC PANEL: CPT | Mod: HCNC

## 2025-04-24 PROCEDURE — 83036 HEMOGLOBIN GLYCOSYLATED A1C: CPT | Mod: HCNC

## 2025-04-24 PROCEDURE — 80061 LIPID PANEL: CPT | Mod: HCNC

## 2025-04-29 ENCOUNTER — RESULTS FOLLOW-UP (OUTPATIENT)
Dept: PRIMARY CARE CLINIC | Facility: CLINIC | Age: 75
End: 2025-04-29

## 2025-04-30 ENCOUNTER — OFFICE VISIT (OUTPATIENT)
Dept: PRIMARY CARE CLINIC | Facility: CLINIC | Age: 75
End: 2025-04-30
Payer: MEDICARE

## 2025-04-30 VITALS
SYSTOLIC BLOOD PRESSURE: 116 MMHG | OXYGEN SATURATION: 98 % | DIASTOLIC BLOOD PRESSURE: 60 MMHG | WEIGHT: 184.06 LBS | HEIGHT: 70 IN | HEART RATE: 55 BPM | BODY MASS INDEX: 26.35 KG/M2

## 2025-04-30 DIAGNOSIS — E11.36 TYPE 2 DIABETES MELLITUS WITH DIABETIC CATARACT, WITHOUT LONG-TERM CURRENT USE OF INSULIN: Primary | ICD-10-CM

## 2025-04-30 DIAGNOSIS — I11.9 HYPERTENSIVE HEART DISEASE WITHOUT HEART FAILURE: ICD-10-CM

## 2025-04-30 DIAGNOSIS — I25.119 ATHEROSCLEROSIS OF NATIVE CORONARY ARTERY OF NATIVE HEART WITH ANGINA PECTORIS: ICD-10-CM

## 2025-04-30 DIAGNOSIS — T46.6X5A STATIN MYOPATHY: ICD-10-CM

## 2025-04-30 DIAGNOSIS — E78.2 HYPERLIPIDEMIA, MIXED: Chronic | ICD-10-CM

## 2025-04-30 DIAGNOSIS — I70.0 AORTIC ATHEROSCLEROSIS: ICD-10-CM

## 2025-04-30 DIAGNOSIS — G72.0 STATIN MYOPATHY: ICD-10-CM

## 2025-04-30 PROCEDURE — 3078F DIAST BP <80 MM HG: CPT | Mod: CPTII,S$GLB,, | Performed by: INTERNAL MEDICINE

## 2025-04-30 PROCEDURE — 3044F HG A1C LEVEL LT 7.0%: CPT | Mod: CPTII,S$GLB,, | Performed by: INTERNAL MEDICINE

## 2025-04-30 PROCEDURE — 3008F BODY MASS INDEX DOCD: CPT | Mod: CPTII,S$GLB,, | Performed by: INTERNAL MEDICINE

## 2025-04-30 PROCEDURE — 4010F ACE/ARB THERAPY RXD/TAKEN: CPT | Mod: CPTII,S$GLB,, | Performed by: INTERNAL MEDICINE

## 2025-04-30 PROCEDURE — 1159F MED LIST DOCD IN RCRD: CPT | Mod: CPTII,S$GLB,, | Performed by: INTERNAL MEDICINE

## 2025-04-30 PROCEDURE — 1157F ADVNC CARE PLAN IN RCRD: CPT | Mod: CPTII,S$GLB,, | Performed by: INTERNAL MEDICINE

## 2025-04-30 PROCEDURE — 1126F AMNT PAIN NOTED NONE PRSNT: CPT | Mod: CPTII,S$GLB,, | Performed by: INTERNAL MEDICINE

## 2025-04-30 PROCEDURE — 3074F SYST BP LT 130 MM HG: CPT | Mod: CPTII,S$GLB,, | Performed by: INTERNAL MEDICINE

## 2025-04-30 PROCEDURE — 99214 OFFICE O/P EST MOD 30 MIN: CPT | Mod: S$GLB,,, | Performed by: INTERNAL MEDICINE

## 2025-04-30 PROCEDURE — 99999 PR PBB SHADOW E&M-EST. PATIENT-LVL IV: CPT | Mod: PBBFAC,,, | Performed by: INTERNAL MEDICINE

## 2025-04-30 NOTE — PROGRESS NOTES
[facility] Note    Chief Complaint      Chief Complaint   Patient presents with    Follow-up     6 month        History of Present Illness      History of Present Illness    CHIEF COMPLAINT:  Mr. French presents today for follow up    HYPERLIPIDEMIA MANAGEMENT:  He started Repatha treatment in March with two initial injections. In April, he experienced adverse effects after injections on April 3rd and April 13th. Despite developing symptoms, he continued with scheduled Repatha injection on April 17th. He is not currently taking Rosuvastatin.    MEDICAL HISTORY:  He has a history of coronary artery disease.      ROS:  General: +sleep disturbances       DM2: A1C 6.2, up from 6.0. Home monitoring has been at goal. Diet controlled.  Eye exam UTD with Dr. Shannon.  HTN: BP at goal on irbesartan, amlodipine.   CAD: Sees Dr. Tompkins, cardiology  On ASA, plavix, irbesartan, crestor.  S/P PCI x 2 1/2022.  Denies CP, SOB.    HLD: Controlled on repatha.  Previously on statin but myalgias with any more frequent dosing interval.  LDL 36, triglyceride 237.    Flu shot declines.  TdAP 2021.  Prevnar 2015.  Pneumovax UTD.  Zostavax 2013.  Shingrix UTD.  COVID UTD.  Cscope Dr. Copeland, 2016, no polyps, 10 yr interval.     Assessment/Plan     Bandar French is a 74 y.o.male with:    Assessment & Plan    Reviewed lipid panel: excellent overall results except for elevated triglycerides.  A1C, liver function, renal function, and electrolytes within acceptable ranges.  Evaluated response to Repatha, considering continuation despite potential side effects.  Determined to maintain current cholesterol management strategy due to history of CAD, despite low cholesterol levels.  Explained that triglycerides can fluctuate and are not a major marker of heart disease from a cholesterol standpoint.    TYPE 2 DIABETES MELLITUS WITH DIABETIC CATARACT, WITHOUT LONG-TERM CURRENT USE OF INSULIN:  - Monitored the patient's A1C level and  found it to be in the pre-diabetes range, which is good especially considering the patient is not on diabetes medications.  - Confirmed liver function, kidney function, and electrolytes are all normal.  - Ordered labs in 6 months to reassess the patient's condition.    HYPERLIPIDEMIA:  - Monitored patient who started Repatha injections in March with every 14-day dosing.  - Cholesterol panel shows low cholesterol, HDL, and LDL, though triglycerides are fluctuating.  - Overall lipid profile looks good except for triglycerides.  - Discussed the difference between Repatha (PCSK9 inhibitor) and statins, emphasizing Repatha's lack of effect on muscle metabolism.  - Mr. French is no longer taking Rosuvastatin.  - Recommend continuing Repatha injections for at least 2 more cycles (1 more month) to assess tolerability.  - Ordered lipid panel and other markers in 6 months.    CORONARY ARTERY DISEASE:  - Acknowledged patient's coronary artery disease history and continued treatment with Repatha injections for cholesterol management.  - Physical exam revealed normal heart sounds and clear carotid arteries.    ADVERSE EFFECTS OF MEDICATION:  - Mr. French reported feeling unwell after starting Repatha injections, with symptoms occurring on April 13th and again after the April 17th injection.  - Swab test to rule out other illnesses came back negative.  - Discussed possible Repatha side effects and recommended continuing injections for at least 2 more doses to properly assess tolerance.         1. Type 2 diabetes mellitus with diabetic cataract, without long-term current use of insulin  - Comprehensive Metabolic Panel; Future  - Hemoglobin A1C; Future  - Microalbumin/Creatinine Ratio, Urine; Future    2. Hypertensive heart disease without heart failure    3. Atherosclerosis of native coronary artery of native heart with angina pectoris    4. Aortic atherosclerosis    5. Hyperlipidemia, mixed  - Lipid Panel; Future    6. Statin  myopathy      Chronic conditions status updated as per HPI.  Other than changes above, cont current medications and maintain follow up with specialists.  No follow-ups on file.    Future Appointments   Date Time Provider Department Center   5/7/2025  1:15 PM Alejandra Omalley MD [facility]   8/12/2025  8:00 AM Mega Tompkins MD [facility] CARDIO [place] [facility]   10/31/2025 10:00 AM Gunnar Rangel MD [facility] PRICRE [place]           Past Medical History:  Past Medical History:   Diagnosis Date    Arthritis     Cataract     Coronary artery disease     Depression     Diabetes mellitus     Glaucoma     Hearing loss 2019    Age?    Hyperlipidemia     Hypertension     Retinal tear of left eye 2019    Seizures 3/12/2022    Type 2 diabetes mellitus without complications 09/25/2024    Unspecified atrial fibrillation 09/25/2024       Past Surgical History:   has a past surgical history that includes Knee arthroscopy (Left, 1967); Knee arthroscopy (Right, 2012); Hand surgery (Right, 12/2004); Coronary stent placement (2014); Tonsillectomy; Total knee arthroplasty (Right, 07/22/2019); Adenoidectomy; Cosmetic surgery (Bilateral, 06/2019); Eye surgery (Left, 03/2019); Joint replacement (Right, 07/22/2019); Left heart catheterization (Left, 01/20/2022); Coronary angiography (N/A, 01/20/2022); and arthroplasty, knee, total, using computer-assisted navigation (Left, 5/15/2023).    Family History:  family history includes Aneurysm in his father; Dementia in his mother; Diabetes in his mother; Glaucoma in his brother; Heart disease in his brother; Migraines in his sister; No Known Problems in his daughter, son, and son; Other in his brother and son; Pneumonia in his brother.     Social History:  Social History[1]    Medications:  Encounter Medications[2]    Allergies:  Review of patient's allergies indicates:  No Known Allergies    Health Maintenance:  Immunization History   Administered Date(s) Administered    COVID-19,  "MRNA, LN-S, PF (MODERNA FULL 0.5 ML DOSE) 03/10/2021, 03/10/2021, 04/12/2021    Influenza (FLUAD) - Quadrivalent - Adjuvanted - PF *Preferred* (65+) 09/16/2020    Influenza - Quadrivalent - High Dose - PF (65 years and older) 10/21/2021    Influenza - Trivalent - Afluria, Fluzone MDV 01/09/2007, 10/12/2011    Influenza - Trivalent - Fluad - Adjuvanted - PF (65 years and older 01/03/2019    Influenza - Trivalent - Fluzone High Dose - PF (65 years and older) 11/23/2015, 11/18/2016, 10/04/2017, 09/25/2019, 12/02/2022    Pneumococcal Conjugate - 13 Valent 11/23/2015    Pneumococcal Polysaccharide - 23 Valent 09/25/2019    Tdap 10/21/2021    Zoster 01/02/2013    Zoster Recombinant 10/14/2020, 05/10/2021      Health Maintenance   Topic Date Due    RSV Vaccine (Age 60+ and Pregnant patients) (1 - Risk 60-74 years 1-dose series) Never done    Influenza Vaccine (1) 09/01/2024    COVID-19 Vaccine (4 - 2024-25 season) 09/01/2024    Hemoglobin A1c  10/24/2025    Diabetes Urine Screening  10/25/2025    Foot Exam  11/15/2025    Diabetic Eye Exam  01/07/2026    Aspirin/Antiplatelet Therapy  04/22/2026    Lipid Panel  04/24/2026    Colorectal Cancer Screening  09/09/2026    TETANUS VACCINE  10/21/2031    Hepatitis C Screening  Completed    Shingles Vaccine  Completed    Pneumococcal Vaccines (Age 50+)  Completed    Abdominal Aortic Aneurysm Screening  Completed        Physical Exam      Vital Signs  Pulse: (!) 55  SpO2: 98 %  BP: 116/60  BP Location: Left arm  Patient Position: Sitting  Pain Score: 0-No pain  Height and Weight  Height: 5' 10" (177.8 cm)  Weight: 83.5 kg (184 lb 1.4 oz)  BSA (Calculated - sq m): 2.03 sq meters  BMI (Calculated): 26.4  Weight in (lb) to have BMI = 25: 173.9]    Physical Exam    General: No acute distress. Well-developed. Well-nourished.  Eyes: EOMI. Sclerae anicteric.  HENT: Normocephalic. Atraumatic. Nares patent. Moist oral mucosa.  Ears: Bilateral TMs clear. Bilateral EACs clear.  Cardiovascular: " Regular rate. Regular rhythm. No murmurs. No rubs. No gallops. Normal S1, S2. Heart sounds fine. Carotid sounds clear.  Respiratory: Normal respiratory effort. Clear to auscultation bilaterally. No rales. No rhonchi. No wheezing. Lungs sound good.  Abdomen: Soft. Non-tender. Non-distended. Normoactive bowel sounds.  Musculoskeletal: No  obvious deformity.  Extremities: No lower extremity edema.  Neurological: Alert & oriented x3. No slurred speech. Normal gait.  Psychiatric: Normal mood. Normal affect. Good insight. Good judgment.  Skin: Warm. Dry. No rash.         Physical Exam  Vitals reviewed.   Constitutional:       Appearance: He is well-developed.   HENT:      Head: Normocephalic and atraumatic.      Right Ear: External ear normal.      Left Ear: External ear normal.   Cardiovascular:      Rate and Rhythm: Normal rate and regular rhythm.      Heart sounds: Normal heart sounds. No murmur heard.  Pulmonary:      Effort: Pulmonary effort is normal.      Breath sounds: Normal breath sounds. No wheezing or rales.   Abdominal:      General: Bowel sounds are normal.      Palpations: Abdomen is soft.         Laboratory:    Results              CBC:  Recent Labs   Lab 04/27/23  1357 05/19/23  1004 03/13/24  1556   WBC 8.13 12.91 H 8.11   RBC 4.28 L 3.19 L 4.32 L   Hemoglobin 12.8 L 9.6 L 12.8 L   Hematocrit 37.5 L 28.0 L 37.2 L   Platelets 179 153 181   MCV 88 88 86   MCH 29.9 30.1 29.6   MCHC 34.1 34.3 34.4     CMP:  Recent Labs   Lab 04/19/24  0839 10/25/24  0733 04/24/25  0728   Glucose 161 H 133 H 130 H   Calcium 9.3 9.1 8.7   Albumin 3.9 4.0 3.8   Protein Total  --   --  6.6   Total Protein 6.5 6.7  --    Sodium 141 142 143   Potassium 4.4 4.7 4.2   CO2 30 H 26 27   Chloride 106 108 109   BUN 15 18 13   Alkaline Phosphatase 72 70  --    ALP  --   --  74   ALT 30 19 19   AST 18 18 17   Total Bilirubin 0.7 0.5  --    Bilirubin Total  --   --  0.6     URINALYSIS:  Recent Labs   Lab 05/19/23  1013   Color, UA Yellow    Specific Gravity, UA 1.020   pH, UA 7.0   Protein, UA Trace A   Nitrite, UA Negative   Leukocytes, UA Negative   Urobilinogen, UA Negative      LIPIDS:  Recent Labs   Lab 10/25/24  0733 25  0828 25  0728   HDL 28 L 32 L  --    HDL Cholesterol  --   --  31 L   Cholesterol Total  --   --  114 L   Cholesterol 120 148  --    Triglycerides 108 159 H  --    Triglyceride  --   --  237 H   LDL Cholesterol 70.4 84.2 35.6 L   HDL/Cholesterol Ratio 23.3 21.6 27.2   Non-HDL Cholesterol 92 116  --    Non HDL Cholesterol  --   --  83   Total Cholesterol/HDL Ratio 4.3 4.6  --    Cholesterol/HDL Ratio  --   --  3.7     TSH:      A1C:  Recent Labs   Lab 22  0744 23  0719 10/05/23  0726 24  0839 10/25/24  0733 25  0728   Hemoglobin A1C 5.8 H  5.8 H 6.1 H 6.1 H 6.6 H 6.0 H  --    Hemoglobin A1c  --   --   --   --   --  6.2 H           This note was generated with the assistance of ambient listening technology. Verbal consent was obtained by the patient and accompanying visitor(s) for the recording of patient appointment to facilitate this note. I attest to having reviewed and edited the generated note for accuracy, though some syntax or spelling errors may persist. Please contact the author of this note for any clarification.      Gunnar Rangel MD  [facility]                       [1]   Social History  Tobacco Use    Smoking status: Former     Current packs/day: 0.00     Average packs/day: 2.0 packs/day for 20.0 years (40.0 ttl pk-yrs)     Types: Cigarettes, Cigars     Start date: 1968     Quit date: 1986     Years since quittin.3    Smokeless tobacco: Never   Substance Use Topics    Alcohol use: Not Currently     Comment: Discontinued     Drug use: Never   [2]   Outpatient Encounter Medications as of 2025   Medication Sig Note Dispense Refill    amLODIPine (NORVASC) 5 MG tablet TAKE 1 TABLET ONE TIME DAILY AT NIGHT  90 tablet 1    ascorbic acid, vitamin C,  (VITAMIN C) 500 MG tablet Take 500 mg by mouth once daily.       aspirin (ECOTRIN) 81 MG EC tablet 81 mg once daily.       blood sugar diagnostic (BLOOD GLUCOSE TEST) Strp 1 each by Misc.(Non-Drug; Combo Route) route once daily.  30 each 11    chlorpheniramine (CHLOR-TRIMETON) 4 mg tablet Take 4 mg by mouth as needed.  4/27/2023: Take as needed.       cholecalciferol, vitamin D3, 125 mcg (5,000 unit) Tab Take 5,000 Units by mouth once daily.       clopidogreL (PLAVIX) 75 mg tablet TAKE 1 TABLET(75 MG) BY MOUTH EVERY DAY  90 tablet 3    cyanocobalamin (VITAMIN B-12) 1000 MCG tablet Take 1,000 mcg by mouth once daily.       dorzolamide-timolol 2-0.5% (COSOPT) 22.3-6.8 mg/mL ophthalmic solution Place 1 drop into both eyes 2 (two) times daily.  30 mL 4    evolocumab (REPATHA SURECLICK) 140 mg/mL PnIj Inject 1 mL (140 mg total) into the skin every 14 (fourteen) days.  6 mL 3    FLONASE ALLERGY RELIEF 50 mcg/actuation nasal spray        folic acid/multivit-min/lutein (CENTRUM SILVER ORAL) Take 1 tablet by mouth once daily.       irbesartan (AVAPRO) 300 MG tablet TAKE 1 TABLET (300 MG TOTAL) BY MOUTH EVERY EVENING.  90 tablet 3    latanoprost 0.005 % ophthalmic solution Place 1 drop into both eyes every evening. INSTILL 1 DROP INTO BOTH EYES EVERY EVENING.  7.5 mL 4    magnesium oxide (MAG-OX) 400 mg (241.3 mg magnesium) tablet Take 250 mg by mouth once daily.       MICRO THIN LANCETS 33 gauge Misc        mv-min-FA-vit K-lutein-zeaxant (PRESERVISION AREDS 2 PLUS MV) 200 mcg-15 mcg- 5 mg-1 mg Cap Take 2 capsules by mouth once daily.       neomycin-polymyxin-dexamethasone (DEXACINE) 3.5 mg/g-10,000 unit/g-0.1 % Oint PLACE INTO THE RIGHT EYE 2 (TWO) TIMES A DAY FOR 10 DAYS  1 each 11    nitroGLYCERIN (NITROSTAT) 0.4 MG SL tablet Place 1 tablet (0.4 mg total) under the tongue every 5 (five) minutes as needed for Chest pain.  25 tablet 11    rosuvastatin (CRESTOR) 40 MG Tab Take 1 tablet (40 mg) every Monday and Friday (Patient  not taking: Reported on 4/30/2025)  24 tablet 1    [DISCONTINUED] clopidogreL (PLAVIX) 75 mg tablet Take 1 tablet (75 mg total) by mouth once daily.  90 tablet 3     No facility-administered encounter medications on file as of 4/30/2025.

## 2025-05-02 ENCOUNTER — TELEPHONE (OUTPATIENT)
Dept: OPTOMETRY | Facility: CLINIC | Age: 75
End: 2025-05-02
Payer: MEDICARE

## 2025-05-02 ENCOUNTER — OFFICE VISIT (OUTPATIENT)
Dept: OPHTHALMOLOGY | Facility: CLINIC | Age: 75
End: 2025-05-02
Payer: MEDICARE

## 2025-05-02 DIAGNOSIS — E11.9 DIABETES MELLITUS TYPE 2 WITHOUT RETINOPATHY: ICD-10-CM

## 2025-05-02 DIAGNOSIS — H25.813 COMBINED FORMS OF AGE-RELATED CATARACT OF BOTH EYES: ICD-10-CM

## 2025-05-02 DIAGNOSIS — H04.129 DRY EYE: ICD-10-CM

## 2025-05-02 DIAGNOSIS — H40.1431 PSEUDOEXFOLIATION (PXF) OPEN-ANGLE GLAUCOMA OF BOTH EYES, MILD STAGE: Primary | ICD-10-CM

## 2025-05-02 DIAGNOSIS — H02.402 PTOSIS OF LEFT EYELID: ICD-10-CM

## 2025-05-02 PROCEDURE — 99999 PR PBB SHADOW E&M-EST. PATIENT-LVL III: CPT | Mod: PBBFAC,HCNC,, | Performed by: STUDENT IN AN ORGANIZED HEALTH CARE EDUCATION/TRAINING PROGRAM

## 2025-05-02 NOTE — PROGRESS NOTES
Subjective:  HPI     Glaucoma     Additional comments: 4 month IOP and OCT review today            Ptosis     Additional comments: Pt feels that his eyebrows and eyelids are starting   to droop again and noticing it more while driving            Comments    DLS: 1/7/25    1. PXF OU  2. NS OU  3. KAMI  4. Ptosis  5. Type 2 DM no DR    MEDS:  Cosopt BID OU  Latanoprost QHS OU  PFAT's PRN OU          Last edited by Shelly Charles MA on 5/2/2025  9:26 AM.        Exam:  See encounter report for full exam    Assessment:  1. 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 You patient, establishing 9/2024  - Surg hx: none   - Laser hx: none  - Glaucoma FHx: brother  -  / 541  - Gonio:  (Coulon 9/2024)  - Tmax: unknown, 20s per Avelino note  - Target IOP: teens OU  - Med adverse effects: n/a    Baseline HVF 10/2022 VFI 97/97  Baseline RNFL 4/2022 avg 60/65  Baseline photos 12/2021    Last:  HVF reviewed- possible early inferior arc OD, non-specific OS  HVF: reliable, sup and inf depressed points, VFI 93 OD  reliable, superior depression, VFI 91 -- fluctuating/likely stable within variation of testing  OCT RNFL: ST/IT thinning, avg 68 OD  ST<IT thinning, avg 67 OS -- first BMO scan  Previous OCTs reviewed, stable 4/2022->7/2023 05/02/2025  IOP slightly higher than previous, has not been using drops as regularly with his more regular contact lens use  OCT RNFL: ST/IT thinning, avg 65 OD  ST<IT thinning, avg 65 OS -- overall stable, watch IT OD/ST OS    2. Combined forms of age-related cataract of both eyes  - Mild, monitor    3. Dry eye  - Wears contact lenses  - Restasis is very expensive, desires to hold for now  - Start PFATs QID OU    4. Ptosis of left eyelid  - Dr. Costa-- now s/p repair  - Hx eye injury to lid when 18 yo OS    5. Diabetes mellitus type 2 without retinopathy  - BP/BS control with PCP  - Yearly DFE    Plan:  IOP slightly higher but not using drops  daily due to contact lens use- advised on daily adherence. OCT stable-- watch IT OD, ST OS.  - Continue Cos 2/2, Xal qhs/qhs  - Continue PFATs QID OU  - Feels eyelids are drooping again; interested in referral to Dr. Burns for eval    Today's visit is associated with current and anticipated ongoing medical care related to this patient's single serious/complex condition (glaucoma). Follow up is to be continued indefinitely to monitor the condition.    Return 4 months -- OCT, VA, IOP  Next DFE due 1/2026    Alejandra Omalley MD  [facility], Glaucoma

## 2025-05-28 ENCOUNTER — PATIENT MESSAGE (OUTPATIENT)
Dept: CARDIOLOGY | Facility: CLINIC | Age: 75
End: 2025-05-28
Payer: MEDICARE

## 2025-05-29 RX ORDER — ROSUVASTATIN CALCIUM 40 MG/1
40 TABLET, COATED ORAL NIGHTLY
Qty: 90 TABLET | Refills: 3 | Status: SHIPPED | OUTPATIENT
Start: 2025-05-29 | End: 2026-05-29

## 2025-05-29 NOTE — TELEPHONE ENCOUNTER
Mr. French, absolutely we can go back on rosuvastatin if you can not tolerate Repatha.  I will send prescription to the pharmacy

## 2025-06-08 ENCOUNTER — PATIENT MESSAGE (OUTPATIENT)
Dept: PRIMARY CARE CLINIC | Facility: CLINIC | Age: 75
End: 2025-06-08
Payer: MEDICARE

## 2025-06-09 RX ORDER — FLUTICASONE PROPIONATE 50 MCG
SPRAY, SUSPENSION (ML) NASAL
Qty: 36 G | Refills: 3 | Status: SHIPPED | OUTPATIENT
Start: 2025-06-09

## 2025-06-09 NOTE — TELEPHONE ENCOUNTER
No care due was identified.  [facility] Embedded Care Due Messages. Reference number: [id number].   6/09/2025 10:28:58 AM CDT

## 2025-06-09 NOTE — TELEPHONE ENCOUNTER
Refill Routing Note   Medication(s) are not appropriate for processing by [facility] for the following reason(s):        No active prescription written by provider    [facility] action(s):  Defer               Appointments  past 12m or future 3m with PCP    Date Provider   Last Visit   4/30/2025 Gunnar Rangel MD   Next Visit   10/31/2025 Gunnar Rangel MD   ED visits in past 90 days: 0        Note composed:11:06 AM 06/09/2025

## 2025-06-24 ENCOUNTER — PATIENT MESSAGE (OUTPATIENT)
Dept: DERMATOLOGY | Facility: CLINIC | Age: 75
End: 2025-06-24
Payer: MEDICARE

## 2025-06-30 RX ORDER — NEOMYCIN SULFATE, POLYMYXIN B SULFATE, AND DEXAMETHASONE 3.5; 10000; 1 MG/G; [USP'U]/G; MG/G
OINTMENT OPHTHALMIC
Qty: 1 EACH | Refills: 11 | Status: SHIPPED | OUTPATIENT
Start: 2025-06-30

## 2025-07-02 ENCOUNTER — OFFICE VISIT (OUTPATIENT)
Dept: PODIATRY | Facility: CLINIC | Age: 75
End: 2025-07-02
Payer: MEDICARE

## 2025-07-02 VITALS
DIASTOLIC BLOOD PRESSURE: 71 MMHG | BODY MASS INDEX: 26.55 KG/M2 | WEIGHT: 185.44 LBS | SYSTOLIC BLOOD PRESSURE: 138 MMHG | HEIGHT: 70 IN | HEART RATE: 54 BPM

## 2025-07-02 DIAGNOSIS — M79.671 CHRONIC PAIN IN RIGHT FOOT: ICD-10-CM

## 2025-07-02 DIAGNOSIS — G89.29 CHRONIC PAIN IN RIGHT FOOT: ICD-10-CM

## 2025-07-02 DIAGNOSIS — M79.671 CHRONIC HEEL PAIN, RIGHT: ICD-10-CM

## 2025-07-02 DIAGNOSIS — M54.17 LUMBOSACRAL RADICULOPATHY: Primary | ICD-10-CM

## 2025-07-02 DIAGNOSIS — G89.29 CHRONIC HEEL PAIN, RIGHT: ICD-10-CM

## 2025-07-02 PROCEDURE — 3044F HG A1C LEVEL LT 7.0%: CPT | Mod: CPTII,HCNC,S$GLB, | Performed by: STUDENT IN AN ORGANIZED HEALTH CARE EDUCATION/TRAINING PROGRAM

## 2025-07-02 PROCEDURE — 3078F DIAST BP <80 MM HG: CPT | Mod: CPTII,HCNC,S$GLB, | Performed by: STUDENT IN AN ORGANIZED HEALTH CARE EDUCATION/TRAINING PROGRAM

## 2025-07-02 PROCEDURE — 99214 OFFICE O/P EST MOD 30 MIN: CPT | Mod: HCNC,S$GLB,, | Performed by: STUDENT IN AN ORGANIZED HEALTH CARE EDUCATION/TRAINING PROGRAM

## 2025-07-02 PROCEDURE — 1157F ADVNC CARE PLAN IN RCRD: CPT | Mod: CPTII,HCNC,S$GLB, | Performed by: STUDENT IN AN ORGANIZED HEALTH CARE EDUCATION/TRAINING PROGRAM

## 2025-07-02 PROCEDURE — 3008F BODY MASS INDEX DOCD: CPT | Mod: CPTII,HCNC,S$GLB, | Performed by: STUDENT IN AN ORGANIZED HEALTH CARE EDUCATION/TRAINING PROGRAM

## 2025-07-02 PROCEDURE — 1159F MED LIST DOCD IN RCRD: CPT | Mod: CPTII,HCNC,S$GLB, | Performed by: STUDENT IN AN ORGANIZED HEALTH CARE EDUCATION/TRAINING PROGRAM

## 2025-07-02 PROCEDURE — 99999 PR PBB SHADOW E&M-EST. PATIENT-LVL IV: CPT | Mod: PBBFAC,HCNC,, | Performed by: STUDENT IN AN ORGANIZED HEALTH CARE EDUCATION/TRAINING PROGRAM

## 2025-07-02 PROCEDURE — 3075F SYST BP GE 130 - 139MM HG: CPT | Mod: CPTII,HCNC,S$GLB, | Performed by: STUDENT IN AN ORGANIZED HEALTH CARE EDUCATION/TRAINING PROGRAM

## 2025-07-02 PROCEDURE — 1125F AMNT PAIN NOTED PAIN PRSNT: CPT | Mod: CPTII,HCNC,S$GLB, | Performed by: STUDENT IN AN ORGANIZED HEALTH CARE EDUCATION/TRAINING PROGRAM

## 2025-07-02 PROCEDURE — 4010F ACE/ARB THERAPY RXD/TAKEN: CPT | Mod: CPTII,HCNC,S$GLB, | Performed by: STUDENT IN AN ORGANIZED HEALTH CARE EDUCATION/TRAINING PROGRAM

## 2025-07-02 RX ORDER — DEXAMETHASONE 4 MG/1
4 TABLET ORAL DAILY
Qty: 14 TABLET | Refills: 0 | Status: SHIPPED | OUTPATIENT
Start: 2025-07-02
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
## 2025-07-13 ENCOUNTER — PATIENT MESSAGE (OUTPATIENT)
Dept: PODIATRY | Facility: CLINIC | Age: 75
End: 2025-07-13
Payer: MEDICARE

## 2025-07-13 DIAGNOSIS — M54.17 LUMBOSACRAL RADICULOPATHY: Primary | ICD-10-CM

## 2025-07-13 DIAGNOSIS — E11.9 TYPE 2 DIABETES MELLITUS WITHOUT COMPLICATION, WITHOUT LONG-TERM CURRENT USE OF INSULIN: ICD-10-CM

## 2025-07-13 DIAGNOSIS — G89.29 CHRONIC HEEL PAIN, RIGHT: ICD-10-CM

## 2025-07-13 DIAGNOSIS — G57.81 SURAL NEURITIS, RIGHT: ICD-10-CM

## 2025-07-13 DIAGNOSIS — M79.671 CHRONIC HEEL PAIN, RIGHT: ICD-10-CM

## 2025-07-14 ENCOUNTER — PATIENT MESSAGE (OUTPATIENT)
Dept: PODIATRY | Facility: CLINIC | Age: 75
End: 2025-07-14
Payer: MEDICARE

## 2025-07-14 RX ORDER — DULOXETIN HYDROCHLORIDE 30 MG/1
30 CAPSULE, DELAYED RELEASE ORAL DAILY
Qty: 30 CAPSULE | Refills: 11 | Status: SHIPPED | OUTPATIENT
Start: 2025-07-14 | End: 2026-07-14

## 2025-07-23 DIAGNOSIS — I25.10 CORONARY ARTERY DISEASE INVOLVING NATIVE CORONARY ARTERY OF NATIVE HEART WITHOUT ANGINA PECTORIS: ICD-10-CM

## 2025-07-23 DIAGNOSIS — Z95.5 PRESENCE OF STENT IN CORONARY ARTERY: Primary | ICD-10-CM

## 2025-07-23 DIAGNOSIS — I10 ESSENTIAL HYPERTENSION: ICD-10-CM

## 2025-07-23 DIAGNOSIS — I70.0 AORTIC ATHEROSCLEROSIS: ICD-10-CM

## 2025-07-24 ENCOUNTER — OFFICE VISIT (OUTPATIENT)
Dept: URGENT CARE | Facility: CLINIC | Age: 75
End: 2025-07-24
Payer: MEDICARE

## 2025-07-24 ENCOUNTER — PATIENT MESSAGE (OUTPATIENT)
Dept: PODIATRY | Facility: CLINIC | Age: 75
End: 2025-07-24
Payer: MEDICARE

## 2025-07-24 VITALS
OXYGEN SATURATION: 98 % | SYSTOLIC BLOOD PRESSURE: 130 MMHG | HEIGHT: 70 IN | TEMPERATURE: 98 F | BODY MASS INDEX: 26.48 KG/M2 | DIASTOLIC BLOOD PRESSURE: 70 MMHG | WEIGHT: 185 LBS | HEART RATE: 66 BPM | RESPIRATION RATE: 20 BRPM

## 2025-07-24 DIAGNOSIS — N48.1 BALANITIS: ICD-10-CM

## 2025-07-24 DIAGNOSIS — T38.0X5A ADVERSE EFFECT OF GLUCOCORTICOID OR SYNTHETIC ANALOGUE, INITIAL ENCOUNTER: ICD-10-CM

## 2025-07-24 DIAGNOSIS — R53.83 FATIGUE, UNSPECIFIED TYPE: ICD-10-CM

## 2025-07-24 DIAGNOSIS — R30.0 DYSURIA: Primary | ICD-10-CM

## 2025-07-24 LAB
BILIRUBIN, UA POC OHS: NEGATIVE
BLOOD, UA POC OHS: ABNORMAL
CLARITY, UA POC OHS: CLEAR
COLOR, UA POC OHS: YELLOW
GLUCOSE SERPL-MCNC: 179 MG/DL (ref 70–110)
GLUCOSE, UA POC OHS: NEGATIVE
KETONES, UA POC OHS: NEGATIVE
LEUKOCYTES, UA POC OHS: NEGATIVE
NITRITE, UA POC OHS: NEGATIVE
OHS QRS DURATION: 90 MS
OHS QTC CALCULATION: 383 MS
PH, UA POC OHS: 6
PROTEIN, UA POC OHS: NEGATIVE
SPECIFIC GRAVITY, UA POC OHS: 1
UROBILINOGEN, UA POC OHS: 0.2

## 2025-07-24 PROCEDURE — 81003 URINALYSIS AUTO W/O SCOPE: CPT | Mod: QW,S$GLB,, | Performed by: INTERNAL MEDICINE

## 2025-07-24 PROCEDURE — 82962 GLUCOSE BLOOD TEST: CPT | Mod: S$GLB,,, | Performed by: INTERNAL MEDICINE

## 2025-07-24 PROCEDURE — 99214 OFFICE O/P EST MOD 30 MIN: CPT | Mod: S$GLB,,, | Performed by: INTERNAL MEDICINE

## 2025-07-24 PROCEDURE — 93000 ELECTROCARDIOGRAM COMPLETE: CPT | Mod: S$GLB,,, | Performed by: STUDENT IN AN ORGANIZED HEALTH CARE EDUCATION/TRAINING PROGRAM

## 2025-07-24 RX ORDER — CLOTRIMAZOLE 1 %
CREAM (GRAM) TOPICAL 2 TIMES DAILY
Qty: 28 G | Refills: 0 | Status: SHIPPED | OUTPATIENT
Start: 2025-07-24 | End: 2025-08-03

## 2025-07-24 RX ORDER — HYDROCORTISONE 10 MG/1
TABLET ORAL
Qty: 9 TABLET | Refills: 0 | Status: SHIPPED | OUTPATIENT
Start: 2025-07-24 | End: 2025-07-30

## 2025-07-24 NOTE — PROGRESS NOTES
"Subjective:      Patient ID: Bandar French is a 74 y.o. male.    Vitals:  height is 5' 10" (1.778 m) and weight is 83.9 kg (185 lb). His oral temperature is 97.5 °F (36.4 °C). His blood pressure is 130/70 and his pulse is 66. His respiration is 20 and oxygen saturation is 98%.     Chief Complaint: Dysuria and Fatigue    Pt presents with being treated with decadron for heel pain by his pcp a few weeks back, then switched to Cymbalta for the pain , he states he has been feeling tired a lot and fatigue,  he is unsure if it is the new medication,   Last Sunday he had some dysuria with  Increase in frequency with urinating , low grade temp 99 and flank pain, denies chest pain,sob,or any pmhx of prostatitis.    Patient presents with severe fatigue csince stopping dexamethasone 4 mg for 14 days. He was prescribed this by his podiatrist for "chronic heel pain". He also noticed some pain with urination starting over the weekend and a redness on the shaft of his penis. He also noticed some flank pain last Sunday.     Dysuria   This is a new problem. The current episode started in the past 7 days. The problem occurs every urination. Maximum temperature: 99. Associated symptoms include flank pain, frequency and urgency. Associated symptoms comments: fatigue.   Fatigue  Associated symptoms include fatigue.       Constitution: Positive for fatigue.   Genitourinary:  Positive for dysuria, frequency, urgency and flank pain.   Skin:  Negative for erythema.      Objective:     Physical Exam   Constitutional: He is oriented to person, place, and time. He appears well-developed. No distress.   HENT:   Head: Normocephalic and atraumatic.   Ears:   Right Ear: External ear normal.   Left Ear: External ear normal.   Nose: Nose normal.   Mouth/Throat: Oropharynx is clear and moist. No oropharyngeal exudate.   Eyes: Conjunctivae and EOM are normal. Pupils are equal, round, and reactive to light. Right eye exhibits no discharge. Left eye " exhibits no discharge. No scleral icterus.   Neck: Neck supple.   Cardiovascular: Normal rate, regular rhythm and normal heart sounds.   No murmur heard.Exam reveals no gallop and no friction rub.   Pulmonary/Chest: Effort normal. No respiratory distress. He has no wheezes. He has no rales.   Abdominal: There is no abdominal tenderness.   Genitourinary:               Comments: There is an area of redness     Lymphadenopathy:     He has no cervical adenopathy.   Neurological: He is alert and oriented to person, place, and time.   Skin: Skin is warm, dry, not diaphoretic and no rash. Capillary refill takes less than 2 seconds. No erythema   Psychiatric: His behavior is normal.   Nursing note and vitals reviewed.      Assessment:     1. Dysuria    2. Fatigue, unspecified type        Plan:       Dysuria  -     POCT Urinalysis(Instrument)    Fatigue, unspecified type  -     POCT Glucose, Hand-Held Device  -     EKG 12-lead; Future    Balanitis  -     clotrimazole (LOTRIMIN) 1 % cream; Apply topically 2 (two) times daily. for 10 days  Dispense: 28 g; Refill: 0    Adverse effect of glucocorticoid or synthetic analogue, initial encounter  -     hydrocortisone (CORTEF) 10 MG Tab; Take 2 tablets (20 mg total) by mouth once daily for 3 days, THEN 1 tablet (10 mg total) once daily for 3 days.  Dispense: 9 tablet; Refill: 0

## 2025-07-24 NOTE — PATIENT INSTRUCTIONS
Follow up with PCP. Go to ER if you develop low blood rpessure, feel like you are going to pass out, or fever.

## 2025-07-25 ENCOUNTER — PATIENT MESSAGE (OUTPATIENT)
Dept: PRIMARY CARE CLINIC | Facility: CLINIC | Age: 75
End: 2025-07-25
Payer: MEDICARE

## 2025-07-28 ENCOUNTER — PATIENT MESSAGE (OUTPATIENT)
Dept: ADMINISTRATIVE | Facility: OTHER | Age: 75
End: 2025-07-28
Payer: MEDICARE

## 2025-08-12 ENCOUNTER — OFFICE VISIT (OUTPATIENT)
Dept: CARDIOLOGY | Facility: CLINIC | Age: 75
End: 2025-08-12
Payer: MEDICARE

## 2025-08-12 ENCOUNTER — TELEPHONE (OUTPATIENT)
Dept: OPHTHALMOLOGY | Facility: CLINIC | Age: 75
End: 2025-08-12
Payer: MEDICARE

## 2025-08-12 VITALS
BODY MASS INDEX: 26.34 KG/M2 | WEIGHT: 184 LBS | HEART RATE: 59 BPM | DIASTOLIC BLOOD PRESSURE: 78 MMHG | OXYGEN SATURATION: 97 % | SYSTOLIC BLOOD PRESSURE: 142 MMHG | HEIGHT: 70 IN

## 2025-08-12 DIAGNOSIS — I11.9 HYPERTENSIVE HEART DISEASE WITHOUT HEART FAILURE: ICD-10-CM

## 2025-08-12 DIAGNOSIS — I70.0 AORTIC ATHEROSCLEROSIS: ICD-10-CM

## 2025-08-12 DIAGNOSIS — Z95.5 PRESENCE OF STENT IN CORONARY ARTERY: Chronic | ICD-10-CM

## 2025-08-12 DIAGNOSIS — I25.119 ATHEROSCLEROSIS OF NATIVE CORONARY ARTERY OF NATIVE HEART WITH ANGINA PECTORIS: ICD-10-CM

## 2025-08-12 DIAGNOSIS — I10 ESSENTIAL HYPERTENSION: Primary | Chronic | ICD-10-CM

## 2025-08-12 DIAGNOSIS — E78.2 HYPERLIPIDEMIA, MIXED: Chronic | ICD-10-CM

## 2025-08-12 PROCEDURE — 1101F PT FALLS ASSESS-DOCD LE1/YR: CPT | Mod: CPTII,HCNC,S$GLB, | Performed by: INTERNAL MEDICINE

## 2025-08-12 PROCEDURE — 3044F HG A1C LEVEL LT 7.0%: CPT | Mod: CPTII,HCNC,S$GLB, | Performed by: INTERNAL MEDICINE

## 2025-08-12 PROCEDURE — 1159F MED LIST DOCD IN RCRD: CPT | Mod: CPTII,HCNC,S$GLB, | Performed by: INTERNAL MEDICINE

## 2025-08-12 PROCEDURE — 3288F FALL RISK ASSESSMENT DOCD: CPT | Mod: CPTII,HCNC,S$GLB, | Performed by: INTERNAL MEDICINE

## 2025-08-12 PROCEDURE — 3077F SYST BP >= 140 MM HG: CPT | Mod: CPTII,HCNC,S$GLB, | Performed by: INTERNAL MEDICINE

## 2025-08-12 PROCEDURE — G2211 COMPLEX E/M VISIT ADD ON: HCPCS | Mod: HCNC,S$GLB,, | Performed by: INTERNAL MEDICINE

## 2025-08-12 PROCEDURE — 4010F ACE/ARB THERAPY RXD/TAKEN: CPT | Mod: CPTII,HCNC,S$GLB, | Performed by: INTERNAL MEDICINE

## 2025-08-12 PROCEDURE — 99999 PR PBB SHADOW E&M-EST. PATIENT-LVL V: CPT | Mod: PBBFAC,HCNC,, | Performed by: INTERNAL MEDICINE

## 2025-08-12 PROCEDURE — 1157F ADVNC CARE PLAN IN RCRD: CPT | Mod: CPTII,HCNC,S$GLB, | Performed by: INTERNAL MEDICINE

## 2025-08-12 PROCEDURE — 3078F DIAST BP <80 MM HG: CPT | Mod: CPTII,HCNC,S$GLB, | Performed by: INTERNAL MEDICINE

## 2025-08-12 PROCEDURE — 1126F AMNT PAIN NOTED NONE PRSNT: CPT | Mod: CPTII,HCNC,S$GLB, | Performed by: INTERNAL MEDICINE

## 2025-08-12 PROCEDURE — 3008F BODY MASS INDEX DOCD: CPT | Mod: CPTII,HCNC,S$GLB, | Performed by: INTERNAL MEDICINE

## 2025-08-12 PROCEDURE — 99214 OFFICE O/P EST MOD 30 MIN: CPT | Mod: HCNC,S$GLB,, | Performed by: INTERNAL MEDICINE

## 2025-08-14 ENCOUNTER — TELEPHONE (OUTPATIENT)
Dept: PHARMACY | Facility: CLINIC | Age: 75
End: 2025-08-14
Payer: MEDICARE

## (undated) DEVICE — SEE MEDLINE ITEM 146298

## (undated) DEVICE — DRAPE THREE-QTR REINF 53X77IN

## (undated) DEVICE — DRESSING TRANS 4X4 TEGADERM

## (undated) DEVICE — Device

## (undated) DEVICE — PULSAVAC ZIMMER

## (undated) DEVICE — KIT LEFT HEART MANIFOLD CUSTOM

## (undated) DEVICE — BLADE SAG DUAL 18MMX1.27MMX90M

## (undated) DEVICE — SEE MEDLINE ITEM 146271

## (undated) DEVICE — PIN FIXATION BONE 140X3.2MM
Type: IMPLANTABLE DEVICE | Site: KNEE | Status: NON-FUNCTIONAL
Removed: 2023-05-15

## (undated) DEVICE — SPONGE GAUZE 16PLY 4X4

## (undated) DEVICE — SYR 30CC LUER LOCK

## (undated) DEVICE — UNDERGLOVES BIOGEL PI SIZE 8.5

## (undated) DEVICE — DRAPE INCISE IOBAN 2 23X33IN

## (undated) DEVICE — GLOVE BIOGEL SKINSENSE PI 6.5

## (undated) DEVICE — SEE MEDLINE ITEM 152530

## (undated) DEVICE — SET MICRO PUNCT 4FR/MPIS-401

## (undated) DEVICE — SYS REVOLUTION CEMENT MIXING

## (undated) DEVICE — DRAPE STERI INSTRUMENT 1018

## (undated) DEVICE — GLOVE BIOGEL SKINSENSE PI 8.0

## (undated) DEVICE — STAPLER SKIN PROXIMATE WIDE

## (undated) DEVICE — TOURNIQUET SB QC DP 34X4IN

## (undated) DEVICE — SEE MEDLINE ITEM 152622

## (undated) DEVICE — CATH NC QUANTUM APEX MR 3X8

## (undated) DEVICE — APPLICATOR CHLORAPREP ORN 26ML

## (undated) DEVICE — KIT VIZADISC KNEE TRACKING

## (undated) DEVICE — ALCOHOL 70% ISOP RUBBING 4OZ

## (undated) DEVICE — BIT DRILL 2.7MM STRAIGHT

## (undated) DEVICE — SOL NACL IRR 1000ML BTL

## (undated) DEVICE — SUT QUILL PDO VIOL CP 45CM 2

## (undated) DEVICE — MARKER SKIN STND TIP BLUE BARR

## (undated) DEVICE — KIT IRR SUCTION HND PIECE

## (undated) DEVICE — KIT GLIDESHEATH SLEND 6FR 10CM

## (undated) DEVICE — DRESSING AQUACEL AG RBBN 2X45

## (undated) DEVICE — GLOVE BIOGEL SKINSENSE PI 7.5

## (undated) DEVICE — DRAPE T EXTRM SURG 121X128X90

## (undated) DEVICE — BRUSH SCRUB HIBICLENS 4%

## (undated) DEVICE — DRESSING TELFA N ADH 3X8

## (undated) DEVICE — CATH NC TREK RX 3X8MM

## (undated) DEVICE — SOL NACL .9% 250ML INJ

## (undated) DEVICE — BLADE MAKO STANDARD

## (undated) DEVICE — PAD KNEE POLAR XL

## (undated) DEVICE — SUT 2/0 36IN COATED VICRYL

## (undated) DEVICE — SUT MCRYL PLUS 4-0 PS2 27IN

## (undated) DEVICE — SOL 9P NACL IRR PIC IL

## (undated) DEVICE — ADHESIVE DERMABOND ADVANCED

## (undated) DEVICE — SUT VICRYL PLUS 2-0 CT1 18

## (undated) DEVICE — PAD DEFIB CADENCE ADULT R2

## (undated) DEVICE — TOWEL OR XRAY WHITE 17X26IN

## (undated) DEVICE — CONTAINER SPECIMEN STRL 4OZ

## (undated) DEVICE — KIT INTRODUCER W/GUIDEWIRE

## (undated) DEVICE — TRAY FOLEY 16FR INFECTION CONT

## (undated) DEVICE — SUT 1 36IN COATED VICRYL UN

## (undated) DEVICE — SYR 0.9% NACL 10ML STERILE

## (undated) DEVICE — CATH RADIAL TIG 6FR 4.0 110CM

## (undated) DEVICE — ALCOHOL 70% ISOP W/GREEN 16OZ

## (undated) DEVICE — UNDERGLOVE BIOGEL PI SZ 6.5 LF

## (undated) DEVICE — PAD CAST SPECIALIST STRL 6

## (undated) DEVICE — CATH SUCTION 10FR

## (undated) DEVICE — DRAPE SURG W/TWL 17 5/8X23

## (undated) DEVICE — HEMOSTAT VASC BAND REG 24CM

## (undated) DEVICE — HOOD T7 W/ PEEL AWAY LENS

## (undated) DEVICE — INFLATOR ENCORE 26 BLLN INFL

## (undated) DEVICE — GUIDE LAUNCHER 6FR JR 4.0

## (undated) DEVICE — SOL BETADINE 5%

## (undated) DEVICE — PIN BONE 3.2X110MM
Type: IMPLANTABLE DEVICE | Site: KNEE | Status: NON-FUNCTIONAL
Removed: 2023-05-15

## (undated) DEVICE — PRESSURIZER BN CEMENT NOZZLE

## (undated) DEVICE — NDL 21GA X1 1/2 REG BEVEL

## (undated) DEVICE — ELECTRODE REM PLYHSV RETURN 9

## (undated) DEVICE — BLADE DUAL CUT SAG 35X64X.89MM

## (undated) DEVICE — UNDERGLOVES BIOGEL PI SIZE 7.5

## (undated) DEVICE — SOL IRR NACL .9% 3000ML

## (undated) DEVICE — GAUZE SPONGE 4X4 12PLY

## (undated) DEVICE — SUT VICRYL+ 1 CTX 18IN VIOL

## (undated) DEVICE — DRAPE TOP 53X102IN

## (undated) DEVICE — PAD COLD THERAPY KNEE WRAP ON

## (undated) DEVICE — GOWN SMARTGOWN 3XL XLONG

## (undated) DEVICE — COVER BACK TABLE 72X21

## (undated) DEVICE — GUIDE LAUNCHER 6FR EBU 3.5

## (undated) DEVICE — CONTRAST VISIPAQUE 150ML

## (undated) DEVICE — MASK FLYTE HOOD PEEL AWAY

## (undated) DEVICE — PAD UNDERPAD 30X30

## (undated) DEVICE — SEE MEDLINE ITEM 146231

## (undated) DEVICE — TUBING HPCIL ROT M/F ADPT 48IN

## (undated) DEVICE — SEE MEDLINE ITEM 153151

## (undated) DEVICE — TAPE CURAD SILK ADH 3INX10YD

## (undated) DEVICE — KIT DRAPE RIO ONE PIECE W/POCK

## (undated) DEVICE — DRESSING XEROFORM FOIL PK 1X8

## (undated) DEVICE — KIT TOTAL KNEE TKOFG OMC

## (undated) DEVICE — CATH EAGLE EYE PLATINUM

## (undated) DEVICE — GLOVE BIOGEL PI MICRO SZ 7

## (undated) DEVICE — KIT CHECKPOINT MAKO

## (undated) DEVICE — SEE MEDLINE ITEM 152523

## (undated) DEVICE — COVER PROBE US 5.5X58L NON LTX

## (undated) DEVICE — SPONGE LAP 18X18 PREWASHED

## (undated) DEVICE — GUIDEWIRE RUNTHROUGH EF 180CM

## (undated) DEVICE — TREK COR DIL NC 3.5X12MM

## (undated) DEVICE — SOL NACL IRR 3000ML

## (undated) DEVICE — BLADE SURG #15 CARBON STEEL

## (undated) DEVICE — COVER BAND BAG 40 X 40

## (undated) DEVICE — SYR 50CC LL

## (undated) DEVICE — GLOVE BIOGEL SKINSENSE PI 7.0

## (undated) DEVICE — CATH TREK RX 2.50MM X 12MM

## (undated) DEVICE — SUT STRATAFIX PDS 1 CTX 18IN

## (undated) DEVICE — VALVE CONTROL COPILOT

## (undated) DEVICE — GUIDEWIRE WHOLEY STD STR 260CM

## (undated) DEVICE — KIT TOTAL HIP OPTIVAC

## (undated) DEVICE — NDL 18GA X1 1/2 REG BEVEL

## (undated) DEVICE — CATH HEARTRAIL III 6FR 100 IR1

## (undated) DEVICE — SEALER BIPOLAR TISSUE 6.0